# Patient Record
Sex: MALE | Race: WHITE | NOT HISPANIC OR LATINO | Employment: OTHER | URBAN - METROPOLITAN AREA
[De-identification: names, ages, dates, MRNs, and addresses within clinical notes are randomized per-mention and may not be internally consistent; named-entity substitution may affect disease eponyms.]

---

## 2017-01-12 ENCOUNTER — GENERIC CONVERSION - ENCOUNTER (OUTPATIENT)
Dept: OTHER | Facility: OTHER | Age: 82
End: 2017-01-12

## 2017-01-16 ENCOUNTER — GENERIC CONVERSION - ENCOUNTER (OUTPATIENT)
Dept: OTHER | Facility: OTHER | Age: 82
End: 2017-01-16

## 2017-04-25 ENCOUNTER — GENERIC CONVERSION - ENCOUNTER (OUTPATIENT)
Dept: OTHER | Facility: OTHER | Age: 82
End: 2017-04-25

## 2017-05-30 ENCOUNTER — ALLSCRIPTS OFFICE VISIT (OUTPATIENT)
Dept: OTHER | Facility: OTHER | Age: 82
End: 2017-05-30

## 2017-05-30 DIAGNOSIS — R51 HEADACHE(784.0): ICD-10-CM

## 2017-06-01 ENCOUNTER — GENERIC CONVERSION - ENCOUNTER (OUTPATIENT)
Dept: OTHER | Facility: OTHER | Age: 82
End: 2017-06-01

## 2017-06-15 ENCOUNTER — GENERIC CONVERSION - ENCOUNTER (OUTPATIENT)
Dept: OTHER | Facility: OTHER | Age: 82
End: 2017-06-15

## 2017-06-15 LAB
A/G RATIO (HISTORICAL): 1.8 (ref 1.2–2.2)
ALBUMIN SERPL BCP-MCNC: 4.5 G/DL (ref 3.5–4.7)
ALP SERPL-CCNC: 67 IU/L (ref 39–117)
ALT SERPL W P-5'-P-CCNC: 21 IU/L (ref 0–44)
AST SERPL W P-5'-P-CCNC: 26 IU/L (ref 0–40)
BILIRUB SERPL-MCNC: 0.7 MG/DL (ref 0–1.2)
BUN SERPL-MCNC: 17 MG/DL (ref 8–27)
BUN/CREA RATIO (HISTORICAL): 19 (ref 10–24)
CALCIUM SERPL-MCNC: 9.2 MG/DL (ref 8.6–10.2)
CHLORIDE SERPL-SCNC: 95 MMOL/L (ref 96–106)
CO2 SERPL-SCNC: 24 MMOL/L (ref 18–29)
CREAT SERPL-MCNC: 0.89 MG/DL (ref 0.76–1.27)
DEPRECATED RDW RBC AUTO: 13.6 % (ref 12.3–15.4)
EGFR AFRICAN AMERICAN (HISTORICAL): 91 ML/MIN/1.73
EGFR-AMERICAN CALC (HISTORICAL): 79 ML/MIN/1.73
ERYTHROCYTE SEDIMENTATION RATE (HISTORICAL): 6 MM/HR (ref 0–30)
GLUCOSE SERPL-MCNC: 110 MG/DL (ref 65–99)
HCT VFR BLD AUTO: 40.2 % (ref 37.5–51)
HGB BLD-MCNC: 13.5 G/DL (ref 12.6–17.7)
MCH RBC QN AUTO: 31.7 PG (ref 26.6–33)
MCHC RBC AUTO-ENTMCNC: 33.6 G/DL (ref 31.5–35.7)
MCV RBC AUTO: 94 FL (ref 79–97)
POTASSIUM SERPL-SCNC: 4.4 MMOL/L (ref 3.5–5.2)
RBC (HISTORICAL): 4.26 X10E6/UL (ref 4.14–5.8)
SODIUM SERPL-SCNC: 135 MMOL/L (ref 134–144)
TOT. GLOBULIN, SERUM (HISTORICAL): 2.5 G/DL (ref 1.5–4.5)
TOTAL PROTEIN (HISTORICAL): 7 G/DL (ref 6–8.5)
WBC # BLD AUTO: 5.5 X10E3/UL (ref 3.4–10.8)

## 2017-07-17 ENCOUNTER — GENERIC CONVERSION - ENCOUNTER (OUTPATIENT)
Dept: OTHER | Facility: OTHER | Age: 82
End: 2017-07-17

## 2017-12-05 ENCOUNTER — GENERIC CONVERSION - ENCOUNTER (OUTPATIENT)
Dept: FAMILY MEDICINE CLINIC | Facility: HOSPITAL | Age: 82
End: 2017-12-05

## 2017-12-26 ENCOUNTER — HOSPITAL ENCOUNTER (OUTPATIENT)
Dept: RADIOLOGY | Facility: HOSPITAL | Age: 82
Discharge: HOME | End: 2017-12-28
Payer: COMMERCIAL

## 2017-12-28 ENCOUNTER — ALLSCRIPTS OFFICE VISIT (OUTPATIENT)
Dept: OTHER | Facility: OTHER | Age: 82
End: 2017-12-28

## 2017-12-28 DIAGNOSIS — R07.89 OTHER CHEST PAIN: ICD-10-CM

## 2017-12-29 ENCOUNTER — TRANSCRIBE ORDERS (OUTPATIENT)
Dept: ADMINISTRATIVE | Facility: HOSPITAL | Age: 82
End: 2017-12-29

## 2017-12-29 ENCOUNTER — HOSPITAL ENCOUNTER (OUTPATIENT)
Dept: RADIOLOGY | Facility: HOSPITAL | Age: 82
Discharge: HOME | End: 2017-12-29
Attending: INTERNAL MEDICINE
Payer: COMMERCIAL

## 2017-12-29 DIAGNOSIS — R07.89 OTHER CHEST PAIN: ICD-10-CM

## 2017-12-29 PROCEDURE — 71020 HB X-RAY EXAM CHEST 2 VIEWS: CPT

## 2017-12-29 NOTE — PROGRESS NOTES
Assessment   1  Chest pain, atypical (786 59) (R07 89)   2  Artificial pacemaker (V45 01) (Z95 0)    Plan   Chest pain, atypical    · * XR CHEST PA & LATERAL; Status:Active; Requested for:44Ypd9102;     Chief Complaint   PT having chest pain on the right side - goes across chest to pacemaker - started with the placement of pacer  History of Present Illness   HPI: gets pain in the R side of chest-mostly with activity It is in the anterior chest  Pain aching  This could last for < 24 hours  No SOB a/w this   No smoking hx      Review of Systems        Constitutional: no fever or chills, feels well, no tiredness, no recent weight loss or weight gain  Cardiovascular: as noted in HPI  Respiratory: as noted in HPI  Gastrointestinal: no complaints of abdominal pain, no constipation, no nausea or vomiting, no diarrhea or bloody stools  Genitourinary: no complaints of dysuria or incontinence, no hesitancy, no nocturia, no genital lesion, no inadequacy of penile erection  Active Problems   1  Advanced directive placed in chart this admission (V49 89) (Z78 9)   2  Aortic stenosis (424 1) (I35 0)   3  Artificial pacemaker (V45 01) (Z95 0)   4  Chronic nonintractable headache, unspecified headache type (784 0) (R51)   5  Enlarged prostate without lower urinary tract symptoms (luts) (600 00) (N40 0)   6  GERD without esophagitis (530 81) (K21 9)   7  Hypertension (401 9) (I10)   8  Hypothyroidism (244 9) (E03 9)    Social History    · Cultural background   · non-   · Former smoker (V15 82) (R06 862)   · Quit 50-60 years ago   · Lives with wife   · Living Situation: Supportive and safe   · Living will on file (I72 46) (Z28 061)   ·    · Primary language is English   · Racial background   · white    Current Meds    1  Aspirin EC 81 MG Oral Tablet Delayed Release; TAKE 1 TABLET DAILY AS DIRECTED; Therapy: 29Bvd0355 to (Evaluate:06Apr2017) Recorded   2   Fish Oil 1200 MG Oral Capsule; take 1 capsule daily; Therapy: (Recorded:14Oct2014) to Recorded   3  Glucosamine 1500 Complex Oral Capsule; TAKE 1 CAPSULE DAILY; Therapy: (Recorded:14Oct2014) to Recorded   4  Levothyroxine Sodium 50 MCG Oral Tablet; TAKE ONE TABLET BY MOUTH ONCE DAILY; Therapy: 43SHY6664 to (Evaluate:62Vxt7345)  Requested for: 36WGM4300; Last     Rx:36Yme2646 Ordered   5  Metoprolol Tartrate 100 MG Oral Tablet; Take 1 tablet twice daily; Therapy: 36Ouo2668 to (Evaluate:91Avp2107) Recorded   6  Multivitamins CAPS; TAKE 1 CAPSULE DAILY; Therapy: (Recorded:33Kck1091) to Recorded   7  Tamsulosin HCl - 0 4 MG Oral Capsule; take 1 capsule daily; Therapy: 00JUO7997 to Recorded   8  Vitamin B12 TR 1000 MCG Oral Tablet Extended Release; TAKE 1 TABLET DAILY AS     DIRECTED; Therapy: (Recorded:14Oct2014) to Recorded   9  Vitamin C Plus 500 MG Oral Tablet; TAKE 1 TABLET DAILY; Therapy: (Recorded:14Oct2014) to Recorded     The medication list was reviewed and updated today  Allergies   1  Augmentin TABS  2  No Known Environmental Allergies   3  No Known Food Allergies    Vitals    Recorded: 25Ifn9382 09:19AM Recorded: 62Yvg5512 09:03AM   Heart Rate  75   Systolic 723 991, LUE, Sitting   Diastolic 85 90, LUE, Sitting   Height  5 ft 8 in   Weight  178 lb    BMI Calculated  27 07   BSA Calculated  1 95   O2 Saturation  98     Physical Exam        Constitutional      General appearance: No acute distress, well appearing and well nourished  Pulmonary there is not chest wall pain  Auscultation of lungs: Clear to auscultation, equal breath sounds bilaterally, no wheezes, no rales, no rhonci  Cardiovascular      Auscultation of heart: Normal rate and rhythm, normal S1 and S2, without murmurs         Examination of extremities for edema and/or varicosities: Normal           Results/Data   PHQ-2 Adult Depression Screening 08Nob9666 09:06AM User, Ahs      Test Name Result Flag Reference   PHQ-2 Adult Depression Score 0     Over the last two weeks, how often have you been bothered by any of the following problems?      Little interest or pleasure in doing things: Not at all - 0     Feeling down, depressed, or hopeless: Not at all - 0   PHQ-2 Adult Depression Screening Negative          Signatures    Electronically signed by : MARILIA Carpio ; Dec 28 2017  9:27AM EST                       (Author)

## 2018-01-03 ENCOUNTER — GENERIC CONVERSION - ENCOUNTER (OUTPATIENT)
Dept: OTHER | Facility: OTHER | Age: 83
End: 2018-01-03

## 2018-01-03 ENCOUNTER — GENERIC CONVERSION - ENCOUNTER (OUTPATIENT)
Dept: FAMILY MEDICINE CLINIC | Facility: HOSPITAL | Age: 83
End: 2018-01-03

## 2018-01-04 ENCOUNTER — GENERIC CONVERSION - ENCOUNTER (OUTPATIENT)
Dept: OTHER | Facility: OTHER | Age: 83
End: 2018-01-04

## 2018-01-14 VITALS
HEIGHT: 68 IN | HEART RATE: 72 BPM | BODY MASS INDEX: 25.77 KG/M2 | WEIGHT: 170.03 LBS | TEMPERATURE: 98.3 F | DIASTOLIC BLOOD PRESSURE: 88 MMHG | SYSTOLIC BLOOD PRESSURE: 150 MMHG

## 2018-01-15 ENCOUNTER — HOSPITAL ENCOUNTER (OUTPATIENT)
Dept: CT IMAGING | Facility: HOSPITAL | Age: 83
Discharge: HOME | End: 2018-01-15
Attending: INTERNAL MEDICINE
Payer: COMMERCIAL

## 2018-01-15 DIAGNOSIS — R07.89 OTHER CHEST PAIN: ICD-10-CM

## 2018-01-15 PROCEDURE — 71260 CT THORAX DX C+: CPT

## 2018-01-15 RX ADMIN — IOHEXOL 85 ML: 350 INJECTION, SOLUTION INTRAVENOUS at 15:15

## 2018-01-15 NOTE — RESULT NOTES
Message   Call, CT is okay however I still want him seen by surgery     Verified Results  * CT ABDOMEN PELVIS W CONTRAST 21Jan2016 04:23PM Nancy Das     Test Name Result Flag Reference   CT ABDOMEN PELVIS W CONTRAST (Report)     CT ABDOMEN AND PELVIS WITH IV CONTRAST     INDICATION: Mid to lower pelvic and right groin region discomfort for 3 weeks  History of hernia surgery  COMPARISON: 7/2/2010     TECHNIQUE: CT examination of the abdomen and pelvis was performed after the administration of intravenous contrast  Examination was performed utilizing techniques to minimize radiation dose, including the use of dose reduction software  Axial,    sagittal, and coronal reformatted images were submitted for interpretation  100 cc of intravenous Omnipaque 350 was administered for this examination  Enteric contrast was given  FINDINGS:     ABDOMEN     LOWER CHEST: No significant abnormalities identified in the lower chest      LIVER/BILIARY TREE: Unremarkable  GALLBLADDER: No calcified gallstones  No pericholecystic inflammatory change  SPLEEN: Unremarkable  PANCREAS: Unremarkable  ADRENAL GLANDS: Unremarkable  KIDNEYS/URETERS: Unremarkable  No hydronephrosis  STOMACH AND BOWEL: There is colonic diverticulosis without evidence of acute diverticulitis  APPENDIX: No findings to suggest appendicitis  ABDOMINOPELVIC CAVITY: No ascites or free intraperitoneal air  No lymphadenopathy  VESSELS: Unremarkable for patient's age  PELVIS     REPRODUCTIVE ORGANS: Unremarkable for patient's age  URINARY BLADDER: Unremarkable  ABDOMINAL WALL/INGUINAL REGIONS: Unremarkable  OSSEOUS STRUCTURES: No acute fracture or destructive osseous lesion  IMPRESSION:     No acute inflammatory process identified in the abdomen or pelvis  Signed by:    Zak Delvalle MD   1/22/16

## 2018-01-16 ENCOUNTER — GENERIC CONVERSION - ENCOUNTER (OUTPATIENT)
Dept: OTHER | Facility: OTHER | Age: 83
End: 2018-01-16

## 2018-01-23 VITALS
SYSTOLIC BLOOD PRESSURE: 142 MMHG | HEIGHT: 68 IN | OXYGEN SATURATION: 98 % | HEART RATE: 75 BPM | WEIGHT: 178 LBS | BODY MASS INDEX: 26.98 KG/M2 | DIASTOLIC BLOOD PRESSURE: 85 MMHG

## 2018-01-23 NOTE — RESULT NOTES
Verified Results  * XR CHEST PA & LATERAL 57WYK9093 11:29AM Rajiv Babin Order Number: [id number]     Test Name Result Flag Reference   XR CHEST PA & LATERAL (Report)     CHEST - DUAL ENERGY     INDICATION: Anterior chest pain  COMPARISON: 7/27/2015     VIEWS: PA (including soft tissue/bone algorithms) and lateral projections     IMAGES: 4     FINDINGS: Left chest wall pacemaker and pacemaker leads project appropriately  Cardiomediastinal silhouette appears unremarkable  The lungs are clear  No pneumothorax or pleural effusion  Stable calcified granuloma within the left lung base  Visualized osseous structures appear within normal limits for the patient's age  IMPRESSION:     No active pulmonary disease         Workstation performed: [id number]     Signed by:   Kaylen Laguerre DO   1/3/18

## 2018-01-23 NOTE — RESULT NOTES
Verified Results  CT CHEST W CONTRAST [id number] 02:48PM Tima Wilder Order Number: [id number]   Performing Comments: r/o mediastinal process   - Patient Instructions: To schedule this appointment, please contact Central Scheduling at [phone]  Test Name Result Flag Reference   CT CHEST W CONTRAST (Report)     CT CHEST WITH IV CONTRAST     INDICATION: Chest pain     COMPARISON: Chest radiograph 12/29/2017     TECHNIQUE: CT examination of the chest was performed  Reformatted images were created in axial, sagittal, and coronal planes  Radiation dose length product (DLP) for this visit: 348 49 mGy-cm   This examination, like all CT scans performed in the [facility], was performed utilizing techniques to minimize radiation dose exposure, including the use of iterative   reconstruction and automated exposure control  IV Contrast: 85 mL of iohexol (OMNIPAQUE)          FINDINGS:     LUNGS: There are scattered calcified subcentimeter nodules bilaterally  No suspicious nodule or mass  No endobronchial lesion  No focal consolidation  PLEURA: Unremarkable  HEART/GREAT VESSELS: There is enlargement of the ascending aorta measuring 4 1 cm at the level of the right pulmonary artery  The heart is normal in size without pericardial mass or effusion  There are atherosclerotic changes of the aorta and coronary   arteries  MEDIASTINUM AND BERRY: There are calcified left hilar lymph nodes present  CHEST WALL AND LOWER NECK:    There is a left-sided pacer generator device  VISUALIZED STRUCTURES IN THE UPPER ABDOMEN: Unremarkable  OSSEOUS STRUCTURES: No acute fracture  No destructive osseous lesion  IMPRESSION:   1  Calcified pulmonary nodules and left hilar lymph nodes compatible with old granulomatous disease  2  No suspicious pulmonary nodule or mass  3  Mild enlargement of the ascending aorta measuring 4 1 cm at the level of the right pulmonary artery  Workstation performed: [id number]     Signed by:   Windy Ambriz MD   1/16/18

## 2018-07-09 DIAGNOSIS — E03.9 ACQUIRED HYPOTHYROIDISM: Primary | ICD-10-CM

## 2018-07-09 RX ORDER — LEVOTHYROXINE SODIUM 0.05 MG/1
TABLET ORAL
Qty: 90 TABLET | Refills: 3 | Status: SHIPPED | OUTPATIENT
Start: 2018-07-09 | End: 2018-11-09

## 2018-10-05 ENCOUNTER — OFFICE VISIT (OUTPATIENT)
Dept: FAMILY MEDICINE CLINIC | Facility: HOSPITAL | Age: 83
End: 2018-10-05
Payer: COMMERCIAL

## 2018-10-05 VITALS
HEART RATE: 78 BPM | SYSTOLIC BLOOD PRESSURE: 138 MMHG | HEIGHT: 70 IN | BODY MASS INDEX: 24.55 KG/M2 | WEIGHT: 171.5 LBS | DIASTOLIC BLOOD PRESSURE: 80 MMHG

## 2018-10-05 DIAGNOSIS — Z23 NEED FOR VACCINATION WITH 13-POLYVALENT PNEUMOCOCCAL CONJUGATE VACCINE: ICD-10-CM

## 2018-10-05 DIAGNOSIS — I35.0 NONRHEUMATIC AORTIC VALVE STENOSIS: ICD-10-CM

## 2018-10-05 DIAGNOSIS — E03.9 ACQUIRED HYPOTHYROIDISM: ICD-10-CM

## 2018-10-05 DIAGNOSIS — Z23 NEED FOR INFLUENZA VACCINATION: ICD-10-CM

## 2018-10-05 DIAGNOSIS — Z00.00 MEDICARE ANNUAL WELLNESS VISIT, SUBSEQUENT: Primary | ICD-10-CM

## 2018-10-05 DIAGNOSIS — N40.0 BENIGN PROSTATIC HYPERPLASIA, UNSPECIFIED WHETHER LOWER URINARY TRACT SYMPTOMS PRESENT: Primary | ICD-10-CM

## 2018-10-05 DIAGNOSIS — I10 ESSENTIAL HYPERTENSION: ICD-10-CM

## 2018-10-05 DIAGNOSIS — R07.9 RIGHT-SIDED CHEST PAIN: ICD-10-CM

## 2018-10-05 DIAGNOSIS — K21.9 GERD WITHOUT ESOPHAGITIS: ICD-10-CM

## 2018-10-05 PROBLEM — G89.29 CHRONIC NONINTRACTABLE HEADACHE: Status: ACTIVE | Noted: 2017-05-30

## 2018-10-05 PROBLEM — R51.9 CHRONIC NONINTRACTABLE HEADACHE: Status: ACTIVE | Noted: 2017-05-30

## 2018-10-05 PROCEDURE — 90662 IIV NO PRSV INCREASED AG IM: CPT

## 2018-10-05 PROCEDURE — 90670 PCV13 VACCINE IM: CPT

## 2018-10-05 PROCEDURE — G0439 PPPS, SUBSEQ VISIT: HCPCS | Performed by: INTERNAL MEDICINE

## 2018-10-05 PROCEDURE — 1125F AMNT PAIN NOTED PAIN PRSNT: CPT

## 2018-10-05 PROCEDURE — 99213 OFFICE O/P EST LOW 20 MIN: CPT | Performed by: INTERNAL MEDICINE

## 2018-10-05 PROCEDURE — 1170F FXNL STATUS ASSESSED: CPT

## 2018-10-05 PROCEDURE — G0009 ADMIN PNEUMOCOCCAL VACCINE: HCPCS

## 2018-10-05 PROCEDURE — G0008 ADMIN INFLUENZA VIRUS VAC: HCPCS

## 2018-10-05 RX ORDER — METOPROLOL TARTRATE 100 MG/1
1 TABLET ORAL 2 TIMES DAILY
COMMUNITY
Start: 2016-12-27 | End: 2019-07-25 | Stop reason: SDUPTHER

## 2018-10-05 RX ORDER — MULTIVITAMIN
1 CAPSULE ORAL DAILY
COMMUNITY

## 2018-10-05 RX ORDER — TAMSULOSIN HYDROCHLORIDE 0.4 MG/1
0.4 CAPSULE ORAL DAILY
Qty: 90 CAPSULE | Refills: 3 | Status: SHIPPED | OUTPATIENT
Start: 2018-10-05 | End: 2019-07-01 | Stop reason: SDUPTHER

## 2018-10-05 RX ORDER — MULTIVIT-MIN/IRON/FOLIC ACID/K 18-600-40
1 CAPSULE ORAL DAILY
COMMUNITY

## 2018-10-05 RX ORDER — ECHINACEA 400 MG
1 CAPSULE ORAL DAILY
COMMUNITY

## 2018-10-05 RX ORDER — TAMSULOSIN HYDROCHLORIDE 0.4 MG/1
1 CAPSULE ORAL DAILY
COMMUNITY
Start: 2017-05-30 | End: 2018-10-05 | Stop reason: SDUPTHER

## 2018-10-05 RX ORDER — HYDROCORTISONE ACETATE 0.5 %
1 CREAM (GRAM) TOPICAL DAILY
COMMUNITY

## 2018-10-05 RX ORDER — GABAPENTIN 100 MG/1
100 CAPSULE ORAL
Qty: 90 CAPSULE | Refills: 3 | Status: SHIPPED | OUTPATIENT
Start: 2018-10-05 | End: 2018-11-13 | Stop reason: SDUPTHER

## 2018-10-05 RX ORDER — AMOXICILLIN 500 MG
1 CAPSULE ORAL DAILY
COMMUNITY
End: 2022-05-20

## 2018-10-05 NOTE — PROGRESS NOTES
Assessment and Plan:    Problem List Items Addressed This Visit     None      Visit Diagnoses     Medicare annual wellness visit, subsequent    -  Primary        Health Maintenance Due   Topic Date Due    Depression Screening PHQ  10/07/1932    Medicare Annual Wellness Visit (AWV)  10/07/1932    DTaP,Tdap,and Td Vaccines (1 - Tdap) 10/07/1953    Fall Risk  10/07/1997    Pneumococcal PPSV23/PCV13 65+ Years / High and Highest Risk (2 of 2 - PCV13) 10/01/2013    INFLUENZA VACCINE  09/01/2018         HPI:  Soumya Jacob is a 80 y o  male here for his Subsequent Wellness Visit  There is no problem list on file for this patient      Past Medical History:   Diagnosis Date    Arthropathy     multiple sites    Cardiac dysrhythmia     Hepatitis A     Melanoma ([facility] [address] )     malignant of skin     Past Surgical History:   Procedure Laterality Date    INGUINAL HERNIA REPAIR       Family History   Problem Relation Age of Onset    Cancer Family         gastrointestinal tract     History   Smoking Status    Never Smoker   Smokeless Tobacco    Never Used     Comment: quit 50-60 yr ago     History   Alcohol Use No      History   Drug Use No       Current Outpatient Prescriptions   Medication Sig Dispense Refill    Ascorbic Acid (VITAMIN C) 500 MG CAPS Take 1 tablet by mouth daily      aspirin 81 MG tablet Take 1 tablet by mouth daily      Cyanocobalamin (VITAMIN B12) 1000 MCG TBCR Take 1 tablet by mouth daily      Flaxseed, Linseed, (FLAXSEED OIL) 1000 MG CAPS Take 1 capsule by mouth daily      Glucosamine-Chondroit-Vit C-Mn (GLUCOSAMINE 1500 COMPLEX) CAPS Take 1 capsule by mouth daily      levothyroxine 50 mcg tablet TAKE ONE TABLET BY MOUTH ONCE DAILY 90 tablet 3    metoprolol tartrate (LOPRESSOR) 100 mg tablet Take 1 tablet by mouth 2 (two) times a day      Multiple Vitamin (MULTIVITAMIN) capsule Take 1 capsule by mouth daily      Omega-3 Fatty Acids (FISH OIL) 1200 MG CAPS Take 1 capsule by mouth daily      tamsulosin (FLOMAX) 0 4 mg Take 1 capsule by mouth daily       No current facility-administered medications for this visit  Allergies   Allergen Reactions    Amoxicillin Other (See Comments)     Severe weakness     Immunization History   Administered Date(s) Administered    Influenza Split High Dose Preservative Free IM 10/06/2014, 10/19/2015, 10/23/2017    Influenza TIV (IM) 10/01/2012, 10/15/2014    Pneumococcal Polysaccharide PPV23 10/01/2012    Zoster 2008       Patient Care Team:  Nelly Caldwell MD as PCP - General  Nelly Caldwell MD    Medicare Screening Tests and Risk Assessments:  Last Medicare Wellness visit information reviewed, patient interviewed, no change since last AWV  Health Risk Assessment:  Patient rates overall health as very good  Patient feels that their physical health rating is Same  Eyesight was rated as Same  Hearing was rated as Same  Patient feels that their emotional and mental health rating is Same  Pain experienced by patient in the last 7 days has been A lot  Patient's pain rating has been 7/10  Patient states that he has experienced no weight loss or gain in last 6 months  (Additional comments: Since having pacemaker inserted about 2 years ago, he has had chest pain nearly everyday  )    Emotional/Mental Health:  Patient has been feeling nervous/anxious  PHQ-9 Depression Screening:    Frequency of the following problems over the past two weeks:      1  Little interest or pleasure in doing things: 0 - not at all      2  Feeling down, depressed, or hopeless: 1 - several days      3  Trouble falling or staying asleep, or sleeping too much: 3 - nearly every day      4  Feeling tired or having little energy: 3 - nearly every day      5  Poor appetite or overeatin - not at all      6  Feeling bad about yourself - or that you are a failure or have let yourself or your family down: 3 - nearly every day      7   Trouble concentrating on things, such as reading the newspaper or watching television: 0 - not at all      8  Moving or speaking so slowly that other people could have noticed  Or the opposite - being so fidgety or restless that you have been moving around a lot more than usual: 0 - not at all      9  Thoughts that you would be better off dead, or of hurting yourself in some way: 2 - more than half the days  PHQ-2 Score: 1  PHQ-9 Score: 12    Broken Bones/Falls: Fall Risk Assessment:    In the past year, patient has experienced: No history of falling in past year          Bladder/Bowel:  Patient has not leaked urine accidently in the last six months  Patient reports no loss of bowel control  Immunizations:  Patient has had a flu vaccination within the last year  Patient has received a pneumonia shot  Patient has received a shingles shot  Patient has not received tetanus/diphtheria shot  Home Safety:  Patient does not have trouble with stairs inside or outside of their home  Patient currently reports that there are no safety hazards present in home, working smoke alarms, working carbon monoxide detectors  Preventative Screenings:   no prostate cancer screen performed, no colon cancer screen completed, no cholesterol screen completed, glaucoma eye exam completed,     Nutrition:  Current diet: Regular with servings of the following:    Medications:  Patient is currently taking over-the-counter supplements  List of OTC medications includes: Fish Oil, mulitvitamin, [id number], glucosamine, vitamin C  Patient is not able to manage medications  (Additional Comments: His wife manages his medications  )Lifestyle Choices:  Patient reports no tobacco use  Patient has not smoked or used tobacco in the past   Patient reports no alcohol use  Patient drives a vehicle  Patient wears seat belt  Current level of exercise of physical activity described by patient as: Does a lot of physical work around the house          Activities of Daily Living:  Can get out of bed by his or her self, able to dress self, able to make own meals, able to do own shopping, able to bathe self, can do own laundry/housekeeping, can manage own money, pay bills and track expenses    Previous Hospitalizations:  No hospitalization or ED visit in past 12 months        Advanced Directives:  Patient has decided on a power of   Patient has spoken to designated power of   Patient has completed advanced directive  Preventative Screening/Counseling:      Cardiovascular:      General: Screening Not Indicated          Diabetes:      General: Screening Current          Colorectal Cancer:      General: Screening Current          Prostate Cancer:      General: Screening Not Indicated          Osteoporosis:      General: Screening Not Indicated          AAA:      General: Screening Not Indicated          HIV:      General: Screening Not Indicated          Hepatitis C:      General: Screening Not Indicated        Advanced Directives:   Patient has living will for healthcare, has durable POA for healthcare, patient has an advanced directive       Immunizations:  Patient reviewed and up to date      Pneumococcal: Pneumococcal Due Today

## 2018-10-05 NOTE — PROGRESS NOTES
Assessment/Plan:       Diagnoses and all orders for this visit:    Medicare annual wellness visit, subsequent    Need for influenza vaccination  -     influenza vaccine, 9627-6978, high-dose, PF 0 5 mL, for patients 65 yr+ (FLUZONE HIGH-DOSE)    Need for vaccination with 13-polyvalent pneumococcal conjugate vaccine  -     PNEUMOCOCCAL CONJUGATE VACCINE 13-VALENT GREATER THAN 6 MONTHS    Acquired hypothyroidism    Essential hypertension    GERD without esophagitis    Nonrheumatic aortic valve stenosis    Other orders  -     aspirin 81 MG tablet; Take 1 tablet by mouth daily  -     Omega-3 Fatty Acids (FISH OIL) 1200 MG CAPS; Take 1 capsule by mouth daily  -     Glucosamine-Chondroit-Vit C-Mn (GLUCOSAMINE 1500 COMPLEX) CAPS; Take 1 capsule by mouth daily  -     metoprolol tartrate (LOPRESSOR) 100 mg tablet; Take 1 tablet by mouth 2 (two) times a day  -     Multiple Vitamin (MULTIVITAMIN) capsule; Take 1 capsule by mouth daily  -     tamsulosin (FLOMAX) 0 4 mg; Take 1 capsule by mouth daily  -     Cyanocobalamin (VITAMIN B12) 1000 MCG TBCR; Take 1 tablet by mouth daily  -     Ascorbic Acid (VITAMIN C) 500 MG CAPS; Take 1 tablet by mouth daily  -     Flaxseed, Linseed, (FLAXSEED OIL) 1000 MG CAPS; Take 1 capsule by mouth daily          All of the above diagnoses have been assessed  Additional COMMENTS/PLAN: Will see back in 6 weeks with venus ayala  Subjective:      Patient ID: Jm Mckenzie is a 80 y o  male  HPI     CP-Still has R sided CP  Almost every day  When he has pain he can not function  This may be neuropathic  Hypothyroid - Patient denies any symptoms such as heat or cold intolerance, change in hair texture, or change in bowel habits  There has been no significant change in weight  HTN-compliant with meds      The following portions of the patient's history were revised and updated as appropriate: Problem list, allergies, med list, FH, SH, Past medical and surgical histories      Current Outpatient Prescriptions   Medication Sig Dispense Refill    Ascorbic Acid (VITAMIN C) 500 MG CAPS Take 1 tablet by mouth daily      aspirin 81 MG tablet Take 1 tablet by mouth daily      Cyanocobalamin (VITAMIN B12) 1000 MCG TBCR Take 1 tablet by mouth daily      Flaxseed, Linseed, (FLAXSEED OIL) 1000 MG CAPS Take 1 capsule by mouth daily      Glucosamine-Chondroit-Vit C-Mn (GLUCOSAMINE 1500 COMPLEX) CAPS Take 1 capsule by mouth daily      levothyroxine 50 mcg tablet TAKE ONE TABLET BY MOUTH ONCE DAILY 90 tablet 3    metoprolol tartrate (LOPRESSOR) 100 mg tablet Take 1 tablet by mouth 2 (two) times a day      Multiple Vitamin (MULTIVITAMIN) capsule Take 1 capsule by mouth daily      Omega-3 Fatty Acids (FISH OIL) 1200 MG CAPS Take 1 capsule by mouth daily      tamsulosin (FLOMAX) 0 4 mg Take 1 capsule by mouth daily       No current facility-administered medications for this visit  Review of Systems   All other systems reviewed and are negative  Objective:    /90 (BP Location: Left arm, Patient Position: Sitting, Cuff Size: Standard)   Pulse 78   Ht 5' 9 5" (1 765 m)   Wt 77 8 kg (171 lb 8 oz)   BMI 24 96 kg/m²      Physical Exam   Constitutional: He is oriented to person, place, and time  He appears well-developed and well-nourished  HENT:   Bilateral cerumen obstruction flushed out  Neck: No JVD present  Cardiovascular: Normal rate and regular rhythm  No CW pain   Pulmonary/Chest: Effort normal    Abdominal: Soft  Bowel sounds are normal    Musculoskeletal: He exhibits no edema  Neurological: He is alert and oriented to person, place, and time  Skin: Skin is warm and dry  Vitals reviewed  No visits with results within 2 Week(s) from this visit     Latest known visit with results is:   Generic Conversion - Encounter on 06/15/2017   Component Date Value Ref Range Status    ERYTHROCYTE SEDIMENTATION RATE 06/15/2017 6  0 - 30 mm/hr Final    Comment:   Performed at: [address] [address], , [place], [place], [id number], [id number]  MD:  [id number] Mayelin Guevara [phone]      Glucose 06/15/2017 110* 65 - 99 mg/dL Final    BUN 06/15/2017 17  8 - 27 mg/dL Final    Creatinine 06/15/2017 0 89  0 76 - 1 27 mg/dL Final    BUN/CREA Ratio 06/15/2017 19  10 - 24 Final    Sodium 06/15/2017 135  134 - 144 mmol/L Final    Potassium 06/15/2017 4 4  3 5 - 5 2 mmol/L Final    Chloride 06/15/2017 95* 96 - 106 mmol/L Final    CO2 06/15/2017 24  18 - 29 mmol/L Final    Calcium 06/15/2017 9 2  8 6 - 10 2 mg/dL Final    Total Protein 06/15/2017 7 0  6 0 - 8 5 g/dL Final    Albumin 06/15/2017 4 5  3 5 - 4 7 g/dL Final    Tot   Globulin, Serum 06/15/2017 2 5  1 5 - 4 5 g/dL Final    A/G RATIO 06/15/2017 1 8  1 2 - 2 2 Final    Total Bilirubin 06/15/2017 0 7  0 0 - 1 2 mg/dL Final    Alkaline Phosphatase 06/15/2017 67  39 - 117 IU/L Final    AST 06/15/2017 26  0 - 40 IU/L Final    ALT 06/15/2017 21  0 - 44 IU/L Final    EGFR-AMERICAN CALC 06/15/2017 79  >59 mL/min/1 73 Final    eGFR  06/15/2017 91  >59 mL/min/1 73 Final    Comment:   Performed at: [address] [address], , [place], [place], [id number], [id number]  MD:  [id number] Mayelin Guevara [phone]      WBC 06/15/2017 5 5  3 4 - 10 8 x10E3/uL Final    RBC 06/15/2017 4 26  4 14 - 5 80 x10E6/uL Final    Hemoglobin 06/15/2017 13 5  12 6 - 17 7 g/dL Final    Hematocrit 06/15/2017 40 2  37 5 - 51 0 % Final    MCV 06/15/2017 94  79 - 97 fL Final    MCH 06/15/2017 31 7  26 6 - 33 0 pg Final    MCHC 06/15/2017 33 6  31 5 - 35 7 g/dL Final    RDW 06/15/2017 13 6  12 3 - 15 4 % Final    Comment:   Performed at: [address] [place] [address], Magaly Gonsalves, , [place], [place], [id number], [id number]  MD:  Magaly Lundy [phone]           Chris Lizarraga MD

## 2018-10-15 ENCOUNTER — TELEPHONE (OUTPATIENT)
Dept: FAMILY MEDICINE CLINIC | Facility: HOSPITAL | Age: 83
End: 2018-10-15

## 2018-11-07 LAB — TSH SERPL DL<=0.005 MIU/L-ACNC: 7.01 UIU/ML (ref 0.45–4.5)

## 2018-11-09 DIAGNOSIS — E03.9 HYPOTHYROIDISM, UNSPECIFIED TYPE: Primary | ICD-10-CM

## 2018-11-09 DIAGNOSIS — E03.9 ACQUIRED HYPOTHYROIDISM: ICD-10-CM

## 2018-11-09 RX ORDER — LEVOTHYROXINE SODIUM 0.1 MG/1
100 TABLET ORAL DAILY
Qty: 30 TABLET | Refills: 3 | Status: CANCELLED | OUTPATIENT
Start: 2018-11-09

## 2018-11-13 ENCOUNTER — OFFICE VISIT (OUTPATIENT)
Dept: FAMILY MEDICINE CLINIC | Facility: HOSPITAL | Age: 83
End: 2018-11-13
Payer: COMMERCIAL

## 2018-11-13 VITALS
BODY MASS INDEX: 25.05 KG/M2 | DIASTOLIC BLOOD PRESSURE: 78 MMHG | WEIGHT: 175 LBS | HEART RATE: 81 BPM | HEIGHT: 70 IN | SYSTOLIC BLOOD PRESSURE: 130 MMHG

## 2018-11-13 DIAGNOSIS — R07.9 RIGHT-SIDED CHEST PAIN: ICD-10-CM

## 2018-11-13 DIAGNOSIS — E03.9 ACQUIRED HYPOTHYROIDISM: Primary | ICD-10-CM

## 2018-11-13 PROCEDURE — 99213 OFFICE O/P EST LOW 20 MIN: CPT | Performed by: INTERNAL MEDICINE

## 2018-11-13 PROCEDURE — 1036F TOBACCO NON-USER: CPT | Performed by: INTERNAL MEDICINE

## 2018-11-13 PROCEDURE — 1160F RVW MEDS BY RX/DR IN RCRD: CPT | Performed by: INTERNAL MEDICINE

## 2018-11-13 PROCEDURE — 4040F PNEUMOC VAC/ADMIN/RCVD: CPT | Performed by: INTERNAL MEDICINE

## 2018-11-13 RX ORDER — GABAPENTIN 100 MG/1
100 CAPSULE ORAL DAILY
Qty: 90 CAPSULE | Refills: 0 | Status: SHIPPED | OUTPATIENT
Start: 2018-11-13 | End: 2019-03-05 | Stop reason: DRUGHIGH

## 2018-11-13 RX ORDER — LEVOTHYROXINE SODIUM 0.1 MG/1
100 TABLET ORAL DAILY
COMMUNITY
End: 2018-11-13 | Stop reason: SDUPTHER

## 2018-11-13 RX ORDER — LEVOTHYROXINE SODIUM 0.1 MG/1
100 TABLET ORAL DAILY
Qty: 90 TABLET | Refills: 3 | Status: SHIPPED | OUTPATIENT
Start: 2018-11-13 | End: 2019-12-06 | Stop reason: SDUPTHER

## 2018-11-13 NOTE — PROGRESS NOTES
Assessment/Plan:       Diagnoses and all orders for this visit:    Acquired hypothyroidism  -     levothyroxine 100 mcg tablet; Take 1 tablet (100 mcg total) by mouth daily    Right-sided chest pain    Other orders  -     Discontinue: levothyroxine 100 mcg tablet; Take 100 mcg by mouth daily          All of the above diagnoses have been assessed  Additional COMMENTS/PLAN: It appears that the pain on the r side of chest is neuropathic  Also some costochondral       Subjective:      Patient ID: Lauren Rojo is a 80 y o  male  HPI     R sided CP-this is better on the gabapentin  Gets some different discomfort after working outside  Does not get any pain when actually exerting self  The following portions of the patient's history were revised and updated as appropriate: Problem list, allergies, med list, FH, SH, Past medical and surgical histories  Current Outpatient Prescriptions   Medication Sig Dispense Refill    Ascorbic Acid (VITAMIN C) 500 MG CAPS Take 1 tablet by mouth daily      aspirin 81 MG tablet Take 1 tablet by mouth daily      Cyanocobalamin (VITAMIN B12) 1000 MCG TBCR Take 1 tablet by mouth daily      Flaxseed, Linseed, (FLAXSEED OIL) 1000 MG CAPS Take 1 capsule by mouth daily      gabapentin (NEURONTIN) 100 mg capsule Take 1 capsule (100 mg total) by mouth titrated One daily for 1 week 1 twice a day for 1 week then 1 3 times a day   90 capsule 3    Glucosamine-Chondroit-Vit C-Mn (GLUCOSAMINE 1500 COMPLEX) CAPS Take 1 capsule by mouth daily      levothyroxine 100 mcg tablet Take 1 tablet (100 mcg total) by mouth daily 90 tablet 3    metoprolol tartrate (LOPRESSOR) 100 mg tablet Take 1 tablet by mouth 2 (two) times a day      Multiple Vitamin (MULTIVITAMIN) capsule Take 1 capsule by mouth daily      Omega-3 Fatty Acids (FISH OIL) 1200 MG CAPS Take 1 capsule by mouth daily      tamsulosin (FLOMAX) 0 4 mg Take 1 capsule (0 4 mg total) by mouth daily 90 capsule 3     No current facility-administered medications for this visit  Review of Systems   All other systems reviewed and are negative  Objective:    /78 (BP Location: Left arm, Patient Position: Sitting, Cuff Size: Standard)   Pulse 81   Ht 5' 9 5" (1 765 m)   Wt 79 4 kg (175 lb)   BMI 25 47 kg/m²      Physical Exam   Constitutional: He appears well-developed and well-nourished  Cardiovascular: Normal rate and regular rhythm  Has some costochondral pain   Pulmonary/Chest: Effort normal and breath sounds normal    Musculoskeletal: He exhibits no edema  Vitals reviewed          Orders Only on 11/06/2018   Component Date Value Ref Range Status    TSH 11/06/2018 7 010* 0 450 - 4 500 uIU/mL Final         Farrukh Aguila MD

## 2019-03-05 ENCOUNTER — OFFICE VISIT (OUTPATIENT)
Dept: FAMILY MEDICINE CLINIC | Facility: HOSPITAL | Age: 84
End: 2019-03-05
Payer: COMMERCIAL

## 2019-03-05 VITALS
DIASTOLIC BLOOD PRESSURE: 80 MMHG | BODY MASS INDEX: 24.98 KG/M2 | HEIGHT: 70 IN | WEIGHT: 174.5 LBS | SYSTOLIC BLOOD PRESSURE: 140 MMHG | HEART RATE: 81 BPM

## 2019-03-05 DIAGNOSIS — I10 ESSENTIAL HYPERTENSION: Primary | ICD-10-CM

## 2019-03-05 DIAGNOSIS — R07.9 LEFT SIDED CHEST PAIN: ICD-10-CM

## 2019-03-05 DIAGNOSIS — I35.0 NONRHEUMATIC AORTIC VALVE STENOSIS: ICD-10-CM

## 2019-03-05 PROCEDURE — 99214 OFFICE O/P EST MOD 30 MIN: CPT | Performed by: INTERNAL MEDICINE

## 2019-03-05 PROCEDURE — 1036F TOBACCO NON-USER: CPT | Performed by: INTERNAL MEDICINE

## 2019-03-05 RX ORDER — GABAPENTIN 100 MG/1
100 CAPSULE ORAL DAILY PRN
COMMUNITY
End: 2021-06-16 | Stop reason: ALTCHOICE

## 2019-03-05 RX ORDER — LISINOPRIL 5 MG/1
TABLET ORAL
COMMUNITY
Start: 2019-02-07 | End: 2019-03-28 | Stop reason: DRUGHIGH

## 2019-03-05 NOTE — PROGRESS NOTES
Assessment/Plan:       Diagnoses and all orders for this visit:    Essential hypertension    Nonrheumatic aortic valve stenosis  -     Echo complete with contrast if indicated; Future  -     Ambulatory referral to Cardiology; Future    Left sided chest pain  -     Ambulatory referral to Cardiology; Future    Other orders  -     gabapentin (NEURONTIN) 100 mg capsule; Take 100 mg by mouth daily  -     lisinopril (ZESTRIL) 5 mg tablet          All of the above diagnoses have been assessed  Additional COMMENTS/PLAN: will refer to  card  Will get fresh echo  Subjective:      Patient ID: Kelin Smith is a 80 y o  male  HPI     Having pain now on the left side of chest-this started a few months ago  This is in area of pacer  Restarted gabapentin which helped for the R sided pain and it did not help  Gets lighted when standing up  The following portions of the patient's history were revised and updated as appropriate: Problem list, allergies, med list, FH, SH, Past medical and surgical histories      Current Outpatient Medications   Medication Sig Dispense Refill    Ascorbic Acid (VITAMIN C) 500 MG CAPS Take 1 tablet by mouth daily      aspirin 81 MG tablet Take 1 tablet by mouth daily      Cyanocobalamin (VITAMIN B12) 1000 MCG TBCR Take 1 tablet by mouth daily      Flaxseed, Linseed, (FLAXSEED OIL) 1000 MG CAPS Take 1 capsule by mouth daily      gabapentin (NEURONTIN) 100 mg capsule Take 100 mg by mouth daily      Glucosamine-Chondroit-Vit C-Mn (GLUCOSAMINE 1500 COMPLEX) CAPS Take 1 capsule by mouth daily      levothyroxine 100 mcg tablet Take 1 tablet (100 mcg total) by mouth daily 90 tablet 3    lisinopril (ZESTRIL) 5 mg tablet       metoprolol tartrate (LOPRESSOR) 100 mg tablet Take 1 tablet by mouth 2 (two) times a day      Multiple Vitamin (MULTIVITAMIN) capsule Take 1 capsule by mouth daily      Omega-3 Fatty Acids (FISH OIL) 1200 MG CAPS Take 1 capsule by mouth daily      tamsulosin (FLOMAX) 0 4 mg Take 1 capsule (0 4 mg total) by mouth daily 90 capsule 3     No current facility-administered medications for this visit  Review of Systems   All other systems reviewed and are negative  Objective:    /80   Pulse 81   Ht 5' 9 5" (1 765 m)   Wt 79 2 kg (174 lb 8 oz)   BMI 25 40 kg/m²   No tilt   Physical Exam   Constitutional: He appears well-developed and well-nourished  Neck: No thyromegaly present  Cardiovascular: Normal rate and regular rhythm  Murmur (2/6) heard  Some tenderness at pacer site  Pulmonary/Chest: Effort normal and breath sounds normal    Musculoskeletal: He exhibits no edema  Vitals reviewed  No visits with results within 2 Week(s) from this visit     Latest known visit with results is:   Orders Only on 11/06/2018   Component Date Value Ref Range Status    TSH 11/06/2018 7 010* 0 450 - 4 500 uIU/mL Final         Zachary Castaneda MD

## 2019-03-11 ENCOUNTER — OFFICE VISIT (OUTPATIENT)
Dept: FAMILY MEDICINE CLINIC | Facility: HOSPITAL | Age: 84
End: 2019-03-11
Payer: COMMERCIAL

## 2019-03-11 VITALS
SYSTOLIC BLOOD PRESSURE: 130 MMHG | BODY MASS INDEX: 24.91 KG/M2 | HEART RATE: 79 BPM | HEIGHT: 70 IN | WEIGHT: 174 LBS | DIASTOLIC BLOOD PRESSURE: 80 MMHG

## 2019-03-11 DIAGNOSIS — S60.00XA TRAUMATIC HEMATOMA OF FINGER, INITIAL ENCOUNTER: Primary | ICD-10-CM

## 2019-03-11 PROCEDURE — 1160F RVW MEDS BY RX/DR IN RCRD: CPT | Performed by: INTERNAL MEDICINE

## 2019-03-11 PROCEDURE — 99212 OFFICE O/P EST SF 10 MIN: CPT | Performed by: INTERNAL MEDICINE

## 2019-03-11 NOTE — PROGRESS NOTES
Assessment/Plan:       Diagnoses and all orders for this visit:    Traumatic hematoma of finger, initial encounter  Comments:  Either Raynaud's or trauma  All of the above diagnoses have been assessed  Additional COMMENTS/PLAN: will call for update in 1 week      Subjective:      Patient ID: Clarice Crain is a 80 y o  male  HPI     Patient's as stacking wood had heavy duty gloves on  Notice some numbness at the tip of his left 3rd finger  When he took it off was noted to be black and blue in the middle portion of the finger  Was the tip  The tip did seem quite white like it was frozen  The following portions of the patient's history were revised and updated as appropriate: Problem list, allergies, med list, FH, SH, Past medical and surgical histories  Current Outpatient Medications   Medication Sig Dispense Refill    Ascorbic Acid (VITAMIN C) 500 MG CAPS Take 1 tablet by mouth daily      aspirin 81 MG tablet Take 1 tablet by mouth daily      Cyanocobalamin (VITAMIN B12) 1000 MCG TBCR Take 1 tablet by mouth daily      Flaxseed, Linseed, (FLAXSEED OIL) 1000 MG CAPS Take 1 capsule by mouth daily      gabapentin (NEURONTIN) 100 mg capsule Take 100 mg by mouth daily      Glucosamine-Chondroit-Vit C-Mn (GLUCOSAMINE 1500 COMPLEX) CAPS Take 1 capsule by mouth daily      levothyroxine 100 mcg tablet Take 1 tablet (100 mcg total) by mouth daily 90 tablet 3    lisinopril (ZESTRIL) 5 mg tablet       metoprolol tartrate (LOPRESSOR) 100 mg tablet Take 1 tablet by mouth 2 (two) times a day      Multiple Vitamin (MULTIVITAMIN) capsule Take 1 capsule by mouth daily      Omega-3 Fatty Acids (FISH OIL) 1200 MG CAPS Take 1 capsule by mouth daily      tamsulosin (FLOMAX) 0 4 mg Take 1 capsule (0 4 mg total) by mouth daily 90 capsule 3     No current facility-administered medications for this visit  Review of Systems   All other systems reviewed and are negative          Objective:    BP 130/80 (BP Location: Left arm, Patient Position: Sitting, Cuff Size: Standard)   Pulse 79   Ht 5' 9 5" (1 765 m)   Wt 78 9 kg (174 lb)   BMI 25 33 kg/m²      Physical Exam   Musculoskeletal:   Left finger shows bluish discoloration from the PIP to the DI P joint  This is the 3rd finger  There is no change in temperature of any finger and there is distal tip is unchanged  No visits with results within 2 Week(s) from this visit     Latest known visit with results is:   Orders Only on 11/06/2018   Component Date Value Ref Range Status    TSH 11/06/2018 7 010* 0 450 - 4 500 uIU/mL Final         Elis Reynolds MD

## 2019-03-18 ENCOUNTER — HOSPITAL ENCOUNTER (OUTPATIENT)
Dept: NON INVASIVE DIAGNOSTICS | Facility: HOSPITAL | Age: 84
Discharge: HOME | End: 2019-03-18
Attending: INTERNAL MEDICINE
Payer: COMMERCIAL

## 2019-03-18 DIAGNOSIS — I35.0 NONRHEUMATIC AORTIC VALVE STENOSIS: ICD-10-CM

## 2019-03-18 PROCEDURE — 93306 TTE W/DOPPLER COMPLETE: CPT

## 2019-03-18 PROCEDURE — 93306 TTE W/DOPPLER COMPLETE: CPT | Performed by: INTERNAL MEDICINE

## 2019-03-25 ENCOUNTER — APPOINTMENT (EMERGENCY)
Dept: RADIOLOGY | Facility: HOSPITAL | Age: 84
End: 2019-03-25
Payer: COMMERCIAL

## 2019-03-25 ENCOUNTER — HOSPITAL ENCOUNTER (EMERGENCY)
Facility: HOSPITAL | Age: 84
Discharge: HOME | End: 2019-03-25
Attending: EMERGENCY MEDICINE | Admitting: EMERGENCY MEDICINE
Payer: COMMERCIAL

## 2019-03-25 ENCOUNTER — APPOINTMENT (EMERGENCY)
Dept: CT IMAGING | Facility: HOSPITAL | Age: 84
End: 2019-03-25
Payer: COMMERCIAL

## 2019-03-25 VITALS
RESPIRATION RATE: 20 BRPM | OXYGEN SATURATION: 96 % | DIASTOLIC BLOOD PRESSURE: 91 MMHG | HEART RATE: 69 BPM | SYSTOLIC BLOOD PRESSURE: 158 MMHG | TEMPERATURE: 84 F | BODY MASS INDEX: 24.34 KG/M2 | HEIGHT: 70 IN | WEIGHT: 170 LBS

## 2019-03-25 DIAGNOSIS — R55 NEAR SYNCOPE: Primary | ICD-10-CM

## 2019-03-25 LAB
ALBUMIN SERPL BCP-MCNC: 3.9 G/DL (ref 3.5–5)
ALP SERPL-CCNC: 79 U/L (ref 46–116)
ALT SERPL W P-5'-P-CCNC: 49 U/L (ref 12–78)
ANION GAP SERPL CALCULATED.3IONS-SCNC: 7 MMOL/L (ref 4–13)
AST SERPL W P-5'-P-CCNC: 35 U/L (ref 5–45)
ATRIAL RATE: 75 BPM
ATRIAL RATE: 75 BPM
BASOPHILS # BLD AUTO: 0.04 THOUSANDS/ΜL (ref 0–0.1)
BASOPHILS NFR BLD AUTO: 1 % (ref 0–1)
BILIRUB SERPL-MCNC: 0.7 MG/DL (ref 0.2–1)
BILIRUB UR QL STRIP: NEGATIVE
BUN SERPL-MCNC: 14 MG/DL (ref 5–25)
CALCIUM SERPL-MCNC: 9.4 MG/DL (ref 8.3–10.1)
CHLORIDE SERPL-SCNC: 98 MMOL/L (ref 100–108)
CLARITY UR: CLEAR
CO2 SERPL-SCNC: 30 MMOL/L (ref 21–32)
COLOR UR: YELLOW
CREAT SERPL-MCNC: 0.87 MG/DL (ref 0.6–1.3)
EOSINOPHIL # BLD AUTO: 0.24 THOUSAND/ΜL (ref 0–0.61)
EOSINOPHIL NFR BLD AUTO: 4 % (ref 0–6)
ERYTHROCYTE [DISTWIDTH] IN BLOOD BY AUTOMATED COUNT: 12.8 % (ref 11.6–15.1)
GFR SERPL CREATININE-BSD FRML MDRD: 78 ML/MIN/1.73SQ M
GLUCOSE SERPL-MCNC: 103 MG/DL (ref 65–140)
GLUCOSE UR STRIP-MCNC: NEGATIVE MG/DL
HCT VFR BLD AUTO: 40.8 % (ref 36.5–49.3)
HGB BLD-MCNC: 13.9 G/DL (ref 12–17)
HGB UR QL STRIP.AUTO: NEGATIVE
IMM GRANULOCYTES # BLD AUTO: 0.03 THOUSAND/UL (ref 0–0.2)
IMM GRANULOCYTES NFR BLD AUTO: 1 % (ref 0–2)
KETONES UR STRIP-MCNC: NEGATIVE MG/DL
LEUKOCYTE ESTERASE UR QL STRIP: NEGATIVE
LYMPHOCYTES # BLD AUTO: 1.75 THOUSANDS/ΜL (ref 0.6–4.47)
LYMPHOCYTES NFR BLD AUTO: 31 % (ref 14–44)
MAGNESIUM SERPL-MCNC: 2.1 MG/DL (ref 1.6–2.6)
MCH RBC QN AUTO: 31.4 PG (ref 26.8–34.3)
MCHC RBC AUTO-ENTMCNC: 34.1 G/DL (ref 31.4–37.4)
MCV RBC AUTO: 92 FL (ref 82–98)
MONOCYTES # BLD AUTO: 0.7 THOUSAND/ΜL (ref 0.17–1.22)
MONOCYTES NFR BLD AUTO: 12 % (ref 4–12)
NEUTROPHILS # BLD AUTO: 2.92 THOUSANDS/ΜL (ref 1.85–7.62)
NEUTS SEG NFR BLD AUTO: 51 % (ref 43–75)
NITRITE UR QL STRIP: NEGATIVE
NRBC BLD AUTO-RTO: 0 /100 WBCS
P AXIS: 58 DEGREES
PH UR STRIP.AUTO: 7 [PH]
PLATELET # BLD AUTO: 270 THOUSANDS/UL (ref 149–390)
PMV BLD AUTO: 10.3 FL (ref 8.9–12.7)
POTASSIUM SERPL-SCNC: 3.9 MMOL/L (ref 3.5–5.3)
PR INTERVAL: 242 MS
PR INTERVAL: 268 MS
PROT SERPL-MCNC: 8.1 G/DL (ref 6.4–8.2)
PROT UR STRIP-MCNC: NEGATIVE MG/DL
QRS AXIS: -51 DEGREES
QRS AXIS: -60 DEGREES
QRSD INTERVAL: 108 MS
QRSD INTERVAL: 110 MS
QT INTERVAL: 376 MS
QT INTERVAL: 376 MS
QTC INTERVAL: 419 MS
QTC INTERVAL: 419 MS
RBC # BLD AUTO: 4.42 MILLION/UL (ref 3.88–5.62)
SODIUM SERPL-SCNC: 135 MMOL/L (ref 136–145)
SP GR UR STRIP.AUTO: 1.01 (ref 1–1.03)
T WAVE AXIS: -74 DEGREES
T WAVE AXIS: -81 DEGREES
TROPONIN I SERPL-MCNC: <0.02 NG/ML
TROPONIN I SERPL-MCNC: <0.02 NG/ML
UROBILINOGEN UR QL STRIP.AUTO: 0.2 E.U./DL
VENTRICULAR RATE: 75 BPM
VENTRICULAR RATE: 75 BPM
WBC # BLD AUTO: 5.68 THOUSAND/UL (ref 4.31–10.16)

## 2019-03-25 PROCEDURE — 70450 CT HEAD/BRAIN W/O DYE: CPT

## 2019-03-25 PROCEDURE — 71045 X-RAY EXAM CHEST 1 VIEW: CPT

## 2019-03-25 PROCEDURE — 93010 ELECTROCARDIOGRAM REPORT: CPT | Performed by: INTERNAL MEDICINE

## 2019-03-25 PROCEDURE — 85025 COMPLETE CBC W/AUTO DIFF WBC: CPT | Performed by: PHYSICIAN ASSISTANT

## 2019-03-25 PROCEDURE — 80053 COMPREHEN METABOLIC PANEL: CPT | Performed by: PHYSICIAN ASSISTANT

## 2019-03-25 PROCEDURE — 83735 ASSAY OF MAGNESIUM: CPT | Performed by: PHYSICIAN ASSISTANT

## 2019-03-25 PROCEDURE — 93005 ELECTROCARDIOGRAM TRACING: CPT

## 2019-03-25 PROCEDURE — 84484 ASSAY OF TROPONIN QUANT: CPT | Performed by: PHYSICIAN ASSISTANT

## 2019-03-25 PROCEDURE — 99285 EMERGENCY DEPT VISIT HI MDM: CPT

## 2019-03-25 PROCEDURE — 81003 URINALYSIS AUTO W/O SCOPE: CPT | Performed by: PHYSICIAN ASSISTANT

## 2019-03-25 PROCEDURE — 36415 COLL VENOUS BLD VENIPUNCTURE: CPT | Performed by: PHYSICIAN ASSISTANT

## 2019-03-25 NOTE — ED PROVIDER NOTES
History  Chief Complaint   Patient presents with    Dizziness     patient here after having a dizzy and lightheaded episode while at a friends house  states this has happened before  80-year-old male with history of cardiac arrhythmias status post pacemaker placement 3 years ago, and hypertension presents emergency department for evaluation of intermittent near syncopal episodes x2 weeks  Patient states that approximately 1 hour prior to arrival, he began to feel faint, was reportedly told that he was pale in the face, and he was assisted to the ground by a bystander  He states that prior to the event he was standing for at least 30 minutes  He notes that he lost control of his urination when symptoms began  He denies associated fever, chills, sweats, chest pain, shortness of breath, abdominal pain, nausea, vomiting, or leg swelling  He previously followed with [facility], will change to [facility] cardiology next week  Prior to Admission Medications   Prescriptions Last Dose Informant Patient Reported? Taking?    Ascorbic Acid (VITAMIN C) 500 MG CAPS  Spouse/Significant Other Yes No   Sig: Take 1 tablet by mouth daily   Cyanocobalamin (VITAMIN B12) 1000 MCG TBCR  Spouse/Significant Other Yes No   Sig: Take 1 tablet by mouth daily   Flaxseed, Linseed, (FLAXSEED OIL) 1000 MG CAPS  Spouse/Significant Other Yes No   Sig: Take 1 capsule by mouth daily   Glucosamine-Chondroit-Vit C-Mn (GLUCOSAMINE 1500 COMPLEX) CAPS  Spouse/Significant Other Yes No   Sig: Take 1 capsule by mouth daily   Multiple Vitamin (MULTIVITAMIN) capsule  Spouse/Significant Other Yes No   Sig: Take 1 capsule by mouth daily   Omega-3 Fatty Acids (FISH OIL) 1200 MG CAPS  Spouse/Significant Other Yes No   Sig: Take 1 capsule by mouth daily   aspirin 81 MG tablet  Spouse/Significant Other Yes Yes   Sig: Take 1 tablet by mouth daily   gabapentin (NEURONTIN) 100 mg capsule  Spouse/Significant Other Yes Yes   Sig: Take 100 mg by mouth daily   levothyroxine 100 mcg tablet  Spouse/Significant Other No Yes   Sig: Take 1 tablet (100 mcg total) by mouth daily   lisinopril (ZESTRIL) 5 mg tablet  Spouse/Significant Other Yes Yes   metoprolol tartrate (LOPRESSOR) 100 mg tablet  Spouse/Significant Other Yes Yes   Sig: Take 1 tablet by mouth 2 (two) times a day   tamsulosin (FLOMAX) 0 4 mg  Spouse/Significant Other No Yes   Sig: Take 1 capsule (0 4 mg total) by mouth daily      Facility-Administered Medications: None       Past Medical History:   Diagnosis Date    Arthropathy     multiple sites    Cardiac dysrhythmia     Hepatitis A     Melanoma ([facility] [address] )     malignant of skin       Past Surgical History:   Procedure Laterality Date    INGUINAL HERNIA REPAIR         Family History   Problem Relation Age of Onset    Cancer Family         gastrointestinal tract    Substance Abuse Neg Hx     Mental illness Neg Hx      I have reviewed and agree with the history as documented  Social History     Tobacco Use    Smoking status: Never Smoker    Smokeless tobacco: Never Used    Tobacco comment: quit 50-60 yr ago   Substance Use Topics    Alcohol use: No    Drug use: No        Review of Systems   Constitutional: Negative for chills, diaphoresis and fever  Eyes: Negative for visual disturbance  Respiratory: Negative for cough and shortness of breath  Cardiovascular: Negative for chest pain and palpitations  Gastrointestinal: Negative for abdominal pain, diarrhea, nausea and vomiting  Genitourinary: Negative for dysuria, flank pain and frequency  Musculoskeletal: Negative for arthralgias and myalgias  Skin: Negative for color change, rash and wound  Allergic/Immunologic: Negative for immunocompromised state  Neurological: Negative for dizziness, syncope (near syncope) and light-headedness  Hematological: Does not bruise/bleed easily  Psychiatric/Behavioral: Negative for confusion   The patient is not nervous/anxious  Physical Exam  Physical Exam   Constitutional: He is oriented to person, place, and time  He appears well-developed and well-nourished  No distress  HENT:   Head: Normocephalic and atraumatic  Mouth/Throat: Oropharynx is clear and moist    Eyes: Pupils are equal, round, and reactive to light  No scleral icterus  Neck: No JVD present  Cardiovascular: Normal rate and regular rhythm  Exam reveals no gallop and no friction rub  No murmur heard  Pulmonary/Chest: No respiratory distress  He has no wheezes  He has no rales  Abdominal: Soft  Bowel sounds are normal  He exhibits no distension and no mass  There is no tenderness  There is no guarding  Neurological: He is alert and oriented to person, place, and time  No cranial nerve deficit  Skin: Skin is warm and dry  Capillary refill takes less than 2 seconds  He is not diaphoretic  Psychiatric: He has a normal mood and affect  His behavior is normal    Vitals reviewed        Vital Signs  ED Triage Vitals   Temperature Pulse Respirations Blood Pressure SpO2   03/25/19 1213 03/25/19 1213 03/25/19 1213 03/25/19 1213 03/25/19 1213   97 6 °F (36 4 °C) 74 20 (!) 199/121 98 %      Temp src Heart Rate Source Patient Position - Orthostatic VS BP Location FiO2 (%)   -- 03/25/19 1430 03/25/19 1430 03/25/19 1430 --    Monitor Lying - Orthostatic VS Left arm       Pain Score       --                  Vitals:    03/25/19 1432 03/25/19 1445 03/25/19 1500 03/25/19 1545   BP: 103/70 109/74 103/70 160/91   Pulse: 83 78 69 86   Patient Position - Orthostatic VS: Standing - Orthostatic VS  Sitting Sitting         Visual Acuity      ED Medications  Medications - No data to display    Diagnostic Studies  Results Reviewed     Procedure Component Value Units Date/Time    Troponin I [[id number]]  (Normal) Collected:  03/25/19 1606    Lab Status:  Final result Specimen:  Blood from Arm, Right Updated:  03/25/19 1634     Troponin I <0 02 ng/mL     UA w Reflex to Microscopic [[id number]] Collected:  03/25/19 1542    Lab Status:  Final result Specimen:  Urine, Clean Catch Updated:  03/25/19 1548     Color, UA Yellow     Clarity, UA Clear     Specific Gravity, UA 1 015     pH, UA 7 0     Leukocytes, UA Negative     Nitrite, UA Negative     Protein, UA Negative mg/dl      Glucose, UA Negative mg/dl      Ketones, UA Negative mg/dl      Urobilinogen, UA 0 2 E U /dl      Bilirubin, UA Negative     Blood, UA Negative    Troponin I [[id number]]  (Normal) Collected:  03/25/19 1301    Lab Status:  Final result Specimen:  Blood from Arm, Right Updated:  03/25/19 1335     Troponin I <0 02 ng/mL     Comprehensive metabolic panel [[id number]]  (Abnormal) Collected:  03/25/19 1301    Lab Status:  Final result Specimen:  Blood from Arm, Right Updated:  03/25/19 1333     Sodium 135 mmol/L      Potassium 3 9 mmol/L      Chloride 98 mmol/L      CO2 30 mmol/L      ANION GAP 7 mmol/L      BUN 14 mg/dL      Creatinine 0 87 mg/dL      Glucose 103 mg/dL      Calcium 9 4 mg/dL      AST 35 U/L      ALT 49 U/L      Alkaline Phosphatase 79 U/L      Total Protein 8 1 g/dL      Albumin 3 9 g/dL      Total Bilirubin 0 70 mg/dL      eGFR 78 ml/min/1 73sq m     Narrative:       National Kidney Disease Education Program recommendations are as follows:  GFR calculation is accurate only with a steady state creatinine  Chronic Kidney disease less than 60 ml/min/1 73 sq  meters  Kidney failure less than 15 ml/min/1 73 sq  meters      Magnesium [[id number]]  (Normal) Collected:  03/25/19 1301    Lab Status:  Final result Specimen:  Blood from Arm, Right Updated:  03/25/19 1333     Magnesium 2 1 mg/dL     CBC and differential [[id number]] Collected:  03/25/19 1301    Lab Status:  Final result Specimen:  Blood from Arm, Right Updated:  03/25/19 1312     WBC 5 68 Thousand/uL      RBC 4 42 Million/uL      Hemoglobin 13 9 g/dL      Hematocrit 40 8 %      MCV 92 fL      MCH 31 4 pg      MCHC 34 1 g/dL      RDW 12 8 %      MPV 10 3 fL      Platelets 095 Thousands/uL      nRBC 0 /100 WBCs      Neutrophils Relative 51 %      Immat GRANS % 1 %      Lymphocytes Relative 31 %      Monocytes Relative 12 %      Eosinophils Relative 4 %      Basophils Relative 1 %      Neutrophils Absolute 2 92 Thousands/µL      Immature Grans Absolute 0 03 Thousand/uL      Lymphocytes Absolute 1 75 Thousands/µL      Monocytes Absolute 0 70 Thousand/µL      Eosinophils Absolute 0 24 Thousand/µL      Basophils Absolute 0 04 Thousands/µL                  XR chest 1 view portable   ED Interpretation by Eliana Galdamez PA-C (03/25 4039)   No acute cardiopulmonary disease      Final Result by Roula Caro MD (03/25 4130)      No active disease            Workstation performed: [id number]         CT head without contrast   Final Result by Amarjit Holley MD (03/25 5305)      No acute intracranial abnormality                    Workstation performed: [id number]                    Procedures  ECG 12 Lead Documentation  Date/Time: 3/25/2019 12:15 PM  Performed by: Eliana Galdamez PA-C  Authorized by: Eliana Galdamez PA-C     Indications / Diagnosis:  Near syncope  ECG reviewed by me, the ED Provider: yes    Patient location:  ED  Previous ECG:     Previous ECG:  Compared to current    Comparison ECG info:  1/3/2018 [facility]    Similarity:  No change  Interpretation:     Interpretation: non-specific    Rate:     ECG rate:  75    ECG rate assessment: normal    Rhythm:     Rhythm: sinus rhythm    Ectopy:     Ectopy: none    QRS:     QRS axis:  Left    QRS intervals:  Normal  Conduction:     Conduction: abnormal      Abnormal conduction: incomplete LBBB    ST segments:     ST segments:  Abnormal    Depression:  II, III and aVF (similar to prior EKG)  T waves:     T waves: inverted    Q waves:     Q waves:  II, III, aVF, V3, V4, V5 and V6 (similar to prior ekg)           Phone Contacts  ED Phone Contact    ED Course  ED Course as of Mar 25 1648   Mon Mar 25, 2019   1300 Left message with pt's cardiology group regarding pacemaker interrogation      1410 [employer] rep will be in ED in approx 2 hours      1508 Pt remains stable without complaints  No significant change w/ orthostatics      1640 Pacer interrogated by [employer] rep, no abnormalities  Repeat troponin negative  MDM  Number of Diagnoses or Management Options  Near syncope: new and requires workup  Diagnosis management comments: 77-year-old male presents emergency department after near syncopal episode  He did experience a similar episode approximately 2 weeks ago  He does report losing control of urination during episode today, this is atypical for near syncope, however bystanders did not report seizure activity  Workup in the emergency department is unremarkable, patient's pacemaker was interrogated by device rep and found to be normal   Delta troponin obtained during the emergency department stay which was also unremarkable  Patient be discharged to outpatient follow-up with primary care as well as Cardiology  He is strongly encouraged to return emergency department should he develop complete syncope or for recurrent episodes         Amount and/or Complexity of Data Reviewed  Clinical lab tests: ordered and reviewed  Tests in the radiology section of CPT®: ordered and reviewed  Tests in the medicine section of CPT®: ordered and reviewed  Review and summarize past medical records: yes  Discuss the patient with other providers: yes  Independent visualization of images, tracings, or specimens: yes        Disposition  Final diagnoses:   Near syncope     Time reflects when diagnosis was documented in both MDM as applicable and the Disposition within this note     Time User Action Codes Description Comment    3/25/2019  4:41 PM Riki Milner Add [R55] Near syncope       ED Disposition     ED Disposition Condition Date/Time Comment    Discharge Stable Mon Mar 25, 2019  4:41 PM Kristina Logan discharge to home/self care  Follow-up Information     Follow up With Specialties Details Why Contact Info    Marcio Bermudez MD Internal Medicine In 2 days  [facility]  [address] [address] [phone]            Patient's Medications   Discharge Prescriptions    No medications on file     No discharge procedures on file      ED Provider  Electronically Signed by           Braulio Moses PA-C  03/25/19 6010

## 2019-03-25 NOTE — ED NOTES
Patient resting in bed, eating hoagie  Denies current complaints  States that he will provide urine after he eats  Awaiting interrogation of pacer and repeat troponin  Will continue to monitor        Jacoby Stallworth RN  03/25/19 6268

## 2019-03-25 NOTE — ED NOTES
Patient resting in bed without complaints  Wife at bedside  Will continue to monitor        Zarina Lizama RN  03/25/19 0065

## 2019-03-28 ENCOUNTER — OFFICE VISIT (OUTPATIENT)
Dept: FAMILY MEDICINE CLINIC | Facility: HOSPITAL | Age: 84
End: 2019-03-28
Payer: COMMERCIAL

## 2019-03-28 VITALS
WEIGHT: 164.5 LBS | DIASTOLIC BLOOD PRESSURE: 78 MMHG | HEIGHT: 70 IN | BODY MASS INDEX: 23.55 KG/M2 | SYSTOLIC BLOOD PRESSURE: 120 MMHG | HEART RATE: 76 BPM | OXYGEN SATURATION: 97 %

## 2019-03-28 DIAGNOSIS — K21.9 GERD WITHOUT ESOPHAGITIS: ICD-10-CM

## 2019-03-28 DIAGNOSIS — I10 ESSENTIAL HYPERTENSION: ICD-10-CM

## 2019-03-28 DIAGNOSIS — R55 SYNCOPE, UNSPECIFIED SYNCOPE TYPE: Primary | ICD-10-CM

## 2019-03-28 PROCEDURE — 1160F RVW MEDS BY RX/DR IN RCRD: CPT | Performed by: INTERNAL MEDICINE

## 2019-03-28 PROCEDURE — 99214 OFFICE O/P EST MOD 30 MIN: CPT | Performed by: INTERNAL MEDICINE

## 2019-03-28 NOTE — PROGRESS NOTES
Assessment/Plan:       Diagnoses and all orders for this visit:    Syncope, unspecified syncope type    Essential hypertension    GERD without esophagitis          All of the above diagnoses have been assessed  Additional COMMENTS/PLAN: I think the patient was orthostatic as well caused the syncope  Patient will only be on the beta-blocker  Will see back for regular apt in June  Subjective:      Patient ID: Bartolome Begum is a 80 y o  male  HPI     Was standing for a long period of time and had syncope  Went to ED  W/U neg  This was a few hours after taking metoprolol and lisinopril  He did have some loss of continence  This patient was interrogated in the emergency room which showed no evidence of dysrhythmia  There for no seizure like activity  Seeing Dr Laney Flor next week  The following portions of the patient's history were revised and updated as appropriate: Problem list, allergies, med list, FH, SH, Past medical and surgical histories  Current Outpatient Medications   Medication Sig Dispense Refill    Ascorbic Acid (VITAMIN C) 500 MG CAPS Take 1 tablet by mouth daily      Cyanocobalamin (VITAMIN B12) 1000 MCG TBCR Take 1 tablet by mouth daily      Flaxseed, Linseed, (FLAXSEED OIL) 1000 MG CAPS Take 1 capsule by mouth daily      gabapentin (NEURONTIN) 100 mg capsule Take 100 mg by mouth daily      Glucosamine-Chondroit-Vit C-Mn (GLUCOSAMINE 1500 COMPLEX) CAPS Take 1 capsule by mouth daily      levothyroxine 100 mcg tablet Take 1 tablet (100 mcg total) by mouth daily 90 tablet 3    metoprolol tartrate (LOPRESSOR) 100 mg tablet Take 1 tablet by mouth 2 (two) times a day      Multiple Vitamin (MULTIVITAMIN) capsule Take 1 capsule by mouth daily      Omega-3 Fatty Acids (FISH OIL) 1200 MG CAPS Take 1 capsule by mouth daily      tamsulosin (FLOMAX) 0 4 mg Take 1 capsule (0 4 mg total) by mouth daily 90 capsule 3     No current facility-administered medications for this visit  Review of Systems   All other systems reviewed and are negative  Objective:    /78   Pulse 76   Ht 5' 10" (1 778 m)   Wt 74 6 kg (164 lb 8 oz)   SpO2 97%   BMI 23 60 kg/m²      Tilt systolic blood pressure 108     Physical Exam   Constitutional: He appears well-developed and well-nourished  Neck: No JVD present  Cardiovascular: Normal rate and regular rhythm  Pulmonary/Chest: Effort normal and breath sounds normal    Musculoskeletal: He exhibits no edema  Vitals reviewed          Admission on 03/25/2019, Discharged on 03/25/2019   Component Date Value Ref Range Status    WBC 03/25/2019 5 68  4 31 - 10 16 Thousand/uL Final    RBC 03/25/2019 4 42  3 88 - 5 62 Million/uL Final    Hemoglobin 03/25/2019 13 9  12 0 - 17 0 g/dL Final    Hematocrit 03/25/2019 40 8  36 5 - 49 3 % Final    MCV 03/25/2019 92  82 - 98 fL Final    MCH 03/25/2019 31 4  26 8 - 34 3 pg Final    MCHC 03/25/2019 34 1  31 4 - 37 4 g/dL Final    RDW 03/25/2019 12 8  11 6 - 15 1 % Final    MPV 03/25/2019 10 3  8 9 - 12 7 fL Final    Platelets 91/31/1605 270  149 - 390 Thousands/uL Final    nRBC 03/25/2019 0  /100 WBCs Final    Neutrophils Relative 03/25/2019 51  43 - 75 % Final    Immat GRANS % 03/25/2019 1  0 - 2 % Final    Lymphocytes Relative 03/25/2019 31  14 - 44 % Final    Monocytes Relative 03/25/2019 12  4 - 12 % Final    Eosinophils Relative 03/25/2019 4  0 - 6 % Final    Basophils Relative 03/25/2019 1  0 - 1 % Final    Neutrophils Absolute 03/25/2019 2 92  1 85 - 7 62 Thousands/µL Final    Immature Grans Absolute 03/25/2019 0 03  0 00 - 0 20 Thousand/uL Final    Lymphocytes Absolute 03/25/2019 1 75  0 60 - 4 47 Thousands/µL Final    Monocytes Absolute 03/25/2019 0 70  0 17 - 1 22 Thousand/µL Final    Eosinophils Absolute 03/25/2019 0 24  0 00 - 0 61 Thousand/µL Final    Basophils Absolute 03/25/2019 0 04  0 00 - 0 10 Thousands/µL Final    Sodium 03/25/2019 135* 136 - 145 mmol/L Final    Potassium 03/25/2019 3 9  3 5 - 5 3 mmol/L Final    Chloride 03/25/2019 98* 100 - 108 mmol/L Final    CO2 03/25/2019 30  21 - 32 mmol/L Final    ANION GAP 03/25/2019 7  4 - 13 mmol/L Final    BUN 03/25/2019 14  5 - 25 mg/dL Final    Creatinine 03/25/2019 0 87  0 60 - 1 30 mg/dL Final    Standardized to IDMS reference method    Glucose 03/25/2019 103  65 - 140 mg/dL Final      If the patient is fasting, the ADA then defines impaired fasting glucose as > 100 mg/dL and diabetes as > or equal to 123 mg/dL  Specimen collection should occur prior to Sulfasalazine administration due to the potential for falsely depressed results  Specimen collection should occur prior to Sulfapyridine administration due to the potential for falsely elevated results   Calcium 03/25/2019 9 4  8 3 - 10 1 mg/dL Final    AST 03/25/2019 35  5 - 45 U/L Final      Specimen collection should occur prior to Sulfasalazine administration due to the potential for falsely depressed results   ALT 03/25/2019 49  12 - 78 U/L Final      Specimen collection should occur prior to Sulfasalazine administration due to the potential for falsely depressed results   Alkaline Phosphatase 03/25/2019 79  46 - 116 U/L Final    Total Protein 03/25/2019 8 1  6 4 - 8 2 g/dL Final    Albumin 03/25/2019 3 9  3 5 - 5 0 g/dL Final    Total Bilirubin 03/25/2019 0 70  0 20 - 1 00 mg/dL Final    eGFR 03/25/2019 78  ml/min/1 73sq m Final    Troponin I 03/25/2019 <0 02  <=0 04 ng/mL Final    3Autovalidation override  Siemens Chemistry analyzer 99% cutoff is > 0 04 ng/mL in network labs     o cTnI 99% cutoff is useful only when applied to patients in the clinical setting of myocardial ischemia   o cTnI 99% cutoff should be interpreted in the context of clinical history, ECG findings and possibly cardiac imaging to establish correct diagnosis     o cTnI 99% cutoff may be suggestive but clearly not indicative of a coronary event without the clinical setting of myocardial ischemia   Magnesium 03/25/2019 2 1  1 6 - 2 6 mg/dL Final    Color, UA 03/25/2019 Yellow   Final    Clarity, UA 03/25/2019 Clear   Final    Specific [place], UA 03/25/2019 1 015  1 003 - 1 030 Final    pH, UA 03/25/2019 7 0  4 5, 5 0, 5 5, 6 0, 6 5, 7 0, 7 5, 8 0 Final    Leukocytes, UA 03/25/2019 Negative  Negative Final    Nitrite, UA 03/25/2019 Negative  Negative Final    Protein, UA 03/25/2019 Negative  Negative mg/dl Final    Glucose, UA 03/25/2019 Negative  Negative mg/dl Final    Ketones, UA 03/25/2019 Negative  Negative mg/dl Final    Urobilinogen, UA 03/25/2019 0 2  0 2, 1 0 E U /dl E U /dl Final    Bilirubin, UA 03/25/2019 Negative  Negative Final    Blood, UA 03/25/2019 Negative  Negative Final    Troponin I 03/25/2019 <0 02  <=0 04 ng/mL Final    3Autovalidation override  Siemens Chemistry analyzer 99% cutoff is > 0 04 ng/mL in network labs     o cTnI 99% cutoff is useful only when applied to patients in the clinical setting of myocardial ischemia   o cTnI 99% cutoff should be interpreted in the context of clinical history, ECG findings and possibly cardiac imaging to establish correct diagnosis  o cTnI 99% cutoff may be suggestive but clearly not indicative of a coronary event without the clinical setting of myocardial ischemia        Ventricular Rate 03/25/2019 75  BPM Final    Atrial Rate 03/25/2019 75  BPM Final    LA Interval 03/25/2019 268  ms Final    QRSD Interval 03/25/2019 110  ms Final    QT Interval 03/25/2019 376  ms Final    QTC Interval 03/25/2019 419  ms Final    QRS Axis 03/25/2019 -60  degrees Final    T Wave Axis 03/25/2019 -81  degrees Final    Ventricular Rate 03/25/2019 75  BPM Final    Atrial Rate 03/25/2019 75  BPM Final    LA Interval 03/25/2019 242  ms Final    QRSD Interval 03/25/2019 108  ms Final    QT Interval 03/25/2019 376  ms Final    QTC Interval 03/25/2019 419  ms Final    P Axis 03/25/2019 58  degrees Final    QRS [place] 03/25/2019 -51  degrees Final    T Wave Axis 03/25/2019 -74  degrees Final         Joanne Mena MD

## 2019-03-29 ENCOUNTER — TELEPHONE (OUTPATIENT)
Dept: CARDIOLOGY CLINIC | Facility: CLINIC | Age: 84
End: 2019-03-29

## 2019-04-05 ENCOUNTER — CONSULT (OUTPATIENT)
Dept: CARDIOLOGY CLINIC | Facility: CLINIC | Age: 84
End: 2019-04-05
Payer: COMMERCIAL

## 2019-04-05 VITALS
BODY MASS INDEX: 24.62 KG/M2 | HEIGHT: 70 IN | HEART RATE: 72 BPM | SYSTOLIC BLOOD PRESSURE: 146 MMHG | DIASTOLIC BLOOD PRESSURE: 90 MMHG | WEIGHT: 172 LBS

## 2019-04-05 DIAGNOSIS — I48.0 PAROXYSMAL ATRIAL FIBRILLATION (HCC): ICD-10-CM

## 2019-04-05 DIAGNOSIS — I10 ESSENTIAL HYPERTENSION: Primary | ICD-10-CM

## 2019-04-05 DIAGNOSIS — I35.0 NONRHEUMATIC AORTIC VALVE STENOSIS: ICD-10-CM

## 2019-04-05 DIAGNOSIS — I49.5 SICK SINUS SYNDROME (HCC): ICD-10-CM

## 2019-04-05 DIAGNOSIS — Z95.0 PRESENCE OF PERMANENT CARDIAC PACEMAKER: ICD-10-CM

## 2019-04-05 PROCEDURE — 99204 OFFICE O/P NEW MOD 45 MIN: CPT | Performed by: INTERNAL MEDICINE

## 2019-05-15 ENCOUNTER — IN-CLINIC DEVICE VISIT (OUTPATIENT)
Dept: CARDIOLOGY CLINIC | Facility: CLINIC | Age: 84
End: 2019-05-15
Payer: COMMERCIAL

## 2019-05-15 DIAGNOSIS — Z95.0 CARDIAC PACEMAKER: ICD-10-CM

## 2019-05-15 DIAGNOSIS — I49.5 SICK SINUS SYNDROME (HCC): Primary | ICD-10-CM

## 2019-05-15 DIAGNOSIS — I48.0 PAROXYSMAL ATRIAL FIBRILLATION (HCC): ICD-10-CM

## 2019-05-15 PROCEDURE — 93280 PM DEVICE PROGR EVAL DUAL: CPT | Performed by: INTERNAL MEDICINE

## 2019-06-11 ENCOUNTER — OFFICE VISIT (OUTPATIENT)
Dept: FAMILY MEDICINE CLINIC | Facility: HOSPITAL | Age: 84
End: 2019-06-11
Payer: COMMERCIAL

## 2019-06-11 VITALS
WEIGHT: 169 LBS | BODY MASS INDEX: 24.2 KG/M2 | HEART RATE: 78 BPM | HEIGHT: 70 IN | DIASTOLIC BLOOD PRESSURE: 70 MMHG | SYSTOLIC BLOOD PRESSURE: 120 MMHG

## 2019-06-11 DIAGNOSIS — I10 ESSENTIAL HYPERTENSION: ICD-10-CM

## 2019-06-11 DIAGNOSIS — R26.89 BALANCE DISORDER: ICD-10-CM

## 2019-06-11 DIAGNOSIS — E03.9 ACQUIRED HYPOTHYROIDISM: ICD-10-CM

## 2019-06-11 DIAGNOSIS — I48.0 PAROXYSMAL ATRIAL FIBRILLATION (HCC): Primary | ICD-10-CM

## 2019-06-11 PROCEDURE — 1160F RVW MEDS BY RX/DR IN RCRD: CPT | Performed by: INTERNAL MEDICINE

## 2019-06-11 PROCEDURE — 1036F TOBACCO NON-USER: CPT | Performed by: INTERNAL MEDICINE

## 2019-06-11 PROCEDURE — 99214 OFFICE O/P EST MOD 30 MIN: CPT | Performed by: INTERNAL MEDICINE

## 2019-06-20 ENCOUNTER — EVALUATION (OUTPATIENT)
Dept: PHYSICAL THERAPY | Facility: CLINIC | Age: 84
End: 2019-06-20
Payer: COMMERCIAL

## 2019-06-20 DIAGNOSIS — R26.89 BALANCE DISORDER: ICD-10-CM

## 2019-06-20 PROCEDURE — 97162 PT EVAL MOD COMPLEX 30 MIN: CPT | Performed by: PHYSICAL THERAPIST

## 2019-06-20 PROCEDURE — 97112 NEUROMUSCULAR REEDUCATION: CPT | Performed by: PHYSICAL THERAPIST

## 2019-06-25 ENCOUNTER — OFFICE VISIT (OUTPATIENT)
Dept: PHYSICAL THERAPY | Facility: CLINIC | Age: 84
End: 2019-06-25
Payer: COMMERCIAL

## 2019-06-25 DIAGNOSIS — R26.89 BALANCE DISORDER: Primary | ICD-10-CM

## 2019-06-25 PROCEDURE — 97112 NEUROMUSCULAR REEDUCATION: CPT | Performed by: PHYSICAL THERAPIST

## 2019-06-25 PROCEDURE — 97140 MANUAL THERAPY 1/> REGIONS: CPT | Performed by: PHYSICAL THERAPIST

## 2019-07-01 DIAGNOSIS — N40.0 BENIGN PROSTATIC HYPERPLASIA, UNSPECIFIED WHETHER LOWER URINARY TRACT SYMPTOMS PRESENT: ICD-10-CM

## 2019-07-01 RX ORDER — TAMSULOSIN HYDROCHLORIDE 0.4 MG/1
0.4 CAPSULE ORAL DAILY
Qty: 90 CAPSULE | Refills: 3 | Status: SHIPPED | OUTPATIENT
Start: 2019-07-01 | End: 2020-03-03 | Stop reason: SDUPTHER

## 2019-07-02 ENCOUNTER — OFFICE VISIT (OUTPATIENT)
Dept: PHYSICAL THERAPY | Facility: CLINIC | Age: 84
End: 2019-07-02
Payer: COMMERCIAL

## 2019-07-02 DIAGNOSIS — R26.89 BALANCE DISORDER: Primary | ICD-10-CM

## 2019-07-02 PROCEDURE — 97112 NEUROMUSCULAR REEDUCATION: CPT | Performed by: PHYSICAL THERAPIST

## 2019-07-02 PROCEDURE — 97140 MANUAL THERAPY 1/> REGIONS: CPT | Performed by: PHYSICAL THERAPIST

## 2019-07-02 NOTE — PROGRESS NOTES
Daily Note / Discharge    Today's date: 2019  Patient name: Karishma Espana  : 10/7/1932  MRN: [id number]  Referring provider: Marito Dwyer MD  Dx:   Encounter Diagnosis     ICD-10-CM    1  Balance disorder R26 89      Addendum 19:  Pt reports feeling great  Ready for D/C  Subjective:  Pt reports feeling much better recently  Did a lot of house work yesterday and felt a little dizziness for 5 min  May have been dehydrated  Objective: See treatment diary below      Assessment:  Pt presented to outpatient physical therapy at [facility] with complaints of dizziness  He presented with decreased cervical range of motion, decreased postural strength, limited flexibility, poor postural awareness, poor balance, decreased tolerance to activity and decreased functional mobility due to balance disorder  He will continue to benefit from skilled PT services in order to address these deficits and reach maximum level of function  Pt has had very little dizziness during the past week since adding manual tx  Did very well with new additional ex today  Plan:  Continue postural strengthening  VOR EC with head motions and Iyer-Daroff with EC  Continue PT 1x/wk x 2-3 weeks         DX:  Balance disorder  EPOC:  8/15/19  Precautions: Fall risk  CO-MORBIDITES:  Pacemaker, HTN  PERSONAL FACTORS:  None    Manual   72        STM cervical paraspinals in supine  12' 12'                                                    Exercise Diary  2       Seated fast VOR vert/horiz targets and no targets EO/EC 15 ea On rocker board 10 ea mod speed On rocker board 10 ea mod speed       T-band rows B  Blue 30 Blue 30       Retro UBE  L5 4' L5 5'       Supine chin tucks  NV 5" 15       T-band LPD B  NV Blue 30       Iyer-Daroff  NV 5 ea fast onto forearm - head not to table Modalities

## 2019-07-09 ENCOUNTER — APPOINTMENT (OUTPATIENT)
Dept: PHYSICAL THERAPY | Facility: CLINIC | Age: 84
End: 2019-07-09
Payer: COMMERCIAL

## 2019-07-16 ENCOUNTER — APPOINTMENT (OUTPATIENT)
Dept: PHYSICAL THERAPY | Facility: CLINIC | Age: 84
End: 2019-07-16
Payer: COMMERCIAL

## 2019-07-25 DIAGNOSIS — I10 ESSENTIAL HYPERTENSION: Primary | ICD-10-CM

## 2019-07-25 RX ORDER — METOPROLOL TARTRATE 100 MG/1
100 TABLET ORAL 2 TIMES DAILY
Qty: 180 TABLET | Refills: 3 | Status: SHIPPED | OUTPATIENT
Start: 2019-07-25 | End: 2020-03-03 | Stop reason: SDUPTHER

## 2019-09-09 ENCOUNTER — REMOTE DEVICE CLINIC VISIT (OUTPATIENT)
Dept: CARDIOLOGY CLINIC | Facility: CLINIC | Age: 84
End: 2019-09-09
Payer: COMMERCIAL

## 2019-09-09 DIAGNOSIS — Z95.0 CARDIAC PACEMAKER IN SITU: Primary | ICD-10-CM

## 2019-09-09 PROCEDURE — 93294 REM INTERROG EVL PM/LDLS PM: CPT | Performed by: INTERNAL MEDICINE

## 2019-09-09 PROCEDURE — 93296 REM INTERROG EVL PM/IDS: CPT | Performed by: INTERNAL MEDICINE

## 2019-09-10 NOTE — PROGRESS NOTES
Results for orders placed or performed in visit on 09/09/19   Cardiac EP device report    Narrative    DUAL CHAMBER PPM  BIOTRONIK TRANSMISSION: BATTERY STATUS "OK"; 75%  %  37%  ALL AVAILABLE LEAD PARAMETERS WITHIN NORMAL LIMITS  NO SIGNIFICANT HIGH RATE EPISODES  NORMAL DEVICE FUNCTION   NC

## 2019-10-15 ENCOUNTER — OFFICE VISIT (OUTPATIENT)
Dept: FAMILY MEDICINE CLINIC | Facility: HOSPITAL | Age: 84
End: 2019-10-15
Payer: COMMERCIAL

## 2019-10-15 VITALS
WEIGHT: 173 LBS | HEIGHT: 70 IN | BODY MASS INDEX: 24.77 KG/M2 | HEART RATE: 78 BPM | DIASTOLIC BLOOD PRESSURE: 78 MMHG | SYSTOLIC BLOOD PRESSURE: 130 MMHG

## 2019-10-15 DIAGNOSIS — K21.9 GERD WITHOUT ESOPHAGITIS: ICD-10-CM

## 2019-10-15 DIAGNOSIS — I10 ESSENTIAL HYPERTENSION: Primary | ICD-10-CM

## 2019-10-15 DIAGNOSIS — E03.9 ACQUIRED HYPOTHYROIDISM: ICD-10-CM

## 2019-10-15 DIAGNOSIS — I48.0 PAROXYSMAL ATRIAL FIBRILLATION (HCC): ICD-10-CM

## 2019-10-15 PROCEDURE — 1101F PT FALLS ASSESS-DOCD LE1/YR: CPT | Performed by: INTERNAL MEDICINE

## 2019-10-15 PROCEDURE — 99214 OFFICE O/P EST MOD 30 MIN: CPT | Performed by: INTERNAL MEDICINE

## 2019-10-15 NOTE — PROGRESS NOTES
Assessment/Plan:       Diagnoses and all orders for this visit:    Essential hypertension  -     CBC; Future  -     Comprehensive metabolic panel; Future    GERD without esophagitis    Paroxysmal atrial fibrillation (HCC)    Acquired hypothyroidism  -     TSH, 3rd generation; Future    Other orders  -     Cancel: influenza vaccine, [id number], high-dose, PF 0 5 mL (FLUZONE HIGH-DOSE)          All of the above diagnoses have been assessed  Additional COMMENTS/PLAN:  wellness      Subjective:      Patient ID: Peterson Ovalle is a 80 y o  male  HPI     PAF/pacer-has apt with card coming up  Also needs eval of Aov  CP - this is better  No using gabapentin  Hypothyroid - Patient denies any symptoms such as heat or cold intolerance, change in hair texture, or change in bowel habits  There has been no significant change in weight  Hypertension  Patient is here for follow-up of elevated blood pressure  He is  adherent to a low-salt diet  Patient denies headache, dizziness or lightheadedness Cardiovascular risk factors: none  History of target organ damage: none  The following portions of the patient's history were revised and updated as appropriate: Problem list, allergies, med list, FH, SH, Past medical and surgical histories      Current Outpatient Medications   Medication Sig Dispense Refill    Ascorbic Acid (VITAMIN C) 500 MG CAPS Take 1 tablet by mouth daily      Cyanocobalamin (VITAMIN B12) 1000 MCG TBCR Take 1 tablet by mouth daily      Flaxseed, Linseed, (FLAXSEED OIL) 1000 MG CAPS Take 1 capsule by mouth daily      gabapentin (NEURONTIN) 100 mg capsule Take 100 mg by mouth daily as needed       Glucosamine-Chondroit-Vit C-Mn (GLUCOSAMINE 1500 COMPLEX) CAPS Take 1 capsule by mouth daily      levothyroxine 100 mcg tablet Take 1 tablet (100 mcg total) by mouth daily 90 tablet 3    metoprolol tartrate (LOPRESSOR) 100 mg tablet Take 1 tablet (100 mg total) by mouth 2 (two) times a day 180 tablet 3    Multiple Vitamin (MULTIVITAMIN) capsule Take 1 capsule by mouth daily      Omega-3 Fatty Acids (FISH OIL) 1200 MG CAPS Take 1 capsule by mouth daily      tamsulosin (FLOMAX) 0 4 mg Take 1 capsule (0 4 mg total) by mouth daily 90 capsule 3     No current facility-administered medications for this visit  Review of Systems   All other systems reviewed and are negative  Objective:    /78 (BP Location: Left arm, Patient Position: Sitting, Cuff Size: Standard)   Pulse 78   Ht 5' 10" (1 778 m)   Wt 78 5 kg (173 lb)   BMI 24 82 kg/m²     BP Readings from Last 3 Encounters:   10/15/19 130/78   06/11/19 120/70   04/05/19 146/90                  Wt Readings from Last 3 Encounters:   10/15/19 78 5 kg (173 lb)   06/11/19 76 7 kg (169 lb)   04/05/19 78 kg (172 lb)         Physical Exam   Constitutional: He appears well-developed and well-nourished  Neck: No thyromegaly present  Cardiovascular: Normal rate and regular rhythm  Murmur: 3/6 systolic  Pulmonary/Chest: Effort normal and breath sounds normal    Abdominal: Soft  Bowel sounds are normal    Musculoskeletal: He exhibits no edema  Vitals reviewed  No visits with results within 2 Week(s) from this visit     Latest known visit with results is:   Admission on 03/25/2019, Discharged on 03/25/2019   Component Date Value Ref Range Status    WBC 03/25/2019 5 68  4 31 - 10 16 Thousand/uL Final    RBC 03/25/2019 4 42  3 88 - 5 62 Million/uL Final    Hemoglobin 03/25/2019 13 9  12 0 - 17 0 g/dL Final    Hematocrit 03/25/2019 40 8  36 5 - 49 3 % Final    MCV 03/25/2019 92  82 - 98 fL Final    MCH 03/25/2019 31 4  26 8 - 34 3 pg Final    MCHC 03/25/2019 34 1  31 4 - 37 4 g/dL Final    RDW 03/25/2019 12 8  11 6 - 15 1 % Final    MPV 03/25/2019 10 3  8 9 - 12 7 fL Final    Platelets 35/12/7489 270  149 - 390 Thousands/uL Final    nRBC 03/25/2019 0  /100 WBCs Final    Neutrophils Relative 03/25/2019 51  43 - 75 % Final    Immat GRANS % 03/25/2019 1  0 - 2 % Final    Lymphocytes Relative 03/25/2019 31  14 - 44 % Final    Monocytes Relative 03/25/2019 12  4 - 12 % Final    Eosinophils Relative 03/25/2019 4  0 - 6 % Final    Basophils Relative 03/25/2019 1  0 - 1 % Final    Neutrophils Absolute 03/25/2019 2 92  1 85 - 7 62 Thousands/µL Final    Immature Grans Absolute 03/25/2019 0 03  0 00 - 0 20 Thousand/uL Final    Lymphocytes Absolute 03/25/2019 1 75  0 60 - 4 47 Thousands/µL Final    Monocytes Absolute 03/25/2019 0 70  0 17 - 1 22 Thousand/µL Final    Eosinophils Absolute 03/25/2019 0 24  0 00 - 0 61 Thousand/µL Final    Basophils Absolute 03/25/2019 0 04  0 00 - 0 10 Thousands/µL Final    Sodium 03/25/2019 135* 136 - 145 mmol/L Final    Potassium 03/25/2019 3 9  3 5 - 5 3 mmol/L Final    Chloride 03/25/2019 98* 100 - 108 mmol/L Final    CO2 03/25/2019 30  21 - 32 mmol/L Final    ANION GAP 03/25/2019 7  4 - 13 mmol/L Final    BUN 03/25/2019 14  5 - 25 mg/dL Final    Creatinine 03/25/2019 0 87  0 60 - 1 30 mg/dL Final    Standardized to IDMS reference method    Glucose 03/25/2019 103  65 - 140 mg/dL Final      If the patient is fasting, the ADA then defines impaired fasting glucose as > 100 mg/dL and diabetes as > or equal to 123 mg/dL  Specimen collection should occur prior to Sulfasalazine administration due to the potential for falsely depressed results  Specimen collection should occur prior to Sulfapyridine administration due to the potential for falsely elevated results   Calcium 03/25/2019 9 4  8 3 - 10 1 mg/dL Final    AST 03/25/2019 35  5 - 45 U/L Final      Specimen collection should occur prior to Sulfasalazine administration due to the potential for falsely depressed results   ALT 03/25/2019 49  12 - 78 U/L Final      Specimen collection should occur prior to Sulfasalazine administration due to the potential for falsely depressed results       Alkaline Phosphatase 03/25/2019 79  46 - 116 U/L Final    Total Protein 03/25/2019 8 1  6 4 - 8 2 g/dL Final    Albumin 03/25/2019 3 9  3 5 - 5 0 g/dL Final    Total Bilirubin 03/25/2019 0 70  0 20 - 1 00 mg/dL Final    eGFR 03/25/2019 78  ml/min/1 73sq m Final    Troponin I 03/25/2019 <0 02  <=0 04 ng/mL Final    3Autovalidation override  [employer] Chemistry analyzer 99% cutoff is > 0 04 ng/mL in network labs     o cTnI 99% cutoff is useful only when applied to patients in the clinical setting of myocardial ischemia   o cTnI 99% cutoff should be interpreted in the context of clinical history, ECG findings and possibly cardiac imaging to establish correct diagnosis  o cTnI 99% cutoff may be suggestive but clearly not indicative of a coronary event without the clinical setting of myocardial ischemia   Magnesium 03/25/2019 2 1  1 6 - 2 6 mg/dL Final    Color, UA 03/25/2019 Yellow   Final    Clarity, UA 03/25/2019 Clear   Final    Specific [place], UA 03/25/2019 1 015  1 003 - 1 030 Final    pH, UA 03/25/2019 7 0  4 5, 5 0, 5 5, 6 0, 6 5, 7 0, 7 5, 8 0 Final    Leukocytes, UA 03/25/2019 Negative  Negative Final    Nitrite, UA 03/25/2019 Negative  Negative Final    Protein, UA 03/25/2019 Negative  Negative mg/dl Final    Glucose, UA 03/25/2019 Negative  Negative mg/dl Final    Ketones, UA 03/25/2019 Negative  Negative mg/dl Final    Urobilinogen, UA 03/25/2019 0 2  0 2, 1 0 E U /dl E U /dl Final    Bilirubin, UA 03/25/2019 Negative  Negative Final    Blood, UA 03/25/2019 Negative  Negative Final    Troponin I 03/25/2019 <0 02  <=0 04 ng/mL Final    3Autovalidation override  [employer] Chemistry analyzer 99% cutoff is > 0 04 ng/mL in network labs     o cTnI 99% cutoff is useful only when applied to patients in the clinical setting of myocardial ischemia   o cTnI 99% cutoff should be interpreted in the context of clinical history, ECG findings and possibly cardiac imaging to establish correct diagnosis     o cTnI 99% cutoff may be suggestive but clearly not indicative of a coronary event without the clinical setting of myocardial ischemia        Ventricular Rate 03/25/2019 75  BPM Final    Atrial Rate 03/25/2019 75  BPM Final    [place] Interval 03/25/2019 268  ms Final    QRSD Interval 03/25/2019 110  ms Final    QT Interval 03/25/2019 376  ms Final    QTC Interval 03/25/2019 419  ms Final    QRS Axis 03/25/2019 -60  degrees Final    T Wave Axis 03/25/2019 -81  degrees Final    Ventricular Rate 03/25/2019 75  BPM Final    Atrial Rate 03/25/2019 75  BPM Final    [place] Interval 03/25/2019 242  ms Final    QRSD Interval 03/25/2019 108  ms Final    QT Interval 03/25/2019 376  ms Final    QTC Interval 03/25/2019 419  ms Final    P Axis 03/25/2019 58  degrees Final    QRS Axis 03/25/2019 -51  degrees Final    T Wave Axis 03/25/2019 -74  degrees Final         Jesse Roman MD

## 2019-10-17 ENCOUNTER — OFFICE VISIT (OUTPATIENT)
Dept: CARDIOLOGY CLINIC | Facility: CLINIC | Age: 84
End: 2019-10-17
Payer: COMMERCIAL

## 2019-10-17 VITALS
HEIGHT: 70 IN | WEIGHT: 173 LBS | BODY MASS INDEX: 24.77 KG/M2 | HEART RATE: 86 BPM | SYSTOLIC BLOOD PRESSURE: 120 MMHG | DIASTOLIC BLOOD PRESSURE: 82 MMHG

## 2019-10-17 DIAGNOSIS — I35.0 NONRHEUMATIC AORTIC VALVE STENOSIS: ICD-10-CM

## 2019-10-17 DIAGNOSIS — Z95.0 PRESENCE OF PERMANENT CARDIAC PACEMAKER: ICD-10-CM

## 2019-10-17 DIAGNOSIS — I10 ESSENTIAL HYPERTENSION: ICD-10-CM

## 2019-10-17 DIAGNOSIS — I48.0 PAROXYSMAL ATRIAL FIBRILLATION (HCC): Primary | ICD-10-CM

## 2019-10-17 DIAGNOSIS — I49.5 SICK SINUS SYNDROME (HCC): ICD-10-CM

## 2019-10-17 PROCEDURE — 99214 OFFICE O/P EST MOD 30 MIN: CPT | Performed by: INTERNAL MEDICINE

## 2019-10-17 PROCEDURE — 93000 ELECTROCARDIOGRAM COMPLETE: CPT | Performed by: INTERNAL MEDICINE

## 2019-10-17 RX ORDER — ASPIRIN 81 MG/1
81 TABLET ORAL DAILY
COMMUNITY

## 2019-10-17 NOTE — PROGRESS NOTES
Cardiology Consultation     Omid Sequeira  [id number]  10/7/1932  HEART & VASCULAR  [facility]  [address]  [address] [address] [address]    1  Paroxysmal atrial fibrillation (HCC)  POCT ECG   2  Sick sinus syndrome ([address] )     3  Presence of permanent cardiac pacemaker     4  Nonrheumatic aortic valve stenosis     5  Essential hypertension       HPI:    Mr Kyra Liu comes in for follow-up given his cardiac history  He formally followed with Cardiology out of [address]  He has a history of sick sinus syndrome and status post permanent pacemaker in March of 2016  He also has a history of moderate aortic stenosis, paroxysmal atrial fibrillation and hypertension  According to the records, he is predominantly atrially paced  He was offered anticoagulation for stroke prevention, but declined  Moe Lutz gives me a history of having significant mechanical pain after his permanent pacemaker  He recalls coming out of anesthesia and felt as if somebody was pressing on his chest   It is unclear at this time what exactly had happened  I asked if there was a complication like an arrhythmia that required defibrillation, but he does not ever recall being told that  For close to 3 years he had on and off mechanical pains from his pacemaker  However, fortunately these have dissipated and are no longer present  His pacemaker interrogations show that he is 100% a paced, and V paced about 40% of the time  He has had a few episodes of atrial fibrillation detected by pacer interrogation in the past   He has declined anticoagulation  Moe Lutz otherwise denies any cardiac complaints  He is not giving me any symptoms of angina  In fact when he is active he feels better, and does not have his pain around his pacemaker site    He does have some shortness of breath and fatigue with exertion, but he goes well above and beyond his activities of daily living without any limitations  He denies any symptoms at rest   No signs or symptoms of CHF  No recent palpitations, lightheadedness or syncope        Patient Active Problem List   Diagnosis    Nonrheumatic aortic valve stenosis    Chronic nonintractable headache    Enlarged prostate without lower urinary tract symptoms (luts)    GERD without esophagitis    Essential hypertension    Acquired hypothyroidism    Right-sided chest pain    Left sided chest pain    Sick sinus syndrome (HCC)    Presence of permanent cardiac pacemaker    Paroxysmal atrial fibrillation (HCC)    Balance disorder     Past Medical History:   Diagnosis Date    Arthropathy     multiple sites    Cardiac dysrhythmia     Hepatitis A     Melanoma ([facility] [address] )     malignant of skin     Social History     Socioeconomic History    Marital status: /Civil Union     Spouse name: Lyndon Hemphill Number of children: Not on file    Years of education: Not on file    Highest education level: Not on file   Occupational History    Not on file   Social Needs    Financial resource strain: Not on file    Food insecurity:     Worry: Not on file     Inability: Not on file    Transportation needs:     Medical: Not on file     Non-medical: Not on file   Tobacco Use    Smoking status: Never Smoker    Smokeless tobacco: Never Used   Substance and Sexual Activity    Alcohol use: No    Drug use: No    Sexual activity: Not on file   Lifestyle    Physical activity:     Days per week: Not on file     Minutes per session: Not on file    Stress: Not on file   Relationships    Social connections:     Talks on phone: Not on file     Gets together: Not on file     Attends [employer] service: Not on file     Active member of club or organization: Not on file     Attends meetings of clubs or organizations: Not on file     Relationship status: Not on file    Intimate partner violence:     Fear of current or ex partner: Not on file     Emotionally abused: Not on file Physically abused: Not on file     Forced sexual activity: Not on file   Other Topics Concern    Not on file   Social History Narrative    Living will on file    Supportive and safe    Lives with wife      Family History   Problem Relation Age of Onset    Cancer Family         gastrointestinal tract    Substance Abuse Neg Hx     Mental illness Neg Hx      Past Surgical History:   Procedure Laterality Date    INGUINAL HERNIA REPAIR         Current Outpatient Medications:     Ascorbic Acid (VITAMIN C) 500 MG CAPS, Take 1 tablet by mouth daily, Disp: , Rfl:     aspirin (ECOTRIN LOW STRENGTH) 81 mg EC tablet, Take 81 mg by mouth daily, Disp: , Rfl:     Cyanocobalamin (VITAMIN B12) 1000 MCG TBCR, Take 1 tablet by mouth daily, Disp: , Rfl:     Flaxseed, Linseed, (FLAXSEED OIL) 1000 MG CAPS, Take 1 capsule by mouth daily, Disp: , Rfl:     gabapentin (NEURONTIN) 100 mg capsule, Take 100 mg by mouth daily as needed , Disp: , Rfl:     Glucosamine-Chondroit-Vit C-Mn (GLUCOSAMINE 1500 COMPLEX) CAPS, Take 1 capsule by mouth daily, Disp: , Rfl:     levothyroxine 100 mcg tablet, Take 1 tablet (100 mcg total) by mouth daily, Disp: 90 tablet, Rfl: 3    metoprolol tartrate (LOPRESSOR) 100 mg tablet, Take 1 tablet (100 mg total) by mouth 2 (two) times a day, Disp: 180 tablet, Rfl: 3    Multiple Vitamin (MULTIVITAMIN) capsule, Take 1 capsule by mouth daily, Disp: , Rfl:     Omega-3 Fatty Acids (FISH OIL) 1200 MG CAPS, Take 1 capsule by mouth daily, Disp: , Rfl:     tamsulosin (FLOMAX) 0 4 mg, Take 1 capsule (0 4 mg total) by mouth daily, Disp: 90 capsule, Rfl: 3  Allergies   Allergen Reactions    Amoxicillin Other (See Comments)     Severe weakness     Vitals:    10/17/19 0955   BP: 120/82   BP Location: Left arm   Patient Position: Sitting   Cuff Size: Standard   Pulse: 86   Weight: 78 5 kg (173 lb)   Height: 5' 10" (1 778 m)       Labs:  Lab Results   Component Value Date     06/15/2017    K 3 9 03/25/2019    K 4 4 06/15/2017    CL 98 (L) 03/25/2019    CL 95 (L) 06/15/2017    CO2 30 03/25/2019    CO2 24 06/15/2017    BUN 14 03/25/2019    BUN 17 06/15/2017    CREATININE 0 87 03/25/2019    CREATININE 0 89 06/15/2017    GLUCOSE 110 (H) 06/15/2017    CALCIUM 9 4 03/25/2019    CALCIUM 9 2 06/15/2017     Lab Results   Component Value Date    WBC 5 68 03/25/2019    WBC 5 5 06/15/2017    HGB 13 9 03/25/2019    HGB 13 5 06/15/2017    HCT 40 8 03/25/2019    HCT 40 2 06/15/2017    MCV 92 03/25/2019    MCV 94 06/15/2017     03/25/2019       Imaging:  ECG today shows AV sequential paced rhythm  ECHO(3/18/19):  LEFT VENTRICLE:  Systolic function was normal  Ejection fraction was estimated to be 55 %  There were no regional wall motion abnormalities  Wall thickness was mildly increased      MITRAL VALVE:  There was moderate annular calcification  There was mild regurgitation      AORTIC VALVE:  The valve was trileaflet  Leaflets exhibited moderately to markedly increased thickness, moderate calcification, mild to moderately reduced cuspal separation, and reduced mobility  There was mild to moderate stenosis  There was trace regurgitation  Valve mean gradient was 17 6 mmHg      TRICUSPID VALVE:  There was mild regurgitation      AORTA:  The root exhibited mild dilatation  4 0 cm      Review of Systems:  Review of Systems   Constitutional: Negative  HENT: Negative  Eyes: Negative  Respiratory: Negative  Cardiovascular: Negative  Gastrointestinal: Negative  Musculoskeletal: Positive for arthralgias and myalgias  Skin: Negative  Allergic/Immunologic: Negative  Neurological: Positive for dizziness and syncope  Hematological: Negative  Psychiatric/Behavioral: Negative  All other systems reviewed and are negative  Vitals:    10/17/19 0955   BP: 120/82   Pulse: 86     Physical Exam:  Physical Exam   Constitutional: He is oriented to person, place, and time   He appears well-developed and well-nourished  HENT:   Head: Normocephalic and atraumatic  Eyes: Pupils are equal, round, and reactive to light  EOM are normal  Right eye exhibits no discharge  Left eye exhibits no discharge  No scleral icterus  Neck: Normal range of motion  Neck supple  No JVD present  No thyromegaly present  Cardiovascular: Normal rate, regular rhythm, S1 normal, S2 normal, intact distal pulses and normal pulses  No extrasystoles are present  Exam reveals distant heart sounds  Exam reveals no gallop, no friction rub and no decreased pulses  Murmur heard  Systolic murmur is present with a grade of 2/6  Pulmonary/Chest: Effort normal and breath sounds normal  No respiratory distress  He has no wheezes  He has no rales  Abdominal: Soft  Bowel sounds are normal  He exhibits no distension  There is no tenderness  Musculoskeletal: Normal range of motion  He exhibits no edema, tenderness or deformity  Neurological: He is alert and oriented to person, place, and time  No cranial nerve deficit  Skin: Skin is warm and dry  No rash noted  Psychiatric: He has a normal mood and affect  Judgment and thought content normal    Nursing note and vitals reviewed  Discussion/Summary:    1  History of permanent pacemaker - This was secondary to sick sinus syndrome  He had some sort of mechanical chest pain after his procedure that fortunately is no longer present  He will continue to follow in our pacemaker clinic  He is 100% a paced, and about 40% V-paced  2  Paroxysmal atrial fibrillation - Found on pacemaker interrogation  He is predominantly atrially paced  Anticoagulation is recommended however he has declined in the past   I suggested at least aspirin therapy  3  Moderate aortic stenosis - He had an echocardiogram prior to our last visit that showed no significant change    After our next visit in 6 months we will repeat an echocardiogram     4  Hypertension - His blood pressure is under good control today   He will remain on the same medical regimen  He has this followed closely by his internist       Counseling / Coordination of Care  Total office time spent today 25 minutes  Greater than 50% of total time was spent with the patient and / or family counseling and / or coordination of care

## 2019-11-20 ENCOUNTER — REMOTE DEVICE CLINIC VISIT (OUTPATIENT)
Dept: CARDIOLOGY CLINIC | Facility: CLINIC | Age: 84
End: 2019-11-20
Payer: COMMERCIAL

## 2019-11-20 DIAGNOSIS — Z95.0 CARDIAC PACEMAKER IN SITU: Primary | ICD-10-CM

## 2019-11-20 PROCEDURE — 93296 REM INTERROG EVL PM/IDS: CPT | Performed by: INTERNAL MEDICINE

## 2019-11-20 PROCEDURE — 93294 REM INTERROG EVL PM/LDLS PM: CPT | Performed by: INTERNAL MEDICINE

## 2019-11-20 NOTE — PROGRESS NOTES
Results for orders placed or performed in visit on 11/20/19   Cardiac EP device report    Narrative    DUAL CHAMBER PPM  BIOTRONIK TRANSMISSION: BATTERY STATUS "OK"  AP-100%, -35%  ALL AVAILABLE LEAD PARAMETERS WITHIN NORMAL LIMITS  NO SIGNIFICANT HIGH RATE EPISODES  NORMAL DEVICE FUNCTION   GV

## 2019-12-06 DIAGNOSIS — E03.9 ACQUIRED HYPOTHYROIDISM: ICD-10-CM

## 2019-12-06 RX ORDER — LEVOTHYROXINE SODIUM 0.1 MG/1
TABLET ORAL
Qty: 90 TABLET | Refills: 3 | Status: SHIPPED | OUTPATIENT
Start: 2019-12-06 | End: 2020-03-03 | Stop reason: SDUPTHER

## 2020-02-19 ENCOUNTER — REMOTE DEVICE CLINIC VISIT (OUTPATIENT)
Dept: CARDIOLOGY CLINIC | Facility: CLINIC | Age: 85
End: 2020-02-19
Payer: COMMERCIAL

## 2020-02-19 DIAGNOSIS — Z95.0 CARDIAC PACEMAKER IN SITU: Primary | ICD-10-CM

## 2020-02-19 PROCEDURE — 93294 REM INTERROG EVL PM/LDLS PM: CPT | Performed by: INTERNAL MEDICINE

## 2020-02-19 PROCEDURE — 93296 REM INTERROG EVL PM/IDS: CPT | Performed by: INTERNAL MEDICINE

## 2020-02-19 NOTE — PROGRESS NOTES
Results for orders placed or performed in visit on 02/19/20   Cardiac EP device report    Narrative    BIOT DUAL CHAMBER PPM  BIOTRONIK TRANSMISSION: BATTERY VOLTAGE ADEQUATE (70%)  %  34%  ALL AVAILABLE LEAD PARAMETERS WITHIN NORMAL LIMITS  NO SIGNIFICANT HIGH RATE EPISODES  NORMAL DEVICE FUNCTION     EB

## 2020-03-03 DIAGNOSIS — N40.0 BENIGN PROSTATIC HYPERPLASIA, UNSPECIFIED WHETHER LOWER URINARY TRACT SYMPTOMS PRESENT: ICD-10-CM

## 2020-03-03 DIAGNOSIS — I10 ESSENTIAL HYPERTENSION: ICD-10-CM

## 2020-03-03 DIAGNOSIS — E03.9 ACQUIRED HYPOTHYROIDISM: ICD-10-CM

## 2020-03-03 RX ORDER — LEVOTHYROXINE SODIUM 0.1 MG/1
100 TABLET ORAL DAILY
Qty: 90 TABLET | Refills: 3 | Status: SHIPPED | OUTPATIENT
Start: 2020-03-03 | End: 2021-03-02 | Stop reason: SDUPTHER

## 2020-03-03 RX ORDER — METOPROLOL TARTRATE 100 MG/1
100 TABLET ORAL 2 TIMES DAILY
Qty: 180 TABLET | Refills: 3 | Status: SHIPPED | OUTPATIENT
Start: 2020-03-03 | End: 2021-04-15 | Stop reason: SDUPTHER

## 2020-03-03 RX ORDER — TAMSULOSIN HYDROCHLORIDE 0.4 MG/1
0.4 CAPSULE ORAL DAILY
Qty: 90 CAPSULE | Refills: 3 | Status: SHIPPED | OUTPATIENT
Start: 2020-03-03 | End: 2021-03-10 | Stop reason: SDUPTHER

## 2020-05-22 ENCOUNTER — REMOTE DEVICE CLINIC VISIT (OUTPATIENT)
Dept: CARDIOLOGY CLINIC | Facility: CLINIC | Age: 85
End: 2020-05-22
Payer: COMMERCIAL

## 2020-05-22 DIAGNOSIS — Z95.0 CARDIAC PACEMAKER IN SITU: Primary | ICD-10-CM

## 2020-05-22 PROCEDURE — 93296 REM INTERROG EVL PM/IDS: CPT | Performed by: INTERNAL MEDICINE

## 2020-05-22 PROCEDURE — 93294 REM INTERROG EVL PM/LDLS PM: CPT | Performed by: INTERNAL MEDICINE

## 2020-06-29 ENCOUNTER — OFFICE VISIT (OUTPATIENT)
Dept: FAMILY MEDICINE CLINIC | Facility: HOSPITAL | Age: 85
End: 2020-06-29
Payer: COMMERCIAL

## 2020-06-29 VITALS
TEMPERATURE: 97.3 F | HEART RATE: 79 BPM | WEIGHT: 170.2 LBS | BODY MASS INDEX: 24.37 KG/M2 | SYSTOLIC BLOOD PRESSURE: 140 MMHG | DIASTOLIC BLOOD PRESSURE: 82 MMHG | HEIGHT: 70 IN

## 2020-06-29 DIAGNOSIS — Z95.0 PRESENCE OF PERMANENT CARDIAC PACEMAKER: ICD-10-CM

## 2020-06-29 DIAGNOSIS — H61.23 CERUMEN DEBRIS ON TYMPANIC MEMBRANE OF BOTH EARS: ICD-10-CM

## 2020-06-29 DIAGNOSIS — I10 ESSENTIAL HYPERTENSION: ICD-10-CM

## 2020-06-29 DIAGNOSIS — K21.9 GERD WITHOUT ESOPHAGITIS: ICD-10-CM

## 2020-06-29 DIAGNOSIS — I48.0 PAROXYSMAL ATRIAL FIBRILLATION (HCC): ICD-10-CM

## 2020-06-29 DIAGNOSIS — E03.9 ACQUIRED HYPOTHYROIDISM: Primary | ICD-10-CM

## 2020-06-29 PROCEDURE — 99214 OFFICE O/P EST MOD 30 MIN: CPT | Performed by: INTERNAL MEDICINE

## 2020-06-29 PROCEDURE — 3077F SYST BP >= 140 MM HG: CPT | Performed by: INTERNAL MEDICINE

## 2020-06-29 PROCEDURE — 1160F RVW MEDS BY RX/DR IN RCRD: CPT | Performed by: INTERNAL MEDICINE

## 2020-06-29 PROCEDURE — 1125F AMNT PAIN NOTED PAIN PRSNT: CPT | Performed by: INTERNAL MEDICINE

## 2020-06-29 PROCEDURE — 69210 REMOVE IMPACTED EAR WAX UNI: CPT | Performed by: INTERNAL MEDICINE

## 2020-06-29 PROCEDURE — G0439 PPPS, SUBSEQ VISIT: HCPCS | Performed by: INTERNAL MEDICINE

## 2020-06-29 PROCEDURE — 3079F DIAST BP 80-89 MM HG: CPT | Performed by: INTERNAL MEDICINE

## 2020-06-29 PROCEDURE — 1036F TOBACCO NON-USER: CPT | Performed by: INTERNAL MEDICINE

## 2020-06-29 PROCEDURE — 3008F BODY MASS INDEX DOCD: CPT | Performed by: INTERNAL MEDICINE

## 2020-06-29 PROCEDURE — 4040F PNEUMOC VAC/ADMIN/RCVD: CPT | Performed by: INTERNAL MEDICINE

## 2020-06-29 PROCEDURE — 1170F FXNL STATUS ASSESSED: CPT | Performed by: INTERNAL MEDICINE

## 2020-08-26 ENCOUNTER — REMOTE DEVICE CLINIC VISIT (OUTPATIENT)
Dept: CARDIOLOGY CLINIC | Facility: CLINIC | Age: 85
End: 2020-08-26
Payer: COMMERCIAL

## 2020-08-26 DIAGNOSIS — Z95.0 CARDIAC PACEMAKER IN SITU: Primary | ICD-10-CM

## 2020-08-26 PROCEDURE — 93296 REM INTERROG EVL PM/IDS: CPT | Performed by: INTERNAL MEDICINE

## 2020-08-26 PROCEDURE — 93294 REM INTERROG EVL PM/LDLS PM: CPT | Performed by: INTERNAL MEDICINE

## 2020-08-26 NOTE — PROGRESS NOTES
Results for orders placed or performed in visit on 08/26/20   Cardiac EP device report    Narrative    BIOT DUAL CHAMBER PPM - ACTIVE SYSTEM IS MRI CONDITIONAL  BIOTRONIK TRANSMISSION: BATTERY STATUS "OK"  %  39%  ALL AVAILABLE LEAD PARAMETERS WITHIN NORMAL LIMITS  NO SIGNIFICANT HIGH RATE EPISODES  NORMAL DEVICE FUNCTION   NC       Current Outpatient Medications:     Ascorbic Acid (VITAMIN C) 500 MG CAPS, Take 1 tablet by mouth daily, Disp: , Rfl:     aspirin (ECOTRIN LOW STRENGTH) 81 mg EC tablet, Take 81 mg by mouth daily, Disp: , Rfl:     Cyanocobalamin (VITAMIN B12) 1000 MCG TBCR, Take 1 tablet by mouth daily, Disp: , Rfl:     Flaxseed, Linseed, (FLAXSEED OIL) 1000 MG CAPS, Take 1 capsule by mouth daily, Disp: , Rfl:     gabapentin (NEURONTIN) 100 mg capsule, Take 100 mg by mouth daily as needed , Disp: , Rfl:     Glucosamine-Chondroit-Vit C-Mn (GLUCOSAMINE 1500 COMPLEX) CAPS, Take 1 capsule by mouth daily, Disp: , Rfl:     levothyroxine 100 mcg tablet, Take 1 tablet (100 mcg total) by mouth daily, Disp: 90 tablet, Rfl: 3    metoprolol tartrate (LOPRESSOR) 100 mg tablet, Take 1 tablet (100 mg total) by mouth 2 (two) times a day, Disp: 180 tablet, Rfl: 3    Multiple Vitamin (MULTIVITAMIN) capsule, Take 1 capsule by mouth daily, Disp: , Rfl:     Omega-3 Fatty Acids (FISH OIL) 1200 MG CAPS, Take 1 capsule by mouth daily, Disp: , Rfl:     tamsulosin (FLOMAX) 0 4 mg, Take 1 capsule (0 4 mg total) by mouth daily, Disp: 90 capsule, Rfl: 3

## 2020-09-02 LAB
ALBUMIN SERPL-MCNC: 4.5 G/DL (ref 3.6–4.6)
ALBUMIN/GLOB SERPL: 1.7 {RATIO} (ref 1.2–2.2)
ALP SERPL-CCNC: 69 IU/L (ref 39–117)
ALT SERPL-CCNC: 38 IU/L (ref 0–44)
AST SERPL-CCNC: 43 IU/L (ref 0–40)
BASOPHILS # BLD AUTO: 0 X10E3/UL (ref 0–0.2)
BASOPHILS NFR BLD AUTO: 1 %
BILIRUB SERPL-MCNC: 0.8 MG/DL (ref 0–1.2)
BUN SERPL-MCNC: 11 MG/DL (ref 8–27)
BUN/CREAT SERPL: 13 (ref 10–24)
CALCIUM SERPL-MCNC: 9.3 MG/DL (ref 8.6–10.2)
CHLORIDE SERPL-SCNC: 97 MMOL/L (ref 96–106)
CO2 SERPL-SCNC: 25 MMOL/L (ref 20–29)
CREAT SERPL-MCNC: 0.84 MG/DL (ref 0.76–1.27)
EOSINOPHIL # BLD AUTO: 0.3 X10E3/UL (ref 0–0.4)
EOSINOPHIL NFR BLD AUTO: 5 %
ERYTHROCYTE [DISTWIDTH] IN BLOOD BY AUTOMATED COUNT: 13.1 % (ref 11.6–15.4)
GLOBULIN SER-MCNC: 2.6 G/DL (ref 1.5–4.5)
GLUCOSE SERPL-MCNC: 105 MG/DL (ref 65–99)
HCT VFR BLD AUTO: 39.2 % (ref 37.5–51)
HGB BLD-MCNC: 13.8 G/DL (ref 13–17.7)
IMM GRANULOCYTES # BLD: 0 X10E3/UL (ref 0–0.1)
IMM GRANULOCYTES NFR BLD: 0 %
LYMPHOCYTES # BLD AUTO: 1.5 X10E3/UL (ref 0.7–3.1)
LYMPHOCYTES NFR BLD AUTO: 27 %
MCH RBC QN AUTO: 32.6 PG (ref 26.6–33)
MCHC RBC AUTO-ENTMCNC: 35.2 G/DL (ref 31.5–35.7)
MCV RBC AUTO: 93 FL (ref 79–97)
MONOCYTES # BLD AUTO: 0.5 X10E3/UL (ref 0.1–0.9)
MONOCYTES NFR BLD AUTO: 9 %
NEUTROPHILS # BLD AUTO: 3.3 X10E3/UL (ref 1.4–7)
NEUTROPHILS NFR BLD AUTO: 58 %
PLATELET # BLD AUTO: 251 X10E3/UL (ref 150–450)
POTASSIUM SERPL-SCNC: 5.1 MMOL/L (ref 3.5–5.2)
PROT SERPL-MCNC: 7.1 G/DL (ref 6–8.5)
RBC # BLD AUTO: 4.23 X10E6/UL (ref 4.14–5.8)
SL AMB EGFR AFRICAN AMERICAN: 91 ML/MIN/1.73
SL AMB EGFR NON AFRICAN AMERICAN: 79 ML/MIN/1.73
SODIUM SERPL-SCNC: 134 MMOL/L (ref 134–144)
TSH SERPL DL<=0.005 MIU/L-ACNC: 2.43 UIU/ML (ref 0.45–4.5)
WBC # BLD AUTO: 5.6 X10E3/UL (ref 3.4–10.8)

## 2020-09-16 ENCOUNTER — IN-CLINIC DEVICE VISIT (OUTPATIENT)
Dept: CARDIOLOGY CLINIC | Facility: CLINIC | Age: 85
End: 2020-09-16
Payer: COMMERCIAL

## 2020-09-16 DIAGNOSIS — Z95.0 PRESENCE OF CARDIAC PACEMAKER: Primary | ICD-10-CM

## 2020-09-16 PROCEDURE — 93280 PM DEVICE PROGR EVAL DUAL: CPT | Performed by: INTERNAL MEDICINE

## 2020-09-16 NOTE — PROGRESS NOTES
Results for orders placed or performed in visit on 09/16/20   Cardiac EP device report    Narrative    BIOT DUAL CHAMBER PPM - ACTIVE SYSTEM IS MRI CONDITIONAL  DEVICE INTERROGATED IN THE [place] OFFICE: TEMP: 97 6  BATTERY VOLTAGE ADEQUATE (7 1 YRS~70%)  AP: 81%  : 33%  ALL LEAD PARAMETERS WITHIN NORMAL LIMITS  3 AMS EPISODES W/ EGRMS SHOWING PAT, MAX DURATION 4 SECS  (HX OF SAME)  PT TAKES METOPROLOL TART, ASA 81MG  EF: 55% (ECHO 3/18/19)  NO PROGRAMMING CHANGES MADE TO DEVICE PARAMETERS  PACEMAKER FUNCTIONING APPROPRIATELY    [address] [place] [address]

## 2020-10-07 ENCOUNTER — IMMUNIZATIONS (OUTPATIENT)
Dept: FAMILY MEDICINE CLINIC | Facility: HOSPITAL | Age: 85
End: 2020-10-07
Payer: COMMERCIAL

## 2020-10-07 DIAGNOSIS — Z23 ENCOUNTER FOR IMMUNIZATION: ICD-10-CM

## 2020-10-07 PROCEDURE — G0008 ADMIN INFLUENZA VIRUS VAC: HCPCS

## 2020-10-07 PROCEDURE — 90662 IIV NO PRSV INCREASED AG IM: CPT

## 2020-12-22 ENCOUNTER — REMOTE DEVICE CLINIC VISIT (OUTPATIENT)
Dept: CARDIOLOGY CLINIC | Facility: CLINIC | Age: 85
End: 2020-12-22
Payer: COMMERCIAL

## 2020-12-22 DIAGNOSIS — Z95.0 PRESENCE OF PERMANENT CARDIAC PACEMAKER: Primary | ICD-10-CM

## 2020-12-22 PROCEDURE — 93294 REM INTERROG EVL PM/LDLS PM: CPT | Performed by: INTERNAL MEDICINE

## 2020-12-22 PROCEDURE — 93296 REM INTERROG EVL PM/IDS: CPT | Performed by: INTERNAL MEDICINE

## 2020-12-29 ENCOUNTER — OFFICE VISIT (OUTPATIENT)
Dept: FAMILY MEDICINE CLINIC | Facility: HOSPITAL | Age: 85
End: 2020-12-29
Payer: COMMERCIAL

## 2020-12-29 VITALS
HEART RATE: 80 BPM | DIASTOLIC BLOOD PRESSURE: 78 MMHG | HEIGHT: 70 IN | SYSTOLIC BLOOD PRESSURE: 130 MMHG | WEIGHT: 172 LBS | BODY MASS INDEX: 24.62 KG/M2

## 2020-12-29 DIAGNOSIS — R26.89 BALANCE DISORDER: ICD-10-CM

## 2020-12-29 DIAGNOSIS — E03.9 ACQUIRED HYPOTHYROIDISM: ICD-10-CM

## 2020-12-29 DIAGNOSIS — K21.9 GERD WITHOUT ESOPHAGITIS: ICD-10-CM

## 2020-12-29 DIAGNOSIS — I10 ESSENTIAL HYPERTENSION: ICD-10-CM

## 2020-12-29 DIAGNOSIS — I35.0 NONRHEUMATIC AORTIC VALVE STENOSIS: Primary | ICD-10-CM

## 2020-12-29 PROCEDURE — 99214 OFFICE O/P EST MOD 30 MIN: CPT | Performed by: INTERNAL MEDICINE

## 2020-12-29 PROCEDURE — 1036F TOBACCO NON-USER: CPT | Performed by: INTERNAL MEDICINE

## 2020-12-29 PROCEDURE — 1160F RVW MEDS BY RX/DR IN RCRD: CPT | Performed by: INTERNAL MEDICINE

## 2020-12-29 RX ORDER — AMLODIPINE BESYLATE 5 MG/1
5 TABLET ORAL DAILY
Qty: 30 TABLET | Refills: 5 | Status: SHIPPED | OUTPATIENT
Start: 2020-12-29 | End: 2021-02-18 | Stop reason: SDUPTHER

## 2020-12-31 ENCOUNTER — TELEPHONE (OUTPATIENT)
Dept: FAMILY MEDICINE CLINIC | Facility: HOSPITAL | Age: 85
End: 2020-12-31

## 2021-01-18 ENCOUNTER — IMMUNIZATIONS (OUTPATIENT)
Dept: FAMILY MEDICINE CLINIC | Facility: HOSPITAL | Age: 86
End: 2021-01-18

## 2021-01-18 ENCOUNTER — TELEPHONE (OUTPATIENT)
Dept: FAMILY MEDICINE CLINIC | Facility: HOSPITAL | Age: 86
End: 2021-01-18

## 2021-01-18 DIAGNOSIS — Z23 ENCOUNTER FOR IMMUNIZATION: Primary | ICD-10-CM

## 2021-01-18 PROCEDURE — 91301 SARS-COV-2 / COVID-19 MRNA VACCINE (MODERNA) 100 MCG: CPT

## 2021-01-18 PROCEDURE — 0011A SARS-COV-2 / COVID-19 MRNA VACCINE (MODERNA) 100 MCG: CPT

## 2021-02-15 ENCOUNTER — IMMUNIZATIONS (OUTPATIENT)
Dept: FAMILY MEDICINE CLINIC | Facility: HOSPITAL | Age: 86
End: 2021-02-15

## 2021-02-15 DIAGNOSIS — Z23 ENCOUNTER FOR IMMUNIZATION: Primary | ICD-10-CM

## 2021-02-15 PROCEDURE — 0012A SARS-COV-2 / COVID-19 MRNA VACCINE (MODERNA) 100 MCG: CPT

## 2021-02-15 PROCEDURE — 91301 SARS-COV-2 / COVID-19 MRNA VACCINE (MODERNA) 100 MCG: CPT

## 2021-02-16 ENCOUNTER — OFFICE VISIT (OUTPATIENT)
Dept: FAMILY MEDICINE CLINIC | Facility: HOSPITAL | Age: 86
End: 2021-02-16
Payer: COMMERCIAL

## 2021-02-16 VITALS
DIASTOLIC BLOOD PRESSURE: 82 MMHG | SYSTOLIC BLOOD PRESSURE: 130 MMHG | BODY MASS INDEX: 25.2 KG/M2 | HEIGHT: 70 IN | HEART RATE: 79 BPM | WEIGHT: 176 LBS

## 2021-02-16 DIAGNOSIS — I10 ESSENTIAL HYPERTENSION: Primary | ICD-10-CM

## 2021-02-16 DIAGNOSIS — E03.9 ACQUIRED HYPOTHYROIDISM: ICD-10-CM

## 2021-02-16 PROCEDURE — 1160F RVW MEDS BY RX/DR IN RCRD: CPT | Performed by: INTERNAL MEDICINE

## 2021-02-16 PROCEDURE — 1101F PT FALLS ASSESS-DOCD LE1/YR: CPT | Performed by: INTERNAL MEDICINE

## 2021-02-16 PROCEDURE — 3725F SCREEN DEPRESSION PERFORMED: CPT | Performed by: INTERNAL MEDICINE

## 2021-02-16 PROCEDURE — 1036F TOBACCO NON-USER: CPT | Performed by: INTERNAL MEDICINE

## 2021-02-16 PROCEDURE — 99213 OFFICE O/P EST LOW 20 MIN: CPT | Performed by: INTERNAL MEDICINE

## 2021-02-16 PROCEDURE — 3288F FALL RISK ASSESSMENT DOCD: CPT | Performed by: INTERNAL MEDICINE

## 2021-02-16 NOTE — PROGRESS NOTES
BMI Counseling: Body mass index is 25 25 kg/m²  The BMI is above normal  Nutrition recommendations include reducing portion sizes  Assessment/Plan:       Diagnoses and all orders for this visit:    Essential hypertension          All of the above diagnoses have been assessed  Additional COMMENTS/PLAN: BP is OK      Subjective:      Patient ID: Jolly Meza is a 80 y o  male  HPI     Here to f/u on BP with the addition of amlodipine/    The following portions of the patient's history were revised and updated as appropriate: Problem list, allergies, med list, FH, SH, Past medical and surgical histories  Current Outpatient Medications   Medication Sig Dispense Refill    amLODIPine (NORVASC) 5 mg tablet Take 1 tablet (5 mg total) by mouth daily 30 tablet 5    Ascorbic Acid (VITAMIN C) 500 MG CAPS Take 1 tablet by mouth daily      aspirin (ECOTRIN LOW STRENGTH) 81 mg EC tablet Take 81 mg by mouth daily      Cyanocobalamin (VITAMIN B12) 1000 MCG TBCR Take 1 tablet by mouth daily      Flaxseed, Linseed, (FLAXSEED OIL) 1000 MG CAPS Take 1 capsule by mouth daily      gabapentin (NEURONTIN) 100 mg capsule Take 100 mg by mouth daily as needed       Glucosamine-Chondroit-Vit C-Mn (GLUCOSAMINE 1500 COMPLEX) CAPS Take 1 capsule by mouth daily      levothyroxine 100 mcg tablet Take 1 tablet (100 mcg total) by mouth daily 90 tablet 3    metoprolol tartrate (LOPRESSOR) 100 mg tablet Take 1 tablet (100 mg total) by mouth 2 (two) times a day 180 tablet 3    Multiple Vitamin (MULTIVITAMIN) capsule Take 1 capsule by mouth daily      Omega-3 Fatty Acids (FISH OIL) 1200 MG CAPS Take 1 capsule by mouth daily      tamsulosin (FLOMAX) 0 4 mg Take 1 capsule (0 4 mg total) by mouth daily 90 capsule 3     No current facility-administered medications for this visit  Review of Systems   All other systems reviewed and are negative          Objective:    /82 (BP Location: Left arm, Patient Position: Sitting) Pulse 79   Ht 5' 10" (1 778 m)   Wt 79 8 kg (176 lb)   BMI 25 25 kg/m²     BP Readings from Last 3 Encounters:   02/16/21 130/82   12/29/20 130/78   06/29/20 140/82                  Wt Readings from Last 3 Encounters:   02/16/21 79 8 kg (176 lb)   12/29/20 78 kg (172 lb)   06/29/20 77 2 kg (170 lb 3 2 oz)         Physical Exam  Vitals signs reviewed  Musculoskeletal:      Right lower leg: No edema  Left lower leg: No edema  No visits with results within 2 Week(s) from this visit  Latest known visit with results is:   Orders Only on 09/01/2020   Component Date Value Ref Range Status    White Blood Cell Count 09/01/2020 5 6  3 4 - 10 8 x10E3/uL Final    Red Blood Cell Count 09/01/2020 4 23  4  14 - 5 80 x10E6/uL Final    Hemoglobin 09/01/2020 13 8  13 0 - 17 7 g/dL Final    HCT 09/01/2020 39 2  37 5 - 51 0 % Final    MCV 09/01/2020 93  79 - 97 fL Final    MCH 09/01/2020 32 6  26 6 - 33 0 pg Final    MCHC 09/01/2020 35 2  31 5 - 35 7 g/dL Final    RDW 09/01/2020 13 1  11 6 - 15 4 % Final    Platelet Count 68/01/5396 251  150 - 450 x10E3/uL Final    Neutrophils 09/01/2020 58  Not Estab  % Final    Lymphocytes 09/01/2020 27  Not Estab  % Final    Monocytes 09/01/2020 9  Not Estab  % Final    Eosinophils 09/01/2020 5  Not Estab  % Final    Basophils PCT 09/01/2020 1  Not Estab  % Final    Neutrophils (Absolute) 09/01/2020 3 3  1 4 - 7 0 x10E3/uL Final    Lymphocytes (Absolute) 09/01/2020 1 5  0 7 - 3 1 x10E3/uL Final    Monocytes (Absolute) 09/01/2020 0 5  0 1 - 0 9 x10E3/uL Final    Eosinophils (Absolute) 09/01/2020 0 3  0 0 - 0 4 x10E3/uL Final    Basophils ABS 09/01/2020 0 0  0 0 - 0 2 x10E3/uL Final    Immature Granulocytes 09/01/2020 0  Not Estab  % Final    Immature Granulocytes (Absolute) 09/01/2020 0 0  0 0 - 0 1 x10E3/uL Final    Comment: A hand-written panel/profile was received from your office   In  accordance with the [employer] Code Policy dated July 2003, we have assigned CBC with Differential/Platelet, Test Code  #[id number] to this request  If this is not the testing you wished to  receive on this specimen, please contact the [facility] Department to clarify the test order  We  appreciate your business   Glucose, Random 09/01/2020 105* 65 - 99 mg/dL Final    BUN 09/01/2020 11  8 - 27 mg/dL Final    Creatinine 09/01/2020 0 84  0 76 - 1 27 mg/dL Final    eGFR Non African American 09/01/2020 79  >59 mL/min/1 73 Final    eGFR African American 09/01/2020 91  >59 mL/min/1 73 Final    SL AMB BUN/CREATININE RATIO 09/01/2020 13  10 - 24 Final    Sodium 09/01/2020 134  134 - 144 mmol/L Final    Potassium 09/01/2020 5 1  3 5 - 5 2 mmol/L Final    Chloride 09/01/2020 97  96 - 106 mmol/L Final    CO2 09/01/2020 25  20 - 29 mmol/L Final    CALCIUM 09/01/2020 9 3  8 6 - 10 2 mg/dL Final    Protein, Total 09/01/2020 7 1  6 0 - 8 5 g/dL Final    Albumin 09/01/2020 4 5  3 6 - 4 6 g/dL Final    Globulin, Total 09/01/2020 2 6  1 5 - 4 5 g/dL Final    Albumin/Globulin Ratio 09/01/2020 1 7  1 2 - 2 2 Final    TOTAL BILIRUBIN 09/01/2020 0 8  0 0 - 1 2 mg/dL Final    Alk Phos Isoenzymes 09/01/2020 69  39 - 117 IU/L Final    AST 09/01/2020 43* 0 - 40 IU/L Final    ALT 09/01/2020 38  0 - 44 IU/L Final    TSH 09/01/2020 2 430  0 450 - 4 500 uIU/mL Final         Rory Mcfarlane MD    Some or all of this note was generated with a voice recognition dictation system and therefore my contain grammatical or spelling errors

## 2021-02-18 DIAGNOSIS — I10 ESSENTIAL HYPERTENSION: ICD-10-CM

## 2021-02-18 RX ORDER — AMLODIPINE BESYLATE 5 MG/1
5 TABLET ORAL DAILY
Qty: 90 TABLET | Refills: 3 | Status: SHIPPED | OUTPATIENT
Start: 2021-02-18 | End: 2022-02-15

## 2021-03-02 DIAGNOSIS — E03.9 ACQUIRED HYPOTHYROIDISM: ICD-10-CM

## 2021-03-02 RX ORDER — LEVOTHYROXINE SODIUM 0.1 MG/1
100 TABLET ORAL DAILY
Qty: 90 TABLET | Refills: 3 | Status: SHIPPED | OUTPATIENT
Start: 2021-03-02 | End: 2022-02-28

## 2021-03-10 DIAGNOSIS — N40.0 BENIGN PROSTATIC HYPERPLASIA, UNSPECIFIED WHETHER LOWER URINARY TRACT SYMPTOMS PRESENT: ICD-10-CM

## 2021-03-11 RX ORDER — TAMSULOSIN HYDROCHLORIDE 0.4 MG/1
0.4 CAPSULE ORAL DAILY
Qty: 90 CAPSULE | Refills: 3 | Status: SHIPPED | OUTPATIENT
Start: 2021-03-11 | End: 2022-03-12

## 2021-04-15 DIAGNOSIS — I10 ESSENTIAL HYPERTENSION: ICD-10-CM

## 2021-04-15 RX ORDER — METOPROLOL TARTRATE 100 MG/1
100 TABLET ORAL 2 TIMES DAILY
Qty: 180 TABLET | Refills: 3 | Status: SHIPPED | OUTPATIENT
Start: 2021-04-15 | End: 2022-04-13

## 2021-05-20 ENCOUNTER — OFFICE VISIT (OUTPATIENT)
Dept: FAMILY MEDICINE CLINIC | Facility: HOSPITAL | Age: 86
End: 2021-05-20
Payer: COMMERCIAL

## 2021-05-20 VITALS
HEART RATE: 74 BPM | WEIGHT: 168 LBS | BODY MASS INDEX: 24.05 KG/M2 | DIASTOLIC BLOOD PRESSURE: 78 MMHG | TEMPERATURE: 98.4 F | HEIGHT: 70 IN | SYSTOLIC BLOOD PRESSURE: 140 MMHG

## 2021-05-20 DIAGNOSIS — R42 VERTIGO: Primary | ICD-10-CM

## 2021-05-20 PROCEDURE — 99213 OFFICE O/P EST LOW 20 MIN: CPT | Performed by: INTERNAL MEDICINE

## 2021-05-20 RX ORDER — MECLIZINE HCL 12.5 MG/1
12.5 TABLET ORAL EVERY 8 HOURS PRN
Qty: 20 TABLET | Refills: 1 | Status: SHIPPED | OUTPATIENT
Start: 2021-05-20 | End: 2021-08-16

## 2021-05-20 NOTE — PROGRESS NOTES
Assessment/Plan:       Diagnoses and all orders for this visit:    Vertigo  -     meclizine (ANTIVERT) 12 5 MG tablet; Take 1 tablet (12 5 mg total) by mouth every 8 (eight) hours as needed for dizziness  -     Ambulatory referral to Physical Therapy; Future          All of the above diagnoses have been assessed  Additional COMMENTS/PLAN:  wellness next time      Subjective:      Patient ID: Amee Stanton is a 80 y o  male  HPI     Dizzy-this started about a month ago  Patient does recognize that there is a sense of motion with this  Change position does seem to the exacerbated  It is better if he moves slower  The following portions of the patient's history were revised and updated as appropriate: Problem list, allergies, med list, FH, SH, Past medical and surgical histories      Current Outpatient Medications   Medication Sig Dispense Refill    amLODIPine (NORVASC) 5 mg tablet Take 1 tablet (5 mg total) by mouth daily 90 tablet 3    Ascorbic Acid (VITAMIN C) 500 MG CAPS Take 1 tablet by mouth daily      aspirin (ECOTRIN LOW STRENGTH) 81 mg EC tablet Take 81 mg by mouth daily      Cyanocobalamin (VITAMIN B12) 1000 MCG TBCR Take 1 tablet by mouth daily      Flaxseed, Linseed, (FLAXSEED OIL) 1000 MG CAPS Take 1 capsule by mouth daily      gabapentin (NEURONTIN) 100 mg capsule Take 100 mg by mouth daily as needed       Glucosamine-Chondroit-Vit C-Mn (GLUCOSAMINE 1500 COMPLEX) CAPS Take 1 capsule by mouth daily      levothyroxine 100 mcg tablet Take 1 tablet (100 mcg total) by mouth daily 90 tablet 3    metoprolol tartrate (LOPRESSOR) 100 mg tablet Take 1 tablet (100 mg total) by mouth 2 (two) times a day 180 tablet 3    Multiple Vitamin (MULTIVITAMIN) capsule Take 1 capsule by mouth daily      Omega-3 Fatty Acids (FISH OIL) 1200 MG CAPS Take 1 capsule by mouth daily      tamsulosin (FLOMAX) 0 4 mg Take 1 capsule (0 4 mg total) by mouth daily 90 capsule 3    meclizine (ANTIVERT) 12 5 MG tablet Take 1 tablet (12 5 mg total) by mouth every 8 (eight) hours as needed for dizziness 20 tablet 1     No current facility-administered medications for this visit  Review of Systems   All other systems reviewed and are negative  Objective:    /78 (BP Location: Left arm, Patient Position: Sitting, Cuff Size: Standard)   Pulse 74   Temp 98 4 °F (36 9 °C) (Tympanic)   Ht 5' 10" (1 778 m)   Wt 76 2 kg (168 lb)   BMI 24 11 kg/m²     BP Readings from Last 3 Encounters:   05/20/21 140/78   02/16/21 130/82   12/29/20 130/78                  Wt Readings from Last 3 Encounters:   05/20/21 76 2 kg (168 lb)   02/16/21 79 8 kg (176 lb)   12/29/20 78 kg (172 lb)         Physical Exam  Vitals signs reviewed  Cardiovascular:      Rate and Rhythm: Normal rate and regular rhythm  Pulmonary:      Effort: Pulmonary effort is normal       Breath sounds: Normal breath sounds  Musculoskeletal:      Right lower leg: No edema  Left lower leg: No edema  Neurological:      Mental Status: He is alert  Comments: Bradley- Hallpike maneuver is negative  No visits with results within 2 Week(s) from this visit  Latest known visit with results is:   Orders Only on 09/01/2020   Component Date Value Ref Range Status    White Blood Cell Count 09/01/2020 5 6  3 4 - 10 8 x10E3/uL Final    Red Blood Cell Count 09/01/2020 4 23  4  14 - 5 80 x10E6/uL Final    Hemoglobin 09/01/2020 13 8  13 0 - 17 7 g/dL Final    HCT 09/01/2020 39 2  37 5 - 51 0 % Final    MCV 09/01/2020 93  79 - 97 fL Final    MCH 09/01/2020 32 6  26 6 - 33 0 pg Final    MCHC 09/01/2020 35 2  31 5 - 35 7 g/dL Final    RDW 09/01/2020 13 1  11 6 - 15 4 % Final    Platelet Count 34/06/4601 251  150 - 450 x10E3/uL Final    Neutrophils 09/01/2020 58  Not Estab  % Final    Lymphocytes 09/01/2020 27  Not Estab  % Final    Monocytes 09/01/2020 9  Not Estab  % Final    Eosinophils 09/01/2020 5  Not Estab  % Final    Basophils PCT 09/01/2020 1  Not Estab  % Final    Neutrophils (Absolute) 09/01/2020 3 3  1 4 - 7 0 x10E3/uL Final    Lymphocytes (Absolute) 09/01/2020 1 5  0 7 - 3 1 x10E3/uL Final    Monocytes (Absolute) 09/01/2020 0 5  0 1 - 0 9 x10E3/uL Final    Eosinophils (Absolute) 09/01/2020 0 3  0 0 - 0 4 x10E3/uL Final    Basophils ABS 09/01/2020 0 0  0 0 - 0 2 x10E3/uL Final    Immature Granulocytes 09/01/2020 0  Not Estab  % Final    Immature Granulocytes (Absolute) 09/01/2020 0 0  0 0 - 0 1 x10E3/uL Final    Comment: A hand-written panel/profile was received from your office  In  accordance with the [facility] Ambiguous Test Code Policy dated July 6429, we have assigned CBC with Differential/Platelet, Test Code  #[id number] to this request  If this is not the testing you wished to  receive on this specimen, please contact the [facility] Department to clarify the test order  We  appreciate your business        Glucose, Random 09/01/2020 105* 65 - 99 mg/dL Final    BUN 09/01/2020 11  8 - 27 mg/dL Final    Creatinine 09/01/2020 0 84  0 76 - 1 27 mg/dL Final    eGFR Non African American 09/01/2020 79  >59 mL/min/1 73 Final    eGFR African American 09/01/2020 91  >59 mL/min/1 73 Final    SL AMB BUN/CREATININE RATIO 09/01/2020 13  10 - 24 Final    Sodium 09/01/2020 134  134 - 144 mmol/L Final    Potassium 09/01/2020 5 1  3 5 - 5 2 mmol/L Final    Chloride 09/01/2020 97  96 - 106 mmol/L Final    CO2 09/01/2020 25  20 - 29 mmol/L Final    CALCIUM 09/01/2020 9 3  8 6 - 10 2 mg/dL Final    Protein, Total 09/01/2020 7 1  6 0 - 8 5 g/dL Final    Albumin 09/01/2020 4 5  3 6 - 4 6 g/dL Final    Globulin, Total 09/01/2020 2 6  1 5 - 4 5 g/dL Final    Albumin/Globulin Ratio 09/01/2020 1 7  1 2 - 2 2 Final    TOTAL BILIRUBIN 09/01/2020 0 8  0 0 - 1 2 mg/dL Final    Alk Phos Isoenzymes 09/01/2020 69  39 - 117 IU/L Final    AST 09/01/2020 43* 0 - 40 IU/L Final    ALT 09/01/2020 38  0 - 44 IU/L Final  TSH 09/01/2020 2 430  0 450 - 4 500 uIU/mL Final         Javed Barriga MD    Some or all of this note was generated with a voice recognition dictation system and therefore my contain grammatical or spelling errors

## 2021-06-01 ENCOUNTER — EVALUATION (OUTPATIENT)
Dept: PHYSICAL THERAPY | Facility: CLINIC | Age: 86
End: 2021-06-01
Payer: COMMERCIAL

## 2021-06-01 DIAGNOSIS — R42 VERTIGO: ICD-10-CM

## 2021-06-01 PROCEDURE — 97161 PT EVAL LOW COMPLEX 20 MIN: CPT | Performed by: PHYSICAL THERAPIST

## 2021-06-01 NOTE — PROGRESS NOTES
PT Evaluation     Today's date: 2021  Patient name: Shannon Collins  : 10/7/1932  MRN: [id number]  Referring provider: Tono Arechiga MD  Dx:   Encounter Diagnosis     ICD-10-CM    1  Vertigo  R42 Ambulatory referral to Physical Therapy                  Assessment  Assessment details: Shannon Collins is a 80 y o  male presenting to outpatient physical therapy at [place] [address] with complaints of dizziness  He presents with decreased cervical range of motion, decreased postural strength, limited flexibility, poor postural awareness, poor balance, decreased tolerance to activity and decreased functional mobility due to balance disorder  He had a similar issue 2 years ago which resolved with PT after 3 visits and he is expected to make good progression again  He would benefit from skilled PT services in order to address these deficits and reach maximum level of function  Thank you for the referral!  Impairments: abnormal or restricted ROM, activity intolerance, impaired balance, impaired physical strength, lacks appropriate home exercise program, pain with function and poor posture   Barriers to therapy: None  Understanding of Dx/Px/POC: good   Prognosis: good    Goals  ST  Independent with HEP in 2 weeks  2  Good postural awareness in 2 weeks     LT  Achieve FOTO score of 90/100 in 8 weeks   2  Able to get up and turn quickly without dizziness in 8 weeks  3  Strength traps = 5/5 B in 8 weeks  4    Excellent B LE balance in 8 weeks  5   - Fakuda Stepping test in 8 weeks      Plan  Patient would benefit from: skilled PT and PT eval  Planned therapy interventions: ADL retraining, balance/weight bearing training, flexibility, functional ROM exercises, home exercise program, joint mobilization, manual therapy, neuromuscular re-education, postural training, strengthening, stretching, therapeutic activities and therapeutic exercise  Frequency: 2x week  Duration in weeks: 8  Treatment plan discussed with: patient        Subjective Evaluation    History of Present Illness  Mechanism of injury: Pt reports having dizziness mostly with changing positions quickly  States that symptoms started about 3 year ago and resolved with PT after only 3 sessions  Symptoms started again recently about 2 months ago  Also has pacemaker x 4 years with fairly consistent quick bursts chest pain, but not related to dizziness  Retired [employer]    Keeps very active with house and yardwork including cutting wood  Has hx of melanoma, Hep A  Most symptoms happen when leaning forward or when getting up fast   Symptoms resolve in a few minutes  Recurrent probem    Quality of life: good    Pain  Current pain ratin  At best pain ratin  At worst pain ratin  Quality: dizziness  Relieving factors: rest  Progression: worsening    Social Support  Steps to enter house: yes  Stairs in house: yes   Lives in: multiple-level home  Lives with: spouse    Employment status: not working    Diagnostic Tests  CT scan: normal  Treatments  Previous treatment: physical therapy  Patient Goals  Patient goals for therapy: improved balance and increased strength  Patient's goals regarding treatment: no dizziness  Objective     Static Posture     Comments  VESTIBULAR EVALUATION    Posture:  Poor with forward head and rounded shoulders = moderate  Cervical ROM:  Limited by 25% throughout  Palpation: Mod tenderness B cervical paraspinals  Balance:  Min limited B LE's  Worse with EC  VOR:  Normal    Nystagmus:  None  Fakuda Stepping Test:  No turn, but 5 foot forward displacement  Strength B UE's = 5/5, except for B traps = 4/5         Flowsheet Rows      Most Recent Value   PT/OT G-Codes   Current Score  97   Projected Score  90        Daily Treatment Diary       DX:  Vertigo  EPOC:  21  Precautions: Fall risk  CO-MORBIDITES:  Pacemaker, HTN, Hep A  PERSONAL FACTORS:  None    Manuals HEP 6/           Supine STM B cervical paraspinals  7'                                                  Neuro Re-Ed     Seated chin tucks with B scap retraction 6/1                                                                                          Ther Ex    Ankle pumps prior to change of position 6/1                                                                                                       Ther Activity                              Gait Training                              Modalities

## 2021-06-02 ENCOUNTER — REMOTE DEVICE CLINIC VISIT (OUTPATIENT)
Dept: CARDIOLOGY CLINIC | Facility: CLINIC | Age: 86
End: 2021-06-02
Payer: COMMERCIAL

## 2021-06-02 DIAGNOSIS — Z95.0 PRESENCE OF PERMANENT CARDIAC PACEMAKER: Primary | ICD-10-CM

## 2021-06-02 PROCEDURE — 93296 REM INTERROG EVL PM/IDS: CPT | Performed by: INTERNAL MEDICINE

## 2021-06-02 PROCEDURE — 93294 REM INTERROG EVL PM/LDLS PM: CPT | Performed by: INTERNAL MEDICINE

## 2021-06-02 NOTE — PROGRESS NOTES
Results for orders placed or performed in visit on 06/02/21   Cardiac EP device report    Narrative    BIOT DUAL CHAMBER PPM - ACTIVE SYSTEM IS MRI CONDITIONAL  BIOTRONIK TRANSMISSION: BATTERY VOLTAGE ADEQUATE (~ 65% REMAINING LONGEVITY)  %  51% (>40%/DDD-CLS 50//280 MS)  ALL AVAILABLE LEAD PARAMETERS WITHIN NORMAL LIMITS  1 VT-NS EPISODE FOR NSVT W/DURATION ~ 10 BEATS @ AVG  MS  EF 55% (3/2019 ECHO)  PT TAKES ASA 81, METOPROLOL TART  TASK TO DR KELLER FOR NSVT  TASK TO SCHEDULING FOR O/D CARDIAC FUP  NORMAL DEVICE FUNCTION     ES

## 2021-06-08 ENCOUNTER — OFFICE VISIT (OUTPATIENT)
Dept: PHYSICAL THERAPY | Facility: CLINIC | Age: 86
End: 2021-06-08
Payer: COMMERCIAL

## 2021-06-08 DIAGNOSIS — R42 VERTIGO: Primary | ICD-10-CM

## 2021-06-08 PROCEDURE — 97112 NEUROMUSCULAR REEDUCATION: CPT | Performed by: PHYSICAL THERAPIST

## 2021-06-08 PROCEDURE — 97140 MANUAL THERAPY 1/> REGIONS: CPT | Performed by: PHYSICAL THERAPIST

## 2021-06-08 NOTE — PROGRESS NOTES
Daily Note     Today's date: 2021  Patient name: Mily Dumont  : 10/7/1932  MRN: [id number]  Referring provider: Jo Ann Hong MD  Dx:   Encounter Diagnosis     ICD-10-CM    1  Vertigo  R42                   Subjective:  Pt reports feeling a little less dizzy  Still some neck pain  Able to cut down several trees this week  Objective:  See treatment diary below      Assessment:  Pt presented to outpatient physical therapy at [place] [address] with complaints of dizziness  He presented with decreased cervical range of motion, decreased postural strength, limited flexibility, poor postural awareness, poor balance, decreased tolerance to activity and decreased functional mobility due to balance disorder  He had a similar issue 2 years ago which resolved with PT after 3 visits and he is expected to make good progression again  He will continue to benefit from skilled PT services in order to address these deficits and reach maximum level of function  Mod less cervical tightness post manual tx today  No LOB on RB with EO or EC today  Plan:  Continue vestibular tx  Try RB EC with head movements           DX:  Vertigo  EPOC:  21  Precautions: Fall risk  CO-MORBIDITES:  Pacemaker, HTN, Hep A  PERSONAL FACTORS:  None    Manuals HEP           Supine STM B cervical paraspinals  7' 10'                                                 Neuro Re-Ed     Seated chin tucks with B scap retraction             VOR on RB with targets and stationary EC F/B, S/S   15 ea          TB rows B   Blue 25          TB LPD B   Blue 25          Retro UBE standing   L4 4'                                    Ther Ex    Ankle pumps prior to change of position                                                                                                        Ther Activity                              Gait Training                              Modalities

## 2021-06-16 ENCOUNTER — OFFICE VISIT (OUTPATIENT)
Dept: PHYSICAL THERAPY | Facility: CLINIC | Age: 86
End: 2021-06-16
Payer: COMMERCIAL

## 2021-06-16 ENCOUNTER — OFFICE VISIT (OUTPATIENT)
Dept: CARDIOLOGY CLINIC | Facility: CLINIC | Age: 86
End: 2021-06-16
Payer: COMMERCIAL

## 2021-06-16 VITALS
HEART RATE: 70 BPM | SYSTOLIC BLOOD PRESSURE: 132 MMHG | HEIGHT: 70 IN | WEIGHT: 169 LBS | BODY MASS INDEX: 24.2 KG/M2 | DIASTOLIC BLOOD PRESSURE: 80 MMHG

## 2021-06-16 DIAGNOSIS — I35.0 NONRHEUMATIC AORTIC VALVE STENOSIS: ICD-10-CM

## 2021-06-16 DIAGNOSIS — Z95.0 PRESENCE OF PERMANENT CARDIAC PACEMAKER: ICD-10-CM

## 2021-06-16 DIAGNOSIS — I10 ESSENTIAL HYPERTENSION: ICD-10-CM

## 2021-06-16 DIAGNOSIS — R42 VERTIGO: Primary | ICD-10-CM

## 2021-06-16 DIAGNOSIS — I48.0 PAROXYSMAL ATRIAL FIBRILLATION (HCC): Primary | ICD-10-CM

## 2021-06-16 DIAGNOSIS — I49.5 SICK SINUS SYNDROME (HCC): ICD-10-CM

## 2021-06-16 PROCEDURE — 97140 MANUAL THERAPY 1/> REGIONS: CPT | Performed by: PHYSICAL THERAPIST

## 2021-06-16 PROCEDURE — 97112 NEUROMUSCULAR REEDUCATION: CPT | Performed by: PHYSICAL THERAPIST

## 2021-06-16 PROCEDURE — 93000 ELECTROCARDIOGRAM COMPLETE: CPT | Performed by: INTERNAL MEDICINE

## 2021-06-16 PROCEDURE — 1036F TOBACCO NON-USER: CPT | Performed by: INTERNAL MEDICINE

## 2021-06-16 PROCEDURE — 99214 OFFICE O/P EST MOD 30 MIN: CPT | Performed by: INTERNAL MEDICINE

## 2021-06-16 PROCEDURE — 1160F RVW MEDS BY RX/DR IN RCRD: CPT | Performed by: INTERNAL MEDICINE

## 2021-06-16 NOTE — PROGRESS NOTES
Daily Note     Today's date: 2021  Patient name: Raoul Amador  : 10/7/1932  MRN: [id number]  Referring provider: Yola Carbone MD  Dx:   Encounter Diagnosis     ICD-10-CM    1  Vertigo  R42                   Subjective:  Pt reports feeling no real dizziness since last week   Objective:  See treatment diary below      Assessment:  Pt presented to outpatient physical therapy at [place] [address] with complaints of dizziness  He presented with decreased cervical range of motion, decreased postural strength, limited flexibility, poor postural awareness, poor balance, decreased tolerance to activity and decreased functional mobility due to balance disorder  He had a similar issue 2 years ago which resolved with PT after 3 visits and he is showing the same progression again  He may be ready for D/C by next week if this trend continues  No LOB on RB with EO or EC today or with addition of walking VOR  Plan:  Likely D/C on 21            DX:  Vertigo  EPOC:  21  Precautions: Fall risk  CO-MORBIDITES:  Pacemaker, HTN, Hep A  PERSONAL FACTORS:  None    Manuals HEP          Supine STM B cervical paraspinals  7' 10' 10'                                                Neuro Re-Ed     Seated chin tucks with B scap retraction             VOR on RB with targets and stationary EC F/B, S/S   15 ea 15 ea EO + EC targets and no targets         TB rows B   Blue 25 [place] 30         TB LPD B   Blue 25 Blue 30         Retro UBE standing   L4 4' L5 4'         Walking VOR targets horiz + vert 12' in ll bars    3 laps ea                      Ther Ex    Ankle pumps prior to change of position                                                                                                        Ther Activity                              Gait Training                              Modalities

## 2021-06-16 NOTE — PROGRESS NOTES
Cardiology Consultation     Amee Stanton  [id number]  10/7/1932  HEART & VASCULAR  [facility] Jose Goldberg  [address]  [address] [address] [address]    1  Paroxysmal atrial fibrillation (HCC)  POCT ECG   2  Nonrheumatic aortic valve stenosis     3  Essential hypertension     4  Sick sinus syndrome ([address] )     5  Presence of permanent cardiac pacemaker           Discussion/Summary:    1  History of permanent pacemaker - This was secondary to sick sinus syndrome  He had some sort of mechanical chest pain after his procedure that fortunately have greatly improved  He will continue to follow in our pacemaker clinic  He is 100% a paced, and about 40-50% V-paced  2  Paroxysmal atrial fibrillation - Found on pacemaker interrogation  He is predominantly atrially paced and has not had any recent episodes of atrial fibrillation with the last episode detected about 2 years ago  Anticoagulation is recommended however he has declined in the past   I suggested at least aspirin therapy  3  Moderate aortic stenosis - It has been over 2 years since his last echocardiogram we were ordered an updated 1 today  If there is no significant change we will see him back in 1 year  4  Hypertension - His blood pressure is under good control today  He will remain on the same medical regimen  He has this followed closely by his internist       HPI:    Mr Luis Cesar comes in for follow-up given his cardiac history  He has a history of sick sinus syndrome and status post permanent pacemaker in March of 2016  He also has a history of moderate aortic stenosis, paroxysmal atrial fibrillation and hypertension  He is predominantly atrially paced  He was offered anticoagulation for stroke prevention, but declined  His prior cardiology care was through [facility], where he received as permanent pacemaker    Courtney Price gives me a history of having significant mechanical pain after his permanent pacemaker  He recalls coming out of anesthesia and felt as if somebody was pressing on his chest   It is unclear at this time what exactly had happened  I asked if there was a complication like an arrhythmia that required defibrillation, but he does not ever recall being told that  For close to 3 years he had on and off mechanical pains from his pacemaker  They have improved, and now he seems to describe the fleeting sharp chest pain that only lasts a few seconds  His pacemaker interrogations show that he is 100% a paced, and V paced about 40-50% of the time  He has had a few episodes of atrial fibrillation detected by pacer interrogation in the past   He has had none recently  His last echocardiogram was over 2 years ago, showing moderate aortic stenosis and normal LV systolic function  Farideh Taylor otherwise denies any cardiac complaints  He is not giving me any symptoms of angina  In fact when he is active he feels better, and does not have his pain around his pacemaker site  He does have some shortness of breath and fatigue with exertion, but he goes well above and beyond his activities of daily living without any limitations  He denies any symptoms at rest   No signs or symptoms of CHF  No recent palpitations, lightheadedness or syncope        Patient Active Problem List   Diagnosis    Nonrheumatic aortic valve stenosis    Chronic nonintractable headache    Enlarged prostate without lower urinary tract symptoms (luts)    GERD without esophagitis    Essential hypertension    Acquired hypothyroidism    Right-sided chest pain    Left sided chest pain    Sick sinus syndrome (HCC)    Presence of permanent cardiac pacemaker    [facility]ox[facility]mal [facility] fibrillation ([facility] )    Balance disorder     Past Medical History:   Diagnosis Date    Arthropathy     multiple sites    Cardiac dysrhythmia     Hepatitis A     Melanoma ([facility] [address] )     malignant of skin     Social History     Socioeconomic History  Marital status: /Civil Union     Spouse name: Kate Epps Number of children: Not on file    Years of education: Not on file    Highest education level: Not on file   Occupational History    Not on file   Tobacco Use    Smoking status: Never Smoker    Smokeless tobacco: Never Used   Vaping Use    Vaping Use: Never used   Substance and Sexual Activity    Alcohol use: No    Drug use: No    Sexual activity: Not on file   Other Topics Concern    Not on file   Social History Narrative    Living will on file    Supportive and safe    Lives with wife     Social Determinants of Health     Financial Resource Strain:     Difficulty of Paying Living Expenses:    Food Insecurity:     Worried About Running Out of Food in the Last Year:     [address] in the Last Year:    Transportation Needs:     Lack of Transportation (Medical):      Lack of Transportation (Non-Medical):    Physical Activity:     Days of Exercise per Week:     Minutes of Exercise per Session:    Stress:     Feeling of Stress :    Social Connections:     Frequency of Communication with Friends and Family:     Frequency of Social Gatherings with Friends and Family:     Attends Nondenominational Services:     Active Member of Clubs or Organizations:     Attends Club or Organization Meetings:     Marital Status:    Intimate Partner Violence:     Fear of Current or Ex-Partner:     Emotionally Abused:     Physically Abused:     Sexually Abused:       Family History   Problem Relation Age of Onset    Cancer Family         gastrointestinal tract    Substance Abuse Neg Hx     Mental illness Neg Hx      Past Surgical History:   Procedure Laterality Date    INGUINAL HERNIA REPAIR         Current Outpatient Medications:     amLODIPine (NORVASC) 5 mg tablet, Take 1 tablet (5 mg total) by mouth daily, Disp: 90 tablet, Rfl: 3    Ascorbic Acid (VITAMIN C) 500 MG CAPS, Take 1 tablet by mouth daily, Disp: , Rfl:     aspirin (ECOTRIN LOW STRENGTH) 81 mg EC tablet, Take 81 mg by mouth daily, Disp: , Rfl:     Cyanocobalamin (VITAMIN B12) 1000 MCG TBCR, Take 1 tablet by mouth daily, Disp: , Rfl:     Flaxseed, Linseed, (FLAXSEED OIL) 1000 MG CAPS, Take 1 capsule by mouth daily, Disp: , Rfl:     Glucosamine-Chondroit-Vit C-Mn (GLUCOSAMINE 1500 COMPLEX) CAPS, Take 1 capsule by mouth daily, Disp: , Rfl:     levothyroxine 100 mcg tablet, Take 1 tablet (100 mcg total) by mouth daily, Disp: 90 tablet, Rfl: 3    metoprolol tartrate (LOPRESSOR) 100 mg tablet, Take 1 tablet (100 mg total) by mouth 2 (two) times a day, Disp: 180 tablet, Rfl: 3    Multiple Vitamin (MULTIVITAMIN) capsule, Take 1 capsule by mouth daily, Disp: , Rfl:     Omega-3 Fatty Acids (FISH OIL) 1200 MG CAPS, Take 1 capsule by mouth daily, Disp: , Rfl:     tamsulosin (FLOMAX) 0 4 mg, Take 1 capsule (0 4 mg total) by mouth daily, Disp: 90 capsule, Rfl: 3    meclizine (ANTIVERT) 12 5 MG tablet, Take 1 tablet (12 5 mg total) by mouth every 8 (eight) hours as needed for dizziness (Patient not taking: Reported on 6/16/2021), Disp: 20 tablet, Rfl: 1  Allergies   Allergen Reactions    Amoxicillin Other (See Comments)     Severe weakness     Vitals:    06/16/21 1014   BP: 132/80   BP Location: Left arm   Patient Position: Sitting   Cuff Size: Standard   Pulse: 70   Weight: 76 7 kg (169 lb)   Height: 5' 10" (1 778 m)       Labs:  Lab Results   Component Value Date     06/15/2017    K 5 1 09/01/2020    CL 97 09/01/2020    CO2 25 09/01/2020    BUN 11 09/01/2020    CREATININE 0 84 09/01/2020    CREATININE 0 87 03/25/2019    CREATININE 0 89 06/15/2017    GLUCOSE 110 (H) 06/15/2017    CALCIUM 9 4 03/25/2019    CALCIUM 9 2 06/15/2017     Lab Results   Component Value Date    WBC 5 6 09/01/2020    WBC 5 68 03/25/2019    WBC 5 5 06/15/2017    HGB 13 8 09/01/2020    HGB 13 9 03/25/2019    HGB 13 5 06/15/2017    HCT 39 2 09/01/2020    HCT 40 8 03/25/2019    HCT 40 2 06/15/2017    MCV 93 09/01/2020 MCV 92 03/25/2019    MCV 94 06/15/2017     09/01/2020     03/25/2019       Imaging:  ECG today shows AV sequential paced rhythm  ECHO(3/18/19):  LEFT VENTRICLE:  Systolic function was normal  Ejection fraction was estimated to be 55 %  There were no regional wall motion abnormalities  Wall thickness was mildly increased      MITRAL VALVE:  There was moderate annular calcification  There was mild regurgitation      AORTIC VALVE:  The valve was trileaflet  Leaflets exhibited moderately to markedly increased thickness, moderate calcification, mild to moderately reduced cuspal separation, and reduced mobility  There was mild to moderate stenosis  There was trace regurgitation  Valve mean gradient was 17 6 mmHg      TRICUSPID VALVE:  There was mild regurgitation      AORTA:  The root exhibited mild dilatation  4 0 cm      Review of Systems:  Review of Systems   Constitutional: Negative  HENT: Negative  Eyes: Negative  Respiratory: Negative  Cardiovascular: Negative  Gastrointestinal: Negative  Musculoskeletal: Positive for arthralgias  Skin: Negative  Allergic/Immunologic: Negative  Neurological: Negative  Hematological: Negative  Psychiatric/Behavioral: Negative  All other systems reviewed and are negative  Vitals:    06/16/21 1014   BP: 132/80   BP Location: Left arm   Patient Position: Sitting   Cuff Size: Standard   Pulse: 70   Weight: 76 7 kg (169 lb)   Height: 5' 10" (1 778 m)       Physical Exam:  Physical Exam  Vitals and nursing note reviewed  Constitutional:       Appearance: He is well-developed  HENT:      Head: Normocephalic and atraumatic  Eyes:      General: No scleral icterus  Right eye: No discharge  Left eye: No discharge  Pupils: Pupils are equal, round, and reactive to light  Neck:      Thyroid: No thyromegaly  Vascular: No JVD  Cardiovascular:      Rate and Rhythm: Normal rate and regular rhythm    No extrasystoles are present  Pulses: Normal pulses  No decreased pulses  Heart sounds: S1 normal and S2 normal  Murmur heard  Systolic murmur is present with a grade of 3/6  No friction rub  No gallop  Pulmonary:      Effort: Pulmonary effort is normal  No respiratory distress  Breath sounds: Normal breath sounds  No wheezing or rales  Abdominal:      General: Bowel sounds are normal  There is no distension  Palpations: Abdomen is soft  Tenderness: There is no abdominal tenderness  Musculoskeletal:         General: No tenderness or deformity  Normal range of motion  Cervical back: Normal range of motion and neck supple  Skin:     General: Skin is warm and dry  Findings: No rash  Neurological:      Mental Status: He is alert and oriented to person, place, and time  Cranial Nerves: No cranial nerve deficit  Psychiatric:         Thought Content: Thought content normal          Judgment: Judgment normal        Counseling / Coordination of Care  Total office time spent today 25 minutes  Greater than 50% of total time was spent with the patient and / or family counseling and / or coordination of care

## 2021-06-22 ENCOUNTER — OFFICE VISIT (OUTPATIENT)
Dept: PHYSICAL THERAPY | Facility: CLINIC | Age: 86
End: 2021-06-22
Payer: COMMERCIAL

## 2021-06-22 DIAGNOSIS — R42 VERTIGO: Primary | ICD-10-CM

## 2021-06-22 PROCEDURE — 97112 NEUROMUSCULAR REEDUCATION: CPT | Performed by: PHYSICAL THERAPIST

## 2021-06-22 PROCEDURE — 97140 MANUAL THERAPY 1/> REGIONS: CPT | Performed by: PHYSICAL THERAPIST

## 2021-06-22 NOTE — PROGRESS NOTES
PT Discharge    Today's date: 2021  Patient name: John Mondragon  : 10/7/1932  MRN: [id number]  Referring provider: Marcus Shine MD  Dx:   Encounter Diagnosis     ICD-10-CM    1  Vertigo  R42                   Assessment  Assessment details: John Mondragon is a 80 y o  male who presented to outpatient physical therapy at [place] [address] with complaints of dizziness  He presented with decreased cervical range of motion, decreased postural strength, limited flexibility, poor postural awareness, poor balance, decreased tolerance to activity and decreased functional mobility due to balance disorder  He had a similar issue 2 years ago which resolved with PT after 3 visits and he was able to resolve symptoms again in a few sessions  He has met all goals and is ready for D/C to HEP  Barriers to therapy: None  Understanding of Dx/Px/POC: excellent  Goals  ST  Independent with HEP in 2 weeks - Met  2  Good postural awareness in 2 weeks - Met    LT  Achieve FOTO score of 90/100 in 8 weeks - Met   2  Able to get up and turn quickly without dizziness in 8 weeks - Met  3  Strength traps = 5/5 B in 8 weeks - Met  4  Excellent B LE balance in 8 weeks - Met  5   - Fakuda Stepping test in 8 weeks - Met      Plan  Treatment plan discussed with: patient        Subjective Evaluation    History of Present Illness  Mechanism of injury: Pt reports having dizziness mostly with changing positions quickly  States that symptoms started about 3 year ago and resolved with PT after only 3 sessions  Symptoms started again recently about 3 months ago  Also has pacemaker x 4 years with fairly consistent quick bursts chest pain, but not related to dizziness  Retired alicia    Keeps very active with house and yardwork including cutting wood  Has hx of melanoma, Hep A  Most symptoms happened when leaning forward or when getting up fast   Symptoms resolved in a few minutes    Since 2 weeks ago he has had no symptoms  Recurrent probem    Quality of life: excellent    Pain  Current pain ratin  At best pain ratin  At worst pain ratin  Quality: dizziness  Relieving factors: rest  Progression: resolved    Social Support  Steps to enter house: yes  Stairs in house: yes   Lives in: multiple-level home  Lives with: spouse    Employment status: not working    Diagnostic Tests  CT scan: normal  Treatments  Previous treatment: physical therapy  Current treatment: physical therapy  Patient Goals  Patient's goals regarding treatment: no dizziness  Objective     Static Posture     Comments  VESTIBULAR EVALUATION    Posture:  Good  Cervical ROM:  Limited by 105% throughout  Palpation:  No tenderness B cervical paraspinals  Balance:  Min limited B LE's with EO or EC  VOR:  Normal    Nystagmus:  None  Fakuda Stepping Test:  Normal    Strength B UE's and traps = 5/5           Daily Treatment Diary         DX:  Vertigo  EPOC:  21  Precautions: Fall risk  CO-MORBIDITES:  Pacemaker, HTN, Hep A  PERSONAL FACTORS:  None    Manuals HEP         Supine STM B cervical paraspinals  7' 10' 10' 10'                                               Neuro Re-Ed     Seated chin tucks with B scap retraction             VOR on RB with targets and stationary EC F/B, S/S   15 ea 15 ea EO + EC targets and no targets 15 ea EO + EC targets and no targets        TB rows B   Blue 25 Plum 30 Plum 30        TB LPD B   Blue 25 Blue 30 Blue 30        Retro UBE standing   L4 4' L5 4' L5 5'        Walking VOR targets horiz + vert 12' in ll bars    3 laps ea 3 laps ea                     Ther Ex    Ankle pumps prior to change of position                                                                                                        Ther Activity                              Gait Training                              Modalities

## 2021-08-06 ENCOUNTER — HOSPITAL ENCOUNTER (OUTPATIENT)
Dept: NON INVASIVE DIAGNOSTICS | Age: 86
Discharge: HOME | End: 2021-08-06
Payer: COMMERCIAL

## 2021-08-06 DIAGNOSIS — I35.0 NONRHEUMATIC AORTIC VALVE STENOSIS: ICD-10-CM

## 2021-08-06 PROCEDURE — 93306 TTE W/DOPPLER COMPLETE: CPT | Performed by: INTERNAL MEDICINE

## 2021-08-06 PROCEDURE — 93306 TTE W/DOPPLER COMPLETE: CPT

## 2021-08-11 LAB
ALBUMIN SERPL-MCNC: 4.5 G/DL (ref 3.6–4.6)
ALBUMIN/GLOB SERPL: 1.6 {RATIO} (ref 1.2–2.2)
ALP SERPL-CCNC: 75 IU/L (ref 48–121)
ALT SERPL-CCNC: 34 IU/L (ref 0–44)
AST SERPL-CCNC: 39 IU/L (ref 0–40)
BASOPHILS # BLD AUTO: 0.1 X10E3/UL (ref 0–0.2)
BASOPHILS NFR BLD AUTO: 1 %
BILIRUB SERPL-MCNC: 0.7 MG/DL (ref 0–1.2)
BUN SERPL-MCNC: 18 MG/DL (ref 8–27)
BUN/CREAT SERPL: 21 (ref 10–24)
CALCIUM SERPL-MCNC: 9.5 MG/DL (ref 8.6–10.2)
CHLORIDE SERPL-SCNC: 97 MMOL/L (ref 96–106)
CO2 SERPL-SCNC: 24 MMOL/L (ref 20–29)
CREAT SERPL-MCNC: 0.87 MG/DL (ref 0.76–1.27)
EOSINOPHIL # BLD AUTO: 0.3 X10E3/UL (ref 0–0.4)
EOSINOPHIL NFR BLD AUTO: 6 %
ERYTHROCYTE [DISTWIDTH] IN BLOOD BY AUTOMATED COUNT: 13.2 % (ref 11.6–15.4)
GLOBULIN SER-MCNC: 2.8 G/DL (ref 1.5–4.5)
GLUCOSE SERPL-MCNC: 109 MG/DL (ref 65–99)
HCT VFR BLD AUTO: 38.8 % (ref 37.5–51)
HGB BLD-MCNC: 13.1 G/DL (ref 13–17.7)
IMM GRANULOCYTES # BLD: 0 X10E3/UL (ref 0–0.1)
IMM GRANULOCYTES NFR BLD: 0 %
LYMPHOCYTES # BLD AUTO: 1.6 X10E3/UL (ref 0.7–3.1)
LYMPHOCYTES NFR BLD AUTO: 30 %
MCH RBC QN AUTO: 31.8 PG (ref 26.6–33)
MCHC RBC AUTO-ENTMCNC: 33.8 G/DL (ref 31.5–35.7)
MCV RBC AUTO: 94 FL (ref 79–97)
MONOCYTES # BLD AUTO: 0.7 X10E3/UL (ref 0.1–0.9)
MONOCYTES NFR BLD AUTO: 14 %
NEUTROPHILS # BLD AUTO: 2.6 X10E3/UL (ref 1.4–7)
NEUTROPHILS NFR BLD AUTO: 49 %
PLATELET # BLD AUTO: 240 X10E3/UL (ref 150–450)
POTASSIUM SERPL-SCNC: 5.1 MMOL/L (ref 3.5–5.2)
PROT SERPL-MCNC: 7.3 G/DL (ref 6–8.5)
RBC # BLD AUTO: 4.12 X10E6/UL (ref 4.14–5.8)
SL AMB EGFR AFRICAN AMERICAN: 89 ML/MIN/1.73
SL AMB EGFR NON AFRICAN AMERICAN: 77 ML/MIN/1.73
SODIUM SERPL-SCNC: 133 MMOL/L (ref 134–144)
TSH SERPL DL<=0.005 MIU/L-ACNC: 2.6 UIU/ML (ref 0.45–4.5)
WBC # BLD AUTO: 5.3 X10E3/UL (ref 3.4–10.8)

## 2021-08-16 ENCOUNTER — OFFICE VISIT (OUTPATIENT)
Dept: FAMILY MEDICINE CLINIC | Facility: HOSPITAL | Age: 86
End: 2021-08-16
Payer: COMMERCIAL

## 2021-08-16 VITALS
WEIGHT: 169 LBS | BODY MASS INDEX: 24.2 KG/M2 | DIASTOLIC BLOOD PRESSURE: 78 MMHG | HEART RATE: 78 BPM | SYSTOLIC BLOOD PRESSURE: 130 MMHG | HEIGHT: 70 IN

## 2021-08-16 DIAGNOSIS — E03.9 ACQUIRED HYPOTHYROIDISM: ICD-10-CM

## 2021-08-16 DIAGNOSIS — I48.0 PAROXYSMAL ATRIAL FIBRILLATION (HCC): ICD-10-CM

## 2021-08-16 DIAGNOSIS — I10 ESSENTIAL HYPERTENSION: Primary | ICD-10-CM

## 2021-08-16 DIAGNOSIS — I35.0 NONRHEUMATIC AORTIC VALVE STENOSIS: ICD-10-CM

## 2021-08-16 DIAGNOSIS — H61.23 CERUMINOSIS, BILATERAL: ICD-10-CM

## 2021-08-16 PROBLEM — R07.9 LEFT-SIDED CHEST PAIN: Status: RESOLVED | Noted: 2019-03-05 | Resolved: 2021-08-16

## 2021-08-16 PROBLEM — R07.9 RIGHT-SIDED CHEST PAIN: Status: RESOLVED | Noted: 2018-10-05 | Resolved: 2021-08-16

## 2021-08-16 PROCEDURE — 3725F SCREEN DEPRESSION PERFORMED: CPT | Performed by: INTERNAL MEDICINE

## 2021-08-16 PROCEDURE — 1170F FXNL STATUS ASSESSED: CPT | Performed by: INTERNAL MEDICINE

## 2021-08-16 PROCEDURE — 69210 REMOVE IMPACTED EAR WAX UNI: CPT | Performed by: INTERNAL MEDICINE

## 2021-08-16 PROCEDURE — 1036F TOBACCO NON-USER: CPT | Performed by: INTERNAL MEDICINE

## 2021-08-16 PROCEDURE — 1160F RVW MEDS BY RX/DR IN RCRD: CPT | Performed by: INTERNAL MEDICINE

## 2021-08-16 PROCEDURE — G0439 PPPS, SUBSEQ VISIT: HCPCS | Performed by: INTERNAL MEDICINE

## 2021-08-16 PROCEDURE — 3288F FALL RISK ASSESSMENT DOCD: CPT | Performed by: INTERNAL MEDICINE

## 2021-08-16 PROCEDURE — 1125F AMNT PAIN NOTED PAIN PRSNT: CPT | Performed by: INTERNAL MEDICINE

## 2021-08-16 PROCEDURE — 99214 OFFICE O/P EST MOD 30 MIN: CPT | Performed by: INTERNAL MEDICINE

## 2021-08-16 NOTE — PROGRESS NOTES
Assessment/Plan:       Diagnoses and all orders for this visit:    Essential hypertension    Acquired hypothyroidism    Paroxysmal atrial fibrillation (HCC)    Nonrheumatic aortic valve stenosis    Ceruminosis, bilateral    Other orders  -     Ear cerumen removal          All of the above diagnoses have been assessed  Additional COMMENTS/PLAN:  Patient is significant aortic stenosis this time I have cautioned as to the warning signs of needing intervention    Will re-evaluate in 4 months  Subjective:      Patient ID: Jacklyn Rizo is a 80 y o  male  HPI     HTN-compliant with meds and salt restriction  Hypothyroid - Patient denies any symptoms such as heat or cold intolerance, change in hair texture, or change in bowel habits  There has been no significant change in weight  Pacer-up to date on check  AS-did have echo with sign AS  The following portions of the patient's history were revised and updated as appropriate: Problem list, allergies, med list, FH, SH, Past medical and surgical histories      Current Outpatient Medications   Medication Sig Dispense Refill    amLODIPine (NORVASC) 5 mg tablet Take 1 tablet (5 mg total) by mouth daily 90 tablet 3    Ascorbic Acid (VITAMIN C) 500 MG CAPS Take 1 tablet by mouth daily      aspirin (ECOTRIN LOW STRENGTH) 81 mg EC tablet Take 81 mg by mouth daily      Cyanocobalamin (VITAMIN B12) 1000 MCG TBCR Take 1 tablet by mouth daily      Flaxseed, Linseed, (FLAXSEED OIL) 1000 MG CAPS Take 1 capsule by mouth daily      Glucosamine-Chondroit-Vit C-Mn (GLUCOSAMINE 1500 COMPLEX) CAPS Take 1 capsule by mouth daily      levothyroxine 100 mcg tablet Take 1 tablet (100 mcg total) by mouth daily 90 tablet 3    metoprolol tartrate (LOPRESSOR) 100 mg tablet Take 1 tablet (100 mg total) by mouth 2 (two) times a day 180 tablet 3    Multiple Vitamin (MULTIVITAMIN) capsule Take 1 capsule by mouth daily      Omega-3 Fatty Acids (FISH OIL) 1200 MG CAPS Take 1 capsule by mouth daily      tamsulosin (FLOMAX) 0 4 mg Take 1 capsule (0 4 mg total) by mouth daily 90 capsule 3     No current facility-administered medications for this visit  Review of Systems   HENT:        Hearing impairment   All other systems reviewed and are negative  Objective:    /78   Pulse 78   Ht 5' 10" (1 778 m)   Wt 76 7 kg (169 lb)   BMI 24 25 kg/m²     BP Readings from Last 3 Encounters:   08/16/21 130/78   06/16/21 132/80   05/20/21 140/78                  Wt Readings from Last 3 Encounters:   08/16/21 76 7 kg (169 lb)   06/16/21 76 7 kg (169 lb)   05/20/21 76 2 kg (168 lb)         Physical Exam  Vitals reviewed  Constitutional:       Appearance: Normal appearance  HENT:      Left Ear: There is impacted cerumen  Neck:      Comments: Transmitted murmur  Cardiovascular:      Rate and Rhythm: Normal rate and regular rhythm  Heart sounds: Murmur heard  Pulmonary:      Effort: Pulmonary effort is normal       Breath sounds: Normal breath sounds  Abdominal:      General: Abdomen is flat  Bowel sounds are normal       Palpations: Abdomen is soft  Musculoskeletal:      Right lower leg: No edema  Left lower leg: No edema  Neurological:      Mental Status: He is alert         Ear cerumen removal    Date/Time: 8/16/2021 1:43 PM  Performed by: Crista Rod MD  Authorized by: Crista Rod MD     Procedure details:     Location:  L ear and R ear    Procedure type: irrigation with instrumentation      Instrumentation: curette    Post-procedure details:     Complication:  None    Hearing quality:  Normal        Orders Only on 08/10/2021   Component Date Value Ref Range Status    White Blood Cell Count 08/10/2021 5 3  3 4 - 10 8 x10E3/uL Final    Red Blood Cell Count 08/10/2021 4 12* 4 14 - 5 80 x10E6/uL Final    Hemoglobin 08/10/2021 13 1  13 0 - 17 7 g/dL Final    HCT 08/10/2021 38 8  37 5 - 51 0 % Final    MCV 08/10/2021 94  79 - 97 fL Final   [facility] 08/10/2021 31 8  26 6 - 33 0 pg Final    MCHC 08/10/2021 33 8  31 5 - 35 7 g/dL Final    RDW 08/10/2021 13 2  11 6 - 15 4 % Final    Platelet Count 10/71/2901 240  150 - 450 x10E3/uL Final    Neutrophils 08/10/2021 49  Not Estab  % Final    Lymphocytes 08/10/2021 30  Not Estab  % Final    Monocytes 08/10/2021 14  Not Estab  % Final    Eosinophils 08/10/2021 6  Not Estab  % Final    Basophils PCT 08/10/2021 1  Not Estab  % Final    Neutrophils (Absolute) 08/10/2021 2 6  1 4 - 7 0 x10E3/uL Final    Lymphocytes (Absolute) 08/10/2021 1 6  0 7 - 3 1 x10E3/uL Final    Monocytes (Absolute) 08/10/2021 0 7  0 1 - 0 9 x10E3/uL Final    Eosinophils (Absolute) 08/10/2021 0 3  0 0 - 0 4 x10E3/uL Final    Basophils ABS 08/10/2021 0 1  0 0 - 0 2 x10E3/uL Final    Immature Granulocytes 08/10/2021 0  Not Estab  % Final    Immature Granulocytes (Absolute) 08/10/2021 0 0  0 0 - 0 1 x10E3/uL Final    Comment: A hand-written panel/profile was received from your office  In  accordance with the [facility] Ambiguous Test Code Policy dated July 8840, we have assigned CBC with Differential/Platelet, Test Code  #[id number] to this request  If this is not the testing you wished to  receive on this specimen, please contact the [facility] Department to clarify the test order  We  appreciate your business   Glucose, Random 08/10/2021 109* 65 - 99 mg/dL Final    BUN 08/10/2021 18  8 - 27 mg/dL Final    Creatinine 08/10/2021 0 87  0 76 - 1 27 mg/dL Final    eGFR Non  08/10/2021 77  >59 mL/min/1 73 Final    eGFR  08/10/2021 89  >59 mL/min/1 73 Final    Comment: **[facility] currently reports eGFR in compliance with the current**    recommendations of the [employer]  [facility] will    update reporting as new guidelines are published from the NKF-ASN    Task force        SL AMB BUN/CREATININE RATIO 08/10/2021 21  10 - 24 Final    Sodium 08/10/2021 133* 134 - 144 mmol/L Final    Potassium 08/10/2021 5 1  3 5 - 5 2 mmol/L Final    Chloride 08/10/2021 97  96 - 106 mmol/L Final    CO2 08/10/2021 24  20 - 29 mmol/L Final    CALCIUM 08/10/2021 9 5  8 6 - 10 2 mg/dL Final    Protein, Total 08/10/2021 7 3  6 0 - 8 5 g/dL Final    Albumin 08/10/2021 4 5  3 6 - 4 6 g/dL Final    Globulin, Total 08/10/2021 2 8  1 5 - 4 5 g/dL Final    Albumin/Globulin Ratio 08/10/2021 1 6  1 2 - 2 2 Final    TOTAL BILIRUBIN 08/10/2021 0 7  0 0 - 1 2 mg/dL Final    Alk Phos Isoenzymes 08/10/2021 75  48 - 121 IU/L Final    AST 08/10/2021 39  0 - 40 IU/L Final    ALT 08/10/2021 34  0 - 44 IU/L Final    TSH 08/10/2021 2 600  0 450 - 4 500 uIU/mL Final         Liborio Heller MD    Some or all of this note was generated with a voice recognition dictation system and therefore my contain grammatical or spelling errors

## 2021-08-16 NOTE — PROGRESS NOTES
Assessment and Plan:     Problem List Items Addressed This Visit     None      Visit Diagnoses     Medicare annual wellness visit, subsequent    -  Primary           Preventive health issues were discussed with patient, and age appropriate screening tests were ordered as noted in patient's After Visit Summary  Personalized health advice and appropriate referrals for health education or preventive services given if needed, as noted in patient's After Visit Summary       History of Present Illness:     Patient presents for Medicare Annual Wellness visit    Patient Care Team:  Jonnathan De Leon MD as PCP - General  Jonnathan De Leon MD     Problem List:     Patient Active Problem List   Diagnosis    Nonrheumatic aortic valve stenosis    Chronic nonintractable headache    Enlarged prostate without lower urinary tract symptoms (luts)    GERD without esophagitis    Essential hypertension    Acquired hypothyroidism    [facility] sinus syndrome ([facility]    Presence of permanent cardiac pacemaker    Paroxysmal atrial fibrillation ([facility] )    Balance disorder      Past Medical and Surgical History:     Past Medical History:   Diagnosis Date    Arthropathy     multiple sites    Cardiac dysrhythmia     Hepatitis A     Melanoma ([facility] )     malignant of skin     Past Surgical History:   Procedure Laterality Date    INGUINAL HERNIA REPAIR        Family History:     Family History   Problem Relation Age of Onset    Cancer Family         gastrointestinal tract    Substance Abuse Neg Hx     Mental illness Neg Hx       Social History:     Social History     Socioeconomic History    Marital status: /Civil Union     Spouse name: Yomi Mar Number of children: None    Years of education: None    Highest education level: None   Occupational History    None   Tobacco Use    Smoking status: Never Smoker    Smokeless tobacco: Never Used   Vaping Use    Vaping Use: Never used   Substance and Sexual Activity    Alcohol use: No    Drug use: No    Sexual activity: None   Other Topics Concern    None   Social History Narrative    Living will on file    Supportive and safe    Lives with wife     Social Determinants of Health     Financial Resource Strain:     Difficulty of Paying Living Expenses:    Food Insecurity:     Worried About Running Out of Food in the Last Year:     [address] in the Last Year:    Transportation Needs:     Lack of Transportation (Medical):      Lack of Transportation (Non-Medical):    Physical Activity:     Days of Exercise per Week:     Minutes of Exercise per Session:    Stress:     Feeling of Stress :    Social Connections:     Frequency of Communication with Friends and Family:     Frequency of Social Gatherings with Friends and Family:     Attends Nondenominational Services:     Active Member of Clubs or Organizations:     Attends Club or Organization Meetings:     Marital Status:    Intimate Partner Violence:     Fear of Current or Ex-Partner:     Emotionally Abused:     Physically Abused:     Sexually Abused:       Medications and Allergies:     Current Outpatient Medications   Medication Sig Dispense Refill    amLODIPine (NORVASC) 5 mg tablet Take 1 tablet (5 mg total) by mouth daily 90 tablet 3    Ascorbic Acid (VITAMIN C) 500 MG CAPS Take 1 tablet by mouth daily      aspirin (ECOTRIN LOW STRENGTH) 81 mg EC tablet Take 81 mg by mouth daily      Cyanocobalamin (VITAMIN B12) 1000 MCG TBCR Take 1 tablet by mouth daily      Flaxseed, Linseed, (FLAXSEED OIL) 1000 MG CAPS Take 1 capsule by mouth daily      Glucosamine-Chondroit-Vit C-Mn (GLUCOSAMINE 1500 COMPLEX) CAPS Take 1 capsule by mouth daily      levothyroxine 100 mcg tablet Take 1 tablet (100 mcg total) by mouth daily 90 tablet 3    metoprolol tartrate (LOPRESSOR) 100 mg tablet Take 1 tablet (100 mg total) by mouth 2 (two) times a day 180 tablet 3    Multiple Vitamin (MULTIVITAMIN) capsule Take 1 capsule by mouth daily      Omega-3 Fatty Acids (FISH OIL) 1200 MG CAPS Take 1 capsule by mouth daily      tamsulosin (FLOMAX) 0 4 mg Take 1 capsule (0 4 mg total) by mouth daily 90 capsule 3     No current facility-administered medications for this visit  Allergies   Allergen Reactions    Amoxicillin Other (See Comments)     Severe weakness      Immunizations:     Immunization History   Administered Date(s) Administered    Influenza Split High Dose Preservative Free IM 10/06/2014, 10/19/2015, 10/23/2017, 10/01/2019    Influenza, high dose seasonal 0 7 mL 10/05/2018, 10/07/2020    Influenza, seasonal, injectable 10/01/2012, 10/15/2014    Pneumococcal Conjugate 13-Valent 10/05/2018    Pneumococcal Polysaccharide PPV23 10/01/2012    SARS-CoV-2 / COVID-19 mRNA IM (Moderna) 01/18/2021, 02/15/2021    Zoster 06/19/2008      Health Maintenance: There are no preventive care reminders to display for this patient  Topic Date Due    DTaP,Tdap,and Td Vaccines (1 - Tdap) Never done    Influenza Vaccine (1) 09/01/2021      Medicare Health Risk Assessment:     /88   Pulse 78   Ht 5' 10" (1 778 m)   Wt 76 7 kg (169 lb)   BMI 24 25 kg/m²      Lorrie Ho is here for his Subsequent Wellness visit  Health Risk Assessment:   Patient rates overall health as good  Patient feels that their physical health rating is same  Patient is satisfied with their life  Eyesight was rated as same  Hearing was rated as same  Patient feels that their emotional and mental health rating is same  Patients states they are sometimes angry  Patient states they are sometimes unusually tired/fatigued  Pain experienced in the last 7 days has been some  Patient's pain rating has been 5/10  Patient states that he has experienced no weight loss or gain in last 6 months  Pain-low back    Depression Screening:   PHQ-2 Score: 0      Fall Risk Screening:    In the past year, patient has experienced: no history of falling in past year      Home Safety:  Patient does not have trouble with stairs inside or outside of their home  Patient has working smoke alarms and has working carbon monoxide detector  Home safety hazards include: none  Nutrition:   Current diet is Regular and Low Saturated Fat  Medications:   Patient is currently taking over-the-counter supplements  OTC medications include: see medication list  Patient is able to manage medications  Activities of Daily Living (ADLs)/Instrumental Activities of Daily Living (IADLs):   Walk and transfer into and out of bed and chair?: Yes  Dress and groom yourself?: Yes    Bathe or shower yourself?: Yes    Feed yourself? Yes  Do your laundry/housekeeping?: Yes  Manage your money, pay your bills and track your expenses?: Yes  Make your own meals?: Yes    Do your own shopping?: Yes    Previous Hospitalizations:   Any hospitalizations or ED visits within the last 12 months?: No      Advance Care Planning:   Living will: Yes    Durable POA for healthcare:  Yes    Advanced directive: Yes      Cognitive Screening:   Provider or family/friend/caregiver concerned regarding cognition?: No    PREVENTIVE SCREENINGS      Cardiovascular Screening:    General: Screening Current      Diabetes Screening:     General: Screening Current      Colorectal Cancer Screening:     General: Screening Not Indicated    Due for: FOBT/FIT      Prostate Cancer Screening:    General: Screening Not Indicated      Osteoporosis Screening:    General: Screening Not Indicated      Abdominal Aortic Aneurysm (AAA) Screening:        General: Screening Not Indicated      Lung Cancer Screening:     General: Screening Not Indicated      Hepatitis C Screening:    General: Screening Not Indicated      Preventive Screening Comments: Exercise-yard work    Vaccines up to date    Screening, Brief Intervention, and Referral to Treatment (SBIRT)    Screening  Typical number of drinks in a day: 0  Typical number of drinks in a week: 2  Interpretation: Low risk drinking behavior      Single Item Drug Screening:  How often have you used an illegal drug (including marijuana) or a prescription medication for non-medical reasons in the past year? never    Single Item Drug Screen Score: 0  Interpretation: Negative screen for possible drug use disorder      Taras Parson MD

## 2021-08-16 NOTE — PATIENT INSTRUCTIONS
Medicare Preventive Visit Patient Instructions  Thank you for completing your Welcome to Medicare Visit or Medicare Annual Wellness Visit today  Your next wellness visit will be due in one year (8/17/2022)  The screening/preventive services that you may require over the next 5-10 years are detailed below  Some tests may not apply to you based off risk factors and/or age  Screening tests ordered at today's visit but not completed yet may show as past due  Also, please note that scanned in results may not display below  Preventive Screenings:  Service Recommendations Previous Testing/Comments   Colorectal Cancer Screening  · Colonoscopy    · Fecal Occult Blood Test (FOBT)/Fecal Immunochemical Test (FIT)  · Fecal DNA/Cologuard Test  · Flexible Sigmoidoscopy Age: 54-65 years old   Colonoscopy: every 10 years (May be performed more frequently if at higher risk)  OR  FOBT/FIT: every 1 year  OR  Cologuard: every 3 years  OR  Sigmoidoscopy: every 5 years  Screening may be recommended earlier than age 48 if at higher risk for colorectal cancer  Also, an individualized decision between you and your healthcare provider will decide whether screening between the ages of 74-80 would be appropriate   Colonoscopy: Not on file  FOBT/FIT: Not on file  Cologuard: Not on file  Sigmoidoscopy: Not on file    Screening Not Indicated     Prostate Cancer Screening Individualized decision between patient and health care provider in men between ages of 53-78   Medicare will cover every 12 months beginning on the day after your 50th birthday PSA: No results in last 5 years     Screening Not Indicated     Hepatitis C Screening Once for adults born between [facility]  More frequently in patients at high risk for Hepatitis C Hep C Antibody: Not on file        Diabetes Screening 1-2 times per year if you're at risk for diabetes or have pre-diabetes Fasting glucose: No results in last 5 years   A1C: No results in last 5 years    Screening Current   Cholesterol Screening Once every 5 years if you don't have a lipid disorder  May order more often based on risk factors  Lipid panel: Not on file           Other Preventive Screenings Covered by Medicare:  1  Abdominal Aortic Aneurysm (AAA) Screening: covered once if your at risk  You're considered to be at risk if you have a family history of AAA or a male between the age of 73-68 who smoking at least 100 cigarettes in your lifetime  2  Lung Cancer Screening: covers low dose CT scan once per year if you meet all of the following conditions: (1) Age 50-69; (2) No signs or symptoms of lung cancer; (3) Current smoker or have quit smoking within the last 15 years; (4) You have a tobacco smoking history of at least 30 pack years (packs per day x number of years you smoked); (5) You get a written order from a healthcare provider  3  Glaucoma Screening: covered annually if you're considered high risk: (1) You have diabetes OR (2) Family history of glaucoma OR (3)  aged 48 and older OR (3)  American aged 72 and older  3  Osteoporosis Screening: covered every 2 years if you meet one of the following conditions: (1) Have a vertebral abnormality; (2) On glucocorticoid therapy for more than 3 months; (3) Have primary hyperparathyroidism; (4) On osteoporosis medications and need to assess response to drug therapy  5  HIV Screening: covered annually if you're between the age of 12-76  Also covered annually if you are younger than 13 and older than 72 with risk factors for HIV infection  For pregnant patients, it is covered up to 3 times per pregnancy      Immunizations:  Immunization Recommendations   Influenza Vaccine Annual influenza vaccination during flu season is recommended for all persons aged >= 6 months who do not have contraindications   Pneumococcal Vaccine (Prevnar and Pneumovax)  * Prevnar = PCV13  * Pneumovax = PPSV23 Adults 25-60 years old: 1-3 doses may be recommended based on certain risk factors  Adults 72 years old: Prevnar (PCV13) vaccine recommended followed by Pneumovax (PPSV23) vaccine  If already received PPSV23 since turning 65, then PCV13 recommended at least one year after PPSV23 dose  Hepatitis B Vaccine 3 dose series if at intermediate or high risk (ex: diabetes, end stage renal disease, liver disease)   Tetanus (Td) Vaccine - COST NOT COVERED BY MEDICARE PART B Following completion of primary series, a booster dose should be given every 10 years to maintain immunity against tetanus  Td may also be given as tetanus wound prophylaxis  Tdap Vaccine - COST NOT COVERED BY MEDICARE PART B Recommended at least once for all adults  For pregnant patients, recommended with each pregnancy  Shingles Vaccine (Shingrix) - COST NOT COVERED BY MEDICARE PART B  2 shot series recommended in those aged 48 and above     Health Maintenance Due:  There are no preventive care reminders to display for this patient  Immunizations Due:      Topic Date Due    DTaP,Tdap,and Td Vaccines (1 - Tdap) Never done    Influenza Vaccine (1) 09/01/2021     Advance Directives   What are advance directives? Advance directives are legal documents that state your wishes and plans for medical care  These plans are made ahead of time in case you lose your ability to make decisions for yourself  Advance directives can apply to any medical decision, such as the treatments you want, and if you want to donate organs  What are the types of advance directives? There are many types of advance directives, and each state has rules about how to use them  You may choose a combination of any of the following:  · Living will: This is a written record of the treatment you want  You can also choose which treatments you do not want, which to limit, and which to stop at a certain time  This includes surgery, medicine, IV fluid, and tube feedings  · Durable power of  for healthcare [facility]):   This is a written record that states who you want to make healthcare choices for you when you are unable to make them for yourself  This person, called a proxy, is usually a family member or a friend  You may choose more than 1 proxy  · Do not resuscitate (DNR) order:  A DNR order is used in case your heart stops beating or you stop breathing  It is a request not to have certain forms of treatment, such as CPR  A DNR order may be included in other types of advance directives  · Medical directive: This covers the care that you want if you are in a coma, near death, or unable to make decisions for yourself  You can list the treatments you want for each condition  Treatment may include pain medicine, surgery, blood transfusions, dialysis, IV or tube feedings, and a ventilator (breathing machine)  · Values history: This document has questions about your views, beliefs, and how you feel and think about life  This information can help others choose the care that you would choose  Why are advance directives important? An advance directive helps you control your care  Although spoken wishes may be used, it is better to have your wishes written down  Spoken wishes can be misunderstood, or not followed  Treatments may be given even if you do not want them  An advance directive may make it easier for your family to make difficult choices about your care  © Copyright [employer] 2018 Information is for End User's use only and may not be sold, redistributed or otherwise used for commercial purposes   All illustrations and images included in CareNotes® are the copyrighted property of [employer]  or [address] [place] [address] [employer]

## 2021-11-08 ENCOUNTER — REMOTE DEVICE CLINIC VISIT (OUTPATIENT)
Dept: CARDIOLOGY CLINIC | Facility: CLINIC | Age: 86
End: 2021-11-08
Payer: COMMERCIAL

## 2021-11-08 DIAGNOSIS — Z95.0 PRESENCE OF CARDIAC PACEMAKER: Primary | ICD-10-CM

## 2021-11-08 PROCEDURE — 93296 REM INTERROG EVL PM/IDS: CPT | Performed by: INTERNAL MEDICINE

## 2021-11-08 PROCEDURE — 93294 REM INTERROG EVL PM/LDLS PM: CPT | Performed by: INTERNAL MEDICINE

## 2021-12-01 ENCOUNTER — TELEPHONE (OUTPATIENT)
Dept: FAMILY MEDICINE CLINIC | Facility: HOSPITAL | Age: 86
End: 2021-12-01

## 2022-02-14 DIAGNOSIS — I10 ESSENTIAL HYPERTENSION: ICD-10-CM

## 2022-02-15 RX ORDER — AMLODIPINE BESYLATE 5 MG/1
TABLET ORAL
Qty: 90 TABLET | Refills: 3 | Status: SHIPPED | OUTPATIENT
Start: 2022-02-15 | End: 2022-05-20

## 2022-02-28 DIAGNOSIS — E03.9 ACQUIRED HYPOTHYROIDISM: ICD-10-CM

## 2022-02-28 RX ORDER — LEVOTHYROXINE SODIUM 0.1 MG/1
TABLET ORAL
Qty: 90 TABLET | Refills: 3 | Status: SHIPPED | OUTPATIENT
Start: 2022-02-28

## 2022-03-01 ENCOUNTER — OFFICE VISIT (OUTPATIENT)
Dept: CARDIOLOGY CLINIC | Facility: CLINIC | Age: 87
End: 2022-03-01
Payer: COMMERCIAL

## 2022-03-01 VITALS
SYSTOLIC BLOOD PRESSURE: 128 MMHG | DIASTOLIC BLOOD PRESSURE: 80 MMHG | HEIGHT: 70 IN | WEIGHT: 168 LBS | HEART RATE: 77 BPM | BODY MASS INDEX: 24.05 KG/M2

## 2022-03-01 DIAGNOSIS — I35.0 SEVERE AORTIC STENOSIS: Primary | ICD-10-CM

## 2022-03-01 DIAGNOSIS — I49.5 SICK SINUS SYNDROME (HCC): ICD-10-CM

## 2022-03-01 DIAGNOSIS — I48.0 PAROXYSMAL ATRIAL FIBRILLATION (HCC): ICD-10-CM

## 2022-03-01 DIAGNOSIS — I10 ESSENTIAL HYPERTENSION: ICD-10-CM

## 2022-03-01 DIAGNOSIS — Z95.0 PRESENCE OF PERMANENT CARDIAC PACEMAKER: ICD-10-CM

## 2022-03-01 PROCEDURE — 99214 OFFICE O/P EST MOD 30 MIN: CPT | Performed by: INTERNAL MEDICINE

## 2022-03-01 NOTE — PROGRESS NOTES
Cardiology Consultation     Lizz Rothman  [id number]  10/7/1932  HEART & VASCULAR  [facility] Jonathon Sumner  [address] [place], [place] [id number]    1  Severe aortic stenosis  Echo complete w/ contrast if indicated   2  Paroxysmal atrial fibrillation (HCC)     3  Sick sinus syndrome ([address] )     4  Presence of permanent cardiac pacemaker     5  Essential hypertension         Discussion/Summary:    1  Severe aortic stenosis - When I last saw Jerry Tirado it had been a few years since his prior echo, and we obtained an updated 1 that showed severe aortic stenosis  At that time he was for the most part asymptomatic, however since I last saw him he has developed worsening shortness of breath with exertion  I recommended consultation with CT surgery as I feel he would be a TAVR candidate  He wanted to think about this, as he had complications from prior procedures in the past   I recommended at least a consultation  In the meantime we will get an updated echocardiogram     2   History of permanent pacemaker - This was secondary to sick sinus syndrome  He had some sort of mechanical chest pain after his procedure that fortunately have greatly improved  He will continue to follow in our pacemaker clinic  He is 100% a paced, and about 50% V-paced  3   Paroxysmal atrial fibrillation - Found on pacemaker interrogation  He is predominantly atrially paced and has not had any recent episodes of atrial fibrillation with the last episode detected about 2 years ago  Anticoagulation is recommended however he has declined in the past   I suggested at least aspirin therapy  5   Hypertension - His blood pressure is under good control today  He will remain on the same medical regimen  He has this followed closely by his internist       HPI:    Mr Bushra Russell comes in for follow-up given his cardiac history    He has a history of sick sinus syndrome and status post permanent pacemaker in March of 2016  He also has a history of moderate aortic stenosis, paroxysmal atrial fibrillation and hypertension  He is predominantly atrially paced  He was offered anticoagulation for stroke prevention, but declined  His prior cardiology care was through [facility], where he received as permanent pacemaker  Holger Mishra gives me a history of having significant mechanical pain after his permanent pacemaker  He recalls coming out of anesthesia and felt as if somebody was pressing on his chest   It is unclear at this time what exactly had happened  I asked if there was a complication like an arrhythmia that required defibrillation, but he does not ever recall being told that  For close to 3 years he had on and off mechanical pains from his pacemaker  They have improved, and now he seems to describe the fleeting sharp chest pain that only lasts a few seconds  His pacemaker interrogations show that he is 100% A-paced, and V paced about 50% of the time  He has had a few episodes of atrial fibrillation detected by pacer interrogation in the past   He has had none recently  At our last visit had been about 2 years since his prior echocardiogram   At that time he had moderate aortic stenosis  We repeated an echocardiogram in August that showed progression to severe aortic stenosis, with normal LV systolic function  At our last visit he had minimal if any symptoms, as he conveyed shortness of breath with upper levels of activity but was very active  Holger Mishra has noticed his shortness of breath progress over the last several months  He is noticing it occur with less exertion than in the past and he sometimes test a stopping take breaks  He is very active for his age and continues to go above beyond his activities of daily living, however he is just noticing this shortness of breath more frequently  He denies chest pains or any symptoms of angina    He denies lightheadedness, palpitations or any history of syncope  No other signs or symptoms of CHF        Patient Active Problem List   Diagnosis    Severe aortic stenosis    Chronic nonintractable headache    Enlarged prostate without lower urinary tract symptoms (luts)    GERD without esophagitis    Essential hypertension    Acquired hypothyroidism    Sick sinus syndrome (HCC)    Presence of permanent cardiac pacemaker    Paroxysmal atrial fibrillation (HCC)    Balance disorder     Past Medical History:   Diagnosis Date    Arthropathy     multiple sites    Cardiac dysrhythmia     Hepatitis A     Melanoma ([address] )     malignant of skin     Social History     Socioeconomic History    Marital status: /Civil Union     Spouse name: Kate Epps Number of children: Not on file    Years of education: Not on file    Highest education level: Not on file   Occupational History    Not on file   Tobacco Use    Smoking status: Never Smoker    Smokeless tobacco: Never Used   Vaping Use    Vaping Use: Never used   Substance and Sexual Activity    Alcohol use: No    Drug use: No    Sexual activity: Not on file   Other Topics Concern    Not on file   Social History Narrative    Living will on file    Supportive and safe    Lives with wife     Social Determinants of Health     Financial Resource Strain: Not on file   Food Insecurity: Not on file   Transportation Needs: Not on file   Physical Activity: Not on file   Stress: Not on file   Social Connections: Not on file   Intimate Partner Violence: Not on file   Housing Stability: Not on file      Family History   Problem Relation Age of Onset    Cancer Family         gastrointestinal tract    Substance Abuse Neg Hx     Mental illness Neg Hx      Past Surgical History:   Procedure Laterality Date    INGUINAL HERNIA REPAIR         Current Outpatient Medications:     amLODIPine (NORVASC) 5 mg tablet, TAKE ONE TABLET BY MOUTH EVERY DAY, Disp: 90 tablet, Rfl: 3    Ascorbic Acid (VITAMIN C) 500 MG CAPS, Take 1 tablet by mouth daily, Disp: , Rfl:     aspirin (ECOTRIN LOW STRENGTH) 81 mg EC tablet, Take 81 mg by mouth daily, Disp: , Rfl:     Cyanocobalamin (VITAMIN B12) 1000 MCG TBCR, Take 1 tablet by mouth daily, Disp: , Rfl:     Flaxseed, Linseed, (FLAXSEED OIL) 1000 MG CAPS, Take 1 capsule by mouth daily, Disp: , Rfl:     Glucosamine-Chondroit-Vit C-Mn (GLUCOSAMINE 1500 COMPLEX) CAPS, Take 1 capsule by mouth daily, Disp: , Rfl:     levothyroxine 100 mcg tablet, TAKE ONE TABLET BY MOUTH EVERY DAY, Disp: 90 tablet, Rfl: 3    metoprolol tartrate (LOPRESSOR) 100 mg tablet, Take 1 tablet (100 mg total) by mouth 2 (two) times a day, Disp: 180 tablet, Rfl: 3    Multiple Vitamin (MULTIVITAMIN) capsule, Take 1 capsule by mouth daily, Disp: , Rfl:     Omega-3 Fatty Acids (FISH OIL) 1200 MG CAPS, Take 1 capsule by mouth daily, Disp: , Rfl:     tamsulosin (FLOMAX) 0 4 mg, Take 1 capsule (0 4 mg total) by mouth daily, Disp: 90 capsule, Rfl: 3  Allergies   Allergen Reactions    Amoxicillin Other (See Comments)     Severe weakness     Vitals:    03/01/22 0942   BP: 128/80   BP Location: Left arm   Patient Position: Sitting   Cuff Size: Standard   Pulse: 77   Weight: 76 2 kg (168 lb)   Height: 5' 10" (1 778 m)       Labs:  Lab Results   Component Value Date     06/15/2017    K 5 1 08/10/2021    CL 97 08/10/2021    CO2 24 08/10/2021    BUN 18 08/10/2021    CREATININE 0 87 08/10/2021    CREATININE 0 87 03/25/2019    CREATININE 0 89 06/15/2017    GLUCOSE 110 (H) 06/15/2017    CALCIUM 9 4 03/25/2019    CALCIUM 9 2 06/15/2017     Lab Results   Component Value Date    WBC 5 3 08/10/2021    WBC 5 68 03/25/2019    WBC 5 5 06/15/2017    HGB 13 1 08/10/2021    HGB 13 9 03/25/2019    HGB 13 5 06/15/2017    HCT 38 8 08/10/2021    HCT 40 8 03/25/2019    HCT 40 2 06/15/2017    MCV 94 08/10/2021    MCV 92 03/25/2019    MCV 94 06/15/2017     08/10/2021     03/25/2019       Imaging: ECHO(3/18/19):  LEFT VENTRICLE:  Systolic function was normal  Ejection fraction was estimated to be 55 %  There were no regional wall motion abnormalities  Wall thickness was mildly increased      MITRAL VALVE:  There was moderate annular calcification  There was mild regurgitation      AORTIC VALVE:  The valve was trileaflet  Leaflets exhibited moderately to markedly increased thickness, moderate calcification, mild to moderately reduced cuspal separation, and reduced mobility  There was mild to moderate stenosis  There was trace regurgitation  Valve mean gradient was 17 6 mmHg      TRICUSPID VALVE:  There was mild regurgitation      AORTA:  The root exhibited mild dilatation  4 0 cm      Review of Systems:  Review of Systems   Constitutional: Negative  HENT: Negative  Eyes: Negative  Respiratory: Negative  Cardiovascular: Negative  Gastrointestinal: Negative  Musculoskeletal: Positive for arthralgias  Skin: Negative  Allergic/Immunologic: Negative  Neurological: Negative  Hematological: Negative  Psychiatric/Behavioral: Negative  All other systems reviewed and are negative  Vitals:    03/01/22 0942   BP: 128/80   BP Location: Left arm   Patient Position: Sitting   Cuff Size: Standard   Pulse: 77   Weight: 76 2 kg (168 lb)   Height: 5' 10" (1 778 m)       Physical Exam:  Physical Exam  Vitals and nursing note reviewed  Constitutional:       Appearance: He is well-developed  HENT:      Head: Normocephalic and atraumatic  Eyes:      General: No scleral icterus  Right eye: No discharge  Left eye: No discharge  Pupils: Pupils are equal, round, and reactive to light  Neck:      Thyroid: No thyromegaly  Vascular: No JVD  Cardiovascular:      Rate and Rhythm: Normal rate and regular rhythm  No extrasystoles are present  Pulses: Normal pulses  No decreased pulses  Heart sounds: S1 normal and S2 normal  Murmur heard      Systolic murmur is present with a grade of 3/6  No friction rub  No gallop  Pulmonary:      Effort: Pulmonary effort is normal  No respiratory distress  Breath sounds: Normal breath sounds  No wheezing or rales  Abdominal:      General: Bowel sounds are normal  There is no distension  Palpations: Abdomen is soft  Tenderness: There is no abdominal tenderness  Musculoskeletal:         General: No tenderness or deformity  Normal range of motion  Cervical back: Normal range of motion and neck supple  Skin:     General: Skin is warm and dry  Findings: No rash  Neurological:      Mental Status: He is alert and oriented to person, place, and time  Cranial Nerves: No cranial nerve deficit  Psychiatric:         Thought Content: Thought content normal          Judgment: Judgment normal        Counseling / Coordination of Care  Total office time spent today 25 minutes  Greater than 50% of total time was spent with the patient and / or family counseling and / or coordination of care

## 2022-03-08 ENCOUNTER — HOSPITAL ENCOUNTER (OUTPATIENT)
Dept: NON INVASIVE DIAGNOSTICS | Age: 87
Discharge: HOME | End: 2022-03-08
Payer: COMMERCIAL

## 2022-03-08 VITALS
SYSTOLIC BLOOD PRESSURE: 128 MMHG | BODY MASS INDEX: 24.05 KG/M2 | DIASTOLIC BLOOD PRESSURE: 80 MMHG | HEIGHT: 70 IN | WEIGHT: 168 LBS

## 2022-03-08 DIAGNOSIS — I35.0 SEVERE AORTIC STENOSIS: ICD-10-CM

## 2022-03-08 LAB
AORTIC ROOT: 3.1 CM
AORTIC VALVE MEAN VELOCITY: 30.6 M/S
APICAL FOUR CHAMBER EJECTION FRACTION: 54 %
ASCENDING AORTA: 4 CM (ref 2–3)
AV AREA BY CONTINUOUS VTI: 0.8 CM2
AV AREA PEAK VELOCITY: 0.7 CM2
AV LVOT MEAN GRADIENT: 1 MMHG
AV LVOT PEAK GRADIENT: 2 MMHG
AV MEAN GRADIENT: 41 MMHG
AV PEAK GRADIENT: 58 MMHG
AV VALVE AREA: 0.76 CM2
AV VELOCITY RATIO: 0.16
AVA (PLAN): 0.8 CM2
DOP CALC AO PEAK VEL: 3.82 M/S
DOP CALC AO VTI: 92.83 CM
DOP CALC LVOT AREA: 4.52 CM2
DOP CALC LVOT DIAMETER: 2.4 CM
DOP CALC LVOT PEAK VEL VTI: 15.56 CM
DOP CALC LVOT PEAK VEL: 0.63 M/S
DOP CALC LVOT STROKE INDEX: 37.1 ML/M2
DOP CALC LVOT STROKE VOLUME: 70.36 CM3
FRACTIONAL SHORTENING: 31 % (ref 28–44)
INTERVENTRICULAR SEPTUM IN DIASTOLE (PARASTERNAL SHORT AXIS VIEW): 1.1 CM
INTERVENTRICULAR SEPTUM: 1.1 CM (ref 0.53–0.99)
LAAS-AP2: 19.9 CM2
LAAS-AP4: 15.8 CM2
LEFT ATRIUM AREA SYSTOLE SINGLE PLANE A4C: 15.1 CM2
LEFT ATRIUM SIZE: 3.5 CM
LEFT INTERNAL DIMENSION IN SYSTOLE: 3.5 CM (ref 2.78–4.21)
LEFT VENTRICLE DIASTOLIC VOLUME (MOD BIPLANE): 163 ML (ref 90.88–204.66)
LEFT VENTRICLE SYSTOLIC VOLUME (MOD BIPLANE): 79 ML
LEFT VENTRICULAR INTERNAL DIMENSION IN DIASTOLE: 5.1 CM (ref 4.56–6.79)
LEFT VENTRICULAR POSTERIOR WALL IN END DIASTOLE: 1.1 CM (ref 0.51–0.97)
LEFT VENTRICULAR STROKE VOLUME: 71 ML
LV EF: 52 %
LVSV (TEICH): 71 ML
RIGHT ATRIUM AREA SYSTOLE A4C: 15.4 CM2
RIGHT VENTRICLE ID DIMENSION: 3.5 CM
SL CV LEFT ATRIUM LENGTH A2C: 5.2 CM
SL CV LV DIAS VOL ENDO Z SCORE: 0.54
SL CV LV EF: 50
SL CV PED ECHO LEFT VENTRICLE DIASTOLIC VOLUME (MOD BIPLANE) 2D: 122 ML
SL CV PED ECHO LEFT VENTRICLE SYSTOLIC VOLUME (MOD BIPLANE) 2D: 50 ML
TR MAX PG: 26 MMHG
TR PEAK VELOCITY: 2.6 M/S
TRICUSPID VALVE PEAK REGURGITATION VELOCITY: 2.56 M/S
Z-SCORE OF ASCENDING AORTA: 5.97 CM
Z-SCORE OF INTERVENTRICULAR SEPTUM IN END DIASTOLE: 2.91
Z-SCORE OF LEFT VENTRICULAR DIMENSION IN END DIASTOLE: -0.86
Z-SCORE OF LEFT VENTRICULAR DIMENSION IN END SYSTOLE: 0.18
Z-SCORE OF LEFT VENTRICULAR POSTERIOR WALL IN END DIASTOLE: 3.02

## 2022-03-08 PROCEDURE — 93306 TTE W/DOPPLER COMPLETE: CPT

## 2022-03-08 PROCEDURE — 93306 TTE W/DOPPLER COMPLETE: CPT | Performed by: INTERNAL MEDICINE

## 2022-03-12 DIAGNOSIS — N40.0 BENIGN PROSTATIC HYPERPLASIA, UNSPECIFIED WHETHER LOWER URINARY TRACT SYMPTOMS PRESENT: ICD-10-CM

## 2022-03-12 RX ORDER — TAMSULOSIN HYDROCHLORIDE 0.4 MG/1
CAPSULE ORAL
Qty: 90 CAPSULE | Refills: 3 | Status: SHIPPED | OUTPATIENT
Start: 2022-03-12 | End: 2022-06-28

## 2022-03-15 ENCOUNTER — RA CDI HCC (OUTPATIENT)
Dept: OTHER | Facility: HOSPITAL | Age: 87
End: 2022-03-15

## 2022-03-15 NOTE — PROGRESS NOTES
[facility]  coding opportunities          Chart Reviewed number of suggestions sent to Provider: 2  [phone]  [phone]       Patients Insurance     Medicare Insurance: [employer]

## 2022-03-22 ENCOUNTER — OFFICE VISIT (OUTPATIENT)
Dept: FAMILY MEDICINE CLINIC | Facility: HOSPITAL | Age: 87
End: 2022-03-22
Payer: COMMERCIAL

## 2022-03-22 ENCOUNTER — REMOTE DEVICE CLINIC VISIT (OUTPATIENT)
Dept: CARDIOLOGY CLINIC | Facility: CLINIC | Age: 87
End: 2022-03-22
Payer: COMMERCIAL

## 2022-03-22 VITALS
WEIGHT: 169 LBS | BODY MASS INDEX: 24.2 KG/M2 | HEIGHT: 70 IN | DIASTOLIC BLOOD PRESSURE: 80 MMHG | SYSTOLIC BLOOD PRESSURE: 145 MMHG | OXYGEN SATURATION: 98 % | HEART RATE: 70 BPM

## 2022-03-22 DIAGNOSIS — I35.0 SEVERE AORTIC STENOSIS: Primary | ICD-10-CM

## 2022-03-22 DIAGNOSIS — I10 ESSENTIAL HYPERTENSION: ICD-10-CM

## 2022-03-22 DIAGNOSIS — Z95.0 CARDIAC PACEMAKER IN SITU: Primary | ICD-10-CM

## 2022-03-22 DIAGNOSIS — I48.0 PAROXYSMAL ATRIAL FIBRILLATION (HCC): ICD-10-CM

## 2022-03-22 PROCEDURE — 93296 REM INTERROG EVL PM/IDS: CPT | Performed by: INTERNAL MEDICINE

## 2022-03-22 PROCEDURE — 99214 OFFICE O/P EST MOD 30 MIN: CPT | Performed by: INTERNAL MEDICINE

## 2022-03-22 PROCEDURE — 93294 REM INTERROG EVL PM/LDLS PM: CPT | Performed by: INTERNAL MEDICINE

## 2022-03-22 PROCEDURE — 3725F SCREEN DEPRESSION PERFORMED: CPT | Performed by: INTERNAL MEDICINE

## 2022-03-22 NOTE — PROGRESS NOTES
Results for orders placed or performed in visit on 03/22/22   Cardiac EP device report    Narrative    BIOT DUAL CHAMBER PPM - ACTIVE SYSTEM IS MRI CONDITIONAL  BIOTRONIK TRANSMISSION: BATTERY STATUS "OK" (60% REMAINING BATTERY LIFE)  AP-100%, -52% (>40% [email or web address])  ALL AVAILABLE LEAD PARAMETERS WITHIN NORMAL LIMITS  NO SIGNIFICANT HIGH RATE EPISODES  NORMAL DEVICE FUNCTION   GV

## 2022-03-22 NOTE — PROGRESS NOTES
Assessment/Plan:       Diagnoses and all orders for this visit:    Severe aortic stenosis  -     Ambulatory Referral to Thoracic Surgery; Future    Essential hypertension    Paroxysmal atrial fibrillation (HCC)          All of the above diagnoses have been assessed  Additional COMMENTS/PLAN: needs to have TAVR    Will see in August-for [facility] wellness      Subjective:      Patient ID: Leonard Early is a 80 y o  male  HPI     CP-has had sharp CP, and has noted decrease exercise tolerance  Has SOB  Has been recommended to have TAVR  Has critical AS  Was told to have this  The following portions of the patient's history were revised and updated as appropriate: Problem list, allergies, med list, FH, SH, Past medical and surgical histories  Current Outpatient Medications   Medication Sig Dispense Refill    amLODIPine (NORVASC) 5 mg tablet TAKE ONE TABLET BY MOUTH EVERY DAY 90 tablet 3    Ascorbic Acid (VITAMIN C) 500 MG CAPS Take 1 tablet by mouth daily      aspirin (ECOTRIN LOW STRENGTH) 81 mg EC tablet Take 81 mg by mouth daily      Cyanocobalamin (VITAMIN B12) 1000 MCG TBCR Take 1 tablet by mouth daily      Flaxseed, Linseed, (FLAXSEED OIL) 1000 MG CAPS Take 1 capsule by mouth daily      Glucosamine-Chondroit-Vit C-Mn (GLUCOSAMINE 1500 COMPLEX) CAPS Take 1 capsule by mouth daily      levothyroxine 100 mcg tablet TAKE ONE TABLET BY MOUTH EVERY DAY 90 tablet 3    metoprolol tartrate (LOPRESSOR) 100 mg tablet Take 1 tablet (100 mg total) by mouth 2 (two) times a day 180 tablet 3    Multiple Vitamin (MULTIVITAMIN) capsule Take 1 capsule by mouth daily      Omega-3 Fatty Acids (FISH OIL) 1200 MG CAPS Take 1 capsule by mouth daily      tamsulosin (FLOMAX) 0 4 mg TAKE ONE CAPSULE BY MOUTH EVERY DAY 90 capsule 3     No current facility-administered medications for this visit  Review of Systems   All other systems reviewed and are negative          Objective:    /80   Pulse 70   Ht 5' 10" (1 778 m)   Wt 76 7 kg (169 lb)   SpO2 98%   BMI 24 25 kg/m²     BP Readings from Last 3 Encounters:   03/22/22 145/80   03/08/22 128/80   03/01/22 128/80                  Wt Readings from Last 3 Encounters:   03/22/22 76 7 kg (169 lb)   03/08/22 76 2 kg (168 lb)   03/01/22 76 2 kg (168 lb)         Physical Exam  Vitals reviewed  Constitutional:       Appearance: Normal appearance  Cardiovascular:      Rate and Rhythm: Normal rate and regular rhythm  Heart sounds: Murmur: 4/6 systolic murmer  Pulmonary:      Effort: Pulmonary effort is normal       Breath sounds: Normal breath sounds  Musculoskeletal:      Right lower leg: No edema  Left lower leg: No edema  Neurological:      Mental Status: He is alert  No visits with results within 2 Week(s) from this visit     Latest known visit with results is:   Hospital Outpatient Visit on 03/08/2022   Component Date Value Ref Range Status    A4C EF 03/08/2022 54  % Final    LVOT stroke volume 03/08/2022 70 36  cm3 Final    LVOT SI 03/08/2022 37 10  ml/m2 Final    LV Diastolic Volume (BP) 71/57/0096 163  90 88 - 204 66 mL Final    LV Systolic Volume (BP) 35/63/3161 79  mL Final    EF 03/08/2022 52  % Final    LVIDd 03/08/2022 5 10  4 56 - 6 79 cm Final    LVIDS 03/08/2022 3 50  2 78 - 4 21 cm Final    IVSd 03/08/2022 1 10  cm Final    LVPWd 03/08/2022 1 10  0 51 - 0 97 cm Final    FS 03/08/2022 31  28 - 44 % Final    AV LVOT peak gradient 03/08/2022 2  mmHg Final    LVOT peak VTI 03/08/2022 15 56  cm Final    LVOT peak clarisa 03/08/2022 0 63  m/s Final    RVID d 03/08/2022 3 5  cm Final    LA size 03/08/2022 3 5  cm Final    LA length (A2C) 03/08/2022 5 20  cm Final    KESHAWN A4C 03/08/2022 15 1  cm2 Final    RAA A4C 03/08/2022 15 4  cm2 Final    LVOT diameter 03/08/2022 2 4  cm Final    Ao peak clarisa retrograde 03/08/2022 3 82  m/s Final    Ao VTI 03/08/2022 92 83  cm Final    AV mean gradient 03/08/2022 41  mmHg Final    LVOT mn grad 03/08/2022 1 0  mmHg Final    AV peak gradient 03/08/2022 58  mmHg Final    AV area by cont VTI 03/08/2022 0 8  cm2 Final    AV area peak andrzej 03/08/2022 0 7  cm2 Final    Aortic Valve Area by Planimetry 03/08/2022 0 8  cm2 Final    TR Peak Andrzej 03/08/2022 2 6  m/s Final    Triscuspid Valve Regurgitation Pea* 03/08/2022 26 0  mmHg Final    Ao root 03/08/2022 3 10  cm Final    Aortic valve mean velocity 03/08/2022 30 60  m/s Final    Tricuspid valve peak regurgitation* 03/08/2022 2 56  m/s Final    Left ventricular stroke volume (2D) 03/08/2022 71 00  mL Final    IVS 03/08/2022 1 1  0 53 - 0 99 cm Final    LEFT VENTRICLE SYSTOLIC VOLUME (MO* 72/82/0465 50  mL Final    LV DIASTOLIC VOLUME (MOD BIPLANE) * 03/08/2022 122  mL Final    Left Atrium Area-systolic Four Susan* 27/82/2916 15 8  cm2 Final    Left Atrium Area-systolic Apical T* 12/61/5556 19 9  cm2 Final    LVSV, 2D 03/08/2022 71  mL Final    LVOT area 03/08/2022 4 52  cm2 Final    AV Velocity Ratio 03/08/2022 0 16   Final    AV valve area 03/08/2022 0 76  cm2 Final    ZLVPWD 03/08/2022 3 02   Final    ZLVIDS 03/08/2022 0 18   Final    ZLVIDD 03/08/2022 -0 86   Final    ZIVSD 03/08/2022 2 91   Final    LV THOMPSON VOL ENDO Z SCORE 03/08/2022 0 54   Final    LV EF 03/08/2022 50   Final    Asc Ao 03/08/2022 4 0  2 00 - 3 00 cm Final    Ao asc z-score 03/08/2022 5 97  cm Final         Donita Ojeda MD    Some or all of this note was generated with a voice recognition dictation system and therefore my contain grammatical or spelling errors

## 2022-03-31 ENCOUNTER — OFFICE VISIT (OUTPATIENT)
Dept: CARDIAC SURGERY | Facility: CLINIC | Age: 87
End: 2022-03-31
Payer: COMMERCIAL

## 2022-03-31 VITALS
BODY MASS INDEX: 24.14 KG/M2 | HEART RATE: 74 BPM | RESPIRATION RATE: 18 BRPM | HEIGHT: 70 IN | OXYGEN SATURATION: 97 % | WEIGHT: 168.6 LBS | SYSTOLIC BLOOD PRESSURE: 130 MMHG | DIASTOLIC BLOOD PRESSURE: 69 MMHG

## 2022-03-31 DIAGNOSIS — Z00.00 HEALTHCARE MAINTENANCE: ICD-10-CM

## 2022-03-31 DIAGNOSIS — Z13.6 ENCOUNTER FOR SCREENING FOR STENOSIS OF CAROTID ARTERY: ICD-10-CM

## 2022-03-31 DIAGNOSIS — Z87.891 FORMER TOBACCO USE: Primary | ICD-10-CM

## 2022-03-31 DIAGNOSIS — I35.0 SEVERE AORTIC STENOSIS: ICD-10-CM

## 2022-03-31 PROCEDURE — 1160F RVW MEDS BY RX/DR IN RCRD: CPT | Performed by: PHYSICIAN ASSISTANT

## 2022-03-31 PROCEDURE — 1036F TOBACCO NON-USER: CPT | Performed by: PHYSICIAN ASSISTANT

## 2022-03-31 PROCEDURE — 99204 OFFICE O/P NEW MOD 45 MIN: CPT | Performed by: PHYSICIAN ASSISTANT

## 2022-03-31 NOTE — LETTER
March 31, 2022     MD Sridevi Lindo  [address] [address]    Patient: Alisson Tinsley   YOB: 1932   Date of Visit: 3/31/2022       Dear Dr Woody Nuno:    Thank you for referring Kristina Logan to me for evaluation  Below are my notes for this consultation  If you have questions, please do not hesitate to call me  I look forward to following your patient along with you  Sincerely,        Troy Marques MD        CC: MD Roya Ferrer PA-C  3/31/2022 10:19 AM  Attested  Consultation - Cardiothoracic Surgery   Alisson Tinsley 80 y o  male MRN: [id number]    Physician Requesting Consult: Dr Isiah Zambrano    Reason for Consult / Principal Problem: Aortic stenosis, Non-Rheumatic    History of Present Illness: Alisson Tinsley is a 80y o  year old male who presents for evaluation of severe AS  Has been followed by cardiology for a fib for many years now, has had PPM for SSS, no AC since at least 2021 due to no a fib on his PPM interrogation & patient refusal  Has had AS on echocardiography since 2019 & has been followed w/ serial echocardiograms  In 2021 had severe AS w/ preserved EF on echocardiography but was asymptomatic therefore continued under surveillance  Had f/u w/ cardiologist in March w/ admission to worsening fatigue over the past year w/ occasional dyspnea, states feels better when he pushes himself through his wood work in his yard but still experiences it  He has been referred to our office for surgical consultation  Upon examination today, Mr Annabel Goddard reports he is feeling well  Admits to complaints as above  Is nervous for a surgical consultation due to bad experience w/ PPM in past & feeling well when he pushes himself working outside  Also c/o a banding sensation to dorsum of b/l feet, brought up to his PCP who did not indicate anything wrong   Denies CP, palpitations, SOB, TILLEY, LE edema b/l, orthopnea, PND, numbness/tinlging/paresthesias in UE or LE bilaterally, HA, lightheadeness, dizziness, presyncopal symptoms, hx syncopal events, N/V/D, hemoptysis, hematemesis, hematochezia, melena  Denies hx stroke, hx cancer w/ chest wall radiation, hx blood loss anemia, hx varicose veins or vein stripping  PMH includes severe AS, HTN, a fib/SSS (no AC per patient, no a fib since 2019 per cardiology note from [facility] [place] interrogation), hypothyroidism, hep A (tx medically, no other hepatis or residual liver issues), h/o melanoma (s/p excisions), arthritis, HA, BPH, GERD, vitamin B12 deficiency  PSH includes PPM (left, [facility] 2016), IHR (b/l, w/ mesh), melanoma excisions  (+) maternal h/o HD (unsure kind, no procedures needed)  Denies FH of CAD/MI, HTN, HL, valvular disease, DM, aortic aneurysms, or SCD  Does wood working in his yard w/ chopping daily  Patient is retired  Lives in a [place] home w/ wife  Does not use an assist device for ambulation  Drives  Denies current or prior use of tobacco, ETOH, or drug use  Allergies include Amoxicillin with rxn weakness  Cardiac pertinent medications include: Norvasc 5mg, Aspirin 81mg, Levothyroxine 100mcg, Lopresor 100mg BID  Other medications can be reviewed in the patient chart  Patient sees Dr Lorean Edgar as his primary care physician, their prior visit documentation was reviewed at this visit  Patient sees Dr Conrad Molina as his cardiologist, their proior visit documentation was reviewed at this visit  Last dental visit last week, (+) full upper/partial lower dentures, Dr Hope Bronson  COVID vaccines: 1/18/2021, 2/15/2021, 11/2/2021      Past Medical History:  Past Medical History:   Diagnosis Date    Arthritis     Ascending aortic aneurysm (HCC)     trileaflet AV, 41mm    BPH (benign prostatic hyperplasia)     Former tobacco use     pipe & cigar use socially & infrequently    GERD (gastroesophageal reflux disease)     Headache     h/o    Hepatitis A     History of melanoma     Hypertension     Hypothyroidism     Severe aortic stenosis     Vitamin B12 deficiency      Past Surgical History:   Past Surgical History:   Procedure Laterality Date    CARDIAC PACEMAKER PLACEMENT      INGUINAL HERNIA REPAIR Bilateral     SKIN CANCER EXCISION      melanoma, multiple     Family History:  Family History   Problem Relation Age of Onset    Cancer Family         gastrointestinal tract    Heart disease Mother     Substance Abuse Neg Hx     Mental illness Neg Hx      Social History:  Social History     Substance and Sexual Activity   Alcohol Use Yes    Comment: a beer once in a while  Social History     Substance and Sexual Activity   Drug Use No     Social History     Tobacco Use   Smoking Status Never Smoker   Smokeless Tobacco Never Used       Home Medications:   Prior to Admission medications    Medication Sig Start Date End Date Taking?  Authorizing Provider   amLODIPine (NORVASC) 5 mg tablet TAKE ONE TABLET BY MOUTH EVERY DAY 2/15/22   Zachary Castaneda MD   Ascorbic Acid (VITAMIN C) 500 MG CAPS Take 1 tablet by mouth daily    Historical Provider, MD   aspirin (ECOTRIN LOW STRENGTH) 81 mg EC tablet Take 81 mg by mouth daily    Historical Provider, MD   Cyanocobalamin (VITAMIN B12) 1000 MCG TBCR Take 1 tablet by mouth daily    Historical Provider, MD   Flaxseed, Linseed, (FLAXSEED OIL) 1000 MG CAPS Take 1 capsule by mouth daily    Historical Provider, MD   Glucosamine-Chondroit-Vit C-Mn (GLUCOSAMINE 1500 COMPLEX) CAPS Take 1 capsule by mouth daily    Historical Provider, MD   levothyroxine 100 mcg tablet TAKE ONE TABLET BY MOUTH EVERY DAY 2/28/22   Zachary Castaneda MD   metoprolol tartrate (LOPRESSOR) 100 mg tablet Take 1 tablet (100 mg total) by mouth 2 (two) times a day 4/15/21   Zachary Castaneda MD   Multiple Vitamin (MULTIVITAMIN) capsule Take 1 capsule by mouth daily    Historical Provider, MD   Omega-3 Fatty Acids (FISH OIL) 1200 MG CAPS Take 1 capsule by mouth daily    Historical Provider, MD tamsulosin (FLOMAX) 0 4 mg TAKE ONE CAPSULE BY MOUTH EVERY DAY 3/12/22   Joyce Fox MD       Allergies: Allergies   Allergen Reactions    Amoxicillin Other (See Comments)     Severe weakness       Review of Systems:     Review of Systems   Constitutional: Positive for activity change and fatigue  Negative for diaphoresis and unexpected weight change  HENT: Negative  Negative for dental problem  Respiratory: Positive for shortness of breath  Negative for chest tightness and wheezing  Cardiovascular: Negative  Negative for chest pain, palpitations and leg swelling  Gastrointestinal: Negative  Negative for blood in stool, constipation, diarrhea, nausea and vomiting  Genitourinary: Negative  Musculoskeletal: Negative  Negative for arthralgias, back pain, gait problem and myalgias  Skin: Negative  Neurological: Negative  Negative for dizziness, syncope, weakness, light-headedness, numbness and headaches  Hematological: Negative  Does not bruise/bleed easily  All other systems reviewed and are negative  Vital Signs:     Vitals:    03/31/22 0929 03/31/22 0933   BP: 131/68 130/69   BP Location: Left arm Right arm   Patient Position: Sitting Sitting   Cuff Size: Standard    Pulse: 74    Resp: 18    SpO2: 97%    Weight: 76 5 kg (168 lb 9 6 oz)    Height: 5' 10" (1 778 m)        Physical Exam:     Physical Exam  Constitutional:       General: He is not in acute distress  Appearance: Normal appearance  He is well-developed  He is not ill-appearing or toxic-appearing  Comments: Sitting on exam table in NAD   HENT:      Head: Normocephalic and atraumatic  Mouth/Throat:      Dentition: Normal dentition  Does not have dentures  Pharynx: Uvula midline  Neck:      Vascular: No carotid bruit or JVD  Trachea: Trachea normal    Cardiovascular:      Rate and Rhythm: Normal rate and regular rhythm  Chest Wall: PMI is not displaced        Heart sounds: S1 normal and S2 normal  Murmur heard  Systolic murmur is present with a grade of 5/6  No friction rub  No S3 or S4 sounds  Comments: No heaves/lifts on palpation  Pulmonary:      Effort: Pulmonary effort is normal  No accessory muscle usage or respiratory distress  Breath sounds: Normal breath sounds  No wheezing, rhonchi or rales  Abdominal:      General: Bowel sounds are normal  There is no distension  Palpations: Abdomen is not rigid  There is no mass  Tenderness: There is no abdominal tenderness  There is no guarding or rebound  Musculoskeletal:      Cervical back: Normal range of motion and neck supple  Skin:     General: Skin is warm and dry  Findings: No bruising, petechiae or rash  Nails: There is no clubbing  Comments: Trace edema b/l LE, no varicosities appreciated to b/l LE, (+) healed left PPM incision   Neurological:      Mental Status: He is alert and oriented to person, place, and time  Cranial Nerves: No cranial nerve deficit  Sensory: No sensory deficit  Comments: No focal deficit appreciated         Lab Results:               Invalid input(s): LABGLOM      No results found for: HGBA1C  Lab Results   Component Value Date    TROPONINI <0 02 03/25/2019       Imaging Studies:     Transthoracic Echocardiogram 3/8/2022: EF 50%, severe AS (mean 41, MIRA 0 75cm2), mild MR, mild TR    I have personally reviewed pertinent reports     and I have personally reviewed pertinent films in PACS    TAVR evaluation Assessment:     Jovanny Parry 122: II    STS Risk Score: 2 5 %, mortality risk    5 Meter Walk Test:      Attempt 1: 4   Attempt 2: 6   Attempt 3: 6    KCCQ-12 completed    Assessment:  Patient Active Problem List    Diagnosis Date Noted    Balance disorder 06/11/2019    Sick sinus syndrome ([facility] [address] ) 04/05/2019    Presence of permanent cardiac pacemaker 04/05/2019    Paroxysmal atrial fibrillation ([facility] [address] ) 04/05/2019    Chronic nonintractable headache 05/30/2017    Severe aortic stenosis 10/19/2015    Acquired hypothyroidism 07/29/2015    GERD without esophagitis 02/26/2015    Enlarged prostate without lower urinary tract symptoms (luts) 10/14/2014    Essential hypertension 10/14/2014     Severe aortic stenosis; Ongoing TAVR workup    Plan:    Tip Toledo has severe symptomatic aortic stenosis  Based on their STS risk assessment, they will undergo the following testing for transcatheter aortic valve replacement: gated CTA of the chest, abdomen, and pelvis, cardiac catheterization, dental clearance and carotid ultrasound  Once these studies have been completed, Tip Toledo will follow up in our office to review the results and confirm the suitability of proceeding with transcatheter aortic valve replacment  Shared decision-making encounter occurred during this visit  Additionally, heart team evaluation of suitability for surgical replacement has been completed  Tip Toledo was comfortable with our recommendations, and their questions were answered to their satisfaction  We will continue to evaluate the patient, with a final surgical recommendation pending the above work up  Thank you for allowing us to participate in the care of this patient  Routine referral to gastroenterology for colonoscopy screening was not indicated, as the patient is over 76years old    SIGNATURE: Bello Ramires PA-C  DATE: March 31, 2022  TIME: 10:11 AM    * This note was completed in part utilizing [employer] direct voice recognition software  Grammatical errors, random word insertion, spelling mistakes, and incomplete sentences may be an occasional consequence of the system secondary to software limitations, ambient noise and hardware issues  At the time of dictation, efforts were made to edit, clarify and /or correct errors  Please read the chart carefully and recognize, using context, where substitutions have occurred    If you have any questions or concerns about the context, text or information contained within the body of this dictation, please contact myself, the provider, for further clarification  Attestation signed by Amarjit Holley MD at 3/31/2022 10:33 AM:  Patient seen and evaluated with Shell Anderson / [facility]ETO  I agree with the above assessment and plan with the following additions  The patient is a very pleasant 80-year-old man with symptomatic severe aortic stenosis  I explained the diagnosis to the patient and his wife and treatment options  I recommend TAVR  The patient understands and agrees  He will go for preoperative testing and will return to schedule an elective operation  This note was completed in part utilizing [email or web address] direct voice recognition software  Grammatical errors, random word insertion, spelling mistakes, and incomplete sentences may be an occasional consequence of the system secondary to software limitations, ambient noise and hardware issues  At the time of dictation, efforts were made to edit, clarify and /or correct errors  Please read the chart carefully and recognize, using context, where substitutions have occurred  If you have any questions or concerns about the context, text or information contained within the body of this dictation, please contact myself, the provider, for further clarification

## 2022-03-31 NOTE — H&P (VIEW-ONLY)
Consultation - Cardiothoracic Surgery   Roc Chan 80 y o  male MRN: [id number]    Physician Requesting Consult: Dr Ayo Valverde    Reason for Consult / Principal Problem: Aortic stenosis, Non-Rheumatic    History of Present Illness: Roc Chan is a 80y o  year old male who presents for evaluation of severe AS  Has been followed by cardiology for a fib for many years now, has had PPM for SSS, no AC since at least 2021 due to no a fib on his PPM interrogation & patient refusal  Has had AS on echocardiography since 2019 & has been followed w/ serial echocardiograms  In 2021 had severe AS w/ preserved EF on echocardiography but was asymptomatic therefore continued under surveillance  Had f/u w/ cardiologist in March w/ admission to worsening fatigue over the past year w/ occasional dyspnea, states feels better when he pushes himself through his wood work in his yard but still experiences it  He has been referred to our office for surgical consultation  Upon examination today, Mr Colonel Reid reports he is feeling well  Admits to complaints as above  Is nervous for a surgical consultation due to bad experience w/ PPM in past & feeling well when he pushes himself working outside  Also c/o a banding sensation to dorsum of b/l feet, brought up to his PCP who did not indicate anything wrong  Denies CP, palpitations, SOB, TILLEY, LE edema b/l, orthopnea, PND, numbness/tinlging/paresthesias in UE or LE bilaterally, HA, lightheadeness, dizziness, presyncopal symptoms, hx syncopal events, N/V/D, hemoptysis, hematemesis, hematochezia, melena  Denies hx stroke, hx cancer w/ chest wall radiation, hx blood loss anemia, hx varicose veins or vein stripping      PMH includes severe AS, HTN, a fib/SSS (no AC per patient, no a fib since 2019 per cardiology note from [facility] interrogation), hypothyroidism, hep A (tx medically, no other hepatis or residual liver issues), h/o melanoma (s/p excisions), arthritis, HA, BPH, GERD, vitamin B12 deficiency  PSH includes PPM (left, [place] 2016), IHR (b/l, w/ mesh), melanoma excisions  (+) maternal h/o HD (unsure kind, no procedures needed)  Denies FH of CAD/MI, HTN, HL, valvular disease, DM, aortic aneurysms, or SCD  Does wood working in his yard w/ chopping daily  Patient is retired  Lives in a rancher home w/ wife  Does not use an assist device for ambulation  Drives  Denies current or prior use of tobacco, ETOH, or drug use  Allergies include Amoxicillin with rxn weakness  Cardiac pertinent medications include: Norvasc 5mg, Aspirin 81mg, Levothyroxine 100mcg, Lopresor 100mg BID  Other medications can be reviewed in the patient chart  Patient sees Dr Edilson Hamlin as his primary care physician, their prior visit documentation was reviewed at this visit  Patient sees Dr Palak Powers as his cardiologist, their proior visit documentation was reviewed at this visit  Last dental visit last week, (+) full upper/partial lower dentures, Dr Jovany Velazco  COVID vaccines: 1/18/2021, 2/15/2021, 11/2/2021      Past Medical History:  Past Medical History:   Diagnosis Date    Arthritis     Ascending aortic aneurysm (HCC)     trileaflet AV, 41mm    BPH (benign prostatic hyperplasia)     Former tobacco use     pipe & cigar use socially & infrequently    GERD (gastroesophageal reflux disease)     Headache     h/o    Hepatitis A     History of melanoma     Hypertension     Hypothyroidism     Severe aortic stenosis     Vitamin B12 deficiency      Past Surgical History:   Past Surgical History:   Procedure Laterality Date    CARDIAC PACEMAKER PLACEMENT      INGUINAL HERNIA REPAIR Bilateral     SKIN CANCER EXCISION      melanoma, multiple     Family History:  Family History   Problem Relation Age of Onset    Cancer Family         gastrointestinal tract    Heart disease Mother     Substance Abuse Neg Hx     Mental illness Neg Hx      Social History:  Social History     Substance and Sexual Activity   Alcohol Use Yes    Comment: a beer once in a while  Social History     Substance and Sexual Activity   Drug Use No     Social History     Tobacco Use   Smoking Status Never Smoker   Smokeless Tobacco Never Used       Home Medications:   Prior to Admission medications    Medication Sig Start Date End Date Taking? Authorizing Provider   amLODIPine (NORVASC) 5 mg tablet TAKE ONE TABLET BY MOUTH EVERY DAY 2/15/22   Ru Stephens MD   Ascorbic Acid (VITAMIN C) 500 MG CAPS Take 1 tablet by mouth daily    Historical Provider, MD   aspirin (ECOTRIN LOW STRENGTH) 81 mg EC tablet Take 81 mg by mouth daily    Historical Provider, MD   Cyanocobalamin (VITAMIN B12) 1000 MCG TBCR Take 1 tablet by mouth daily    Historical Provider, MD   Flaxseed, Linseed, (FLAXSEED OIL) 1000 MG CAPS Take 1 capsule by mouth daily    Historical Provider, MD   Glucosamine-Chondroit-Vit C-Mn (GLUCOSAMINE 1500 COMPLEX) CAPS Take 1 capsule by mouth daily    Historical Provider, MD   levothyroxine 100 mcg tablet TAKE ONE TABLET BY MOUTH EVERY DAY 2/28/22   Ru Stephens MD   metoprolol tartrate (LOPRESSOR) 100 mg tablet Take 1 tablet (100 mg total) by mouth 2 (two) times a day 4/15/21   Ru Stephens MD   Multiple Vitamin (MULTIVITAMIN) capsule Take 1 capsule by mouth daily    Historical Provider, MD   Omega-3 Fatty Acids (FISH OIL) 1200 MG CAPS Take 1 capsule by mouth daily    Historical Provider, MD   tamsulosin (FLOMAX) 0 4 mg TAKE ONE CAPSULE BY MOUTH EVERY DAY 3/12/22   Ru Stephens MD       Allergies: Allergies   Allergen Reactions    Amoxicillin Other (See Comments)     Severe weakness       Review of Systems:     Review of Systems   Constitutional: Positive for activity change and fatigue  Negative for diaphoresis and unexpected weight change  HENT: Negative  Negative for dental problem  Respiratory: Positive for shortness of breath  Negative for chest tightness and wheezing  Cardiovascular: Negative    Negative for chest pain, palpitations and leg swelling  Gastrointestinal: Negative  Negative for blood in stool, constipation, diarrhea, nausea and vomiting  Genitourinary: Negative  Musculoskeletal: Negative  Negative for arthralgias, back pain, gait problem and myalgias  Skin: Negative  Neurological: Negative  Negative for dizziness, syncope, weakness, light-headedness, numbness and headaches  Hematological: Negative  Does not bruise/bleed easily  All other systems reviewed and are negative  Vital Signs:     Vitals:    03/31/22 0929 03/31/22 0933   BP: 131/68 130/69   BP Location: Left arm Right arm   Patient Position: Sitting Sitting   Cuff Size: Standard    Pulse: 74    Resp: 18    SpO2: 97%    Weight: 76 5 kg (168 lb 9 6 oz)    Height: 5' 10" (1 778 m)        Physical Exam:     Physical Exam  Constitutional:       General: He is not in acute distress  Appearance: Normal appearance  He is well-developed  He is not ill-appearing or toxic-appearing  Comments: Sitting on exam table in NAD   HENT:      Head: Normocephalic and atraumatic  Mouth/Throat:      Dentition: Normal dentition  Does not have dentures  Pharynx: Uvula midline  Neck:      Vascular: No carotid bruit or JVD  Trachea: Trachea normal    Cardiovascular:      Rate and Rhythm: Normal rate and regular rhythm  Chest Wall: PMI is not displaced  Heart sounds: S1 normal and S2 normal  Murmur heard  Systolic murmur is present with a grade of 5/6  No friction rub  No S3 or S4 sounds  Comments: No heaves/lifts on palpation  Pulmonary:      Effort: Pulmonary effort is normal  No accessory muscle usage or respiratory distress  Breath sounds: Normal breath sounds  No wheezing, rhonchi or rales  Abdominal:      General: Bowel sounds are normal  There is no distension  Palpations: Abdomen is not rigid  There is no mass  Tenderness: There is no abdominal tenderness  There is no guarding or rebound     Musculoskeletal: Cervical back: Normal range of motion and neck supple  Skin:     General: Skin is warm and dry  Findings: No bruising, petechiae or rash  Nails: There is no clubbing  Comments: Trace edema b/l LE, no varicosities appreciated to b/l LE, (+) healed left PPM incision   Neurological:      Mental Status: He is alert and oriented to person, place, and time  Cranial Nerves: No cranial nerve deficit  Sensory: No sensory deficit  Comments: No focal deficit appreciated         Lab Results:               Invalid input(s): LABGLOM      No results found for: HGBA1C  Lab Results   Component Value Date    TROPONINI <0 02 03/25/2019       Imaging Studies:     Transthoracic Echocardiogram 3/8/2022: EF 50%, severe AS (mean 41, MIRA 0 75cm2), mild MR, mild TR    I have personally reviewed pertinent reports  and I have personally reviewed pertinent films in PACS    TAVR evaluation Assessment:     Don Davis: II    STS Risk Score: 2 5 %, mortality risk    5 Meter Walk Test:      Attempt 1: 4   Attempt 2: 6   Attempt 3: 6    KCCQ-12 completed    Assessment:  Patient Active Problem List    Diagnosis Date Noted    Balance disorder 06/11/2019    Sick sinus syndrome ([facility] [address] ) 04/05/2019    Presence of permanent cardiac pacemaker 04/05/2019    Paroxysmal atrial fibrillation ([facility] [address] ) 04/05/2019    Chronic nonintractable headache 05/30/2017    Severe aortic stenosis 10/19/2015    Acquired hypothyroidism 07/29/2015    GERD without esophagitis 02/26/2015    Enlarged prostate without lower urinary tract symptoms (luts) 10/14/2014    Essential hypertension 10/14/2014     Severe aortic stenosis; Ongoing TAVR workup    Plan:    Lona Dickinson has severe symptomatic aortic stenosis  Based on their STS risk assessment, they will undergo the following testing for transcatheter aortic valve replacement: gated CTA of the chest, abdomen, and pelvis, cardiac catheterization, dental clearance and carotid ultrasound      Once these studies have been completed, Ruby Mendez will follow up in our office to review the results and confirm the suitability of proceeding with transcatheter aortic valve replacment  Shared decision-making encounter occurred during this visit  Additionally, heart team evaluation of suitability for surgical replacement has been completed  Ruby Mendez was comfortable with our recommendations, and their questions were answered to their satisfaction  We will continue to evaluate the patient, with a final surgical recommendation pending the above work up  Thank you for allowing us to participate in the care of this patient  Routine referral to gastroenterology for colonoscopy screening was not indicated, as the patient is over 76years old    SIGNATURE: Qing Meneses PA-C  DATE: March 31, 2022  TIME: 10:11 AM    * This note was completed in part utilizing [employer] direct voice recognition software  Grammatical errors, random word insertion, spelling mistakes, and incomplete sentences may be an occasional consequence of the system secondary to software limitations, ambient noise and hardware issues  At the time of dictation, efforts were made to edit, clarify and /or correct errors  Please read the chart carefully and recognize, using context, where substitutions have occurred  If you have any questions or concerns about the context, text or information contained within the body of this dictation, please contact myself, the provider, for further clarification

## 2022-03-31 NOTE — PROGRESS NOTES
Consultation - Cardiothoracic Surgery   Donna Martin 80 y o  male MRN: [id number]    Physician Requesting Consult: Dr Ron Nunez    Reason for Consult / Principal Problem: Aortic stenosis, Non-Rheumatic    History of Present Illness: Donna Martin is a 80y o  year old male who presents for evaluation of severe AS  Has been followed by cardiology for a fib for many years now, has had PPM for SSS, no AC since at least 2021 due to no a fib on his PPM interrogation & patient refusal  Has had AS on echocardiography since 2019 & has been followed w/ serial echocardiograms  In 2021 had severe AS w/ preserved EF on echocardiography but was asymptomatic therefore continued under surveillance  Had f/u w/ cardiologist in March w/ admission to worsening fatigue over the past year w/ occasional dyspnea, states feels better when he pushes himself through his wood work in his yard but still experiences it  He has been referred to our office for surgical consultation  Upon examination today, Mr Pervis Primrose reports he is feeling well  Admits to complaints as above  Is nervous for a surgical consultation due to bad experience w/ PPM in past & feeling well when he pushes himself working outside  Also c/o a banding sensation to dorsum of b/l feet, brought up to his PCP who did not indicate anything wrong  Denies CP, palpitations, SOB, TILLEY, LE edema b/l, orthopnea, PND, numbness/tinlging/paresthesias in UE or LE bilaterally, HA, lightheadeness, dizziness, presyncopal symptoms, hx syncopal events, N/V/D, hemoptysis, hematemesis, hematochezia, melena  Denies hx stroke, hx cancer w/ chest wall radiation, hx blood loss anemia, hx varicose veins or vein stripping      PMH includes severe AS, HTN, a fib/SSS (no AC per patient, no a fib since 2019 per cardiology note from [facility] interrogation), hypothyroidism, hep A (tx medically, no other hepatis or residual liver issues), h/o melanoma (s/p excisions), arthritis, HA, BPH, GERD, vitamin B12 deficiency  PSH includes PPM (left, [place] 2016), IHR (b/l, w/ mesh), melanoma excisions  (+) maternal h/o HD (unsure kind, no procedures needed)  Denies FH of CAD/MI, HTN, HL, valvular disease, DM, aortic aneurysms, or SCD  Does wood working in his yard w/ chopping daily  Patient is retired  Lives in a rancher home w/ wife  Does not use an assist device for ambulation  Drives  Denies current or prior use of tobacco, ETOH, or drug use  Allergies include Amoxicillin with rxn weakness  Cardiac pertinent medications include: Norvasc 5mg, Aspirin 81mg, Levothyroxine 100mcg, Lopresor 100mg BID  Other medications can be reviewed in the patient chart  Patient sees Dr Rada Romberg as his primary care physician, their prior visit documentation was reviewed at this visit  Patient sees Dr Adin Mckeon as his cardiologist, their proior visit documentation was reviewed at this visit  Last dental visit last week, (+) full upper/partial lower dentures, Dr Tesfaye Marion  COVID vaccines: 1/18/2021, 2/15/2021, 11/2/2021      Past Medical History:  Past Medical History:   Diagnosis Date    Arthritis     Ascending aortic aneurysm (HCC)     trileaflet AV, 41mm    BPH (benign prostatic hyperplasia)     Former tobacco use     pipe & cigar use socially & infrequently    GERD (gastroesophageal reflux disease)     Headache     h/o    Hepatitis A     History of melanoma     Hypertension     Hypothyroidism     Severe aortic stenosis     Vitamin B12 deficiency      Past Surgical History:   Past Surgical History:   Procedure Laterality Date    CARDIAC PACEMAKER PLACEMENT      INGUINAL HERNIA REPAIR Bilateral     SKIN CANCER EXCISION      melanoma, multiple     Family History:  Family History   Problem Relation Age of Onset    Cancer Family         gastrointestinal tract    Heart disease Mother     Substance Abuse Neg Hx     Mental illness Neg Hx      Social History:  Social History     Substance and Sexual Activity   Alcohol Use Yes    Comment: a beer once in a while  Social History     Substance and Sexual Activity   Drug Use No     Social History     Tobacco Use   Smoking Status Never Smoker   Smokeless Tobacco Never Used       Home Medications:   Prior to Admission medications    Medication Sig Start Date End Date Taking? Authorizing Provider   amLODIPine (NORVASC) 5 mg tablet TAKE ONE TABLET BY MOUTH EVERY DAY 2/15/22   Tricia Prakash MD   Ascorbic Acid (VITAMIN C) 500 MG CAPS Take 1 tablet by mouth daily    Historical Provider, MD   aspirin (ECOTRIN LOW STRENGTH) 81 mg EC tablet Take 81 mg by mouth daily    Historical Provider, MD   Cyanocobalamin (VITAMIN B12) 1000 MCG TBCR Take 1 tablet by mouth daily    Historical Provider, MD   Flaxseed, Linseed, (FLAXSEED OIL) 1000 MG CAPS Take 1 capsule by mouth daily    Historical Provider, MD   Glucosamine-Chondroit-Vit C-Mn (GLUCOSAMINE 1500 COMPLEX) CAPS Take 1 capsule by mouth daily    Historical Provider, MD   levothyroxine 100 mcg tablet TAKE ONE TABLET BY MOUTH EVERY DAY 2/28/22   Tricia Prakash MD   metoprolol tartrate (LOPRESSOR) 100 mg tablet Take 1 tablet (100 mg total) by mouth 2 (two) times a day 4/15/21   Tricia Prakash MD   Multiple Vitamin (MULTIVITAMIN) capsule Take 1 capsule by mouth daily    Historical Provider, MD   Omega-3 Fatty Acids (FISH OIL) 1200 MG CAPS Take 1 capsule by mouth daily    Historical Provider, MD   tamsulosin (FLOMAX) 0 4 mg TAKE ONE CAPSULE BY MOUTH EVERY DAY 3/12/22   Tricia Prakash MD       Allergies: Allergies   Allergen Reactions    Amoxicillin Other (See Comments)     Severe weakness       Review of Systems:     Review of Systems   Constitutional: Positive for activity change and fatigue  Negative for diaphoresis and unexpected weight change  HENT: Negative  Negative for dental problem  Respiratory: Positive for shortness of breath  Negative for chest tightness and wheezing  Cardiovascular: Negative    Negative for chest pain, palpitations and leg swelling  Gastrointestinal: Negative  Negative for blood in stool, constipation, diarrhea, nausea and vomiting  Genitourinary: Negative  Musculoskeletal: Negative  Negative for arthralgias, back pain, gait problem and myalgias  Skin: Negative  Neurological: Negative  Negative for dizziness, syncope, weakness, light-headedness, numbness and headaches  Hematological: Negative  Does not bruise/bleed easily  All other systems reviewed and are negative  Vital Signs:     Vitals:    03/31/22 0929 03/31/22 0933   BP: 131/68 130/69   BP Location: Left arm Right arm   Patient Position: Sitting Sitting   Cuff Size: Standard    Pulse: 74    Resp: 18    SpO2: 97%    Weight: 76 5 kg (168 lb 9 6 oz)    Height: 5' 10" (1 778 m)        Physical Exam:     Physical Exam  Constitutional:       General: He is not in acute distress  Appearance: Normal appearance  He is well-developed  He is not ill-appearing or toxic-appearing  Comments: Sitting on exam table in NAD   HENT:      Head: Normocephalic and atraumatic  Mouth/Throat:      Dentition: Normal dentition  Does not have dentures  Pharynx: Uvula midline  Neck:      Vascular: No carotid bruit or JVD  Trachea: Trachea normal    Cardiovascular:      Rate and Rhythm: Normal rate and regular rhythm  Chest Wall: PMI is not displaced  Heart sounds: S1 normal and S2 normal  Murmur heard  Systolic murmur is present with a grade of 5/6  No friction rub  No S3 or S4 sounds  Comments: No heaves/lifts on palpation  Pulmonary:      Effort: Pulmonary effort is normal  No accessory muscle usage or respiratory distress  Breath sounds: Normal breath sounds  No wheezing, rhonchi or rales  Abdominal:      General: Bowel sounds are normal  There is no distension  Palpations: Abdomen is not rigid  There is no mass  Tenderness: There is no abdominal tenderness  There is no guarding or rebound     Musculoskeletal: Cervical back: Normal range of motion and neck supple  Skin:     General: Skin is warm and dry  Findings: No bruising, petechiae or rash  Nails: There is no clubbing  Comments: Trace edema b/l LE, no varicosities appreciated to b/l LE, (+) healed left PPM incision   Neurological:      Mental Status: He is alert and oriented to person, place, and time  Cranial Nerves: No cranial nerve deficit  Sensory: No sensory deficit  Comments: No focal deficit appreciated         Lab Results:               Invalid input(s): LABGLOM      No results found for: HGBA1C  Lab Results   Component Value Date    TROPONINI <0 02 03/25/2019       Imaging Studies:     Transthoracic Echocardiogram 3/8/2022: EF 50%, severe AS (mean 41, MIRA 0 75cm2), mild MR, mild TR    I have personally reviewed pertinent reports  and I have personally reviewed pertinent films in PACS    TAVR evaluation Assessment:     Jairo Velasquez: II    STS Risk Score: 2 5 %, mortality risk    5 Meter Walk Test:      Attempt 1: 4   Attempt 2: 6   Attempt 3: 6    KCCQ-12 completed    Assessment:  Patient Active Problem List    Diagnosis Date Noted    Balance disorder 06/11/2019    Sick sinus syndrome ([facility] [address] ) 04/05/2019    Presence of permanent cardiac pacemaker 04/05/2019    Paroxysmal atrial fibrillation ([facility] [address] ) 04/05/2019    Chronic nonintractable headache 05/30/2017    Severe aortic stenosis 10/19/2015    Acquired hypothyroidism 07/29/2015    GERD without esophagitis 02/26/2015    Enlarged prostate without lower urinary tract symptoms (luts) 10/14/2014    Essential hypertension 10/14/2014     Severe aortic stenosis; Ongoing TAVR workup    Plan:    Ginna Delgadillo has severe symptomatic aortic stenosis  Based on their STS risk assessment, they will undergo the following testing for transcatheter aortic valve replacement: gated CTA of the chest, abdomen, and pelvis, cardiac catheterization, dental clearance and carotid ultrasound      Once these studies have been completed, Ke Cronin will follow up in our office to review the results and confirm the suitability of proceeding with transcatheter aortic valve replacment  Shared decision-making encounter occurred during this visit  Additionally, heart team evaluation of suitability for surgical replacement has been completed  Ke Cronin was comfortable with our recommendations, and their questions were answered to their satisfaction  We will continue to evaluate the patient, with a final surgical recommendation pending the above work up  Thank you for allowing us to participate in the care of this patient  Routine referral to gastroenterology for colonoscopy screening was not indicated, as the patient is over 76years old    SIGNATURE: Leobardo Palma PA-C  DATE: March 31, 2022  TIME: 10:11 AM    * This note was completed in part utilizing [employer] direct voice recognition software  Grammatical errors, random word insertion, spelling mistakes, and incomplete sentences may be an occasional consequence of the system secondary to software limitations, ambient noise and hardware issues  At the time of dictation, efforts were made to edit, clarify and /or correct errors  Please read the chart carefully and recognize, using context, where substitutions have occurred  If you have any questions or concerns about the context, text or information contained within the body of this dictation, please contact myself, the provider, for further clarification

## 2022-04-04 ENCOUNTER — TELEPHONE (OUTPATIENT)
Dept: CARDIOLOGY CLINIC | Facility: CLINIC | Age: 87
End: 2022-04-04

## 2022-04-04 ENCOUNTER — PREP FOR PROCEDURE (OUTPATIENT)
Dept: CARDIOLOGY CLINIC | Facility: CLINIC | Age: 87
End: 2022-04-04

## 2022-04-04 DIAGNOSIS — I35.0 AORTIC VALVE STENOSIS, SEVERE: Primary | ICD-10-CM

## 2022-04-04 NOTE — TELEPHONE ENCOUNTER
Patient scheduled for R+LHc at [facility] on 04/15/22 with Dr Josephine Jones  Patient aware of general instructions and labs reacquired  Can we please have insurance check for this service

## 2022-04-04 NOTE — TELEPHONE ENCOUNTER
----- Message from Leeroy Christensen sent at 3/31/2022 10:50 AM EDT -----  Regarding: Cath  Please schedule patient for:     Pre TAVR Cardiac Cath: to be scheduled in the next few weeks, patient has [employer] as primary insurance and is getting bloodwork done this week  Pre TAVR PCI:     To be done at: [facility]     To be done by:     Please address any questions regarding this request to Najma Oswald or Oenl Ozuna,     Thank you

## 2022-04-06 LAB
ALBUMIN SERPL-MCNC: 4.3 G/DL (ref 3.6–4.6)
ALBUMIN/GLOB SERPL: 1.6 {RATIO} (ref 1.2–2.2)
ALP SERPL-CCNC: 85 IU/L (ref 44–121)
ALT SERPL-CCNC: 19 IU/L (ref 0–44)
AST SERPL-CCNC: 26 IU/L (ref 0–40)
BASOPHILS # BLD AUTO: 0.1 X10E3/UL (ref 0–0.2)
BASOPHILS NFR BLD AUTO: 1 %
BILIRUB SERPL-MCNC: 0.8 MG/DL (ref 0–1.2)
BUN SERPL-MCNC: 17 MG/DL (ref 8–27)
BUN/CREAT SERPL: 20 (ref 10–24)
CALCIUM SERPL-MCNC: 9.3 MG/DL (ref 8.6–10.2)
CHLORIDE SERPL-SCNC: 97 MMOL/L (ref 96–106)
CO2 SERPL-SCNC: 23 MMOL/L (ref 20–29)
CREAT SERPL-MCNC: 0.85 MG/DL (ref 0.76–1.27)
EGFR: 83 ML/MIN/1.73
EOSINOPHIL # BLD AUTO: 0.3 X10E3/UL (ref 0–0.4)
EOSINOPHIL NFR BLD AUTO: 5 %
ERYTHROCYTE [DISTWIDTH] IN BLOOD BY AUTOMATED COUNT: 12.6 % (ref 11.6–15.4)
GLOBULIN SER-MCNC: 2.7 G/DL (ref 1.5–4.5)
GLUCOSE SERPL-MCNC: 97 MG/DL (ref 65–99)
HCT VFR BLD AUTO: 36.9 % (ref 37.5–51)
HGB BLD-MCNC: 12.7 G/DL (ref 13–17.7)
IMM GRANULOCYTES # BLD: 0 X10E3/UL (ref 0–0.1)
IMM GRANULOCYTES NFR BLD: 0 %
LYMPHOCYTES # BLD AUTO: 1.6 X10E3/UL (ref 0.7–3.1)
LYMPHOCYTES NFR BLD AUTO: 32 %
MCH RBC QN AUTO: 31.2 PG (ref 26.6–33)
MCHC RBC AUTO-ENTMCNC: 34.4 G/DL (ref 31.5–35.7)
MCV RBC AUTO: 91 FL (ref 79–97)
MONOCYTES # BLD AUTO: 0.5 X10E3/UL (ref 0.1–0.9)
MONOCYTES NFR BLD AUTO: 10 %
NEUTROPHILS # BLD AUTO: 2.6 X10E3/UL (ref 1.4–7)
NEUTROPHILS NFR BLD AUTO: 52 %
PLATELET # BLD AUTO: 227 X10E3/UL (ref 150–450)
POTASSIUM SERPL-SCNC: 4.5 MMOL/L (ref 3.5–5.2)
PROT SERPL-MCNC: 7 G/DL (ref 6–8.5)
RBC # BLD AUTO: 4.07 X10E6/UL (ref 4.14–5.8)
SODIUM SERPL-SCNC: 135 MMOL/L (ref 134–144)
WBC # BLD AUTO: 5 X10E3/UL (ref 3.4–10.8)

## 2022-04-07 ENCOUNTER — HOSPITAL ENCOUNTER (OUTPATIENT)
Dept: RADIOLOGY | Facility: HOSPITAL | Age: 87
Discharge: HOME | End: 2022-04-07
Payer: COMMERCIAL

## 2022-04-07 ENCOUNTER — HOSPITAL ENCOUNTER (OUTPATIENT)
Dept: NON INVASIVE DIAGNOSTICS | Facility: HOSPITAL | Age: 87
Discharge: HOME | End: 2022-04-07
Payer: COMMERCIAL

## 2022-04-07 DIAGNOSIS — I35.0 SEVERE AORTIC STENOSIS: ICD-10-CM

## 2022-04-07 DIAGNOSIS — Z13.6 ENCOUNTER FOR SCREENING FOR STENOSIS OF CAROTID ARTERY: ICD-10-CM

## 2022-04-07 DIAGNOSIS — Z87.891 FORMER TOBACCO USE: ICD-10-CM

## 2022-04-07 PROCEDURE — 75572 CT HRT W/3D IMAGE: CPT

## 2022-04-07 PROCEDURE — G1004 CDSM NDSC: HCPCS

## 2022-04-07 PROCEDURE — 93880 EXTRACRANIAL BILAT STUDY: CPT

## 2022-04-07 PROCEDURE — 93880 EXTRACRANIAL BILAT STUDY: CPT | Performed by: SURGERY

## 2022-04-07 PROCEDURE — 74174 CTA ABD&PLVS W/CONTRAST: CPT

## 2022-04-07 RX ADMIN — IODIXANOL 120 ML: 320 INJECTION, SOLUTION INTRAVASCULAR at 10:33

## 2022-04-08 DIAGNOSIS — I35.0 SEVERE AORTIC STENOSIS: Primary | ICD-10-CM

## 2022-04-13 DIAGNOSIS — I10 ESSENTIAL HYPERTENSION: ICD-10-CM

## 2022-04-13 RX ORDER — METOPROLOL TARTRATE 100 MG/1
TABLET ORAL
Qty: 180 TABLET | Refills: 3 | Status: SHIPPED | OUTPATIENT
Start: 2022-04-13 | End: 2022-05-20

## 2022-04-15 ENCOUNTER — HOSPITAL ENCOUNTER (OUTPATIENT)
Facility: HOSPITAL | Age: 87
Setting detail: OUTPATIENT SURGERY
Discharge: HOME | End: 2022-04-15
Attending: INTERNAL MEDICINE | Admitting: INTERNAL MEDICINE
Payer: COMMERCIAL

## 2022-04-15 VITALS
DIASTOLIC BLOOD PRESSURE: 87 MMHG | SYSTOLIC BLOOD PRESSURE: 149 MMHG | RESPIRATION RATE: 17 BRPM | TEMPERATURE: 97.5 F | HEIGHT: 70 IN | BODY MASS INDEX: 23.62 KG/M2 | WEIGHT: 165 LBS | HEART RATE: 74 BPM | OXYGEN SATURATION: 95 %

## 2022-04-15 DIAGNOSIS — I25.10 CORONARY ARTERY DISEASE INVOLVING NATIVE CORONARY ARTERY OF NATIVE HEART WITHOUT ANGINA PECTORIS: Primary | ICD-10-CM

## 2022-04-15 DIAGNOSIS — I35.0 AORTIC VALVE STENOSIS, SEVERE: ICD-10-CM

## 2022-04-15 LAB
KCT BLD-ACNC: 327 SEC (ref 89–137)
SPECIMEN SOURCE: ABNORMAL

## 2022-04-15 PROCEDURE — 93454 CORONARY ARTERY ANGIO S&I: CPT | Performed by: INTERNAL MEDICINE

## 2022-04-15 PROCEDURE — C1769 GUIDE WIRE: HCPCS | Performed by: INTERNAL MEDICINE

## 2022-04-15 PROCEDURE — C1894 INTRO/SHEATH, NON-LASER: HCPCS | Performed by: INTERNAL MEDICINE

## 2022-04-15 PROCEDURE — C1874 STENT, COATED/COV W/DEL SYS: HCPCS | Performed by: INTERNAL MEDICINE

## 2022-04-15 PROCEDURE — C1887 CATHETER, GUIDING: HCPCS | Performed by: INTERNAL MEDICINE

## 2022-04-15 PROCEDURE — 92928 PRQ TCAT PLMT NTRAC ST 1 LES: CPT | Performed by: INTERNAL MEDICINE

## 2022-04-15 PROCEDURE — 85347 COAGULATION TIME ACTIVATED: CPT

## 2022-04-15 PROCEDURE — 99152 MOD SED SAME PHYS/QHP 5/>YRS: CPT | Performed by: INTERNAL MEDICINE

## 2022-04-15 PROCEDURE — C1725 CATH, TRANSLUMIN NON-LASER: HCPCS | Performed by: INTERNAL MEDICINE

## 2022-04-15 PROCEDURE — 99153 MOD SED SAME PHYS/QHP EA: CPT | Performed by: INTERNAL MEDICINE

## 2022-04-15 PROCEDURE — 93005 ELECTROCARDIOGRAM TRACING: CPT

## 2022-04-15 PROCEDURE — C9600 PERC DRUG-EL COR STENT SING: HCPCS | Performed by: INTERNAL MEDICINE

## 2022-04-15 DEVICE — XIENCE SKYPOINT™ EVEROLIMUS ELUTING CORONARY STENT SYSTEM 2.50 MM X 18 MM / RAPID-EXCHANGE
Type: IMPLANTABLE DEVICE | Status: FUNCTIONAL
Brand: XIENCE SKYPOINT™

## 2022-04-15 RX ORDER — PHENYLEPHRINE HYDROCHLORIDE 10 MG/ML
INJECTION INTRAVENOUS AS NEEDED
Status: DISCONTINUED | OUTPATIENT
Start: 2022-04-15 | End: 2022-04-15 | Stop reason: HOSPADM

## 2022-04-15 RX ORDER — SODIUM CHLORIDE 9 MG/ML
100 INJECTION, SOLUTION INTRAVENOUS CONTINUOUS
Status: DISPENSED | OUTPATIENT
Start: 2022-04-15 | End: 2022-04-15

## 2022-04-15 RX ORDER — CLOPIDOGREL BISULFATE 75 MG/1
75 TABLET ORAL DAILY
Qty: 90 TABLET | Refills: 1 | Status: SHIPPED | OUTPATIENT
Start: 2022-04-16 | End: 2022-06-07 | Stop reason: SDUPTHER

## 2022-04-15 RX ORDER — AMLODIPINE BESYLATE 5 MG/1
5 TABLET ORAL ONCE
Status: COMPLETED | OUTPATIENT
Start: 2022-04-15 | End: 2022-04-15

## 2022-04-15 RX ORDER — ONDANSETRON 2 MG/ML
4 INJECTION INTRAMUSCULAR; INTRAVENOUS EVERY 6 HOURS PRN
Status: DISCONTINUED | OUTPATIENT
Start: 2022-04-15 | End: 2022-04-15 | Stop reason: HOSPADM

## 2022-04-15 RX ORDER — HEPARIN SODIUM 1000 [USP'U]/ML
INJECTION, SOLUTION INTRAVENOUS; SUBCUTANEOUS AS NEEDED
Status: DISCONTINUED | OUTPATIENT
Start: 2022-04-15 | End: 2022-04-15 | Stop reason: HOSPADM

## 2022-04-15 RX ORDER — CLOPIDOGREL BISULFATE 75 MG/1
300 TABLET ORAL ONCE
Status: COMPLETED | OUTPATIENT
Start: 2022-04-15 | End: 2022-04-15

## 2022-04-15 RX ORDER — NITROGLYCERIN 0.4 MG/1
0.4 TABLET SUBLINGUAL
Status: DISCONTINUED | OUTPATIENT
Start: 2022-04-15 | End: 2022-04-15 | Stop reason: HOSPADM

## 2022-04-15 RX ORDER — NITROGLYCERIN 20 MG/100ML
INJECTION INTRAVENOUS AS NEEDED
Status: DISCONTINUED | OUTPATIENT
Start: 2022-04-15 | End: 2022-04-15 | Stop reason: HOSPADM

## 2022-04-15 RX ORDER — SODIUM CHLORIDE 9 MG/ML
125 INJECTION, SOLUTION INTRAVENOUS CONTINUOUS
Status: DISCONTINUED | OUTPATIENT
Start: 2022-04-15 | End: 2022-04-15 | Stop reason: HOSPADM

## 2022-04-15 RX ORDER — MIDAZOLAM HYDROCHLORIDE 2 MG/2ML
INJECTION, SOLUTION INTRAMUSCULAR; INTRAVENOUS AS NEEDED
Status: DISCONTINUED | OUTPATIENT
Start: 2022-04-15 | End: 2022-04-15 | Stop reason: HOSPADM

## 2022-04-15 RX ORDER — LIDOCAINE HYDROCHLORIDE 10 MG/ML
INJECTION, SOLUTION EPIDURAL; INFILTRATION; INTRACAUDAL; PERINEURAL AS NEEDED
Status: DISCONTINUED | OUTPATIENT
Start: 2022-04-15 | End: 2022-04-15 | Stop reason: HOSPADM

## 2022-04-15 RX ORDER — ASPIRIN 81 MG/1
324 TABLET, CHEWABLE ORAL ONCE
Status: COMPLETED | OUTPATIENT
Start: 2022-04-15 | End: 2022-04-15

## 2022-04-15 RX ORDER — LABETALOL 20 MG/4 ML (5 MG/ML) INTRAVENOUS SYRINGE
10
Status: DISCONTINUED | OUTPATIENT
Start: 2022-04-15 | End: 2022-04-15 | Stop reason: HOSPADM

## 2022-04-15 RX ORDER — VERAPAMIL HCL 2.5 MG/ML
AMPUL (ML) INTRAVENOUS AS NEEDED
Status: DISCONTINUED | OUTPATIENT
Start: 2022-04-15 | End: 2022-04-15 | Stop reason: HOSPADM

## 2022-04-15 RX ORDER — FENTANYL CITRATE 50 UG/ML
INJECTION, SOLUTION INTRAMUSCULAR; INTRAVENOUS AS NEEDED
Status: DISCONTINUED | OUTPATIENT
Start: 2022-04-15 | End: 2022-04-15 | Stop reason: HOSPADM

## 2022-04-15 RX ORDER — ACETAMINOPHEN 325 MG/1
650 TABLET ORAL EVERY 4 HOURS PRN
Status: DISCONTINUED | OUTPATIENT
Start: 2022-04-15 | End: 2022-04-15 | Stop reason: HOSPADM

## 2022-04-15 RX ADMIN — CLOPIDOGREL BISULFATE 300 MG: 75 TABLET ORAL at 17:12

## 2022-04-15 RX ADMIN — ASPIRIN 81 MG CHEWABLE TABLET 324 MG: 81 TABLET CHEWABLE at 07:23

## 2022-04-15 RX ADMIN — AMLODIPINE BESYLATE 5 MG: 5 TABLET ORAL at 14:25

## 2022-04-15 RX ADMIN — SODIUM CHLORIDE 125 ML/HR: 0.9 INJECTION, SOLUTION INTRAVENOUS at 07:24

## 2022-04-15 RX ADMIN — Medication 10 MG: at 15:18

## 2022-04-15 NOTE — PROGRESS NOTES
Gave labatolol 10mg to bring pressure down, in 170s, unable to get tr band off, will check b/p 1/2hr   otherwise pt stable

## 2022-04-15 NOTE — Clinical Note
Post procedure Joel Saas is fully awake alert responsive and appropriate  FSC, WATTS without complaints  Respirations eupenic on r/a    Readied for  transfer

## 2022-04-15 NOTE — PROGRESS NOTES
Post procedure, family spoken to by Filemon Pham MD, Fellow and findings with plan of care discussed  All questions answered  Transferred to [facility] post procedure is stable condition  Moved to bed with smooth  and 4 assist and tolerated  Received by Chante Hernandez and care assumed  OP site assessed and remains stable  Safety addressed before leaving bedside

## 2022-04-15 NOTE — INTERVAL H&P NOTE
H&P reviewed  After examining the patient I find no changes in the patients condition since the H&P had been written   Accept for H and P      Vitals:    04/15/22 0712   BP: 148/95   Pulse: 86   Resp: 18   Temp: 97 5 °F (36 4 °C)   SpO2: 94%

## 2022-04-15 NOTE — DISCHARGE INSTRUCTIONS
1  Please see the post cardiac catheterization dishcarge instructions  No heavy lifting, greater than 10 lbs  or strenuous  activity for 48 hrs  2 Remove band aid tomorrow  Shower and wash area- wrist gently with soap and water- beginning tomorrow  Rinse and pat dry  Apply new water seal band aid  Repeat this process for 5 days  No powders, creams lotions or antibiotic ointments  for 5 days  No tub baths, hot tubs or swimming for 5 days  3  Please call our office ([phone]) if you have any fever, redness, swelling, discharge from your wrist access site  4 No driving for 1 day    5  none      Left Heart Catheterization   WHAT YOU NEED TO KNOW:   A left heart catheterization is a procedure to look at your heart and its arteries  You may need this procedure if you have chest pain, heart disease, or your heart is not working as it should  WHILE YOU ARE HERE:   Before your procedure:   · Informed consent  is a legal document that explains the tests, treatments, or procedures that you may need  Informed consent means you understand what will be done and can make decisions about what you want  You give your permission when you sign the consent form  You can have someone sign this form for you if you are not able to sign it  You have the right to understand your medical care in words you know  Before you sign the consent form, understand the risks and benefits of what will be done  Make sure all your questions are answered  · An IV  is a small tube placed in your vein that is used to give you medicine or liquids  · Medicines:      ? Antihistamines  help prevent a reaction to the dye used during your heart catheterization  ? A sedative  is given to help you stay calm and relaxed  ? Steroids  decrease inflammation and help prevent a reaction to the contrast dye  · Local anesthesia  is a shot of medicine put into your arm or leg  It is used to numb the area and dull the pain   You may still feel pressure or pushing during the procedure  During your procedure:   · Your healthcare provider will insert a catheter into an artery in your arm, wrist, or leg  An x-ray will be used to carefully guide the catheter to your heart  He will inject a dye so he can see the blood vessels, muscle, or valves of your heart more clearly  You may get a warm feeling or slight nausea right after the dye is injected  This is normal, and will pass quickly  Your healthcare provider may remove a small sample of heart tissue and send it to a lab for testing  He may also open a narrow or blocked heart valve or artery  A stent (small tube) may be left inside your artery to hold it open  · The catheter may be left in place to monitor pressure in your heart  When the catheter is removed, a healthcare provider will apply pressure to the site for at least 30 minutes to help decrease the risk of bleeding  A collagen plug or other closure devices may be used to close the site  If the site is in your wrist, your healthcare provider will place a compression device around your wrist  Healthcare providers will cover the site with a pressure bandage to decrease further bleeding  After your procedure: You will be taken to a room to rest until you are fully awake  If insertion was in your wrist, the pressure device will be around your wrist  Healthcare providers will slowly decrease pressure in the device  If insertion was in your groin, a pressure bandage will be in place  Keep your arm or leg straight  Do not  get out of bed until your healthcare provider says it is okay  Healthcare providers will frequently monitor your vital signs and pulses  They will also frequently check your wound for bleeding  Healthcare providers may ask you to drink more liquids to help flush the dye out of your body  If the catheter was in your groin and you need to cough, apply pressure over the area with your hands as directed    RISKS:   During the procedure, the catheter may tear an artery and cause bleeding  An air bubble may enter your lung, or your lung may collapse  You may have a heart attack  After the procedure, you may have bleeding or an infection  You may have damage to a heart valve, or a fistula (abnormal opening) may form between an artery and vein  You may have irregular heartbeats, which may cause dizziness or fainting  You may get a blood clot in your leg or arm  Without this procedure, your condition may get worse  These problems may become life-threatening  CARE AGREEMENT:   You have the right to help plan your care  Learn about your health condition and how it may be treated  Discuss treatment options with your healthcare providers to decide what care you want to receive  You always have the right to refuse treatment  © Copyright [employer] 2018 Information is for End User's use only and may not be sold, redistributed or otherwise used for commercial purposes  All illustrations and images included in CareNotes® are the copyrighted property of A D A M , Inc  or [facility]  The above information is an  only  It is not intended as medical advice for individual conditions or treatments  Talk to your doctor, nurse or pharmacist before following any medical regimen to see if it is safe and effective for you    Patient Discharge    Gallo Anupama / [id number] : 10/7/1932    Admitted 4/15/2022 Discharged: 4/15/2022       Scheduled Meds:  Current Facility-Administered Medications   Medication Dose Route Frequency Provider Last Rate    acetaminophen  650 mg Oral Q4H PRN Natalie Salazar, DO      clopidogrel  300 mg Oral Once Natalie Salazar, DO      Labetalol HCl  10 mg Intravenous Q10 Min PRN Royal Alfaro, DO      nitroglycerin  0 4 mg Sublingual Q5 Min PRN Natalie Salazar, DO      ondansetron  4 mg Intravenous Q6H PRN Natalie Salazar, DO      sodium chloride  125 mL/hr Intravenous Continuous MELINA Arnett 125 mL/hr (04/15/22 0724)     Continuous Infusions:sodium chloride, 125 mL/hr, Last Rate: 125 mL/hr (04/15/22 0724)      PRN Meds:  acetaminophen    Labetalol HCl    nitroglycerin    ondansetron  Personal Items    Please collect all clothing which belongs to you from your nurse  Please collect any valuables you stored during your stay from the , and please remember all of your personal items, such as dentures, canes, and eyeglasses  Activity Instructions    You must avoid lifting more than *** pounds until your physician instructs you differently  You should avoid {d/c avoid/resume:288972}  You may resume {d/c avoid/resume:800913}  I understand that if any problems occur once I am at home I am to contact my physician  I understand and acknowledge receipt of the instructions indicated above        _____________________________________________                                                        Physician's or R N 's Signature                Date/Time                          _____________________________________________                                                        Patient or Representative Signature         Date/Time          I understand that if any problems occur once I am at home I am to contact my physician      I understand and acknowledge receipt of the instructions indicated above        _____________________________________________                                                        Physician's or R N 's Signature                Date/Time                          _____________________________________________                                                        Patient or Representative Signature         Date/Time

## 2022-04-18 ENCOUNTER — OFFICE VISIT (OUTPATIENT)
Dept: CARDIAC SURGERY | Facility: CLINIC | Age: 87
End: 2022-04-18
Payer: COMMERCIAL

## 2022-04-18 VITALS
OXYGEN SATURATION: 97 % | HEART RATE: 76 BPM | DIASTOLIC BLOOD PRESSURE: 83 MMHG | SYSTOLIC BLOOD PRESSURE: 148 MMHG | WEIGHT: 168.7 LBS | HEIGHT: 70 IN | BODY MASS INDEX: 24.15 KG/M2 | RESPIRATION RATE: 18 BRPM

## 2022-04-18 DIAGNOSIS — Z95.5 S/P DRUG ELUTING CORONARY STENT PLACEMENT: ICD-10-CM

## 2022-04-18 DIAGNOSIS — I25.10 CORONARY ARTERY DISEASE INVOLVING NATIVE CORONARY ARTERY OF NATIVE HEART WITHOUT ANGINA PECTORIS: ICD-10-CM

## 2022-04-18 DIAGNOSIS — I35.0 SEVERE AORTIC STENOSIS: Primary | ICD-10-CM

## 2022-04-18 DIAGNOSIS — Z01.812 ENCOUNTER FOR PRE-OPERATIVE LABORATORY TESTING: ICD-10-CM

## 2022-04-18 PROCEDURE — 99215 OFFICE O/P EST HI 40 MIN: CPT | Performed by: NURSE PRACTITIONER

## 2022-04-18 RX ORDER — CHLORHEXIDINE GLUCONATE 0.12 MG/ML
15 RINSE ORAL ONCE
Status: CANCELLED | OUTPATIENT
Start: 2022-04-18 | End: 2022-04-18

## 2022-04-18 RX ORDER — CEFAZOLIN SODIUM 2 G/50ML
2000 SOLUTION INTRAVENOUS ONCE
Status: CANCELLED | OUTPATIENT
Start: 2022-04-18 | End: 2022-04-18

## 2022-04-18 NOTE — H&P
Pre-Op History & Physical - Cardiothoracic Surgery   Yanelis Dickinson 80 y o  male MRN: [id number]    Physician Requesting Consult: Harlo Hodgkin, MD    Reason for Consult / Principal Problem: Aortic stenosis, Non-Rheumatic    History of Present Illness: Yanelis Dickinson is a 80y o  year old male who was previously evaluated in our office by MARILIA Eid  for transcatheter aortic valve replacement  During this initial consultation, arrangements were made for the following studies to be completed: Gated CTA of the chest/abdomen/pelvis, cardiac catheterization, dental clearance and carotid artery ultrasound  Yanelis Diciknson now presents to review the results of these tests and obtain a second surgeon to confirm the suitability of proceeding with transcatheter aortic valve replacment  In review, Danie Abdi is an 80 yr old male with PMHx notable for Afib (No AC), SSS s/p PPM (3/2016), Asc Ao aneurysm (41 mm), h/o Hep A, HTN, Hypothyroidism, GERD, BPH, h/o melanoma and arthritis  Patient has been followed by Cardiology for a number of years for AFib  Echo in 2019 demonstrated AS and since then he has been under surveillance with serial echocardiograms  Most recent Echo 3/8/22 demonstrates progression of his AS to severe range with an MIRA 0 75 cm2, MG 41 and PG 58 mm Hg  Patient with symptoms of fatigue, TILLEY and change in activity tolerance  He denies chest pian, lightheadedness, palpitations, syncope, weight gain or edema  Recent cath 4/15/22 with 90% mRCA lesion treated with PCI/IAIN  Now on ASA and Plavix  Carotid duplex with asymptomatic BAILEY stenosis 70-99%  Upon interview today patient denies any change in his cardiac symptoms  He reports a tick bite this past weekend on his right upper arm  The tick was engorged with blood and fell off yesterday  He denies any fevers, chills or malaise  He reports a raised red lump at the bite site      Covid vaccinations x 3    Up to date with dental care (full upper denture, partial lower denture)      Past Medical History:  Past Medical History:   Diagnosis Date    Arthritis     Ascending aortic aneurysm (HCC)     trileaflet AV, 41mm    BPH (benign prostatic hyperplasia)     Former tobacco use     pipe & cigar use socially & infrequently    GERD (gastroesophageal reflux disease)     Headache     h/o    Hepatitis A     History of melanoma     Hypertension     Hypothyroidism     Severe aortic stenosis     Vitamin B12 deficiency          Past Surgical History:   Past Surgical History:   Procedure Laterality Date    CARDIAC CATHETERIZATION  4/15/2022    Procedure: Cardiac catheterization;  Surgeon: Jairo Jacob DO;  [facility]: [facility]; Service: Cardiology    CARDIAC CATHETERIZATION N/A 4/15/2022    Procedure: Cardiac Coronary Angiogram;  Surgeon: Jairo Jacob DO;  [facility]; Service: Cardiology    CARDIAC CATHETERIZATION N/A 4/15/2022    Procedure: Cardiac pci;  Surgeon: Jairo Jacob DO;  [facility]; Service: Cardiology    CARDIAC PACEMAKER PLACEMENT      INGUINAL HERNIA REPAIR Bilateral     SKIN CANCER EXCISION      melanoma, multiple         Family History:  Family History   Problem Relation Age of Onset    Cancer Family         gastrointestinal tract    Heart disease Mother     Substance Abuse Neg Hx     Mental illness Neg Hx          Social History:    Social History     Substance and Sexual Activity   Alcohol Use Yes    Comment: a beer once in a while  Social History     Substance and Sexual Activity   Drug Use No     Social History     Tobacco Use   Smoking Status Never Smoker   Smokeless Tobacco Never Used       Home Medications:   Prior to Admission medications    Medication Sig Start Date End Date Taking?  Authorizing Provider   amLODIPine (NORVASC) 5 mg tablet TAKE ONE TABLET BY MOUTH EVERY DAY 2/15/22  Yes Juan David Esqueda MD   Ascorbic Acid (VITAMIN C) 500 MG CAPS Take 1 tablet by mouth daily   Yes Historical Provider, MD   aspirin (ECOTRIN LOW STRENGTH) 81 mg EC tablet Take 81 mg by mouth daily   Yes Historical Provider, MD   clopidogrel (Plavix) 75 mg tablet Take 1 tablet (75 mg total) by mouth daily 4/16/22  Yes Alyssia Kwon, DO   Cyanocobalamin (VITAMIN B12) 1000 MCG TBCR Take 1 tablet by mouth daily   Yes Historical Provider, MD   Flaxseed, Linseed, (FLAXSEED OIL) 1000 MG CAPS Take 1 capsule by mouth daily   Yes Historical Provider, MD   Glucosamine-Chondroit-Vit C-Mn (GLUCOSAMINE 1500 COMPLEX) CAPS Take 1 capsule by mouth daily   Yes Historical Provider, MD   levothyroxine 100 mcg tablet TAKE ONE TABLET BY MOUTH EVERY DAY 2/28/22  Yes Maritza Serrano MD   metoprolol tartrate (LOPRESSOR) 100 mg tablet TAKE ONE TABLET BY MOUTH TWICE A DAY 4/13/22  Yes Maritza Serrano MD   Multiple Vitamin (MULTIVITAMIN) capsule Take 1 capsule by mouth daily   Yes Historical Provider, MD   Omega-3 Fatty Acids (FISH OIL) 1200 MG CAPS Take 1 capsule by mouth daily   Yes Historical Provider, MD   tamsulosin (FLOMAX) 0 4 mg TAKE ONE CAPSULE BY MOUTH EVERY DAY 3/12/22  Yes Maritza Serrano MD       Allergies:   Allergies   Allergen Reactions    Amoxicillin Other (See Comments)     Severe weakness       Review of Systems:  Review of Systems - History obtained from chart review and the patient  General ROS: positive for  - fatigue and activity change  negative for - chills, fever, malaise, night sweats, sleep disturbance or weight gain  Psychological ROS: negative  Ophthalmic ROS: negative  ENT ROS: negative  Allergy and Immunology ROS: negative  Hematological and Lymphatic ROS: negative for - bleeding problems, blood clots, bruising or jaundice  Endocrine ROS: negative  Breast ROS: negative  Respiratory ROS: positive for - shortness of breath  negative for - cough, hemoptysis, orthopnea or pleuritic pain  Cardiovascular ROS: positive for - dyspnea on exertion and murmur  negative for - chest pain, edema, irregular heartbeat, loss of consciousness, orthopnea, palpitations or paroxysmal nocturnal dyspnea  Gastrointestinal ROS: no abdominal pain, change in bowel habits, or black or bloody stools  Genito-Urinary ROS: no dysuria, trouble voiding, or hematuria  Musculoskeletal ROS: negative  Neurological ROS: no TIA or stroke symptoms  Dermatological ROS: positive for raised red lump right upper arm (tick bite site)    Vital Signs:     Vitals:    04/18/22 1016   BP: 148/83   BP Location: Left arm   Patient Position: Sitting   Cuff Size: Standard   Pulse: 76   Resp: 18   SpO2: 97%   Weight: 76 5 kg (168 lb 11 2 oz)   Height: 5' 10" (1 778 m)       Physical Exam:    General: Alert, oriented, well developed, no acute distress  HEENT/NECK:  PERRLA  No jugular venous distention  Cardiac:Regular rate and rhythm, III/VI harsh systolic murmur RUSB  Carotids: 1+ pulses, bruits vs radiation of cardiac murmur   Pulmonary:  Breath sounds clear bilaterally  Abdomen:  Non-tender, Non-distended  Positive bowel sounds  Upper extremities: 2+ radial pulses; brisk capillary refill  Lower extremities: Extremities warm/dry  PT/DP pulses 1+ bilaterally  No edema B/L  Neuro: Alert and oriented X 3  Sensation is grossly intact  No focal deficits  Musculoskeletal: MAEE, stable gait  Skin: Warm/Dry, without rashes or lesions      Lab Results:   Lab Results   Component Value Date    WBC 5 0 04/05/2022    HGB 12 7 (L) 04/05/2022    HCT 36 9 (L) 04/05/2022    MCV 91 04/05/2022     04/05/2022     Lab Results   Component Value Date     06/15/2017    SODIUM 135 04/05/2022    K 4 5 04/05/2022    CL 97 04/05/2022    CO2 23 04/05/2022    AGAP 7 03/25/2019    BUN 17 04/05/2022    CREATININE 0 85 04/05/2022    GLUC 97 04/05/2022    CALCIUM 9 4 03/25/2019    AST 26 04/05/2022    ALT 19 04/05/2022    ALKPHOS 79 03/25/2019    PROT 7 0 06/15/2017    TP 7 0 04/05/2022    BILITOT 0 7 06/15/2017    TBILI 0 8 04/05/2022    EGFR 83 04/05/2022     Lab Results   Component Value Date    TROPONINI <0 02 03/25/2019       Imaging Studies:     Echocardiogram: 3/8/22      Left Ventricle: Left ventricular cavity size is upper normal  Wall thickness is mildly increased  The left ventricular ejection fraction is 50%  Systolic function is low normal  Wall motion is normal     Aortic Valve: The aortic valve is trileaflet  The leaflets are severely calcified  There is severely reduced mobility  There is mild regurgitation  There is severe stenosis  Mean gradient of 41 mmHg, MIRA of 0 75 cm2    Mitral Valve: There is mild annular calcification  There is mild regurgitation    Tricuspid Valve: There is mild regurgitation    Aorta: The aortic root is normal in size  The ascending aorta is mildly dilated  Gated CTA of the chest/abdomen/pelvis: 4/7/22  VASCULAR STRUCTURES:       Annulus: diameter 31 4 x 26 6 mm      area: 663 4 sq mm    Annulus to LCA: 8 5 mm    Annulus to RCA: 19 8 mm    Minimal diameter right iliofemoral segment: 7 7 mm    Minimal diameter left iliofemoral segment: 6 7 mm     The ascending aorta is nonaneurysmal measuring 39 mm with minimal atherosclerosis  There is a type III aortic arch with classic branching anatomy and no stenosis in the visualized great vessels  The aortic arch is nonaneurysmal with mild   atherosclerosis  The descending thoracic aorta is nonaneurysmal with mild atherosclerosis      The abdominal aorta is nonaneurysmal with mild atherosclerosis  Bilateral iliofemoral arteries are nonaneurysmal with mild atherosclerosis  Celiac, superior mesenteric, and inferior mesenteric arteries are patent  Bilateral renal arteries are patent      Cardiac findings: There is moderate calcification of the aortic valve  The left ventricle is normal   The ventricular septum is normal   No prior valvular surgery  No prior bypass surgery  No pericardial effusion  No cardiac mass or thrombus    Coronary artery and mitral annulus calcifications  Pacemaker leads terminate in the right atrium and right ventricle  Cardiac catheterization: 4/15/22  Left Main   The vessel exhibits minimal luminal irregularities  Left Anterior Descending   Prox LAD lesion is 25% stenosed  FLAKITO flow is 3  Mid LAD lesion is 50% stenosed  FLAKITO flow is 3  Left Circumflex   First Obtuse Marginal Branch   1st Mrg lesion is 80% stenosed  Small OM1 branch (medical therapy), supplies small territory   Right Coronary Artery   Mid RCA lesion is 90% stenosed  Mid RCA lesion   Angioplasty   Angioplasty using a compliant balloon was performed  The balloon used was a BALLOON EUPHORA RX 2 X 15MM  Maximum pressure: 6 rivas  Inflation time: 5 sec  Time obtained: 09:22 EDT  First reinflation; Max pressure: 12 rivas  Inflation time 10 sec  Time obtained: 09:23 EDT  Supplies used: CATH GUIDE LAUNCHER 5FR JR 4 0; BALLOON EUPHORA RX 2 X 15MM; GUIDEWIRE ASAHI MINAMO 190CM J   Stent   Drug-eluting stent was successfully placed  The stent used was a STENT XIENCE SKYPOINT 2 5 X 18MM  Stent was deployed by way of balloon expansion  Maximum pressure: 14 rivas  Inflation time: 10 sec  Supplies used: STENT XIENCE SKYPOINT 2 5 X 18MM; CATH GUIDE LAUNCHER 5FR JR 4 0; GUIDEWIRE ASAHI MINAMO 190CM J   Post-Intervention Lesion Assessment   The intervention was successful  Post-intervention FLAKITO flow is 3  There is a 0% residual stenosis post intervention  Carotid artery ultrasound: 4/7/22     RIGHT:  There is 70-99% stenosis noted in the internal carotid artery  Plaque is  heterogenous and irregular  Vertebral artery flow is antegrade  There is no significant subclavian artery  disease  LEFT:  There is <50% stenosis noted in the internal carotid artery  Plaque is  homogenous and smooth  Vertebral artery flow is antegrade  There is no significant subclavian artery  Disease        I have personally reviewed pertinent films in PACS     PCP and Cardiology notes reviewed    TAVR evaluation Assessment:     5 Meter Walk Test:      Attempt 1: 4 sec   Attempt 2: 4 sec   Attempt 3: 6 sec    STS Risk Score: 2 5%    [address]: III    KCCQ-12 completed    Assessment:  Patient Active Problem List    Diagnosis Date Noted    Balance disorder 06/11/2019    Sick sinus syndrome ([facility] [address] ) 04/05/2019    Presence of permanent cardiac pacemaker 04/05/2019    [facility] 04/05/2019    Chronic nonintractable headache 05/30/2017    Severe aortic stenosis 10/19/2015    Acquired hypothyroidism 07/29/2015    GERD without esophagitis 02/26/2015    Enlarged prostate without lower urinary tract symptoms (luts) 10/14/2014    Essential hypertension 10/14/2014     Severe aortic stenosis;  TAVR workup completed    Plan:    Natanael Ch has symptomatic severe aortic stenosis  They have undergone testing for transcatheter aortic valve replacement  The results of these studies have been interpreted by the multidisciplinary TAVR team who have determined the patient to be Intermediate risk for open aortic valve replacement based on other pre-existing comobidities not reflected in the STS risk score  The risks, benefits, and alternatives to TAVR were discussed in detail with the patient today  They understand and wish to proceed with transfemoral transcatheter aortic valve replacement  Informed consent was obtained and a date for the intervention has been set  Patient's PCP contacted regarding recent tick bite  Their office will reach out to patient to evaluate and treat as deemed necessary  Natanael Ch was comfortable with our recommendations, and their questions were answered to their satisfaction  The following preoperative instructions were provided at the conclusion of their consultation:     1  You will receive a phone call from the hospital between 2:00 PM and 8:00 PM the day prior to surgery to confirm arrival time and location   For surgery on Mondays, you will receive a call on Friday  2  Do not drink or eat anything after midnight the night before surgery  That includes no water, candy, gum, lozenges, Lifesavers, etc  We recommend you not eat any salty or fatty snack food, consume alcohol or smoke the night before surgery  3  Continue taking aspirin but only 81 mg daily  4  If you take Coumadin and/or Plavix, discontinue it 5 days before surgery  5  If you are diabetic, do not take any of your diabetic pills the morning of surgery  If you take Lantus insulin, you may take it at your regularly scheduled time the day before surgery  Do not take any other insulins the morning of surgery  6  The 2 nights before surgery, take a shower each night using the special antiseptic soap or soap sponges you received from the office or [facility] your hair with regular shampoo and rinse completely before using the antiseptic sponges  Use the sponge to wash from your neck down, with special attention to the armpits and groin area  Do not use any other soap or cleanser on your skin  Do not use lotions, powder, deodorant or perfume of any kind on your skin after you shower  Use clean bed linens and wear clean pajamas after your shower  7  You will be prescribed Mupirocin nasal ointment  Apply to both nostrils twice a day for 5 days prior to surgery  8  Do not take a shower the morning of her surgery; you'll be given a special" bath" at the hospital   9  Notify the CT surgery office if you develop a cold, sore throat, cough, fever or other health issues before your surgery  10  Other medication changes included the following:   STOP OMEGA 3, MULTIVITAMIN, FLAXSEED AND GLUCOSAMINE-CHONDROITIN NOW  CONTINUE PLAVIX AND ASPIRIN FOR HEART ARTERY STENT           SIGNATURE: MELINA Whaley  DATE: April 18, 2022  TIME: 10:43 AM

## 2022-04-18 NOTE — PROGRESS NOTES
Pre-Op History & Physical - Cardiothoracic Surgery   Lizz Tariq 80 y o  male MRN: [id number]    Physician Requesting Consult: Christian Fajardo MD    Reason for Consult / Principal Problem: Aortic stenosis, Non-Rheumatic    History of Present Illness: Lizz Tariq is a 80y o  year old male who was previously evaluated in our office by MARILIA Tellez  for transcatheter aortic valve replacement  During this initial consultation, arrangements were made for the following studies to be completed: Gated CTA of the chest/abdomen/pelvis, cardiac catheterization, dental clearance and carotid artery ultrasound  Lizz Tariq now presents to review the results of these tests and obtain a second surgeon to confirm the suitability of proceeding with transcatheter aortic valve replacment  In review, Colletta Burows is an 80 yr old male with PMHx notable for Afib (No AC), SSS s/p PPM (3/2016), Asc Ao aneurysm (41 mm), h/o Hep A, HTN, Hypothyroidism, GERD, BPH, h/o melanoma and arthritis  Patient has been followed by Cardiology for a number of years for AFib  Echo in 2019 demonstrated AS and since then he has been under surveillance with serial echocardiograms  Most recent Echo 3/8/22 demonstrates progression of his AS to severe range with an MIRA 0 75 cm2, MG 41 and PG 58 mm Hg  Patient with symptoms of fatigue, TILLEY and change in activity tolerance  He denies chest pian, lightheadedness, palpitations, syncope, weight gain or edema  Recent cath 4/15/22 with 90% mRCA lesion treated with PCI/IAIN  Now on ASA and Plavix  Carotid duplex with asymptomatic BAILEY stenosis 70-99%  Upon interview today patient denies any change in his cardiac symptoms  He reports a tick bite this past weekend on his right upper arm  The tick was engorged with blood and fell off yesterday  He denies any fevers, chills or malaise  He reports a raised red lump at the bite site      Covid vaccinations x 3    Up to date with dental care (full upper denture, partial lower denture)      Past Medical History:  Past Medical History:   Diagnosis Date    Arthritis     Ascending aortic aneurysm (HCC)     trileaflet AV, 41mm    BPH (benign prostatic hyperplasia)     Former tobacco use     pipe & cigar use socially & infrequently    GERD (gastroesophageal reflux disease)     Headache     h/o    Hepatitis A     History of melanoma     Hypertension     Hypothyroidism     Severe aortic stenosis     Vitamin B12 deficiency          Past Surgical History:   Past Surgical History:   Procedure Laterality Date    CARDIAC CATHETERIZATION  4/15/2022    Procedure: Cardiac catheterization;  Surgeon: Ke Ford DO;  Location: [facility]; Service: Cardiology    CARDIAC CATHETERIZATION N/A 4/15/2022    Procedure: Cardiac Coronary Angiogram;  Surgeon: Ke Ford DO;  [facility]; Service: Cardiology    CARDIAC CATHETERIZATION N/A 4/15/2022    Procedure: Cardiac pci;  Surgeon: Ke Ford DO;  [facility]: [facility]; Service: Cardiology    CARDIAC PACEMAKER PLACEMENT      INGUINAL HERNIA REPAIR Bilateral     SKIN CANCER EXCISION      melanoma, multiple         Family History:  Family History   Problem Relation Age of Onset    Cancer Family         gastrointestinal tract    Heart disease Mother     Substance Abuse Neg Hx     Mental illness Neg Hx          Social History:    Social History     Substance and Sexual Activity   Alcohol Use Yes    Comment: a beer once in a while  Social History     Substance and Sexual Activity   Drug Use No     Social History     Tobacco Use   Smoking Status Never Smoker   Smokeless Tobacco Never Used       Home Medications:   Prior to Admission medications    Medication Sig Start Date End Date Taking?  Authorizing Provider   amLODIPine (NORVASC) 5 mg tablet TAKE ONE TABLET BY MOUTH EVERY DAY 2/15/22  Yes Cara Ozuna MD   Ascorbic Acid (VITAMIN C) 500 MG CAPS Take 1 tablet by mouth daily   Yes Historical Provider, MD   aspirin (ECOTRIN LOW STRENGTH) 81 mg EC tablet Take 81 mg by mouth daily   Yes Historical Provider, MD   clopidogrel (Plavix) 75 mg tablet Take 1 tablet (75 mg total) by mouth daily 4/16/22  Yes Pily Sims DO   Cyanocobalamin (VITAMIN B12) 1000 MCG TBCR Take 1 tablet by mouth daily   Yes Historical Provider, MD   Flaxseed, Linseed, (FLAXSEED OIL) 1000 MG CAPS Take 1 capsule by mouth daily   Yes Historical Provider, MD   Glucosamine-Chondroit-Vit C-Mn (GLUCOSAMINE 1500 COMPLEX) CAPS Take 1 capsule by mouth daily   Yes Historical Provider, MD   levothyroxine 100 mcg tablet TAKE ONE TABLET BY MOUTH EVERY DAY 2/28/22  Yes Jad Colbert MD   metoprolol tartrate (LOPRESSOR) 100 mg tablet TAKE ONE TABLET BY MOUTH TWICE A DAY 4/13/22  Yes Jad Colbert MD   Multiple Vitamin (MULTIVITAMIN) capsule Take 1 capsule by mouth daily   Yes Historical Provider, MD   Omega-3 Fatty Acids (FISH OIL) 1200 MG CAPS Take 1 capsule by mouth daily   Yes Historical Provider, MD   tamsulosin (FLOMAX) 0 4 mg TAKE ONE CAPSULE BY MOUTH EVERY DAY 3/12/22  Yes Jad Colbert MD       Allergies:   Allergies   Allergen Reactions    Amoxicillin Other (See Comments)     Severe weakness       Review of Systems:  Review of Systems - History obtained from chart review and the patient  General ROS: positive for  - fatigue and activity change  negative for - chills, fever, malaise, night sweats, sleep disturbance or weight gain  Psychological ROS: negative  Ophthalmic ROS: negative  ENT ROS: negative  Allergy and Immunology ROS: negative  Hematological and Lymphatic ROS: negative for - bleeding problems, blood clots, bruising or jaundice  Endocrine ROS: negative  Breast ROS: negative  Respiratory ROS: positive for - shortness of breath  negative for - cough, hemoptysis, orthopnea or pleuritic pain  Cardiovascular ROS: positive for - dyspnea on exertion and murmur  negative for - chest pain, edema, irregular heartbeat, loss of consciousness, orthopnea, palpitations or paroxysmal nocturnal dyspnea  Gastrointestinal ROS: no abdominal pain, change in bowel habits, or black or bloody stools  Genito-Urinary ROS: no dysuria, trouble voiding, or hematuria  Musculoskeletal ROS: negative  Neurological ROS: no TIA or stroke symptoms  Dermatological ROS: positive for raised red lump right upper arm (tick bite site)    Vital Signs:     Vitals:    04/18/22 1016   BP: 148/83   BP Location: Left arm   Patient Position: Sitting   Cuff Size: Standard   Pulse: 76   Resp: 18   SpO2: 97%   Weight: 76 5 kg (168 lb 11 2 oz)   Height: 5' 10" (1 778 m)       Physical Exam:    General: Alert, oriented, well developed, no acute distress  HEENT/NECK:  PERRLA  No jugular venous distention  Cardiac:Regular rate and rhythm, III/VI harsh systolic murmur RUSB  Carotids: 1+ pulses, bruits vs radiation of cardiac murmur   Pulmonary:  Breath sounds clear bilaterally  Abdomen:  Non-tender, Non-distended  Positive bowel sounds  Upper extremities: 2+ radial pulses; brisk capillary refill  Lower extremities: Extremities warm/dry  PT/DP pulses 1+ bilaterally  No edema B/L  Neuro: Alert and oriented X 3  Sensation is grossly intact  No focal deficits  Musculoskeletal: MAEE, stable gait  Skin: Warm/Dry, without rashes or lesions      Lab Results:   Lab Results   Component Value Date    WBC 5 0 04/05/2022    HGB 12 7 (L) 04/05/2022    HCT 36 9 (L) 04/05/2022    MCV 91 04/05/2022     04/05/2022     Lab Results   Component Value Date     06/15/2017    SODIUM 135 04/05/2022    K 4 5 04/05/2022    CL 97 04/05/2022    CO2 23 04/05/2022    AGAP 7 03/25/2019    BUN 17 04/05/2022    CREATININE 0 85 04/05/2022    GLUC 97 04/05/2022    CALCIUM 9 4 03/25/2019    AST 26 04/05/2022    ALT 19 04/05/2022    ALKPHOS 79 03/25/2019    PROT 7 0 06/15/2017    TP 7 0 04/05/2022    BILITOT 0 7 06/15/2017    TBILI 0 8 04/05/2022    EGFR 83 04/05/2022     Lab Results   Component Value Date    TROPONINI <0 02 03/25/2019       Imaging Studies:     Echocardiogram: 3/8/22      Left Ventricle: Left ventricular cavity size is upper normal  Wall thickness is mildly increased  The left ventricular ejection fraction is 50%  Systolic function is low normal  Wall motion is normal     Aortic Valve: The aortic valve is trileaflet  The leaflets are severely calcified  There is severely reduced mobility  There is mild regurgitation  There is severe stenosis  Mean gradient of 41 mmHg, MIRA of 0 75 cm2    Mitral Valve: There is mild annular calcification  There is mild regurgitation    Tricuspid Valve: There is mild regurgitation    Aorta: The aortic root is normal in size  The ascending aorta is mildly dilated  Gated CTA of the chest/abdomen/pelvis: 4/7/22  VASCULAR STRUCTURES:       Annulus: diameter 31 4 x 26 6 mm      area: 663 4 sq mm    Annulus to LCA: 8 5 mm    Annulus to RCA: 19 8 mm    Minimal diameter right iliofemoral segment: 7 7 mm    Minimal diameter left iliofemoral segment: 6 7 mm     The ascending aorta is nonaneurysmal measuring 39 mm with minimal atherosclerosis  There is a type III aortic arch with classic branching anatomy and no stenosis in the visualized great vessels  The aortic arch is nonaneurysmal with mild   atherosclerosis  The descending thoracic aorta is nonaneurysmal with mild atherosclerosis      The abdominal aorta is nonaneurysmal with mild atherosclerosis  Bilateral iliofemoral arteries are nonaneurysmal with mild atherosclerosis  Celiac, superior mesenteric, and inferior mesenteric arteries are patent  Bilateral renal arteries are patent      Cardiac findings: There is moderate calcification of the aortic valve  The left ventricle is normal   The ventricular septum is normal   No prior valvular surgery  No prior bypass surgery  No pericardial effusion  No cardiac mass or thrombus    Coronary artery and mitral annulus calcifications  Pacemaker leads terminate in the right atrium and right ventricle  Cardiac catheterization: 4/15/22  Left Main   The vessel exhibits minimal luminal irregularities  Left Anterior Descending   Prox LAD lesion is 25% stenosed  FLAKITO flow is 3  Mid LAD lesion is 50% stenosed  FLAKITO flow is 3  Left Circumflex   First Obtuse Marginal Branch   1st Mrg lesion is 80% stenosed  Small OM1 branch (medical therapy), supplies small territory   Right Coronary Artery   Mid RCA lesion is 90% stenosed  Mid RCA lesion   Angioplasty   Angioplasty using a compliant balloon was performed  The balloon used was a BALLOON EUPHORA RX 2 X 15MM  Maximum pressure: 6 rivas  Inflation time: 5 sec  Time obtained: 09:22 EDT  First reinflation; Max pressure: 12 rivas  Inflation time 10 sec  Time obtained: 09:23 EDT  Supplies used: CATH GUIDE LAUNCHER 5FR JR 4 0; BALLOON EUPHORA RX 2 X 15MM; GUIDEWIRE ASAHI MINAMO 190CM J   Stent   Drug-eluting stent was successfully placed  The stent used was a STENT XIENCE SKYPOINT 2 5 X 18MM  Stent was deployed by way of balloon expansion  Maximum pressure: 14 rivas  Inflation time: 10 sec  Supplies used: STENT XIENCE SKYPOINT 2 5 X 18MM; CATH GUIDE LAUNCHER 5FR JR 4 0; GUIDEWIRE ASAHI MINAMO 190CM J   Post-Intervention Lesion Assessment   The intervention was successful  Post-intervention FLAKITO flow is 3  There is a 0% residual stenosis post intervention  Carotid artery ultrasound: 4/7/22     RIGHT:  There is 70-99% stenosis noted in the internal carotid artery  Plaque is  heterogenous and irregular  Vertebral artery flow is antegrade  There is no significant subclavian artery  disease  LEFT:  There is <50% stenosis noted in the internal carotid artery  Plaque is  homogenous and smooth  Vertebral artery flow is antegrade  There is no significant subclavian artery  Disease        I have personally reviewed pertinent films in PACS     PCP and Cardiology notes reviewed    TAVR evaluation Assessment:     5 Meter Walk Test:      Attempt 1: 4 sec   Attempt 2: 4 sec   Attempt 3: 6 sec    STS Risk Score: 2 5%    [address]: III    KCCQ-12 completed    Assessment:  Patient Active Problem List    Diagnosis Date Noted    Balance disorder 06/11/2019    Sick sinus syndrome ([facility] ) 04/05/2019    Presence of permanent cardiac pacemaker 04/05/2019    [facility] 04/05/2019    Chronic nonintractable headache 05/30/2017    Severe aortic stenosis 10/19/2015    Acquired hypothyroidism 07/29/2015    GERD without esophagitis 02/26/2015    Enlarged prostate without lower urinary tract symptoms (luts) 10/14/2014    Essential hypertension 10/14/2014     Severe aortic stenosis;  TAVR workup completed    Plan:    Kalyn Osorio has symptomatic severe aortic stenosis  They have undergone testing for transcatheter aortic valve replacement  The results of these studies have been interpreted by the multidisciplinary TAVR team who have determined the patient to be Intermediate risk for open aortic valve replacement based on other pre-existing comobidities not reflected in the STS risk score  The risks, benefits, and alternatives to TAVR were discussed in detail with the patient today  They understand and wish to proceed with transfemoral transcatheter aortic valve replacement  Informed consent was obtained and a date for the intervention has been set  Patient's PCP contacted regarding recent tick bite  Their office will reach out to patient to evaluate and treat as deemed necessary  Kalyn Osorio was comfortable with our recommendations, and their questions were answered to their satisfaction  The following preoperative instructions were provided at the conclusion of their consultation:     1  You will receive a phone call from the hospital between 2:00 PM and 8:00 PM the day prior to surgery to confirm arrival time and location   For surgery on Mondays, you will receive a call on Friday  2  Do not drink or eat anything after midnight the night before surgery  That includes no water, candy, gum, lozenges, Lifesavers, etc  We recommend you not eat any salty or fatty snack food, consume alcohol or smoke the night before surgery  3  Continue taking aspirin but only 81 mg daily  4  If you take Coumadin and/or Plavix, discontinue it 5 days before surgery  5  If you are diabetic, do not take any of your diabetic pills the morning of surgery  If you take Lantus insulin, you may take it at your regularly scheduled time the day before surgery  Do not take any other insulins the morning of surgery  6  The 2 nights before surgery, take a shower each night using the special antiseptic soap or soap sponges you received from the office or [facility] your hair with regular shampoo and rinse completely before using the antiseptic sponges  Use the sponge to wash from your neck down, with special attention to the armpits and groin area  Do not use any other soap or cleanser on your skin  Do not use lotions, powder, deodorant or perfume of any kind on your skin after you shower  Use clean bed linens and wear clean pajamas after your shower  7  You will be prescribed Mupirocin nasal ointment  Apply to both nostrils twice a day for 5 days prior to surgery  8  Do not take a shower the morning of her surgery; you'll be given a special" bath" at the hospital   9  Notify the CT surgery office if you develop a cold, sore throat, cough, fever or other health issues before your surgery  10  Other medication changes included the following:   STOP OMEGA 3, MULTIVITAMIN, FLAXSEED AND GLUCOSAMINE-CHONDROITIN NOW  CONTINUE PLAVIX AND ASPIRIN FOR HEART ARTERY STENT           SIGNATURE: MELINA Hardwick  DATE: April 18, 2022  TIME: 10:43 AM

## 2022-04-18 NOTE — LETTER
April 18, 2022     MD Sridevi Moreno  [address] [address]    Patient: Ag Mccormack   YOB: 1932   Date of Visit: 4/18/2022       Dear Dr Hamida Shen:    Thank you for referring Carli Francis to me for evaluation  Below are my notes for this consultation  If you have questions, please do not hesitate to call me  I look forward to following your patient along with you  Sincerely,        Sabrina Proctor DO        CC: No Recipients  MELINA Coughlin  4/18/2022 11:20 AM  Attested  Pre-Op History & Physical - Cardiothoracic Surgery   Ag Mccormack 80 y o  male MRN: [id number]    Physician Requesting Consult: Kali Mahoney MD    Reason for Consult / Principal Problem: Aortic stenosis, Non-Rheumatic    History of Present Illness: Ag Mccormack is a 80y o  year old male who was previously evaluated in our office by MARILIA Castillo  for transcatheter aortic valve replacement  During this initial consultation, arrangements were made for the following studies to be completed: Gated CTA of the chest/abdomen/pelvis, cardiac catheterization, dental clearance and carotid artery ultrasound  Ag Mccormack now presents to review the results of these tests and obtain a second surgeon to confirm the suitability of proceeding with transcatheter aortic valve replacment  In review, Carli Francis is an 80 yr old male with PMHx notable for Afib (No AC), SSS s/p PPM (3/2016), Asc Ao aneurysm (41 mm), h/o Hep A, HTN, Hypothyroidism, GERD, BPH, h/o melanoma and arthritis  Patient has been followed by Cardiology for a number of years for AFib  Echo in 2019 demonstrated AS and since then he has been under surveillance with serial echocardiograms  Most recent Echo 3/8/22 demonstrates progression of his AS to severe range with an MIRA 0 75 cm2, MG 41 and PG 58 mm Hg  Patient with symptoms of fatigue, TILLEY and change in activity tolerance   He denies chest pian, lightheadedness, palpitations, syncope, weight gain or edema  Recent cath 4/15/22 with 90% mRCA lesion treated with PCI/IAIN  Now on ASA and Plavix  Carotid duplex with asymptomatic BAILEY stenosis 70-99%  Upon interview today patient denies any change in his cardiac symptoms  He reports a tick bite this past weekend on his right upper arm  The tick was engorged with blood and fell off yesterday  He denies any fevers, chills or malaise  He reports a raised red lump at the bite site  Covid vaccinations x 3    Up to date with dental care (full upper denture, partial lower denture)      Past Medical History:  Past Medical History:   Diagnosis Date    Arthritis     Ascending aortic aneurysm (HCC)     trileaflet AV, 41mm    BPH (benign prostatic hyperplasia)     Former tobacco use     pipe & cigar use socially & infrequently    GERD (gastroesophageal reflux disease)     Headache     h/o    Hepatitis A     History of melanoma     Hypertension     Hypothyroidism     Severe aortic stenosis     Vitamin B12 deficiency          Past Surgical History:   Past Surgical History:   Procedure Laterality Date    CARDIAC CATHETERIZATION  4/15/2022    Procedure: Cardiac catheterization;  Surgeon: Chau Cardona DO;  Location: [facility]; Service: Cardiology    CARDIAC CATHETERIZATION N/A 4/15/2022    Procedure: Cardiac Coronary Angiogram;  Surgeon: Chau Cardona DO;  [facility]; Service: Cardiology    CARDIAC CATHETERIZATION N/A 4/15/2022    Procedure: Cardiac pci;  Surgeon: Chau Cardona DO;  Location: [facility];   Service: Cardiology    CARDIAC PACEMAKER PLACEMENT      INGUINAL HERNIA REPAIR Bilateral     SKIN CANCER EXCISION      melanoma, multiple         Family History:  Family History   Problem Relation Age of Onset    Cancer Family         gastrointestinal tract    Heart disease Mother     Substance Abuse Neg Hx     Mental illness Neg Hx Social History:    Social History     Substance and Sexual Activity   Alcohol Use Yes    Comment: a beer once in a while  Social History     Substance and Sexual Activity   Drug Use No     Social History     Tobacco Use   Smoking Status Never Smoker   Smokeless Tobacco Never Used       Home Medications:   Prior to Admission medications    Medication Sig Start Date End Date Taking? Authorizing Provider   amLODIPine (NORVASC) 5 mg tablet TAKE ONE TABLET BY MOUTH EVERY DAY 2/15/22  Yes Donnie Christian MD   Ascorbic Acid (VITAMIN C) 500 MG CAPS Take 1 tablet by mouth daily   Yes Historical Provider, MD   aspirin (ECOTRIN LOW STRENGTH) 81 mg EC tablet Take 81 mg by mouth daily   Yes Historical Provider, MD   clopidogrel (Plavix) 75 mg tablet Take 1 tablet (75 mg total) by mouth daily 4/16/22  Yes Maximilian Ebbs,    Cyanocobalamin (VITAMIN B12) 1000 MCG TBCR Take 1 tablet by mouth daily   Yes Historical Provider, MD   Flaxseed, Linseed, (FLAXSEED OIL) 1000 MG CAPS Take 1 capsule by mouth daily   Yes Historical Provider, MD   Glucosamine-Chondroit-Vit C-Mn (GLUCOSAMINE 1500 COMPLEX) CAPS Take 1 capsule by mouth daily   Yes Historical Provider, MD   levothyroxine 100 mcg tablet TAKE ONE TABLET BY MOUTH EVERY DAY 2/28/22  Yes Donnie Christian MD   metoprolol tartrate (LOPRESSOR) 100 mg tablet TAKE ONE TABLET BY MOUTH TWICE A DAY 4/13/22  Yes Donnie Christian MD   Multiple Vitamin (MULTIVITAMIN) capsule Take 1 capsule by mouth daily   Yes Historical Provider, MD   Omega-3 Fatty Acids (FISH OIL) 1200 MG CAPS Take 1 capsule by mouth daily   Yes Historical Provider, MD   tamsulosin (FLOMAX) 0 4 mg TAKE ONE CAPSULE BY MOUTH EVERY DAY 3/12/22  Yes Donnie Christian MD       Allergies:   Allergies   Allergen Reactions    Amoxicillin Other (See Comments)     Severe weakness       Review of Systems:  Review of Systems - History obtained from chart review and the patient  General ROS: positive for  - fatigue and activity change  negative for - chills, fever, malaise, night sweats, sleep disturbance or weight gain  Psychological ROS: negative  Ophthalmic ROS: negative  ENT ROS: negative  Allergy and Immunology ROS: negative  Hematological and Lymphatic ROS: negative for - bleeding problems, blood clots, bruising or jaundice  Endocrine ROS: negative  Breast ROS: negative  Respiratory ROS: positive for - shortness of breath  negative for - cough, hemoptysis, orthopnea or pleuritic pain  Cardiovascular ROS: positive for - dyspnea on exertion and murmur  negative for - chest pain, edema, irregular heartbeat, loss of consciousness, orthopnea, palpitations or paroxysmal nocturnal dyspnea  Gastrointestinal ROS: no abdominal pain, change in bowel habits, or black or bloody stools  Genito-Urinary ROS: no dysuria, trouble voiding, or hematuria  Musculoskeletal ROS: negative  Neurological ROS: no TIA or stroke symptoms  Dermatological ROS: positive for raised red lump right upper arm (tick bite site)    Vital Signs:     Vitals:    04/18/22 1016   BP: 148/83   BP Location: Left arm   Patient Position: Sitting   Cuff Size: Standard   Pulse: 76   Resp: 18   SpO2: 97%   Weight: 76 5 kg (168 lb 11 2 oz)   Height: 5' 10" (1 778 m)       Physical Exam:    General: Alert, oriented, well developed, no acute distress  HEENT/NECK:  PERRLA  No jugular venous distention  Cardiac:Regular rate and rhythm, III/VI harsh systolic murmur RUSB  Carotids: 1+ pulses, bruits vs radiation of cardiac murmur   Pulmonary:  Breath sounds clear bilaterally  Abdomen:  Non-tender, Non-distended  Positive bowel sounds  Upper extremities: 2+ radial pulses; brisk capillary refill  Lower extremities: Extremities warm/dry  PT/DP pulses 1+ bilaterally  No edema B/L  Neuro: Alert and oriented X 3  Sensation is grossly intact  No focal deficits  Musculoskeletal: MAEE, stable gait  Skin: Warm/Dry, without rashes or lesions      Lab Results:   Lab Results   Component Value Date    WBC 5 0 04/05/2022    HGB 12 7 (L) 04/05/2022    HCT 36 9 (L) 04/05/2022    MCV 91 04/05/2022     04/05/2022     Lab Results   Component Value Date     06/15/2017    SODIUM 135 04/05/2022    K 4 5 04/05/2022    CL 97 04/05/2022    CO2 23 04/05/2022    AGAP 7 03/25/2019    BUN 17 04/05/2022    CREATININE 0 85 04/05/2022    GLUC 97 04/05/2022    CALCIUM 9 4 03/25/2019    AST 26 04/05/2022    ALT 19 04/05/2022    ALKPHOS 79 03/25/2019    PROT 7 0 06/15/2017    TP 7 0 04/05/2022    BILITOT 0 7 06/15/2017    TBILI 0 8 04/05/2022    EGFR 83 04/05/2022     Lab Results   Component Value Date    TROPONINI <0 02 03/25/2019       Imaging Studies:     Echocardiogram: 3/8/22      Left Ventricle: Left ventricular cavity size is upper normal  Wall thickness is mildly increased  The left ventricular ejection fraction is 50%  Systolic function is low normal  Wall motion is normal     Aortic Valve: The aortic valve is trileaflet  The leaflets are severely calcified  There is severely reduced mobility  There is mild regurgitation  There is severe stenosis  Mean gradient of 41 mmHg, MIRA of 0 75 cm2    Mitral Valve: There is mild annular calcification  There is mild regurgitation    Tricuspid Valve: There is mild regurgitation    Aorta: The aortic root is normal in size  The ascending aorta is mildly dilated  Gated CTA of the chest/abdomen/pelvis: 4/7/22  VASCULAR STRUCTURES:       Annulus: diameter 31 4 x 26 6 mm      area: 663 4 sq mm    Annulus to LCA: 8 5 mm    Annulus to RCA: 19 8 mm    Minimal diameter right iliofemoral segment: 7 7 mm    Minimal diameter left iliofemoral segment: 6 7 mm     The ascending aorta is nonaneurysmal measuring 39 mm with minimal atherosclerosis  There is a type III aortic arch with classic branching anatomy and no stenosis in the visualized great vessels  The aortic arch is nonaneurysmal with mild   atherosclerosis   The descending thoracic aorta is nonaneurysmal with mild atherosclerosis      The abdominal aorta is nonaneurysmal with mild atherosclerosis  Bilateral iliofemoral arteries are nonaneurysmal with mild atherosclerosis  Celiac, superior mesenteric, and inferior mesenteric arteries are patent  Bilateral renal arteries are patent      Cardiac findings: There is moderate calcification of the aortic valve  The left ventricle is normal   The ventricular septum is normal   No prior valvular surgery  No prior bypass surgery  No pericardial effusion  No cardiac mass or thrombus  Coronary artery and   mitral annulus calcifications  Pacemaker leads terminate in the right atrium and right ventricle  Cardiac catheterization: 4/15/22  Left Main   The vessel exhibits minimal luminal irregularities  Left Anterior Descending   Prox LAD lesion is 25% stenosed  FLAKITO flow is 3  Mid LAD lesion is 50% stenosed  FLAKITO flow is 3  Left Circumflex   First Obtuse Marginal Branch   1st Mrg lesion is 80% stenosed  Small OM1 branch (medical therapy), supplies small territory   Right Coronary Artery   Mid RCA lesion is 90% stenosed  Mid RCA lesion   Angioplasty   Angioplasty using a compliant balloon was performed  The balloon used was a BALLOON EUPHORA RX 2 X 15MM  Maximum pressure: 6 rivas  Inflation time: 5 sec  Time obtained: 09:22 EDT  First reinflation; Max pressure: 12 rivas  Inflation time 10 sec  Time obtained: 09:23 EDT  Supplies used: CATH GUIDE LAUNCHER 5FR JR 4 0; BALLOON EUPHORA RX 2 X 15MM; GUIDEWIRE ASAHI MINAMO 190CM J   Stent   Drug-eluting stent was successfully placed  The stent used was a STENT XIENCE SKYPOINT 2 5 X 18MM  Stent was deployed by way of balloon expansion  Maximum pressure: 14 rivas  Inflation time: 10 sec  Supplies used: STENT XIENCE SKYPOINT 2 5 X 18MM; CATH GUIDE LAUNCHER 5FR JR 4 0; GUIDEWIRE ASAHI MINAMO 190CM J   Post-Intervention Lesion Assessment   The intervention was successful  Post-intervention FLAKITO flow is 3     There is a 0% residual stenosis post intervention  Carotid artery ultrasound: 4/7/22     RIGHT:  There is 70-99% stenosis noted in the internal carotid artery  Plaque is  heterogenous and irregular  Vertebral artery flow is antegrade  There is no significant subclavian artery  disease  LEFT:  There is <50% stenosis noted in the internal carotid artery  Plaque is  homogenous and smooth  Vertebral artery flow is antegrade  There is no significant subclavian artery  Disease  I have personally reviewed pertinent films in PACS     PCP and Cardiology notes reviewed    TAVR evaluation Assessment:     5 Meter Walk Test:      Attempt 1: 4 sec   Attempt 2: 4 sec   Attempt 3: 6 sec    STS Risk Score: 2 5%    [address]: III    KCCQ-12 completed    Assessment:  Patient Active Problem List    Diagnosis Date Noted    Balance disorder 06/11/2019    Sick sinus syndrome ([facility] [address] ) 04/05/2019    Presence of permanent cardiac pacemaker 04/05/2019    Paroxysmal atrial fibrillation ([facility] [address] ) 04/05/2019    Chronic nonintractable headache 05/30/2017    Severe aortic stenosis 10/19/2015    Acquired hypothyroidism 07/29/2015    GERD without esophagitis 02/26/2015    Enlarged prostate without lower urinary tract symptoms (luts) 10/14/2014    Essential hypertension 10/14/2014     Severe aortic stenosis;  TAVR workup completed    Plan:    Priscilla Little has symptomatic severe aortic stenosis  They have undergone testing for transcatheter aortic valve replacement  The results of these studies have been interpreted by the multidisciplinary TAVR team who have determined the patient to be Intermediate risk for open aortic valve replacement based on other pre-existing comobidities not reflected in the STS risk score  The risks, benefits, and alternatives to TAVR were discussed in detail with the patient today  They understand and wish to proceed with transfemoral transcatheter aortic valve replacement    Informed consent was obtained and a date for the intervention has been set  Patient's PCP contacted regarding recent tick bite  Their office will reach out to patient to evaluate and treat as deemed necessary  Trista Lizama was comfortable with our recommendations, and their questions were answered to their satisfaction  The following preoperative instructions were provided at the conclusion of their consultation:     1  You will receive a phone call from the hospital between 2:00 PM and 8:00 PM the day prior to surgery to confirm arrival time and location  For surgery on Mondays, you will receive a call on Friday  2  Do not drink or eat anything after midnight the night before surgery  That includes no water, candy, gum, lozenges, Lifesavers, etc  We recommend you not eat any salty or fatty snack food, consume alcohol or smoke the night before surgery  3  Continue taking aspirin but only 81 mg daily  4  If you take Coumadin and/or Plavix, discontinue it 5 days before surgery  5  If you are diabetic, do not take any of your diabetic pills the morning of surgery  If you take Lantus insulin, you may take it at your regularly scheduled time the day before surgery  Do not take any other insulins the morning of surgery  6  The 2 nights before surgery, take a shower each night using the special antiseptic soap or soap sponges you received from the office or hospital  Arthur Jenningsan your hair with regular shampoo and rinse completely before using the antiseptic sponges  Use the sponge to wash from your neck down, with special attention to the armpits and groin area  Do not use any other soap or cleanser on your skin  Do not use lotions, powder, deodorant or perfume of any kind on your skin after you shower  Use clean bed linens and wear clean pajamas after your shower  7  You will be prescribed Mupirocin nasal ointment  Apply to both nostrils twice a day for 5 days prior to surgery    8  Do not take a shower the morning of her surgery; you'll be given a special" bath" at the hospital   9  Notify the CT surgery office if you develop a cold, sore throat, cough, fever or other health issues before your surgery  10  Other medication changes included the following:   STOP OMEGA 3, MULTIVITAMIN, FLAXSEED AND GLUCOSAMINE-CHONDROITIN NOW  CONTINUE PLAVIX AND ASPIRIN FOR HEART ARTERY STENT  MELINA Guevara  DATE: April 18, 2022  TIME: 10:43 AM  Attestation signed by Jamir Lynch DO at 4/18/2022 11:22 AM:  Mr Magnus Handley is here for follow-up of his TAVR testing and planning his procedure  All pre-op testing was reviewed in detail with the patient  Of note, the LCA orifice was measured at 8 9mm from the annulus on imaging, however, on certain views it does appear quite low  Additionally, there is heavy leaflet calcium  He had PCI of the RCA and reports some improvement in his symptoms since then but still reports TILLEY  Carotid stenosis will be followed by vascular surgery post TAVR  The risks, benefits and alternatives to TAVR were discussed in detail with the patient and he is willing to proceed  He did recently discover an engorged tick on his right bicep  The site is inflamed, but there doesn't appear to be a target lesion  He will be referred to his PCP for testing prior to his TAVR  Planned for a left TFTAVR later this month

## 2022-04-19 ENCOUNTER — OFFICE VISIT (OUTPATIENT)
Dept: FAMILY MEDICINE CLINIC | Facility: HOSPITAL | Age: 87
End: 2022-04-19
Payer: COMMERCIAL

## 2022-04-19 VITALS
OXYGEN SATURATION: 97 % | BODY MASS INDEX: 24.2 KG/M2 | DIASTOLIC BLOOD PRESSURE: 88 MMHG | HEART RATE: 81 BPM | WEIGHT: 169 LBS | HEIGHT: 70 IN | SYSTOLIC BLOOD PRESSURE: 142 MMHG

## 2022-04-19 DIAGNOSIS — M79.5 FOREIGN BODY (FB) IN SOFT TISSUE: ICD-10-CM

## 2022-04-19 DIAGNOSIS — S40.861A TICK BITE OF RIGHT UPPER ARM, INITIAL ENCOUNTER: Primary | ICD-10-CM
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
## 2022-04-19 PROCEDURE — 24200 RMVL FB UPPER ARM/ELBW SUBQ: CPT | Performed by: INTERNAL MEDICINE

## 2022-04-19 PROCEDURE — 1160F RVW MEDS BY RX/DR IN RCRD: CPT | Performed by: INTERNAL MEDICINE

## 2022-04-19 PROCEDURE — 99213 OFFICE O/P EST LOW 20 MIN: CPT | Performed by: INTERNAL MEDICINE

## 2022-04-19 PROCEDURE — 1036F TOBACCO NON-USER: CPT | Performed by: INTERNAL MEDICINE

## 2022-04-19 RX ORDER — DOXYCYCLINE HYCLATE 100 MG
100 TABLET ORAL 2 TIMES DAILY
Qty: 14 TABLET | Refills: 0 | Status: SHIPPED | OUTPATIENT
Start: 2022-04-19 | End: 2022-04-26

## 2022-04-19 NOTE — PROGRESS NOTES
Assessment/Plan:       Diagnoses and all orders for this visit:    Tick bite of right upper arm, initial encounter  -     doxycycline hyclate (VIBRA-TABS) 100 mg tablet; Take 1 tablet (100 mg total) by mouth 2 (two) times a day for 7 days    Foreign body (FB) in soft tissue  -     Foreign body removal          All of the above diagnoses have been assessed  Additional COMMENTS/PLAN:  Will treat for 1 week with doxycycline for the local cellulitis as well as prevention of Lyme disease  Subjective:      Patient ID: Sadi Jon is a 80 y o  male  HPI     Discovered tick in his right upper arm 4-5 days ago  Patient had tick fall off  There is however soft tissue swelling in the area of the right upper arm  No fever chills  The following portions of the patient's history were revised and updated as appropriate: Problem list, allergies, med list, FH, SH, Past medical and surgical histories      Current Outpatient Medications   Medication Sig Dispense Refill    amLODIPine (NORVASC) 5 mg tablet TAKE ONE TABLET BY MOUTH EVERY DAY 90 tablet 3    Ascorbic Acid (VITAMIN C) 500 MG CAPS Take 1 tablet by mouth daily      aspirin (ECOTRIN LOW STRENGTH) 81 mg EC tablet Take 81 mg by mouth daily      clopidogrel (Plavix) 75 mg tablet Take 1 tablet (75 mg total) by mouth daily 90 tablet 1    Cyanocobalamin (VITAMIN B12) 1000 MCG TBCR Take 1 tablet by mouth daily      Flaxseed, Linseed, (FLAXSEED OIL) 1000 MG CAPS Take 1 capsule by mouth daily      Glucosamine-Chondroit-Vit C-Mn (GLUCOSAMINE 1500 COMPLEX) CAPS Take 1 capsule by mouth daily      levothyroxine 100 mcg tablet TAKE ONE TABLET BY MOUTH EVERY DAY 90 tablet 3    metoprolol tartrate (LOPRESSOR) 100 mg tablet TAKE ONE TABLET BY MOUTH TWICE A  tablet 3    Multiple Vitamin (MULTIVITAMIN) capsule Take 1 capsule by mouth daily      Omega-3 Fatty Acids (FISH OIL) 1200 MG CAPS Take 1 capsule by mouth daily      tamsulosin (FLOMAX) 0 4 mg TAKE ONE CAPSULE BY MOUTH EVERY DAY 90 capsule 3    doxycycline hyclate (VIBRA-TABS) 100 mg tablet Take 1 tablet (100 mg total) by mouth 2 (two) times a day for 7 days 14 tablet 0     No current facility-administered medications for this visit  Review of Systems      Objective:    /88   Pulse 81   Ht 5' 10" (1 778 m)   Wt 76 7 kg (169 lb)   SpO2 97%   BMI 24 25 kg/m²     BP Readings from Last 3 Encounters:   04/19/22 142/88   04/18/22 148/83   04/15/22 149/87                  Wt Readings from Last 3 Encounters:   04/19/22 76 7 kg (169 lb)   04/18/22 76 5 kg (168 lb 11 2 oz)   04/15/22 74 8 kg (165 lb)         Physical Exam  Vitals reviewed  Musculoskeletal:      Comments: Examination right upper arm reveals what appears to be retain mouth piece of the tick in the bicipital region with some soft tissue swelling         Universal Protocol:  Consent: Verbal consent obtained  Foreign body removal    Date/Time: 4/19/2022 9:24 AM  Performed by: Santy Reyez MD  Authorized by: Santy Reyez MD   Body area: skin  General location: upper extremity  Location details: right upper arm    Anesthesia:  Local Anesthetic: topical anesthetic  Complexity: simple  Objects recovered: Mouth piece of tick        Admission on 04/15/2022, Discharged on 04/15/2022   Component Date Value Ref Range Status    Activated Clotting Time, i-STAT 04/15/2022 327* 89 - 137 sec Final    Specimen Type 04/15/2022 VENOUS   Final         Santy Ryeez MD    Some or all of this note was generated with a voice recognition dictation system and therefore my contain grammatical or spelling errors

## 2022-04-20 LAB
ATRIAL RATE: 72 BPM
ATRIAL RATE: 79 BPM
PR INTERVAL: 302 MS
PR INTERVAL: 302 MS
QRS AXIS: -84 DEGREES
QRS AXIS: -85 DEGREES
QRSD INTERVAL: 196 MS
QRSD INTERVAL: 198 MS
QT INTERVAL: 456 MS
QT INTERVAL: 484 MS
QTC INTERVAL: 522 MS
QTC INTERVAL: 529 MS
T WAVE AXIS: 75 DEGREES
T WAVE AXIS: 76 DEGREES
VENTRICULAR RATE: 72 BPM
VENTRICULAR RATE: 79 BPM

## 2022-04-20 PROCEDURE — 93010 ELECTROCARDIOGRAM REPORT: CPT | Performed by: INTERNAL MEDICINE

## 2022-05-04 ENCOUNTER — OFFICE VISIT (OUTPATIENT)
Dept: CARDIAC SURGERY | Facility: CLINIC | Age: 87
End: 2022-05-04
Payer: COMMERCIAL

## 2022-05-04 ENCOUNTER — PREP FOR PROCEDURE (OUTPATIENT)
Dept: CARDIAC SURGERY | Facility: CLINIC | Age: 87
End: 2022-05-04

## 2022-05-04 VITALS
OXYGEN SATURATION: 97 % | HEART RATE: 78 BPM | DIASTOLIC BLOOD PRESSURE: 74 MMHG | HEIGHT: 70 IN | RESPIRATION RATE: 18 BRPM | SYSTOLIC BLOOD PRESSURE: 137 MMHG | WEIGHT: 165 LBS | BODY MASS INDEX: 23.62 KG/M2

## 2022-05-04 DIAGNOSIS — I35.0 SEVERE AORTIC STENOSIS: Primary | ICD-10-CM

## 2022-05-04 DIAGNOSIS — Z95.5 S/P DRUG ELUTING CORONARY STENT PLACEMENT: ICD-10-CM

## 2022-05-04 DIAGNOSIS — I25.10 CORONARY ARTERY DISEASE INVOLVING NATIVE CORONARY ARTERY OF NATIVE HEART WITHOUT ANGINA PECTORIS: Primary | ICD-10-CM

## 2022-05-04 PROCEDURE — 1036F TOBACCO NON-USER: CPT | Performed by: PHYSICIAN ASSISTANT

## 2022-05-04 PROCEDURE — 1160F RVW MEDS BY RX/DR IN RCRD: CPT | Performed by: PHYSICIAN ASSISTANT

## 2022-05-04 PROCEDURE — 99215 OFFICE O/P EST HI 40 MIN: CPT | Performed by: PHYSICIAN ASSISTANT

## 2022-05-04 RX ORDER — ATORVASTATIN CALCIUM 40 MG/1
40 TABLET, FILM COATED ORAL DAILY
Qty: 90 TABLET | Refills: 1 | Status: SHIPPED | OUTPATIENT
Start: 2022-05-04 | End: 2022-05-13 | Stop reason: ALTCHOICE

## 2022-05-04 RX ORDER — CHLORHEXIDINE GLUCONATE 0.12 MG/ML
15 RINSE ORAL ONCE
Status: CANCELLED | OUTPATIENT
Start: 2022-05-04 | End: 2022-05-04

## 2022-05-04 RX ORDER — CEFAZOLIN SODIUM 2 G/50ML
2000 SOLUTION INTRAVENOUS ONCE
Status: CANCELLED | OUTPATIENT
Start: 2022-05-04 | End: 2022-05-04

## 2022-05-04 NOTE — H&P (VIEW-ONLY)
Consultation - Cardiothoracic Surgery   Darian Ricci 80 y o  male MRN: [id number]    Reason for Consult / Principal Problem: Aortic stenosis, Non-Rheumatic    History of Present Illness: Darian Ricci is a 80y o  year old male who was previously evaluated in our office by POLLY Murdock  for transcatheter aortic valve replacement  During this initial consultation, arrangements were made for the following studies to be completed: echocardiogram, gated CTA of the chest, abdomen, and pelvis, cardiac catheterization, dental clearance and carotid ultrasound  The patient has a PMH notable for Afib not on [facility], SSS s/p PPM ([employer]) in 2016, hx of hepatitis A, hypothyroidism, GERD, BPH and arthritis who was last seen in the office on 4/18/2022  He was recommended to have a TF TAVR at that time  He is revisiting the office to be seen by Dr Felice Devine for evaluation  The patient has complaints of TILLEY with some improvement following his PCI to the RCA on ASA and Plavix  4/15/2022  He has known aortic stenosis with a MIRA 0 75 cm2, MG 41 and PG 58mm  He has overall symptoms of fatigue, TILLEY and decrease in activity tolerance  He notices TILLEY and chest pain such as yard work and 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  Dental clearance completed      Past Medical History:  Past Medical History:   Diagnosis Date    Arthritis     Ascending aortic aneurysm (HCC)     trileaflet AV, 41mm    BPH (benign prostatic hyperplasia)     Former tobacco use     pipe & cigar use socially & infrequently    GERD (gastroesophageal reflux disease)     Headache     h/o    Hepatitis A     History of melanoma     Hypertension     Hypothyroidism     Severe aortic stenosis     Vitamin B12 deficiency      Past Surgical History:   Past Surgical History:   Procedure Laterality Date    CARDIAC CATHETERIZATION  4/15/2022    Procedure: Cardiac catheterization;  Surgeon: Mahsa Arteaga DO; Location: [facility]; Service: Cardiology    CARDIAC CATHETERIZATION N/A 4/15/2022    Procedure: Cardiac Coronary Angiogram;  Surgeon: Lizzie Epps DO;  [facility]; Service: Cardiology    CARDIAC CATHETERIZATION N/A 4/15/2022    Procedure: Cardiac pci;  Surgeon: Lizzie Epps DO;  Location:  CARDIAC CATH LAB; Service: Cardiology    CARDIAC PACEMAKER PLACEMENT      INGUINAL HERNIA REPAIR Bilateral     SKIN CANCER EXCISION      melanoma, multiple     Family History:  Family History   Problem Relation Age of Onset    Cancer Family         gastrointestinal tract    Heart disease Mother     Substance Abuse Neg Hx     Mental illness Neg Hx        Social History:    Social History     Substance and Sexual Activity   Alcohol Use Yes    Comment: a beer once in a while  Social History     Substance and Sexual Activity   Drug Use No     Social History     Tobacco Use   Smoking Status Never Smoker   Smokeless Tobacco Never Used       Home Medications:   Prior to Admission medications    Medication Sig Start Date End Date Taking?  Authorizing Provider   amLODIPine (NORVASC) 5 mg tablet TAKE ONE TABLET BY MOUTH EVERY DAY 2/15/22   Cristian Pickard MD   Ascorbic Acid (VITAMIN C) 500 MG CAPS Take 1 tablet by mouth daily    Historical Provider, MD   aspirin (ECOTRIN LOW STRENGTH) 81 mg EC tablet Take 81 mg by mouth daily    Historical Provider, MD   clopidogrel (Plavix) 75 mg tablet Take 1 tablet (75 mg total) by mouth daily 4/16/22   Magaly SmokerDO   Cyanocobalamin (VITAMIN B12) 1000 MCG TBCR Take 1 tablet by mouth daily    Historical Provider, MD   Flaxseed, Linseed, (FLAXSEED OIL) 1000 MG CAPS Take 1 capsule by mouth daily    Historical Provider, MD   Glucosamine-Chondroit-Vit C-Mn (GLUCOSAMINE 1500 COMPLEX) CAPS Take 1 capsule by mouth daily    Historical Provider, MD   levothyroxine 100 mcg tablet TAKE ONE TABLET BY MOUTH EVERY DAY 2/28/22   Cristian Pickard MD metoprolol tartrate (LOPRESSOR) 100 mg tablet TAKE ONE TABLET BY MOUTH TWICE A DAY 4/13/22   Maritza Serrano MD   Multiple Vitamin (MULTIVITAMIN) capsule Take 1 capsule by mouth daily    Historical Provider, MD   Omega-3 Fatty Acids (FISH OIL) 1200 MG CAPS Take 1 capsule by mouth daily    Historical Provider, MD   tamsulosin (FLOMAX) 0 4 mg TAKE ONE CAPSULE BY MOUTH EVERY DAY 3/12/22   Maritza Serrano MD       Allergies: Allergies   Allergen Reactions    Amoxicillin Other (See Comments)     Severe weakness       Review of Systems:     Review of Systems   Constitutional: Positive for activity change and fatigue  HENT: Negative  Eyes: Negative  Respiratory: Positive for chest tightness and shortness of breath  Cardiovascular: Positive for chest pain  Gastrointestinal: Negative  Endocrine: Negative  Genitourinary: Negative  Musculoskeletal: Negative  Skin: Negative  Allergic/Immunologic: Negative  Neurological: Negative  Hematological: Negative  Psychiatric/Behavioral: Negative  Vital Signs:   5/4/22 9:34 AM  5/4/22 9:38 AM       BP  127/67   137/74     BP Location  Left arm   Right arm     Patient Position  Sitting   Sitting     Cuff Size  Standard      Pulse  78      Resp  18      SpO2  97%      Weight   74 8 kg (165 lb)      Height  5' 10" (1 778 m)      Pain Score  0-No pain       Physical Exam:     Physical Exam  Constitutional:       General: He is not in acute distress  Appearance: He is normal weight  He is not toxic-appearing  HENT:      Head: Normocephalic and atraumatic  Right Ear: External ear normal       Left Ear: External ear normal       Nose: Nose normal       Mouth/Throat:      Mouth: Mucous membranes are moist       Pharynx: Oropharynx is clear  No oropharyngeal exudate or posterior oropharyngeal erythema  Eyes:      General: No scleral icterus  Right eye: No discharge  Left eye: No discharge        Extraocular Movements: Extraocular movements intact  Conjunctiva/sclera: Conjunctivae normal       Pupils: Pupils are equal, round, and reactive to light  Neck:      Vascular: No carotid bruit  Cardiovascular:      Rate and Rhythm: Normal rate and regular rhythm  Pulses: Normal pulses  Heart sounds: Murmur heard  Comments: Pacemaker to left upper chest palpated through skin  III/VI systolic murmur  Pulmonary:      Effort: Pulmonary effort is normal  No respiratory distress  Breath sounds: Normal breath sounds  No wheezing  Abdominal:      General: Abdomen is flat  Bowel sounds are normal       Palpations: Abdomen is soft  Musculoskeletal:         General: Normal range of motion  Cervical back: Normal range of motion and neck supple  Right lower leg: No edema  Left lower leg: No edema  Skin:     General: Skin is warm and dry  Coloration: Skin is not pale  Findings: No erythema or rash  Neurological:      General: No focal deficit present  Mental Status: He is alert and oriented to person, place, and time  Cranial Nerves: No cranial nerve deficit  Sensory: No sensory deficit  Motor: No weakness  Coordination: Coordination normal       Gait: Gait normal       Deep Tendon Reflexes: Reflexes normal    Psychiatric:         Mood and Affect: Mood normal          Behavior: Behavior normal          Thought Content: Thought content normal          Judgment: Judgment normal          Imaging Studies:     Echocardiogram:   Findings    Left Ventricle Left ventricular cavity size is upper normal  Wall thickness is mildly increased  The left ventricular ejection fraction is 50%  Systolic function is low normal   Wall motion is normal  Unable to assess diastolic function due to E/A fusion  Right Ventricle Right ventricular cavity size is normal  Systolic function is normal  Wall thickness is normal    Left Atrium The atrium is normal in size     Right Atrium The atrium is normal in size  Aortic Valve The aortic valve is trileaflet  The leaflets are severely calcified  There is severely reduced mobility  There is mild regurgitation  There is severe stenosis  Mean gradient of 41 mmHg, MIRA of 0 75 cm2  Mitral Valve The mitral valve has normal function  There is mild annular calcification  There is mild regurgitation  There is no evidence of stenosis  The valve has normal function  Tricuspid Valve Tricuspid valve structure is normal  There is mild regurgitation  There is no evidence of stenosis  Pulmonic Valve The pulmonic valve was not well visualized  There is no evidence of regurgitation  There is no evidence of stenosis  Ascending Aorta The aortic root is normal in size  The ascending aorta is mildly dilated  IVC/SVC The inferior vena cava is normal in size  Pericardium There is no pericardial effusion  The pericardium is normal in appearance  Gated CTA of the chest/abdomen/pelvis:   FINDINGS:     VASCULAR STRUCTURES:       Annulus: diameter 31 4 x 26 6 mm      area: 663 4 sq mm    Annulus to LCA: 8 5 mm    Annulus to RCA: 19 8 mm    Minimal diameter right iliofemoral segment: 7 7 mm    Minimal diameter left iliofemoral segment: 6 7 mm     The ascending aorta is nonaneurysmal measuring 39 mm with minimal atherosclerosis  There is a type III aortic arch with classic branching anatomy and no stenosis in the visualized great vessels  The aortic arch is nonaneurysmal with mild   atherosclerosis  The descending thoracic aorta is nonaneurysmal with mild atherosclerosis      The abdominal aorta is nonaneurysmal with mild atherosclerosis  Bilateral iliofemoral arteries are nonaneurysmal with mild atherosclerosis  Celiac, superior mesenteric, and inferior mesenteric arteries are patent  Bilateral renal arteries are patent      Cardiac findings: There is moderate calcification of the aortic valve    The left ventricle is normal   The ventricular septum is normal   No prior valvular surgery  No prior bypass surgery  No pericardial effusion  No cardiac mass or thrombus  Coronary artery and   mitral annulus calcifications  Pacemaker leads terminate in the right atrium and right ventricle      OTHER FINDINGS:      CHEST     LUNGS:  Bilateral calcified granulomas  There is no tracheal or endobronchial lesion      PLEURA:  Unremarkable      MEDIASTINUM AND BERRY:  Calcified left hilar lymph nodes      CHEST WALL:   Left chest wall pacemaker generator is partially visualized      ABDOMEN     LIVER/BILIARY TREE:  Unremarkable      GALLBLADDER:  No calcified gallstones  No pericholecystic inflammatory change      SPLEEN:  Unremarkable      PANCREAS:  Unremarkable      ADRENAL GLANDS:  Unremarkable      KIDNEYS/URETERS:  Unremarkable  No hydronephrosis      STOMACH AND BOWEL:  Colonic diverticulosis      APPENDIX:  No findings to suggest appendicitis      ABDOMINOPELVIC CAVITY:  No ascites or free intraperitoneal air  No lymphadenopathy      PELVIS     REPRODUCTIVE ORGANS:  Unremarkable for patient's age      URINARY BLADDER:  Unremarkable      ABDOMINAL WALL/INGUINAL REGIONS:  Unremarkable      OSSEOUS STRUCTURES:  No acute fracture or destructive osseous lesion      IMPRESSION:  TAVR measurements as above  Cardiac catheterization:   Impression    · Codominant RCA 90% IAIN x 1 placed  · Small OM1 branch 80% (1 5-1 75mm diameter, small territory served)-medical therapy    Carotid artery ultrasound:   CONCLUSION:     Impression     RIGHT:  There is 70-99% stenosis noted in the internal carotid artery  Plaque is  heterogenous and irregular  Vertebral artery flow is antegrade  There is no significant subclavian artery  disease  LEFT:  There is <50% stenosis noted in the internal carotid artery  Plaque is  homogenous and smooth  Vertebral artery flow is antegrade  There is no significant subclavian artery  disease  I have personally reviewed pertinent reports  TAVR evaluation Assessment:     5 Meter Walk Test:      Attempt 1: 4   Attempt 2: 4   Attempt 3: 6    STS Risk Score: 2 5 %, mortality risk    [place]: III    KCCQ-12 was completed    Assessment:  Severe aortic stenosis     Plan:  Surgical options provided by Dr Renae Arrieta in the office today  SAVR option mostly due to coronary anatomy  Patient to discuss and review with family and call the office when ready for decisions  SIGNATURE: Tobi Perdue  DATE: May 4, 2022  TIME: 9:22 AM    * This note was completed in part utilizing [employer] fluency direct voice recognition software  Grammatical errors, random word insertion, spelling mistakes, and incomplete sentences may be an occasional consequence of the system secondary to software limitations, ambient noise and hardware issues  At the time of dictation, efforts were made to edit, clarify and /or correct errors  Please read the chart carefully and recognize, using context, where substitutions have occurred  If you have any questions or concerns about the context, text or information contained within the body of this dictation, please contact myself, the provider, for further clarification

## 2022-05-04 NOTE — H&P
Consultation - Cardiothoracic Surgery   Natanael Ch 80 y o  male MRN: [id number]    Reason for Consult / Principal Problem: Aortic stenosis, Non-Rheumatic    History of Present Illness: Natanael Ch is a 80y o  year old male who was previously evaluated in our office by POLLY Rodriguez  for transcatheter aortic valve replacement  During this initial consultation, arrangements were made for the following studies to be completed: echocardiogram, gated CTA of the chest, abdomen, and pelvis, cardiac catheterization, dental clearance and carotid ultrasound  The patient has a PMH notable for Afib not on [facility], SSS s/p PPM ([employer]) in 2016, hx of hepatitis A, hypothyroidism, GERD, BPH and arthritis who was last seen in the office on 4/18/2022  He was recommended to have a TF TAVR at that time  He is revisiting the office to be seen by Dr Rowena Hurt for evaluation  The patient has complaints of TILLEY with some improvement following his PCI to the RCA on ASA and Plavix  4/15/2022  He has known aortic stenosis with a MIRA 0 75 cm2, MG 41 and PG 58mm  He has overall symptoms of fatigue, TILLEY and decrease in activity tolerance  He notices TILLEY and chest pain such as yard work and 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  Dental clearance completed      Past Medical History:  Past Medical History:   Diagnosis Date    Arthritis     Ascending aortic aneurysm (HCC)     trileaflet AV, 41mm    BPH (benign prostatic hyperplasia)     Former tobacco use     pipe & cigar use socially & infrequently    GERD (gastroesophageal reflux disease)     Headache     h/o    Hepatitis A     History of melanoma     Hypertension     Hypothyroidism     Severe aortic stenosis     Vitamin B12 deficiency      Past Surgical History:   Past Surgical History:   Procedure Laterality Date    CARDIAC CATHETERIZATION  4/15/2022    Procedure: Cardiac catheterization;  Surgeon: Ovidio Naidu DO; Location: [facility]; Service: Cardiology    CARDIAC CATHETERIZATION N/A 4/15/2022    Procedure: Cardiac Coronary Angiogram;  Surgeon: Lizzie Epps DO;  Location: [facility]; Service: Cardiology    CARDIAC CATHETERIZATION N/A 4/15/2022    Procedure: Cardiac pci;  Surgeon: Lizzie Epps DO;  Location:  CARDIAC CATH LAB; Service: Cardiology    CARDIAC PACEMAKER PLACEMENT      INGUINAL HERNIA REPAIR Bilateral     SKIN CANCER EXCISION      melanoma, multiple     Family History:  Family History   Problem Relation Age of Onset    Cancer Family         gastrointestinal tract    Heart disease Mother     Substance Abuse Neg Hx     Mental illness Neg Hx        Social History:    Social History     Substance and Sexual Activity   Alcohol Use Yes    Comment: a beer once in a while  Social History     Substance and Sexual Activity   Drug Use No     Social History     Tobacco Use   Smoking Status Never Smoker   Smokeless Tobacco Never Used       Home Medications:   Prior to Admission medications    Medication Sig Start Date End Date Taking?  Authorizing Provider   amLODIPine (NORVASC) 5 mg tablet TAKE ONE TABLET BY MOUTH EVERY DAY 2/15/22   Cristian Pickard MD   Ascorbic Acid (VITAMIN C) 500 MG CAPS Take 1 tablet by mouth daily    Historical Provider, MD   aspirin (ECOTRIN LOW STRENGTH) 81 mg EC tablet Take 81 mg by mouth daily    Historical Provider, MD   clopidogrel (Plavix) 75 mg tablet Take 1 tablet (75 mg total) by mouth daily 4/16/22   Magaly SmokerDO   Cyanocobalamin (VITAMIN B12) 1000 MCG TBCR Take 1 tablet by mouth daily    Historical Provider, MD   Flaxseed, Linseed, (FLAXSEED OIL) 1000 MG CAPS Take 1 capsule by mouth daily    Historical Provider, MD   Glucosamine-Chondroit-Vit C-Mn (GLUCOSAMINE 1500 COMPLEX) CAPS Take 1 capsule by mouth daily    Historical Provider, MD   levothyroxine 100 mcg tablet TAKE ONE TABLET BY MOUTH EVERY DAY 2/28/22   Cristian Pickard MD metoprolol tartrate (LOPRESSOR) 100 mg tablet TAKE ONE TABLET BY MOUTH TWICE A DAY 4/13/22   Rah Anderson MD   Multiple Vitamin (MULTIVITAMIN) capsule Take 1 capsule by mouth daily    Historical Provider, MD   Omega-3 Fatty Acids (FISH OIL) 1200 MG CAPS Take 1 capsule by mouth daily    Historical Provider, MD   tamsulosin (FLOMAX) 0 4 mg TAKE ONE CAPSULE BY MOUTH EVERY DAY 3/12/22   Rah Anderson MD       Allergies: Allergies   Allergen Reactions    Amoxicillin Other (See Comments)     Severe weakness       Review of Systems:     Review of Systems   Constitutional: Positive for activity change and fatigue  HENT: Negative  Eyes: Negative  Respiratory: Positive for chest tightness and shortness of breath  Cardiovascular: Positive for chest pain  Gastrointestinal: Negative  Endocrine: Negative  Genitourinary: Negative  Musculoskeletal: Negative  Skin: Negative  Allergic/Immunologic: Negative  Neurological: Negative  Hematological: Negative  Psychiatric/Behavioral: Negative  Vital Signs:   5/4/22 9:34 AM  5/4/22 9:38 AM       BP  127/67   137/74     BP Location  Left arm   Right arm     Patient Position  Sitting   Sitting     Cuff Size  Standard      Pulse  78      Resp  18      SpO2  97%      Weight   74 8 kg (165 lb)      Height  5' 10" (1 778 m)      Pain Score  0-No pain       Physical Exam:     Physical Exam  Constitutional:       General: He is not in acute distress  Appearance: He is normal weight  He is not toxic-appearing  HENT:      Head: Normocephalic and atraumatic  Right Ear: External ear normal       Left Ear: External ear normal       Nose: Nose normal       Mouth/Throat:      Mouth: Mucous membranes are moist       Pharynx: Oropharynx is clear  No oropharyngeal exudate or posterior oropharyngeal erythema  Eyes:      General: No scleral icterus  Right eye: No discharge  Left eye: No discharge        Extraocular Movements: Extraocular movements intact  Conjunctiva/sclera: Conjunctivae normal       Pupils: Pupils are equal, round, and reactive to light  Neck:      Vascular: No carotid bruit  Cardiovascular:      Rate and Rhythm: Normal rate and regular rhythm  Pulses: Normal pulses  Heart sounds: Murmur heard  Comments: Pacemaker to left upper chest palpated through skin  III/VI systolic murmur  Pulmonary:      Effort: Pulmonary effort is normal  No respiratory distress  Breath sounds: Normal breath sounds  No wheezing  Abdominal:      General: Abdomen is flat  Bowel sounds are normal       Palpations: Abdomen is soft  Musculoskeletal:         General: Normal range of motion  Cervical back: Normal range of motion and neck supple  Right lower leg: No edema  Left lower leg: No edema  Skin:     General: Skin is warm and dry  Coloration: Skin is not pale  Findings: No erythema or rash  Neurological:      General: No focal deficit present  Mental Status: He is alert and oriented to person, place, and time  Cranial Nerves: No cranial nerve deficit  Sensory: No sensory deficit  Motor: No weakness  Coordination: Coordination normal       Gait: Gait normal       Deep Tendon Reflexes: Reflexes normal    Psychiatric:         Mood and Affect: Mood normal          Behavior: Behavior normal          Thought Content: Thought content normal          Judgment: Judgment normal          Imaging Studies:     Echocardiogram:   Findings    Left Ventricle Left ventricular cavity size is upper normal  Wall thickness is mildly increased  The left ventricular ejection fraction is 50%  Systolic function is low normal   Wall motion is normal  Unable to assess diastolic function due to E/A fusion  Right Ventricle Right ventricular cavity size is normal  Systolic function is normal  Wall thickness is normal    Left Atrium The atrium is normal in size     Right Atrium The atrium is normal in size  Aortic Valve The aortic valve is trileaflet  The leaflets are severely calcified  There is severely reduced mobility  There is mild regurgitation  There is severe stenosis  Mean gradient of 41 mmHg, MIRA of 0 75 cm2  Mitral Valve The mitral valve has normal function  There is mild annular calcification  There is mild regurgitation  There is no evidence of stenosis  The valve has normal function  Tricuspid Valve Tricuspid valve structure is normal  There is mild regurgitation  There is no evidence of stenosis  Pulmonic Valve The pulmonic valve was not well visualized  There is no evidence of regurgitation  There is no evidence of stenosis  Ascending Aorta The aortic root is normal in size  The ascending aorta is mildly dilated  IVC/SVC The inferior vena cava is normal in size  Pericardium There is no pericardial effusion  The pericardium is normal in appearance  Gated CTA of the chest/abdomen/pelvis:   FINDINGS:     VASCULAR STRUCTURES:       Annulus: diameter 31 4 x 26 6 mm      area: 663 4 sq mm    Annulus to LCA: 8 5 mm    Annulus to RCA: 19 8 mm    Minimal diameter right iliofemoral segment: 7 7 mm    Minimal diameter left iliofemoral segment: 6 7 mm     The ascending aorta is nonaneurysmal measuring 39 mm with minimal atherosclerosis  There is a type III aortic arch with classic branching anatomy and no stenosis in the visualized great vessels  The aortic arch is nonaneurysmal with mild   atherosclerosis  The descending thoracic aorta is nonaneurysmal with mild atherosclerosis      The abdominal aorta is nonaneurysmal with mild atherosclerosis  Bilateral iliofemoral arteries are nonaneurysmal with mild atherosclerosis  Celiac, superior mesenteric, and inferior mesenteric arteries are patent  Bilateral renal arteries are patent      Cardiac findings: There is moderate calcification of the aortic valve    The left ventricle is normal   The ventricular septum is normal   No prior valvular surgery  No prior bypass surgery  No pericardial effusion  No cardiac mass or thrombus  Coronary artery and   mitral annulus calcifications  Pacemaker leads terminate in the right atrium and right ventricle      OTHER FINDINGS:      CHEST     LUNGS:  Bilateral calcified granulomas  There is no tracheal or endobronchial lesion      PLEURA:  Unremarkable      MEDIASTINUM AND BERRY:  Calcified left hilar lymph nodes      CHEST WALL:   Left chest wall pacemaker generator is partially visualized      ABDOMEN     LIVER/BILIARY TREE:  Unremarkable      GALLBLADDER:  No calcified gallstones  No pericholecystic inflammatory change      SPLEEN:  Unremarkable      PANCREAS:  Unremarkable      ADRENAL GLANDS:  Unremarkable      KIDNEYS/URETERS:  Unremarkable  No hydronephrosis      STOMACH AND BOWEL:  Colonic diverticulosis      APPENDIX:  No findings to suggest appendicitis      ABDOMINOPELVIC CAVITY:  No ascites or free intraperitoneal air  No lymphadenopathy      PELVIS     REPRODUCTIVE ORGANS:  Unremarkable for patient's age      URINARY BLADDER:  Unremarkable      ABDOMINAL WALL/INGUINAL REGIONS:  Unremarkable      OSSEOUS STRUCTURES:  No acute fracture or destructive osseous lesion      IMPRESSION:  TAVR measurements as above  Cardiac catheterization:   Impression    · Codominant RCA 90% IAIN x 1 placed  · Small OM1 branch 80% (1 5-1 75mm diameter, small territory served)-medical therapy    Carotid artery ultrasound:   CONCLUSION:     Impression     RIGHT:  There is 70-99% stenosis noted in the internal carotid artery  Plaque is  heterogenous and irregular  Vertebral artery flow is antegrade  There is no significant subclavian artery  disease  LEFT:  There is <50% stenosis noted in the internal carotid artery  Plaque is  homogenous and smooth  Vertebral artery flow is antegrade  There is no significant subclavian artery  disease  I have personally reviewed pertinent reports  TAVR evaluation Assessment:     5 Meter Walk Test:      Attempt 1: 4   Attempt 2: 4   Attempt 3: 6    STS Risk Score: 2 5 %, mortality risk    [place]: III    KCCQ-12 was completed    Assessment:  Severe aortic stenosis     Plan:  Surgical options provided by Dr Denice Santos in the office today  SAVR option mostly due to coronary anatomy  Patient to discuss and review with family and call the office when ready for decisions  SIGNATURE: Tobi Cornejo  DATE: May 4, 2022  TIME: 9:22 AM    * This note was completed in part utilizing "[employer]" direct voice recognition software  Grammatical errors, random word insertion, spelling mistakes, and incomplete sentences may be an occasional consequence of the system secondary to software limitations, ambient noise and hardware issues  At the time of dictation, efforts were made to edit, clarify and /or correct errors  Please read the chart carefully and recognize, using context, where substitutions have occurred  If you have any questions or concerns about the context, text or information contained within the body of this dictation, please contact myself, the provider, for further clarification  Attestation signed by Marija Lucero DO at 5/4/2022 11:07 AM:  The patient was seen and examined, and I agree with the midlevel's history, physical exam, assessment and plan with the following additions:    Mr Albert Sharma was seen in the office today for evaluation of his aortic stenosis  I had a long conversation both he and his wife who was also present at the visit  His preoperative TAVR workup has been reviewed by the team   He is a suitable candidate for TAVR, with the exception that his left coronary ostium takeoff is very low and close to the annulus  This may put him at increased risk for left main occlusion especially given the amount of calcium present on his left coronary cusp  We discussed options of treatment    I feel he is better suited for open surgical aortic replacement in a controlled manner  If he is unwilling to proceed with open surgery, we did discuss proceeding with TAVR, but with the clear understanding that his complications from this procedure may be great, and he could require emergent surgery, and may even not survive, or if does with heart failure secondary to occlusion of his left main artery for period of time      He will let us know how he wishes to proceed

## 2022-05-04 NOTE — LETTER
May 4, 2022     MD Sridevi Rios  [address] [address]    Patient: Cleo Altman   YOB: 1932   Date of Visit: 5/4/2022       Dear Dr Lui Carr:    Thank you for referring Cristopher Durant to me for evaluation  Below are my notes for this consultation  If you have questions, please do not hesitate to call me  I look forward to following your patient along with you  Sincerely,        Justine Chapman,         CC: No Recipients  Meli Lay  5/4/2022 10:36 AM  Attested  Consultation - Cardiothoracic Surgery   Cleo Altman 80 y o  male MRN: [id number]    Reason for Consult / Principal Problem: Aortic stenosis, Non-Rheumatic    History of Present Illness: Cleo Altman is a 80y o  year old male who was previously evaluated in our office by POLLY Frankel  for transcatheter aortic valve replacement  During this initial consultation, arrangements were made for the following studies to be completed: echocardiogram, gated CTA of the chest, abdomen, and pelvis, cardiac catheterization, dental clearance and carotid ultrasound  The patient has a PMH notable for Afib not on [facility], operated by [facility], SSS s/p PPM ([employer]) in 2016, hx of hepatitis A, hypothyroidism, GERD, BPH and arthritis who was last seen in the office on 4/18/2022  He was recommended to have a TF TAVR at that time  He is revisiting the office to be seen by Dr Jhony Christiansen for evaluation  The patient has complaints of TILLEY with some improvement following his PCI to the RCA on ASA and Plavix  4/15/2022  He has known aortic stenosis with a MIRA 0 75 cm2, MG 41 and PG 58mm  He has overall symptoms of fatigue, TILLEY and decrease in activity tolerance  He notices TILLEY and chest pain such as yard work and he still works part time as a [employer]  COVID vaccinations x 3  Dental clearance completed      Past Medical History:  Past Medical History:   Diagnosis Date    Arthritis     Ascending aortic aneurysm (HCC)     trileaflet AV, 41mm    BPH (benign prostatic hyperplasia)     Former tobacco use     pipe & cigar use socially & infrequently    GERD (gastroesophageal reflux disease)     Headache     h/o    Hepatitis A     History of melanoma     Hypertension     Hypothyroidism     Severe aortic stenosis     Vitamin B12 deficiency      Past Surgical History:   Past Surgical History:   Procedure Laterality Date    CARDIAC CATHETERIZATION  4/15/2022    Procedure: Cardiac catheterization;  Surgeon: Teofilo Cast DO;  [facility]: [facility]; Service: Cardiology    CARDIAC CATHETERIZATION N/A 4/15/2022    Procedure: Cardiac Coronary Angiogram;  Surgeon: Teofilo Cast DO;  [facility]; Service: Cardiology    CARDIAC CATHETERIZATION N/A 4/15/2022    Procedure: Cardiac pci;  Surgeon: Teofilo Cast DO;  [facility]; Service: Cardiology    CARDIAC PACEMAKER PLACEMENT      INGUINAL HERNIA REPAIR Bilateral     SKIN CANCER EXCISION      melanoma, multiple     Family History:  Family History   Problem Relation Age of Onset    Cancer Family         gastrointestinal tract    Heart disease Mother     Substance Abuse Neg Hx     Mental illness Neg Hx        Social History:    Social History     Substance and Sexual Activity   Alcohol Use Yes    Comment: a beer once in a while  Social History     Substance and Sexual Activity   Drug Use No     Social History     Tobacco Use   Smoking Status Never Smoker   Smokeless Tobacco Never Used       Home Medications:   Prior to Admission medications    Medication Sig Start Date End Date Taking?  Authorizing Provider   amLODIPine (NORVASC) 5 mg tablet TAKE ONE TABLET BY MOUTH EVERY DAY 2/15/22   Donnie Osman MD   Ascorbic Acid (VITAMIN C) 500 MG CAPS Take 1 tablet by mouth daily    Historical Provider, MD   aspirin (ECOTRIN LOW STRENGTH) 81 mg EC tablet Take 81 mg by mouth daily    Historical Provider, MD   clopidogrel (Plavix) 75 mg tablet Take 1 tablet (75 mg total) by mouth daily 4/16/22   Dal Sever, DO   Cyanocobalamin (VITAMIN B12) 1000 MCG TBCR Take 1 tablet by mouth daily    Historical Provider, MD   Flaxdarron, Linseed, (FLAXSEED OIL) 1000 MG CAPS Take 1 capsule by mouth daily    Historical Provider, MD   Glucosamine-Chondroit-Vit C-Mn (GLUCOSAMINE 1500 COMPLEX) CAPS Take 1 capsule by mouth daily    Historical Provider, MD   levothyroxine 100 mcg tablet TAKE ONE TABLET BY MOUTH EVERY DAY 2/28/22   Yohana Officer, MD   metoprolol tartrate (LOPRESSOR) 100 mg tablet TAKE ONE TABLET BY MOUTH TWICE A DAY 4/13/22   Yohana Officer, MD   Multiple Vitamin (MULTIVITAMIN) capsule Take 1 capsule by mouth daily    Historical Provider, MD   Omega-3 Fatty Acids (FISH OIL) 1200 MG CAPS Take 1 capsule by mouth daily    Historical Provider, MD   tamsulosin (FLOMAX) 0 4 mg TAKE ONE CAPSULE BY MOUTH EVERY DAY 3/12/22   Yohana Officer, MD       Allergies: Allergies   Allergen Reactions    Amoxicillin Other (See Comments)     Severe weakness       Review of Systems:     Review of Systems   Constitutional: Positive for activity change and fatigue  HENT: Negative  Eyes: Negative  Respiratory: Positive for chest tightness and shortness of breath  Cardiovascular: Positive for chest pain  Gastrointestinal: Negative  Endocrine: Negative  Genitourinary: Negative  Musculoskeletal: Negative  Skin: Negative  Allergic/Immunologic: Negative  Neurological: Negative  Hematological: Negative  Psychiatric/Behavioral: Negative          Vital Signs:   5/4/22 9:34 AM  5/4/22 9:38 AM       BP  127/67   137/74     BP Location  Left arm   Right arm     Patient Position  Sitting   Sitting     Cuff Size  Standard      Pulse  78      Resp  18      SpO2  97%      Weight   74 8 kg (165 lb)      Height  5' 10" (1 778 m)      Pain Score  0-No pain       Physical Exam:     Physical Exam  Constitutional: General: He is not in acute distress  Appearance: He is normal weight  He is not toxic-appearing  HENT:      Head: Normocephalic and atraumatic  Right Ear: External ear normal       Left Ear: External ear normal       Nose: Nose normal       Mouth/Throat:      Mouth: Mucous membranes are moist       Pharynx: Oropharynx is clear  No oropharyngeal exudate or posterior oropharyngeal erythema  Eyes:      General: No scleral icterus  Right eye: No discharge  Left eye: No discharge  Extraocular Movements: Extraocular movements intact  Conjunctiva/sclera: Conjunctivae normal       Pupils: Pupils are equal, round, and reactive to light  Neck:      Vascular: No carotid bruit  Cardiovascular:      Rate and Rhythm: Normal rate and regular rhythm  Pulses: Normal pulses  Heart sounds: Murmur heard  Comments: Pacemaker to left upper chest palpated through skin  III/VI systolic murmur  Pulmonary:      Effort: Pulmonary effort is normal  No respiratory distress  Breath sounds: Normal breath sounds  No wheezing  Abdominal:      General: Abdomen is flat  Bowel sounds are normal       Palpations: Abdomen is soft  Musculoskeletal:         General: Normal range of motion  Cervical back: Normal range of motion and neck supple  Right lower leg: No edema  Left lower leg: No edema  Skin:     General: Skin is warm and dry  Coloration: Skin is not pale  Findings: No erythema or rash  Neurological:      General: No focal deficit present  Mental Status: He is alert and oriented to person, place, and time  Cranial Nerves: No cranial nerve deficit  Sensory: No sensory deficit  Motor: No weakness  Coordination: Coordination normal       Gait: Gait normal       Deep Tendon Reflexes: Reflexes normal    Psychiatric:         Mood and Affect: Mood normal          Behavior: Behavior normal          Thought Content:  Thought content normal          Judgment: Judgment normal          Imaging Studies:     Echocardiogram:   Findings    Left Ventricle Left ventricular cavity size is upper normal  Wall thickness is mildly increased  The left ventricular ejection fraction is 50%  Systolic function is low normal   Wall motion is normal  Unable to assess diastolic function due to E/A fusion  Right Ventricle Right ventricular cavity size is normal  Systolic function is normal  Wall thickness is normal    Left Atrium The atrium is normal in size  Right Atrium The atrium is normal in size  Aortic Valve The aortic valve is trileaflet  The leaflets are severely calcified  There is severely reduced mobility  There is mild regurgitation  There is severe stenosis  Mean gradient of 41 mmHg, MIRA of 0 75 cm2  Mitral Valve The mitral valve has normal function  There is mild annular calcification  There is mild regurgitation  There is no evidence of stenosis  The valve has normal function  Tricuspid Valve Tricuspid valve structure is normal  There is mild regurgitation  There is no evidence of stenosis  Pulmonic Valve The pulmonic valve was not well visualized  There is no evidence of regurgitation  There is no evidence of stenosis  Ascending Aorta The aortic root is normal in size  The ascending aorta is mildly dilated  IVC/SVC The inferior vena cava is normal in size  Pericardium There is no pericardial effusion  The pericardium is normal in appearance  Gated CTA of the chest/abdomen/pelvis:   FINDINGS:     VASCULAR STRUCTURES:       Annulus: diameter 31 4 x 26 6 mm      area: 663 4 sq mm    Annulus to LCA: 8 5 mm    Annulus to RCA: 19 8 mm    Minimal diameter right iliofemoral segment: 7 7 mm    Minimal diameter left iliofemoral segment: 6 7 mm     The ascending aorta is nonaneurysmal measuring 39 mm with minimal atherosclerosis  There is a type III aortic arch with classic branching anatomy and no stenosis in the visualized great vessels  The aortic arch is nonaneurysmal with mild   atherosclerosis  The descending thoracic aorta is nonaneurysmal with mild atherosclerosis      The abdominal aorta is nonaneurysmal with mild atherosclerosis  Bilateral iliofemoral arteries are nonaneurysmal with mild atherosclerosis  Celiac, superior mesenteric, and inferior mesenteric arteries are patent  Bilateral renal arteries are patent      Cardiac findings: There is moderate calcification of the aortic valve  The left ventricle is normal   The ventricular septum is normal   No prior valvular surgery  No prior bypass surgery  No pericardial effusion  No cardiac mass or thrombus  Coronary artery and   mitral annulus calcifications  Pacemaker leads terminate in the right atrium and right ventricle      OTHER FINDINGS:      CHEST     LUNGS:  Bilateral calcified granulomas  There is no tracheal or endobronchial lesion      PLEURA:  Unremarkable      MEDIASTINUM AND BERRY:  Calcified left hilar lymph nodes      CHEST WALL:   Left chest wall pacemaker generator is partially visualized      ABDOMEN     LIVER/BILIARY TREE:  Unremarkable      GALLBLADDER:  No calcified gallstones  No pericholecystic inflammatory change      SPLEEN:  Unremarkable      PANCREAS:  Unremarkable      ADRENAL GLANDS:  Unremarkable      KIDNEYS/URETERS:  Unremarkable  No hydronephrosis      STOMACH AND BOWEL:  Colonic diverticulosis      APPENDIX:  No findings to suggest appendicitis      ABDOMINOPELVIC CAVITY:  No ascites or free intraperitoneal air  No lymphadenopathy      PELVIS     REPRODUCTIVE ORGANS:  Unremarkable for patient's age      URINARY BLADDER:  Unremarkable      ABDOMINAL WALL/INGUINAL REGIONS:  Unremarkable      OSSEOUS STRUCTURES:  No acute fracture or destructive osseous lesion      IMPRESSION:  TAVR measurements as above      Cardiac catheterization:   Impression    · Codominant RCA 90% IAIN x 1 placed  · Small OM1 branch 80% (1 5-1 75mm diameter, small territory served)-medical therapy    Carotid artery ultrasound:   CONCLUSION:     Impression     RIGHT:  There is 70-99% stenosis noted in the internal carotid artery  Plaque is  heterogenous and irregular  Vertebral artery flow is antegrade  There is no significant subclavian artery  disease  LEFT:  There is <50% stenosis noted in the internal carotid artery  Plaque is  homogenous and smooth  Vertebral artery flow is antegrade  There is no significant subclavian artery  disease  I have personally reviewed pertinent reports  TAVR evaluation Assessment:     5 Meter Walk Test:      Attempt 1: 4   Attempt 2: 4   Attempt 3: 6    STS Risk Score: 2 5 %, mortality risk    NYHC: III    KCCQ-12 was completed    Assessment:  Severe aortic stenosis     Plan:  Surgical options provided by Dr Jcakie Vergara in the office today  SAVR option mostly due to coronary anatomy  Patient to discuss and review with family and call the office when ready for decisions  SIGNATURE: Tobi Gavin  DATE: May 4, 2022  TIME: 9:22 AM    * This note was completed in part utilizing [employer] direct voice recognition software  Grammatical errors, random word insertion, spelling mistakes, and incomplete sentences may be an occasional consequence of the system secondary to software limitations, ambient noise and hardware issues  At the time of dictation, efforts were made to edit, clarify and /or correct errors  Please read the chart carefully and recognize, using context, where substitutions have occurred  If you have any questions or concerns about the context, text or information contained within the body of this dictation, please contact myself, the provider, for further clarification    Attestation signed by Abhijit Loza DO at 5/4/2022 11:07 AM:  The patient was seen and examined, and I agree with the midlevel's history, physical exam, assessment and plan with the following additions:    Mr Marcus Whitaker was seen in the office today for evaluation of his aortic stenosis  I had a long conversation both he and his wife who was also present at the visit  His preoperative TAVR workup has been reviewed by the team   He is a suitable candidate for TAVR, with the exception that his left coronary ostium takeoff is very low and close to the annulus  This may put him at increased risk for left main occlusion especially given the amount of calcium present on his left coronary cusp  We discussed options of treatment  I feel he is better suited for open surgical aortic replacement in a controlled manner  If he is unwilling to proceed with open surgery, we did discuss proceeding with TAVR, but with the clear understanding that his complications from this procedure may be great, and he could require emergent surgery, and may even not survive, or if does with heart failure secondary to occlusion of his left main artery for period of time      He will let us know how he wishes to proceed

## 2022-05-04 NOTE — PROGRESS NOTES
Consultation - Cardiothoracic Surgery   Jamilah Mendoza 80 y o  male MRN: [id number]    Reason for Consult / Principal Problem: Aortic stenosis, Non-Rheumatic    History of Present Illness: Jamilah Mendoza is a 80y o  year old male who was previously evaluated in our office by POLLY Kumar  for transcatheter aortic valve replacement  During this initial consultation, arrangements were made for the following studies to be completed: echocardiogram, gated CTA of the chest, abdomen, and pelvis, cardiac catheterization, dental clearance and carotid ultrasound  The patient has a PMH notable for Afib not on [facility], SSS s/p PPM ([employer]) in 2016, hx of hepatitis A, hypothyroidism, GERD, BPH and arthritis who was last seen in the office on 4/18/2022  He was recommended to have a TF TAVR at that time  He is revisiting the office to be seen by Dr Raymond Kincaid for evaluation  The patient has complaints of TILLEY with some improvement following his PCI to the RCA on ASA and Plavix  4/15/2022  He has known aortic stenosis with a MIRA 0 75 cm2, MG 41 and PG 58mm  He has overall symptoms of fatigue, TILLEY and decrease in activity tolerance  He notices TILLEY and chest pain such as yard work and 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  Dental clearance completed      Past Medical History:  Past Medical History:   Diagnosis Date    Arthritis     Ascending aortic aneurysm (HCC)     trileaflet AV, 41mm    BPH (benign prostatic hyperplasia)     Former tobacco use     pipe & cigar use socially & infrequently    GERD (gastroesophageal reflux disease)     Headache     h/o    Hepatitis A     History of melanoma     Hypertension     Hypothyroidism     Severe aortic stenosis     Vitamin B12 deficiency      Past Surgical History:   Past Surgical History:   Procedure Laterality Date    CARDIAC CATHETERIZATION  4/15/2022    Procedure: Cardiac catheterization;  Surgeon: Grecia Moss DO; Location: [facility]; Service: Cardiology    CARDIAC CATHETERIZATION N/A 4/15/2022    Procedure: Cardiac Coronary Angiogram;  Surgeon: Wandy Elaine DO;  [facility]; Service: Cardiology    CARDIAC CATHETERIZATION N/A 4/15/2022    Procedure: Cardiac pci;  Surgeon: Wandy Elaine DO;  [facility]; Service: Cardiology    CARDIAC PACEMAKER PLACEMENT      INGUINAL HERNIA REPAIR Bilateral     SKIN CANCER EXCISION      melanoma, multiple     Family History:  Family History   Problem Relation Age of Onset    Cancer Family         gastrointestinal tract    Heart disease Mother     Substance Abuse Neg Hx     Mental illness Neg Hx        Social History:    Social History     Substance and Sexual Activity   Alcohol Use Yes    Comment: a beer once in a while  Social History     Substance and Sexual Activity   Drug Use No     Social History     Tobacco Use   Smoking Status Never Smoker   Smokeless Tobacco Never Used       Home Medications:   Prior to Admission medications    Medication Sig Start Date End Date Taking?  Authorizing Provider   amLODIPine (NORVASC) 5 mg tablet TAKE ONE TABLET BY MOUTH EVERY DAY 2/15/22   Sarah Denise MD   Ascorbic Acid (VITAMIN C) 500 MG CAPS Take 1 tablet by mouth daily    Historical Provider, MD   aspirin (ECOTRIN LOW STRENGTH) 81 mg EC tablet Take 81 mg by mouth daily    Historical Provider, MD   clopidogrel (Plavix) 75 mg tablet Take 1 tablet (75 mg total) by mouth daily 4/16/22   Rodrigo Perez DO   Cyanocobalamin (VITAMIN B12) 1000 MCG TBCR Take 1 tablet by mouth daily    Historical Provider, MD   Flaxseed, Linseed, (FLAXSEED OIL) 1000 MG CAPS Take 1 capsule by mouth daily    Historical Provider, MD   Glucosamine-Chondroit-Vit C-Mn (GLUCOSAMINE 1500 COMPLEX) CAPS Take 1 capsule by mouth daily    Historical Provider, MD   levothyroxine 100 mcg tablet TAKE ONE TABLET BY MOUTH EVERY DAY 2/28/22   Sarah Denise MD metoprolol tartrate (LOPRESSOR) 100 mg tablet TAKE ONE TABLET BY MOUTH TWICE A DAY 4/13/22   Jerald Colon MD   Multiple Vitamin (MULTIVITAMIN) capsule Take 1 capsule by mouth daily    Historical Provider, MD   Omega-3 Fatty Acids (FISH OIL) 1200 MG CAPS Take 1 capsule by mouth daily    Historical Provider, MD   tamsulosin (FLOMAX) 0 4 mg TAKE ONE CAPSULE BY MOUTH EVERY DAY 3/12/22   Jerald Colon MD       Allergies: Allergies   Allergen Reactions    Amoxicillin Other (See Comments)     Severe weakness       Review of Systems:     Review of Systems   Constitutional: Positive for activity change and fatigue  HENT: Negative  Eyes: Negative  Respiratory: Positive for chest tightness and shortness of breath  Cardiovascular: Positive for chest pain  Gastrointestinal: Negative  Endocrine: Negative  Genitourinary: Negative  Musculoskeletal: Negative  Skin: Negative  Allergic/Immunologic: Negative  Neurological: Negative  Hematological: Negative  Psychiatric/Behavioral: Negative  Vital Signs:   5/4/22 9:34 AM  5/4/22 9:38 AM       BP  127/67   137/74     BP Location  Left arm   Right arm     Patient Position  Sitting   Sitting     Cuff Size  Standard      Pulse  78      Resp  18      SpO2  97%      Weight   74 8 kg (165 lb)      Height  5' 10" (1 778 m)      Pain Score  0-No pain       Physical Exam:     Physical Exam  Constitutional:       General: He is not in acute distress  Appearance: He is normal weight  He is not toxic-appearing  HENT:      Head: Normocephalic and atraumatic  Right Ear: External ear normal       Left Ear: External ear normal       Nose: Nose normal       Mouth/Throat:      Mouth: Mucous membranes are moist       Pharynx: Oropharynx is clear  No oropharyngeal exudate or posterior oropharyngeal erythema  Eyes:      General: No scleral icterus  Right eye: No discharge  Left eye: No discharge        Extraocular Movements: Extraocular movements intact  Conjunctiva/sclera: Conjunctivae normal       Pupils: Pupils are equal, round, and reactive to light  Neck:      Vascular: No carotid bruit  Cardiovascular:      Rate and Rhythm: Normal rate and regular rhythm  Pulses: Normal pulses  Heart sounds: Murmur heard  Comments: Pacemaker to left upper chest palpated through skin  III/VI systolic murmur  Pulmonary:      Effort: Pulmonary effort is normal  No respiratory distress  Breath sounds: Normal breath sounds  No wheezing  Abdominal:      General: Abdomen is flat  Bowel sounds are normal       Palpations: Abdomen is soft  Musculoskeletal:         General: Normal range of motion  Cervical back: Normal range of motion and neck supple  Right lower leg: No edema  Left lower leg: No edema  Skin:     General: Skin is warm and dry  Coloration: Skin is not pale  Findings: No erythema or rash  Neurological:      General: No focal deficit present  Mental Status: He is alert and oriented to person, place, and time  Cranial Nerves: No cranial nerve deficit  Sensory: No sensory deficit  Motor: No weakness  Coordination: Coordination normal       Gait: Gait normal       Deep Tendon Reflexes: Reflexes normal    Psychiatric:         Mood and Affect: Mood normal          Behavior: Behavior normal          Thought Content: Thought content normal          Judgment: Judgment normal          Imaging Studies:     Echocardiogram:   Findings    Left Ventricle Left ventricular cavity size is upper normal  Wall thickness is mildly increased  The left ventricular ejection fraction is 50%  Systolic function is low normal   Wall motion is normal  Unable to assess diastolic function due to E/A fusion  Right Ventricle Right ventricular cavity size is normal  Systolic function is normal  Wall thickness is normal    Left Atrium The atrium is normal in size     Right Atrium The atrium is normal in size  Aortic Valve The aortic valve is trileaflet  The leaflets are severely calcified  There is severely reduced mobility  There is mild regurgitation  There is severe stenosis  Mean gradient of 41 mmHg, MIRA of 0 75 cm2  Mitral Valve The mitral valve has normal function  There is mild annular calcification  There is mild regurgitation  There is no evidence of stenosis  The valve has normal function  Tricuspid Valve Tricuspid valve structure is normal  There is mild regurgitation  There is no evidence of stenosis  Pulmonic Valve The pulmonic valve was not well visualized  There is no evidence of regurgitation  There is no evidence of stenosis  Ascending Aorta The aortic root is normal in size  The ascending aorta is mildly dilated  IVC/SVC The inferior vena cava is normal in size  Pericardium There is no pericardial effusion  The pericardium is normal in appearance  Gated CTA of the chest/abdomen/pelvis:   FINDINGS:     VASCULAR STRUCTURES:       Annulus: diameter 31 4 x 26 6 mm      area: 663 4 sq mm    Annulus to LCA: 8 5 mm    Annulus to RCA: 19 8 mm    Minimal diameter right iliofemoral segment: 7 7 mm    Minimal diameter left iliofemoral segment: 6 7 mm     The ascending aorta is nonaneurysmal measuring 39 mm with minimal atherosclerosis  There is a type III aortic arch with classic branching anatomy and no stenosis in the visualized great vessels  The aortic arch is nonaneurysmal with mild   atherosclerosis  The descending thoracic aorta is nonaneurysmal with mild atherosclerosis      The abdominal aorta is nonaneurysmal with mild atherosclerosis  Bilateral iliofemoral arteries are nonaneurysmal with mild atherosclerosis  Celiac, superior mesenteric, and inferior mesenteric arteries are patent  Bilateral renal arteries are patent      Cardiac findings: There is moderate calcification of the aortic valve    The left ventricle is normal   The ventricular septum is normal   No prior valvular surgery  No prior bypass surgery  No pericardial effusion  No cardiac mass or thrombus  Coronary artery and   mitral annulus calcifications  Pacemaker leads terminate in the right atrium and right ventricle      OTHER FINDINGS:      CHEST     LUNGS:  Bilateral calcified granulomas  There is no tracheal or endobronchial lesion      PLEURA:  Unremarkable      MEDIASTINUM AND BERRY:  Calcified left hilar lymph nodes      CHEST WALL:   Left chest wall pacemaker generator is partially visualized      ABDOMEN     LIVER/BILIARY TREE:  Unremarkable      GALLBLADDER:  No calcified gallstones  No pericholecystic inflammatory change      SPLEEN:  Unremarkable      PANCREAS:  Unremarkable      ADRENAL GLANDS:  Unremarkable      KIDNEYS/URETERS:  Unremarkable  No hydronephrosis      STOMACH AND BOWEL:  Colonic diverticulosis      APPENDIX:  No findings to suggest appendicitis      ABDOMINOPELVIC CAVITY:  No ascites or free intraperitoneal air  No lymphadenopathy      PELVIS     REPRODUCTIVE ORGANS:  Unremarkable for patient's age      URINARY BLADDER:  Unremarkable      ABDOMINAL WALL/INGUINAL REGIONS:  Unremarkable      OSSEOUS STRUCTURES:  No acute fracture or destructive osseous lesion      IMPRESSION:  TAVR measurements as above  Cardiac catheterization:   Impression    · Codominant RCA 90% IAIN x 1 placed  · Small OM1 branch 80% (1 5-1 75mm diameter, small territory served)-medical therapy    Carotid artery ultrasound:   CONCLUSION:     Impression     RIGHT:  There is 70-99% stenosis noted in the internal carotid artery  Plaque is  heterogenous and irregular  Vertebral artery flow is antegrade  There is no significant subclavian artery  disease  LEFT:  There is <50% stenosis noted in the internal carotid artery  Plaque is  homogenous and smooth  Vertebral artery flow is antegrade  There is no significant subclavian artery  disease  I have personally reviewed pertinent reports  TAVR evaluation Assessment:     5 Meter Walk Test:      Attempt 1: 4   Attempt 2: 4   Attempt 3: 6    STS Risk Score: 2 5 %, mortality risk    [place]: III    KCCQ-12 was completed    Assessment:  Severe aortic stenosis     Plan:  Surgical options provided by Dr Olya Rodriguez in the office today  SAVR option mostly due to coronary anatomy  Patient to discuss and review with family and call the office when ready for decisions  SIGNATURE: Tobi Husain  DATE: May 4, 2022  TIME: 9:22 AM    * This note was completed in part utilizing [employer] fluency direct voice recognition software  Grammatical errors, random word insertion, spelling mistakes, and incomplete sentences may be an occasional consequence of the system secondary to software limitations, ambient noise and hardware issues  At the time of dictation, efforts were made to edit, clarify and /or correct errors  Please read the chart carefully and recognize, using context, where substitutions have occurred  If you have any questions or concerns about the context, text or information contained within the body of this dictation, please contact myself, the provider, for further clarification

## 2022-05-05 ENCOUNTER — TELEPHONE (OUTPATIENT)
Dept: FAMILY MEDICINE CLINIC | Facility: HOSPITAL | Age: 87
End: 2022-05-05

## 2022-05-05 NOTE — TELEPHONE ENCOUNTER
Spoke with pt's wife  They saw 3 surgeons 2 said they want to do the valve replacement through pt's leg  Dr Wilmar Weaver wants to go through pt's chest  Pt and wife would like your recommendation  Please call after 4 if possible   Thanks

## 2022-05-10 ENCOUNTER — LAB (OUTPATIENT)
Dept: LAB | Facility: HOSPITAL | Age: 87
End: 2022-05-10
Payer: COMMERCIAL

## 2022-05-10 ENCOUNTER — LAB REQUISITION (OUTPATIENT)
Dept: LAB | Facility: HOSPITAL | Age: 87
End: 2022-05-10
Payer: COMMERCIAL

## 2022-05-10 DIAGNOSIS — Z00.00 HEALTHCARE MAINTENANCE: ICD-10-CM

## 2022-05-10 DIAGNOSIS — Z95.5 S/P DRUG ELUTING CORONARY STENT PLACEMENT: ICD-10-CM

## 2022-05-10 DIAGNOSIS — Z01.812 PRE-OPERATIVE LABORATORY EXAMINATION: ICD-10-CM

## 2022-05-10 DIAGNOSIS — I25.10 CORONARY ARTERY DISEASE INVOLVING NATIVE CORONARY ARTERY OF NATIVE HEART WITHOUT ANGINA PECTORIS: ICD-10-CM

## 2022-05-10 DIAGNOSIS — I35.0 SEVERE AORTIC STENOSIS: ICD-10-CM

## 2022-05-10 DIAGNOSIS — Z01.812 ENCOUNTER FOR PREPROCEDURAL LABORATORY EXAMINATION: ICD-10-CM

## 2022-05-10 DIAGNOSIS — Z01.812 ENCOUNTER FOR PRE-OPERATIVE LABORATORY TESTING: ICD-10-CM

## 2022-05-10 LAB
ABO GROUP BLD: NORMAL
ALBUMIN SERPL BCP-MCNC: 3.7 G/DL (ref 3.5–5)
ALP SERPL-CCNC: 75 U/L (ref 46–116)
ALT SERPL W P-5'-P-CCNC: 37 U/L (ref 12–78)
ANION GAP SERPL CALCULATED.3IONS-SCNC: 2 MMOL/L (ref 4–13)
APTT PPP: 30 SECONDS (ref 23–37)
AST SERPL W P-5'-P-CCNC: 33 U/L (ref 5–45)
BILIRUB SERPL-MCNC: 0.67 MG/DL (ref 0.2–1)
BLD GP AB SCN SERPL QL: NEGATIVE
BUN SERPL-MCNC: 19 MG/DL (ref 5–25)
CALCIUM SERPL-MCNC: 9.3 MG/DL (ref 8.3–10.1)
CHLORIDE SERPL-SCNC: 104 MMOL/L (ref 100–108)
CHOLEST SERPL-MCNC: 168 MG/DL
CO2 SERPL-SCNC: 29 MMOL/L (ref 21–32)
CREAT SERPL-MCNC: 0.94 MG/DL (ref 0.6–1.3)
ERYTHROCYTE [DISTWIDTH] IN BLOOD BY AUTOMATED COUNT: 13 % (ref 11.6–15.1)
EST. AVERAGE GLUCOSE BLD GHB EST-MCNC: 117 MG/DL
GFR SERPL CREATININE-BSD FRML MDRD: 71 ML/MIN/1.73SQ M
GLUCOSE P FAST SERPL-MCNC: 101 MG/DL (ref 65–99)
HBA1C MFR BLD: 5.7 %
HCT VFR BLD AUTO: 38.3 % (ref 36.5–49.3)
HDLC SERPL-MCNC: 45 MG/DL
HGB BLD-MCNC: 12.9 G/DL (ref 12–17)
INR PPP: 1.01 (ref 0.84–1.19)
LDLC SERPL CALC-MCNC: 106 MG/DL (ref 0–100)
MCH RBC QN AUTO: 31.4 PG (ref 26.8–34.3)
MCHC RBC AUTO-ENTMCNC: 33.7 G/DL (ref 31.4–37.4)
MCV RBC AUTO: 93 FL (ref 82–98)
NONHDLC SERPL-MCNC: 123 MG/DL
PLATELET # BLD AUTO: 225 THOUSANDS/UL (ref 149–390)
PMV BLD AUTO: 9.9 FL (ref 8.9–12.7)
POTASSIUM SERPL-SCNC: 4.2 MMOL/L (ref 3.5–5.3)
PROT SERPL-MCNC: 7.8 G/DL (ref 6.4–8.2)
PROTHROMBIN TIME: 12.9 SECONDS (ref 11.6–14.5)
RBC # BLD AUTO: 4.11 MILLION/UL (ref 3.88–5.62)
RH BLD: POSITIVE
SODIUM SERPL-SCNC: 135 MMOL/L (ref 136–145)
SPECIMEN EXPIRATION DATE: NORMAL
TRIGL SERPL-MCNC: 85 MG/DL
WBC # BLD AUTO: 4.26 THOUSAND/UL (ref 4.31–10.16)

## 2022-05-10 PROCEDURE — 87081 CULTURE SCREEN ONLY: CPT

## 2022-05-10 PROCEDURE — 80061 LIPID PANEL: CPT

## 2022-05-10 PROCEDURE — 85610 PROTHROMBIN TIME: CPT

## 2022-05-10 PROCEDURE — 83036 HEMOGLOBIN GLYCOSYLATED A1C: CPT

## 2022-05-10 PROCEDURE — 86850 RBC ANTIBODY SCREEN: CPT | Performed by: NURSE PRACTITIONER

## 2022-05-10 PROCEDURE — 86900 BLOOD TYPING SEROLOGIC ABO: CPT | Performed by: NURSE PRACTITIONER

## 2022-05-10 PROCEDURE — 85730 THROMBOPLASTIN TIME PARTIAL: CPT

## 2022-05-10 PROCEDURE — 86901 BLOOD TYPING SEROLOGIC RH(D): CPT | Performed by: NURSE PRACTITIONER

## 2022-05-10 PROCEDURE — 85027 COMPLETE CBC AUTOMATED: CPT

## 2022-05-10 PROCEDURE — 80053 COMPREHEN METABOLIC PANEL: CPT

## 2022-05-10 PROCEDURE — 36415 COLL VENOUS BLD VENIPUNCTURE: CPT

## 2022-05-11 LAB — MRSA NOSE QL CULT: NORMAL

## 2022-05-13 RX ORDER — HEPARIN SODIUM 1000 [USP'U]/ML
400 INJECTION, SOLUTION INTRAVENOUS; SUBCUTANEOUS ONCE
Status: CANCELLED | OUTPATIENT
Start: 2022-05-16

## 2022-05-13 RX ORDER — HEPARIN SODIUM 1000 [USP'U]/ML
10000 INJECTION, SOLUTION INTRAVENOUS; SUBCUTANEOUS ONCE
Status: CANCELLED | OUTPATIENT
Start: 2022-05-16

## 2022-05-13 NOTE — PRE-PROCEDURE INSTRUCTIONS
Pre-Surgery Instructions:   Medication Instructions    amLODIPine (NORVASC) 5 mg tablet optimized by cardio    aspirin (ECOTRIN LOW STRENGTH) 81 mg EC tablet optimized by cardio    clopidogrel (Plavix) 75 mg tablet Last dose 5/12; per cardio    Cyanocobalamin (VITAMIN B12) 1000 MCG TBCR Last dose 5/4; per cardio    Flaxseed, Linseed, (FLAXSEED OIL) 1000 MG CAPS last dose 5/4; per cardio    Glucosamine-Chondroit-Vit C-Mn (GLUCOSAMINE 1500 COMPLEX) CAPS last dose 5/4; per cardio    levothyroxine 100 mcg tablet optimized by cardio    metoprolol tartrate (LOPRESSOR) 100 mg tablet optimized by cardio    Multiple Vitamin (MULTIVITAMIN) capsule last dose 5/4; per cardio    mupirocin (BACTROBAN) 2 % nasal ointment optimized by cardio    Omega-3 Fatty Acids (FISH OIL) 1200 MG CAPS last dose 5/4; per cardio    tamsulosin (FLOMAX) 0 4 mg optimized by cardio     Pt's wife verbalizes understanding of the following:    - optimized by cardio  - Bathing instructions, has chg, neck down, no genitals  - No lotions, powders, sprays, deodorant, jewelry      - Bring list of meds with last dose noted  - [employer] cards & photo id

## 2022-05-16 ENCOUNTER — APPOINTMENT (INPATIENT)
Dept: RADIOLOGY | Facility: HOSPITAL | Age: 87
DRG: 219 | End: 2022-05-16
Payer: COMMERCIAL

## 2022-05-16 ENCOUNTER — ANESTHESIA (INPATIENT)
Dept: PERIOP | Facility: HOSPITAL | Age: 87
DRG: 219 | End: 2022-05-16
Payer: COMMERCIAL

## 2022-05-16 ENCOUNTER — APPOINTMENT (INPATIENT)
Dept: NON INVASIVE DIAGNOSTICS | Facility: HOSPITAL | Age: 87
DRG: 219 | End: 2022-05-16
Attending: THORACIC SURGERY (CARDIOTHORACIC VASCULAR SURGERY)
Payer: COMMERCIAL

## 2022-05-16 ENCOUNTER — ANESTHESIA EVENT (INPATIENT)
Dept: PERIOP | Facility: HOSPITAL | Age: 87
DRG: 219 | End: 2022-05-16
Payer: COMMERCIAL

## 2022-05-16 ENCOUNTER — HOSPITAL ENCOUNTER (INPATIENT)
Facility: HOSPITAL | Age: 87
LOS: 4 days | Discharge: HOME HEALTH | DRG: 219 | End: 2022-05-20
Attending: THORACIC SURGERY (CARDIOTHORACIC VASCULAR SURGERY) | Admitting: THORACIC SURGERY (CARDIOTHORACIC VASCULAR SURGERY)
Payer: COMMERCIAL

## 2022-05-16 DIAGNOSIS — I35.9 NONRHEUMATIC AORTIC VALVE DISORDER: ICD-10-CM

## 2022-05-16 DIAGNOSIS — Z95.2 S/P AVR: Primary | ICD-10-CM

## 2022-05-16 DIAGNOSIS — Z95.5 S/P DRUG ELUTING CORONARY STENT PLACEMENT: ICD-10-CM

## 2022-05-16 DIAGNOSIS — I35.0 SEVERE AORTIC STENOSIS: ICD-10-CM

## 2022-05-16 LAB
ABO GROUP BLD: NORMAL
ANION GAP SERPL CALCULATED.3IONS-SCNC: 6 MMOL/L (ref 4–13)
APTT PPP: 34 SECONDS (ref 23–37)
BASE EXCESS BLDA CALC-SCNC: -1 MMOL/L (ref -2–3)
BASE EXCESS BLDA CALC-SCNC: -1 MMOL/L (ref -2–3)
BASE EXCESS BLDA CALC-SCNC: -2 MMOL/L (ref -2–3)
BASE EXCESS BLDA CALC-SCNC: -4 MMOL/L (ref -2–3)
BASE EXCESS BLDA CALC-SCNC: 1 MMOL/L (ref -2–3)
BILIRUB UR QL STRIP: NEGATIVE
BUN SERPL-MCNC: 13 MG/DL (ref 5–25)
CA-I BLD-SCNC: 0.9 MMOL/L (ref 1.12–1.32)
CA-I BLD-SCNC: 0.97 MMOL/L (ref 1.12–1.32)
CA-I BLD-SCNC: 0.99 MMOL/L (ref 1.12–1.32)
CA-I BLD-SCNC: 1.11 MMOL/L (ref 1.12–1.32)
CA-I BLD-SCNC: 1.17 MMOL/L (ref 1.12–1.32)
CA-I BLD-SCNC: 1.21 MMOL/L (ref 1.12–1.32)
CA-I BLD-SCNC: 1.24 MMOL/L (ref 1.12–1.32)
CALCIUM SERPL-MCNC: 8.1 MG/DL (ref 8.3–10.1)
CHLORIDE SERPL-SCNC: 108 MMOL/L (ref 100–108)
CLARITY UR: CLEAR
CO2 SERPL-SCNC: 23 MMOL/L (ref 21–32)
COLOR UR: NORMAL
CREAT SERPL-MCNC: 0.83 MG/DL (ref 0.6–1.3)
FIBRINOGEN PPP-MCNC: 311 MG/DL (ref 227–495)
FIO2 GAS DIL.REBREATH: 60 L
GFR SERPL CREATININE-BSD FRML MDRD: 78 ML/MIN/1.73SQ M
GLUCOSE SERPL-MCNC: 103 MG/DL (ref 65–140)
GLUCOSE SERPL-MCNC: 103 MG/DL (ref 65–140)
GLUCOSE SERPL-MCNC: 109 MG/DL (ref 65–140)
GLUCOSE SERPL-MCNC: 111 MG/DL (ref 65–140)
GLUCOSE SERPL-MCNC: 117 MG/DL (ref 65–140)
GLUCOSE SERPL-MCNC: 128 MG/DL (ref 65–140)
GLUCOSE SERPL-MCNC: 138 MG/DL (ref 65–140)
GLUCOSE SERPL-MCNC: 156 MG/DL (ref 65–140)
GLUCOSE SERPL-MCNC: 170 MG/DL (ref 65–140)
GLUCOSE SERPL-MCNC: 202 MG/DL (ref 65–140)
GLUCOSE SERPL-MCNC: 218 MG/DL (ref 65–140)
GLUCOSE SERPL-MCNC: 220 MG/DL (ref 65–140)
GLUCOSE SERPL-MCNC: 225 MG/DL (ref 65–140)
GLUCOSE SERPL-MCNC: 94 MG/DL (ref 65–140)
GLUCOSE UR STRIP-MCNC: NEGATIVE MG/DL
HCO3 BLDA-SCNC: 22 MMOL/L (ref 22–28)
HCO3 BLDA-SCNC: 22.6 MMOL/L (ref 22–28)
HCO3 BLDA-SCNC: 23.5 MMOL/L (ref 24–30)
HCO3 BLDA-SCNC: 23.7 MMOL/L (ref 22–28)
HCO3 BLDA-SCNC: 25.4 MMOL/L (ref 22–28)
HCO3 BLDA-SCNC: 25.6 MMOL/L (ref 22–28)
HCO3 BLDA-SCNC: 26.4 MMOL/L (ref 24–30)
HCT VFR BLD AUTO: 28.1 % (ref 36.5–49.3)
HCT VFR BLD AUTO: 29.5 % (ref 36.5–49.3)
HCT VFR BLD CALC: 23 % (ref 36.5–49.3)
HCT VFR BLD CALC: 24 % (ref 36.5–49.3)
HCT VFR BLD CALC: 25 % (ref 36.5–49.3)
HCT VFR BLD CALC: 26 % (ref 36.5–49.3)
HCT VFR BLD CALC: 26 % (ref 36.5–49.3)
HCT VFR BLD CALC: 31 % (ref 36.5–49.3)
HCT VFR BLD CALC: 34 % (ref 36.5–49.3)
HGB BLD-MCNC: 10.1 G/DL (ref 12–17)
HGB BLD-MCNC: 9.5 G/DL (ref 12–17)
HGB BLDA-MCNC: 10.5 G/DL (ref 12–17)
HGB BLDA-MCNC: 11.6 G/DL (ref 12–17)
HGB BLDA-MCNC: 7.8 G/DL (ref 12–17)
HGB BLDA-MCNC: 8.2 G/DL (ref 12–17)
HGB BLDA-MCNC: 8.5 G/DL (ref 12–17)
HGB BLDA-MCNC: 8.8 G/DL (ref 12–17)
HGB BLDA-MCNC: 8.8 G/DL (ref 12–17)
HGB UR QL STRIP.AUTO: NEGATIVE
INR PPP: 1.29 (ref 0.84–1.19)
KCT BLD-ACNC: 126 SEC (ref 89–137)
KCT BLD-ACNC: 132 SEC (ref 89–137)
KCT BLD-ACNC: 478 SEC (ref 89–137)
KCT BLD-ACNC: 485 SEC (ref 89–137)
KCT BLD-ACNC: 492 SEC (ref 89–137)
KCT BLD-ACNC: 558 SEC (ref 89–137)
KCT BLD-ACNC: 571 SEC (ref 89–137)
KETONES UR STRIP-MCNC: NEGATIVE MG/DL
LEUKOCYTE ESTERASE UR QL STRIP: NEGATIVE
NITRITE UR QL STRIP: NEGATIVE
PCO2 BLD: 23 MMOL/L (ref 21–32)
PCO2 BLD: 24 MMOL/L (ref 21–32)
PCO2 BLD: 25 MMOL/L (ref 21–32)
PCO2 BLD: 25 MMOL/L (ref 21–32)
PCO2 BLD: 27 MMOL/L (ref 21–32)
PCO2 BLD: 27 MMOL/L (ref 21–32)
PCO2 BLD: 28 MMOL/L (ref 21–32)
PCO2 BLD: 37.1 MM HG (ref 36–44)
PCO2 BLD: 37.8 MM HG (ref 42–50)
PCO2 BLD: 39.1 MM HG (ref 36–44)
PCO2 BLD: 39.8 MM HG (ref 36–44)
PCO2 BLD: 40.4 MM HG (ref 36–44)
PCO2 BLD: 43.8 MM HG (ref 36–44)
PCO2 BLD: 46.4 MM HG (ref 42–50)
PH BLD: 7.31 [PH] (ref 7.35–7.45)
PH BLD: 7.36 [PH] (ref 7.3–7.4)
PH BLD: 7.39 [PH] (ref 7.35–7.45)
PH BLD: 7.39 [PH] (ref 7.35–7.45)
PH BLD: 7.4 [PH] (ref 7.3–7.4)
PH BLD: 7.41 [PH] (ref 7.35–7.45)
PH BLD: 7.41 [PH] (ref 7.35–7.45)
PH UR STRIP.AUTO: 6 [PH]
PLATELET # BLD AUTO: 165 THOUSANDS/UL (ref 149–390)
PLATELET # BLD AUTO: 175 THOUSANDS/UL (ref 149–390)
PMV BLD AUTO: 9.5 FL (ref 8.9–12.7)
PMV BLD AUTO: 9.7 FL (ref 8.9–12.7)
PO2 BLD: 333 MM HG (ref 75–129)
PO2 BLD: 366 MM HG (ref 75–129)
PO2 BLD: 372 MM HG (ref 75–129)
PO2 BLD: 40 MM HG (ref 35–45)
PO2 BLD: 50 MM HG (ref 35–45)
PO2 BLD: 98 MM HG (ref 75–129)
PO2 BLD: >400 MM HG (ref 75–129)
POTASSIUM BLD-SCNC: 3.7 MMOL/L (ref 3.5–5.3)
POTASSIUM BLD-SCNC: 4.1 MMOL/L (ref 3.5–5.3)
POTASSIUM BLD-SCNC: 4.2 MMOL/L (ref 3.5–5.3)
POTASSIUM BLD-SCNC: 4.3 MMOL/L (ref 3.5–5.3)
POTASSIUM BLD-SCNC: 4.3 MMOL/L (ref 3.5–5.3)
POTASSIUM BLD-SCNC: 4.5 MMOL/L (ref 3.5–5.3)
POTASSIUM BLD-SCNC: 5.6 MMOL/L (ref 3.5–5.3)
POTASSIUM SERPL-SCNC: 3.7 MMOL/L (ref 3.5–5.3)
POTASSIUM SERPL-SCNC: 4.3 MMOL/L (ref 3.5–5.3)
PROT UR STRIP-MCNC: NEGATIVE MG/DL
PROTHROMBIN TIME: 15.6 SECONDS (ref 11.6–14.5)
RH BLD: POSITIVE
SAO2 % BLD FROM PO2: 100 % (ref 60–85)
SAO2 % BLD FROM PO2: 73 % (ref 60–85)
SAO2 % BLD FROM PO2: 85 % (ref 60–85)
SAO2 % BLD FROM PO2: 97 % (ref 60–85)
SODIUM BLD-SCNC: 131 MMOL/L (ref 136–145)
SODIUM BLD-SCNC: 133 MMOL/L (ref 136–145)
SODIUM BLD-SCNC: 133 MMOL/L (ref 136–145)
SODIUM BLD-SCNC: 134 MMOL/L (ref 136–145)
SODIUM BLD-SCNC: 135 MMOL/L (ref 136–145)
SODIUM BLD-SCNC: 135 MMOL/L (ref 136–145)
SODIUM BLD-SCNC: 137 MMOL/L (ref 136–145)
SODIUM SERPL-SCNC: 137 MMOL/L (ref 136–145)
SP GR UR STRIP.AUTO: 1.02 (ref 1–1.03)
SPECIMEN SOURCE: ABNORMAL
SPECIMEN SOURCE: NORMAL
SPECIMEN SOURCE: NORMAL
UROBILINOGEN UR STRIP-ACNC: <2 MG/DL

## 2022-05-16 PROCEDURE — 88313 SPECIAL STAINS GROUP 2: CPT | Performed by: PATHOLOGY

## 2022-05-16 PROCEDURE — 30233R1 TRANSFUSION OF NONAUTOLOGOUS PLATELETS INTO PERIPHERAL VEIN, PERCUTANEOUS APPROACH: ICD-10-PCS | Performed by: THORACIC SURGERY (CARDIOTHORACIC VASCULAR SURGERY)

## 2022-05-16 PROCEDURE — 94760 N-INVAS EAR/PLS OXIMETRY 1: CPT

## 2022-05-16 PROCEDURE — 5A1223Z PERFORMANCE OF CARDIAC PACING, CONTINUOUS: ICD-10-PCS | Performed by: THORACIC SURGERY (CARDIOTHORACIC VASCULAR SURGERY)

## 2022-05-16 PROCEDURE — 99291 CRITICAL CARE FIRST HOUR: CPT | Performed by: STUDENT IN AN ORGANIZED HEALTH CARE EDUCATION/TRAINING PROGRAM

## 2022-05-16 PROCEDURE — 71045 X-RAY EXAM CHEST 1 VIEW: CPT

## 2022-05-16 PROCEDURE — 88311 DECALCIFY TISSUE: CPT | Performed by: PATHOLOGY

## 2022-05-16 PROCEDURE — 33405 REPLACEMENT AORTIC VALVE OPN: CPT | Performed by: PHYSICIAN ASSISTANT

## 2022-05-16 PROCEDURE — 82803 BLOOD GASES ANY COMBINATION: CPT

## 2022-05-16 PROCEDURE — 86901 BLOOD TYPING SEROLOGIC RH(D): CPT | Performed by: THORACIC SURGERY (CARDIOTHORACIC VASCULAR SURGERY)

## 2022-05-16 PROCEDURE — 85347 COAGULATION TIME ACTIVATED: CPT

## 2022-05-16 PROCEDURE — 85018 HEMOGLOBIN: CPT | Performed by: PHYSICIAN ASSISTANT

## 2022-05-16 PROCEDURE — 85384 FIBRINOGEN ACTIVITY: CPT | Performed by: PHYSICIAN ASSISTANT

## 2022-05-16 PROCEDURE — C1781 MESH (IMPLANTABLE): HCPCS | Performed by: THORACIC SURGERY (CARDIOTHORACIC VASCULAR SURGERY)

## 2022-05-16 PROCEDURE — 84132 ASSAY OF SERUM POTASSIUM: CPT

## 2022-05-16 PROCEDURE — 93005 ELECTROCARDIOGRAM TRACING: CPT

## 2022-05-16 PROCEDURE — 02HV33Z INSERTION OF INFUSION DEVICE INTO SUPERIOR VENA CAVA, PERCUTANEOUS APPROACH: ICD-10-PCS | Performed by: ANESTHESIOLOGY

## 2022-05-16 PROCEDURE — 84295 ASSAY OF SERUM SODIUM: CPT

## 2022-05-16 PROCEDURE — 02RF08Z REPLACEMENT OF AORTIC VALVE WITH ZOOPLASTIC TISSUE, OPEN APPROACH: ICD-10-PCS | Performed by: THORACIC SURGERY (CARDIOTHORACIC VASCULAR SURGERY)

## 2022-05-16 PROCEDURE — 82330 ASSAY OF CALCIUM: CPT

## 2022-05-16 PROCEDURE — 86920 COMPATIBILITY TEST SPIN: CPT

## 2022-05-16 PROCEDURE — 88305 TISSUE EXAM BY PATHOLOGIST: CPT | Performed by: PATHOLOGY

## 2022-05-16 PROCEDURE — 85014 HEMATOCRIT: CPT

## 2022-05-16 PROCEDURE — 85049 AUTOMATED PLATELET COUNT: CPT | Performed by: PHYSICIAN ASSISTANT

## 2022-05-16 PROCEDURE — 85610 PROTHROMBIN TIME: CPT | Performed by: PHYSICIAN ASSISTANT

## 2022-05-16 PROCEDURE — 94002 VENT MGMT INPAT INIT DAY: CPT

## 2022-05-16 PROCEDURE — 85049 AUTOMATED PLATELET COUNT: CPT | Performed by: THORACIC SURGERY (CARDIOTHORACIC VASCULAR SURGERY)

## 2022-05-16 PROCEDURE — 86900 BLOOD TYPING SEROLOGIC ABO: CPT | Performed by: THORACIC SURGERY (CARDIOTHORACIC VASCULAR SURGERY)

## 2022-05-16 PROCEDURE — 33405 REPLACEMENT AORTIC VALVE OPN: CPT | Performed by: THORACIC SURGERY (CARDIOTHORACIC VASCULAR SURGERY)

## 2022-05-16 PROCEDURE — 82948 REAGENT STRIP/BLOOD GLUCOSE: CPT

## 2022-05-16 PROCEDURE — P9035 PLATELET PHERES LEUKOREDUCED: HCPCS

## 2022-05-16 PROCEDURE — C1894 INTRO/SHEATH, NON-LASER: HCPCS | Performed by: THORACIC SURGERY (CARDIOTHORACIC VASCULAR SURGERY)

## 2022-05-16 PROCEDURE — 93355 ECHO TRANSESOPHAGEAL (TEE): CPT

## 2022-05-16 PROCEDURE — 84132 ASSAY OF SERUM POTASSIUM: CPT | Performed by: PHYSICIAN ASSISTANT

## 2022-05-16 PROCEDURE — 85014 HEMATOCRIT: CPT | Performed by: PHYSICIAN ASSISTANT

## 2022-05-16 PROCEDURE — 81003 URINALYSIS AUTO W/O SCOPE: CPT | Performed by: PHYSICIAN ASSISTANT

## 2022-05-16 PROCEDURE — 5A1221Z PERFORMANCE OF CARDIAC OUTPUT, CONTINUOUS: ICD-10-PCS | Performed by: THORACIC SURGERY (CARDIOTHORACIC VASCULAR SURGERY)

## 2022-05-16 PROCEDURE — 85730 THROMBOPLASTIN TIME PARTIAL: CPT | Performed by: PHYSICIAN ASSISTANT

## 2022-05-16 PROCEDURE — 82947 ASSAY GLUCOSE BLOOD QUANT: CPT

## 2022-05-16 PROCEDURE — 80048 BASIC METABOLIC PNL TOTAL CA: CPT | Performed by: PHYSICIAN ASSISTANT

## 2022-05-16 DEVICE — VALVE AORTIC MAGNA EASE 25MM: Type: IMPLANTABLE DEVICE | Site: HEART | Status: FUNCTIONAL

## 2022-05-16 RX ORDER — ALBUMIN, HUMAN INJ 5% 5 %
12.5 SOLUTION INTRAVENOUS ONCE
Status: COMPLETED | OUTPATIENT
Start: 2022-05-16 | End: 2022-05-16

## 2022-05-16 RX ORDER — OXYCODONE HYDROCHLORIDE 5 MG/1
5 TABLET ORAL EVERY 4 HOURS PRN
Status: DISCONTINUED | OUTPATIENT
Start: 2022-05-16 | End: 2022-05-16

## 2022-05-16 RX ORDER — LIDOCAINE HCL/PF 100 MG/5ML
100 SYRINGE (ML) INJECTION
Status: DISCONTINUED | OUTPATIENT
Start: 2022-05-16 | End: 2022-05-17

## 2022-05-16 RX ORDER — SODIUM CHLORIDE 450 MG/100ML
20 INJECTION, SOLUTION INTRAVENOUS CONTINUOUS
Status: DISCONTINUED | OUTPATIENT
Start: 2022-05-16 | End: 2022-05-17

## 2022-05-16 RX ORDER — CEFAZOLIN SODIUM 2 G/50ML
2000 SOLUTION INTRAVENOUS ONCE
Status: DISCONTINUED | OUTPATIENT
Start: 2022-05-16 | End: 2022-05-16

## 2022-05-16 RX ORDER — POTASSIUM CHLORIDE 14.9 MG/ML
20 INJECTION INTRAVENOUS
Status: DISCONTINUED | OUTPATIENT
Start: 2022-05-16 | End: 2022-05-17

## 2022-05-16 RX ORDER — CALCIUM CHLORIDE 100 MG/ML
1 INJECTION INTRAVENOUS; INTRAVENTRICULAR ONCE
Status: DISCONTINUED | OUTPATIENT
Start: 2022-05-16 | End: 2022-05-16

## 2022-05-16 RX ORDER — BISACODYL 10 MG
10 SUPPOSITORY, RECTAL RECTAL DAILY PRN
Status: DISCONTINUED | OUTPATIENT
Start: 2022-05-16 | End: 2022-05-20 | Stop reason: HOSPADM

## 2022-05-16 RX ORDER — CHLORHEXIDINE GLUCONATE 0.12 MG/ML
15 RINSE ORAL 2 TIMES DAILY
Status: DISCONTINUED | OUTPATIENT
Start: 2022-05-16 | End: 2022-05-16

## 2022-05-16 RX ORDER — PANTOPRAZOLE SODIUM 40 MG/1
40 TABLET, DELAYED RELEASE ORAL
Status: DISCONTINUED | OUTPATIENT
Start: 2022-05-17 | End: 2022-05-20 | Stop reason: HOSPADM

## 2022-05-16 RX ORDER — TAMSULOSIN HYDROCHLORIDE 0.4 MG/1
0.4 CAPSULE ORAL
Status: DISCONTINUED | OUTPATIENT
Start: 2022-05-16 | End: 2022-05-20 | Stop reason: HOSPADM

## 2022-05-16 RX ORDER — CEFAZOLIN SODIUM 2 G/50ML
SOLUTION INTRAVENOUS AS NEEDED
Status: DISCONTINUED | OUTPATIENT
Start: 2022-05-16 | End: 2022-05-16

## 2022-05-16 RX ORDER — AMINOCAPROIC ACID 250 MG/ML
INJECTION, SOLUTION INTRAVENOUS AS NEEDED
Status: DISCONTINUED | OUTPATIENT
Start: 2022-05-16 | End: 2022-05-16

## 2022-05-16 RX ORDER — HEPARIN SODIUM 1000 [USP'U]/ML
INJECTION, SOLUTION INTRAVENOUS; SUBCUTANEOUS AS NEEDED
Status: DISCONTINUED | OUTPATIENT
Start: 2022-05-16 | End: 2022-05-16

## 2022-05-16 RX ORDER — ALBUMIN, HUMAN INJ 5% 5 %
SOLUTION INTRAVENOUS
Status: DISPENSED
Start: 2022-05-16 | End: 2022-05-17

## 2022-05-16 RX ORDER — ACETAMINOPHEN 325 MG/1
975 TABLET ORAL EVERY 8 HOURS
Status: DISCONTINUED | OUTPATIENT
Start: 2022-05-16 | End: 2022-05-17

## 2022-05-16 RX ORDER — FENTANYL CITRATE 50 UG/ML
50 INJECTION, SOLUTION INTRAMUSCULAR; INTRAVENOUS ONCE
Status: COMPLETED | OUTPATIENT
Start: 2022-05-16 | End: 2022-05-16

## 2022-05-16 RX ORDER — ACETAMINOPHEN 650 MG/1
650 SUPPOSITORY RECTAL EVERY 4 HOURS PRN
Status: DISCONTINUED | OUTPATIENT
Start: 2022-05-16 | End: 2022-05-16

## 2022-05-16 RX ORDER — ALBUMIN, HUMAN INJ 5% 5 %
SOLUTION INTRAVENOUS
Status: COMPLETED
Start: 2022-05-16 | End: 2022-05-16

## 2022-05-16 RX ORDER — DEXMEDETOMIDINE HYDROCHLORIDE 4 UG/ML
.1-.7 INJECTION, SOLUTION INTRAVENOUS
Status: DISCONTINUED | OUTPATIENT
Start: 2022-05-16 | End: 2022-05-16

## 2022-05-16 RX ORDER — HYDROMORPHONE HCL/PF 1 MG/ML
0.5 SYRINGE (ML) INJECTION EVERY 2 HOUR PRN
Status: DISCONTINUED | OUTPATIENT
Start: 2022-05-16 | End: 2022-05-17

## 2022-05-16 RX ORDER — ONDANSETRON 2 MG/ML
INJECTION INTRAMUSCULAR; INTRAVENOUS AS NEEDED
Status: DISCONTINUED | OUTPATIENT
Start: 2022-05-16 | End: 2022-05-16

## 2022-05-16 RX ORDER — POLYETHYLENE GLYCOL 3350 17 G/17G
17 POWDER, FOR SOLUTION ORAL DAILY
Status: DISCONTINUED | OUTPATIENT
Start: 2022-05-17 | End: 2022-05-20 | Stop reason: HOSPADM

## 2022-05-16 RX ORDER — PROPOFOL 10 MG/ML
INJECTION, EMULSION INTRAVENOUS AS NEEDED
Status: DISCONTINUED | OUTPATIENT
Start: 2022-05-16 | End: 2022-05-16

## 2022-05-16 RX ORDER — MIDAZOLAM HYDROCHLORIDE 2 MG/2ML
INJECTION, SOLUTION INTRAMUSCULAR; INTRAVENOUS AS NEEDED
Status: DISCONTINUED | OUTPATIENT
Start: 2022-05-16 | End: 2022-05-16

## 2022-05-16 RX ORDER — CEFAZOLIN SODIUM 2 G/50ML
2000 SOLUTION INTRAVENOUS EVERY 8 HOURS
Status: COMPLETED | OUTPATIENT
Start: 2022-05-16 | End: 2022-05-17

## 2022-05-16 RX ORDER — CLOPIDOGREL BISULFATE 75 MG/1
75 TABLET ORAL DAILY
Status: DISCONTINUED | OUTPATIENT
Start: 2022-05-17 | End: 2022-05-20 | Stop reason: HOSPADM

## 2022-05-16 RX ORDER — VASOPRESSIN 20 U/ML
INJECTION PARENTERAL AS NEEDED
Status: DISCONTINUED | OUTPATIENT
Start: 2022-05-16 | End: 2022-05-16

## 2022-05-16 RX ORDER — NEOSTIGMINE METHYLSULFATE 1 MG/ML
INJECTION INTRAVENOUS AS NEEDED
Status: DISCONTINUED | OUTPATIENT
Start: 2022-05-16 | End: 2022-05-16

## 2022-05-16 RX ORDER — FENTANYL CITRATE 50 UG/ML
50 INJECTION, SOLUTION INTRAMUSCULAR; INTRAVENOUS
Status: DISCONTINUED | OUTPATIENT
Start: 2022-05-16 | End: 2022-05-16

## 2022-05-16 RX ORDER — METOPROLOL TARTRATE 5 MG/5ML
INJECTION INTRAVENOUS AS NEEDED
Status: DISCONTINUED | OUTPATIENT
Start: 2022-05-16 | End: 2022-05-16

## 2022-05-16 RX ORDER — PROTAMINE SULFATE 10 MG/ML
INJECTION, SOLUTION INTRAVENOUS AS NEEDED
Status: DISCONTINUED | OUTPATIENT
Start: 2022-05-16 | End: 2022-05-16

## 2022-05-16 RX ORDER — FONDAPARINUX SODIUM 2.5 MG/.5ML
2.5 INJECTION SUBCUTANEOUS DAILY
Status: DISCONTINUED | OUTPATIENT
Start: 2022-05-17 | End: 2022-05-20 | Stop reason: HOSPADM

## 2022-05-16 RX ORDER — AMIODARONE HYDROCHLORIDE 200 MG/1
200 TABLET ORAL EVERY 8 HOURS SCHEDULED
Status: DISCONTINUED | OUTPATIENT
Start: 2022-05-16 | End: 2022-05-19

## 2022-05-16 RX ORDER — VANCOMYCIN HYDROCHLORIDE 1 G/20ML
INJECTION, POWDER, LYOPHILIZED, FOR SOLUTION INTRAVENOUS AS NEEDED
Status: DISCONTINUED | OUTPATIENT
Start: 2022-05-16 | End: 2022-05-16 | Stop reason: HOSPADM

## 2022-05-16 RX ORDER — LEVOTHYROXINE SODIUM 0.1 MG/1
100 TABLET ORAL
Status: DISCONTINUED | OUTPATIENT
Start: 2022-05-17 | End: 2022-05-20 | Stop reason: HOSPADM

## 2022-05-16 RX ORDER — ATORVASTATIN CALCIUM 80 MG/1
80 TABLET, FILM COATED ORAL
Status: DISCONTINUED | OUTPATIENT
Start: 2022-05-16 | End: 2022-05-20 | Stop reason: HOSPADM

## 2022-05-16 RX ORDER — FUROSEMIDE 10 MG/ML
40 INJECTION INTRAMUSCULAR; INTRAVENOUS EVERY 6 HOURS PRN
Status: DISCONTINUED | OUTPATIENT
Start: 2022-05-16 | End: 2022-05-17

## 2022-05-16 RX ORDER — MAGNESIUM HYDROXIDE 1200 MG/15ML
LIQUID ORAL AS NEEDED
Status: DISCONTINUED | OUTPATIENT
Start: 2022-05-16 | End: 2022-05-16 | Stop reason: HOSPADM

## 2022-05-16 RX ORDER — OXYCODONE HYDROCHLORIDE 10 MG/1
10 TABLET ORAL EVERY 4 HOURS PRN
Status: DISCONTINUED | OUTPATIENT
Start: 2022-05-16 | End: 2022-05-17

## 2022-05-16 RX ORDER — ONDANSETRON 2 MG/ML
4 INJECTION INTRAMUSCULAR; INTRAVENOUS EVERY 6 HOURS PRN
Status: DISCONTINUED | OUTPATIENT
Start: 2022-05-16 | End: 2022-05-17

## 2022-05-16 RX ORDER — ROCURONIUM BROMIDE 10 MG/ML
INJECTION, SOLUTION INTRAVENOUS AS NEEDED
Status: DISCONTINUED | OUTPATIENT
Start: 2022-05-16 | End: 2022-05-16

## 2022-05-16 RX ORDER — GLYCOPYRROLATE 0.2 MG/ML
INJECTION INTRAMUSCULAR; INTRAVENOUS AS NEEDED
Status: DISCONTINUED | OUTPATIENT
Start: 2022-05-16 | End: 2022-05-16

## 2022-05-16 RX ORDER — ALBUMIN, HUMAN INJ 5% 5 %
25 SOLUTION INTRAVENOUS ONCE
Status: COMPLETED | OUTPATIENT
Start: 2022-05-16 | End: 2022-05-16

## 2022-05-16 RX ORDER — POTASSIUM CHLORIDE 14.9 MG/ML
20 INJECTION INTRAVENOUS ONCE AS NEEDED
Status: DISCONTINUED | OUTPATIENT
Start: 2022-05-16 | End: 2022-05-17

## 2022-05-16 RX ORDER — CHLORHEXIDINE GLUCONATE 0.12 MG/ML
15 RINSE ORAL ONCE
Status: COMPLETED | OUTPATIENT
Start: 2022-05-16 | End: 2022-05-16

## 2022-05-16 RX ORDER — SODIUM CHLORIDE 9 MG/ML
INJECTION, SOLUTION INTRAVENOUS CONTINUOUS PRN
Status: DISCONTINUED | OUTPATIENT
Start: 2022-05-16 | End: 2022-05-16

## 2022-05-16 RX ORDER — MAGNESIUM SULFATE HEPTAHYDRATE 40 MG/ML
2 INJECTION, SOLUTION INTRAVENOUS ONCE
Status: COMPLETED | OUTPATIENT
Start: 2022-05-16 | End: 2022-05-16

## 2022-05-16 RX ORDER — FENTANYL CITRATE 50 UG/ML
INJECTION, SOLUTION INTRAMUSCULAR; INTRAVENOUS AS NEEDED
Status: DISCONTINUED | OUTPATIENT
Start: 2022-05-16 | End: 2022-05-16

## 2022-05-16 RX ORDER — OXYCODONE HYDROCHLORIDE 5 MG/1
2.5 TABLET ORAL EVERY 4 HOURS PRN
Status: DISCONTINUED | OUTPATIENT
Start: 2022-05-16 | End: 2022-05-16

## 2022-05-16 RX ORDER — ASPIRIN 81 MG/1
81 TABLET, CHEWABLE ORAL DAILY
Status: DISCONTINUED | OUTPATIENT
Start: 2022-05-16 | End: 2022-05-20 | Stop reason: HOSPADM

## 2022-05-16 RX ORDER — OXYCODONE HYDROCHLORIDE 5 MG/1
5 TABLET ORAL EVERY 4 HOURS PRN
Status: DISCONTINUED | OUTPATIENT
Start: 2022-05-16 | End: 2022-05-17

## 2022-05-16 RX ADMIN — MIDAZOLAM 2 MG: 1 INJECTION INTRAMUSCULAR; INTRAVENOUS at 12:55

## 2022-05-16 RX ADMIN — HEPARIN SODIUM 29900 UNITS: 1000 INJECTION INTRAVENOUS; SUBCUTANEOUS at 12:38

## 2022-05-16 RX ADMIN — SODIUM CHLORIDE 20 ML/HR: 0.45 INJECTION, SOLUTION INTRAVENOUS at 15:23

## 2022-05-16 RX ADMIN — ALBUMIN, HUMAN INJ 5% 12.5 G: 5 SOLUTION at 22:20

## 2022-05-16 RX ADMIN — FENTANYL CITRATE 250 MCG: 50 INJECTION, SOLUTION INTRAMUSCULAR; INTRAVENOUS at 11:53

## 2022-05-16 RX ADMIN — POTASSIUM CHLORIDE 20 MEQ: 14.9 INJECTION, SOLUTION INTRAVENOUS at 21:08

## 2022-05-16 RX ADMIN — ACETAMINOPHEN 975 MG: 325 TABLET, FILM COATED ORAL at 23:06

## 2022-05-16 RX ADMIN — MIDAZOLAM 2 MG: 1 INJECTION INTRAMUSCULAR; INTRAVENOUS at 11:48

## 2022-05-16 RX ADMIN — DEXMEDETOMIDINE HYDROCHLORIDE 0.2 MCG/KG/HR: 400 INJECTION INTRAVENOUS at 15:36

## 2022-05-16 RX ADMIN — ONDANSETRON 4 MG: 2 INJECTION INTRAMUSCULAR; INTRAVENOUS at 14:15

## 2022-05-16 RX ADMIN — PROPOFOL 100 MG: 10 INJECTION, EMULSION INTRAVENOUS at 11:53

## 2022-05-16 RX ADMIN — GLYCOPYRROLATE 0.4 MG: 0.2 INJECTION, SOLUTION INTRAMUSCULAR; INTRAVENOUS at 14:15

## 2022-05-16 RX ADMIN — NICARDIPINE HYDROCHLORIDE 2.5 MG/HR: 2.5 INJECTION, SOLUTION INTRAVENOUS at 15:27

## 2022-05-16 RX ADMIN — SODIUM CHLORIDE, SODIUM LACTATE, POTASSIUM CHLORIDE, AND CALCIUM CHLORIDE 500 ML: .6; .31; .03; .02 INJECTION, SOLUTION INTRAVENOUS at 15:20

## 2022-05-16 RX ADMIN — CEFAZOLIN SODIUM 2000 MG: 2 SOLUTION INTRAVENOUS at 12:20

## 2022-05-16 RX ADMIN — CHLORHEXIDINE GLUCONATE 15 ML: 1.2 SOLUTION ORAL at 10:52

## 2022-05-16 RX ADMIN — AMINOCAPROIC ACID 7.5 G: 250 INJECTION, SOLUTION INTRAVENOUS at 14:20

## 2022-05-16 RX ADMIN — PROTAMINE SULFATE 10 MG: 10 INJECTION, SOLUTION INTRAVENOUS at 13:53

## 2022-05-16 RX ADMIN — FENTANYL CITRATE 50 MCG: 50 INJECTION, SOLUTION INTRAMUSCULAR; INTRAVENOUS at 15:23

## 2022-05-16 RX ADMIN — PHENYLEPHRINE HYDROCHLORIDE 50 MCG/MIN: 50 INJECTION INTRAVENOUS at 15:24

## 2022-05-16 RX ADMIN — ALBUMIN, HUMAN INJ 5% 25 G: 5 SOLUTION at 17:14

## 2022-05-16 RX ADMIN — VASOPRESSIN 1 UNITS: 20 INJECTION INTRAVENOUS at 14:00

## 2022-05-16 RX ADMIN — ALBUMIN (HUMAN) 25 G: 12.5 INJECTION, SOLUTION INTRAVENOUS at 17:14

## 2022-05-16 RX ADMIN — OXYCODONE HYDROCHLORIDE 10 MG: 10 TABLET ORAL at 21:25

## 2022-05-16 RX ADMIN — FENTANYL CITRATE 250 MCG: 50 INJECTION, SOLUTION INTRAMUSCULAR; INTRAVENOUS at 12:55

## 2022-05-16 RX ADMIN — SODIUM CHLORIDE, SODIUM LACTATE, POTASSIUM CHLORIDE, AND CALCIUM CHLORIDE 500 ML: .6; .31; .03; .02 INJECTION, SOLUTION INTRAVENOUS at 16:11

## 2022-05-16 RX ADMIN — TAMSULOSIN HYDROCHLORIDE 0.4 MG: 0.4 CAPSULE ORAL at 18:18

## 2022-05-16 RX ADMIN — CEFAZOLIN SODIUM 2000 MG: 2 SOLUTION INTRAVENOUS at 21:05

## 2022-05-16 RX ADMIN — SODIUM CHLORIDE 5 UNITS/HR: 9 INJECTION, SOLUTION INTRAVENOUS at 15:14

## 2022-05-16 RX ADMIN — CEFAZOLIN SODIUM 2000 MG: 2 SOLUTION INTRAVENOUS at 14:08

## 2022-05-16 RX ADMIN — MUPIROCIN 1 APPLICATION: 20 OINTMENT TOPICAL at 21:07

## 2022-05-16 RX ADMIN — MAGNESIUM SULFATE HEPTAHYDRATE 2 G: 40 INJECTION, SOLUTION INTRAVENOUS at 15:25

## 2022-05-16 RX ADMIN — AMINOCAPROIC ACID 1 G/HR: 250 INJECTION, SOLUTION INTRAVENOUS at 15:35

## 2022-05-16 RX ADMIN — PHENYLEPHRINE HYDROCHLORIDE 100 MCG/MIN: 10 INJECTION INTRAVENOUS at 14:10

## 2022-05-16 RX ADMIN — HYDROMORPHONE HYDROCHLORIDE 0.5 MG: 1 INJECTION, SOLUTION INTRAMUSCULAR; INTRAVENOUS; SUBCUTANEOUS at 18:17

## 2022-05-16 RX ADMIN — MUPIROCIN 1 APPLICATION: 20 OINTMENT TOPICAL at 10:52

## 2022-05-16 RX ADMIN — OXYCODONE HYDROCHLORIDE 5 MG: 5 TABLET ORAL at 17:33

## 2022-05-16 RX ADMIN — NEOSTIGMINE 3 MG: 1 INJECTION INTRAVENOUS at 14:15

## 2022-05-16 RX ADMIN — AMIODARONE HYDROCHLORIDE 200 MG: 200 TABLET ORAL at 21:07

## 2022-05-16 RX ADMIN — METOPROLOL TARTRATE 2.5 MG: 5 INJECTION, SOLUTION INTRAVENOUS at 11:45

## 2022-05-16 RX ADMIN — FENTANYL CITRATE 50 MCG: 50 INJECTION, SOLUTION INTRAMUSCULAR; INTRAVENOUS at 15:14

## 2022-05-16 RX ADMIN — HYDROMORPHONE HYDROCHLORIDE 0.5 MG: 1 INJECTION, SOLUTION INTRAMUSCULAR; INTRAVENOUS; SUBCUTANEOUS at 20:03

## 2022-05-16 RX ADMIN — VASOPRESSIN 2 UNITS: 20 INJECTION INTRAVENOUS at 13:58

## 2022-05-16 RX ADMIN — SODIUM CHLORIDE: 0.9 INJECTION, SOLUTION INTRAVENOUS at 11:52

## 2022-05-16 RX ADMIN — ROCURONIUM BROMIDE 100 MG: 50 INJECTION INTRAVENOUS at 11:53

## 2022-05-16 RX ADMIN — ALBUMIN (HUMAN) 12.5 G: 12.5 INJECTION, SOLUTION INTRAVENOUS at 22:20

## 2022-05-16 NOTE — INTERVAL H&P NOTE
H&P reviewed  After examining the patient I find no changes in the patients condition since the H&P had been written  Vitals:    05/16/22 1009   BP: 143/90   Pulse: 77   Resp: 20   Temp: 97 5 °F (36 4 °C)   SpO2: 97%     Anticipated Length of Stay:  Patient will be admitted on an Inpatient basis with an anticipated length of stay of  greater than 2 midnights  Justification for Hospital Stay:  Post surgical recovery following open heart surgery

## 2022-05-16 NOTE — ANESTHESIA PROCEDURE NOTES
Arterial Line Insertion    Date/Time: 5/16/2022 12:36 PM  Performed by: Rama Verma MD  Authorized by: Rama Verma MD   Consent: Written consent obtained  Risks and benefits: risks, benefits and alternatives were discussed  Consent given by: patient  Patient understanding: patient states understanding of the procedure being performed  Patient consent: the patient's understanding of the procedure matches consent given  Patient identity confirmed: verbally with patient and arm band  Preparation: Patient was prepped and draped in the usual sterile fashion  Indications: hemodynamic monitoring    Procedure Details:  Needle gauge: 20  Seldinger technique: Seldinger technique used  Number of attempts: 1    Post-procedure:  Post-procedure: dressing applied  Waveform: good waveform  Post-procedure CNS: normal and unchanged  Patient tolerance: Patient tolerated the procedure well with no immediate complications  Comments: Single skin and vessel puncture  Easy thread of wires  No apparent complications

## 2022-05-16 NOTE — ANESTHESIA POSTPROCEDURE EVALUATION
Post-Op Assessment Note    CV Status:  Stable       Hydration Status:  Stable   PONV Controlled:  None   Airway Patency:  Patent  Airway: intubated   There is a medical reason for not screening for obstructive sleep apnea and/or for not using two or more mitigation strategies   Post Op Vitals Reviewed: Yes      Staff: Anesthesiologist     full report to ICU NP/MD;  BPs 110s/60s on low-dose phenylephrine; SpO2 98%, PACED      No complications documented      BP      Temp      Pulse     Resp      SpO2

## 2022-05-16 NOTE — ANESTHESIA PROCEDURE NOTES
Introducer/Panfilo-Michael  Performed by: Aleksandr Arroyo MD  Authorized by: Aleksandr Arroyo MD     Date/Time: 5/16/2022 12:13 PM  Consent: Written consent obtained  Risks and benefits: risks, benefits and alternatives were discussed  Consent given by: patient  Patient understanding: patient states understanding of the procedure being performed  Patient consent: the patient's understanding of the procedure matches consent given  Patient identity confirmed: verbally with patient and arm band  Indications: vascular access  Location details: right internal jugular  Catheter size: 9 Fr  Assessment: blood return through all ports and free fluid flow  Preparation: skin prepped with 2% chlorhexidine  Skin prep agent dried: skin prep agent completely dried prior to procedure  Sterile barriers: all five maximum sterile barriers used - cap, mask, sterile gown, sterile gloves, and large sterile sheet  Hand hygiene: hand hygiene performed prior to central venous catheter insertion  Ultrasound guidance: yes  ultrasound permanent image saved  Pre-procedure: landmarks identified  Number of attempts: 1  Successful placement: yes  Post-procedure: line sutured and dressing applied  Patient tolerance: Patient tolerated the procedure well with no immediate complications  Comments: Single skin and vessel puncture  Easy thread of wires  Wires confirmed in SVC with ultrasound  No apparent complications

## 2022-05-16 NOTE — ANESTHESIA PREPROCEDURE EVALUATION
Procedure:  REPLACEMENT VALVE AORTIC (TISSUE AVR) W/ ROJELIO (N/A Chest)    Relevant Problems   CARDIO   (+) Coronary artery disease involving native coronary artery 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 pectoris   (+) Essential hypertension   (+) Paroxysmal atrial fibrillation (HCC)   (+) Severe aortic stenosis   (+) Sick sinus syndrome (HCC)      ENDO   (+) Acquired hypothyroidism      GI/HEPATIC   (+) GERD without esophagitis      NEURO/PSYCH   (+) Chronic nonintractable headache     NYHA 1-2 SX  RECENT LAD STENT       St  [address] [address]     Transthoracic Echocardiogram  2D, M-mode, Doppler, and Color Doppler     Study date:  06-Aug-2021     Patient: Grazyna Melo  MR number: [id number]  Account number: [de-identified]  : 07-Oct-1932  Age: 80 years  Gender: Male  Status: Outpatient  Location: [facility]  Height: 70 in  Weight: 169 lb  BP: 132/ 80 mmHg     Indications: Aortic stenosis      Diagnoses: I35 0 - Nonrheumatic aortic (valve) stenosis     Sonographer:  CADE Orlando RDCS RVT  Primary Physician:  Talisha Reardon MD  Referring Physician:  Jaqui Aguilar MD  Group:  [facility]  Interpreting Physician:  Rhonda Kumari DO     SUMMARY     LEFT VENTRICLE:  Systolic function was at the lower limits of normal  Ejection fraction was estimated to be 53 %  There was severe hypokinesis of the distal apex and possibly the basal inferior myocardial wall segment  Wall thickness was at the upper limits of normal   Doppler parameters were consistent with abnormal left ventricular relaxation (grade 1 diastolic dysfunction)      MITRAL VALVE:  There was mild annular calcification  There was moderately restricted mobility of the posterior leaflet  There was mild regurgitation      AORTIC VALVE:  The valve was trileaflet   Leaflets exhibited markedly increased thickness, marked calcification, markedly reduced cuspal separation, reduced mobility, and sclerosis  Transaortic velocity was increased due to valvular stenosis  There was severe stenosis  There was mild regurgitation  The peak valve velocity was 4 cm/s  Valve mean gradient was 49 mmHg  Aortic valve area was 0 7 cmï¾² by the continuity equation      TRICUSPID VALVE:  There was trace regurgitation  Lab Results   Component Value Date    SODIUM 135 (L) 05/10/2022    K 4 2 05/10/2022    BUN 19 05/10/2022    CREATININE 0 94 05/10/2022    EGFR 71 05/10/2022    GLUCOSE 110 (H) 06/15/2017     Lab Results   Component Value Date    HGBA1C 5 7 (H) 05/10/2022       Lab Results   Component Value Date    HGB 12 9 05/10/2022    HGB 12 7 (L) 04/05/2022    HGB 13 1 08/10/2021     05/10/2022     04/05/2022     08/10/2021     Lab Results   Component Value Date    WBC 4 26 (L) 05/10/2022       Lab Results   Component Value Date    CREATININE 0 94 05/10/2022    CREATININE 0 85 04/05/2022    CREATININE 0 87 08/10/2021       Lab Results   Component Value Date    INR 1 01 05/10/2022     Lab Results   Component Value Date    PTT 30 05/10/2022       No results found for: LACTATE      Type and Screen  A                  Physical Exam    Airway    Mallampati score: II  TM Distance: >3 FB       Dental   upper dentures,     Cardiovascular      Pulmonary      Other Findings        Anesthesia Plan  ASA Score- 3     Anesthesia Type- general with ASA Monitors  Additional Monitors: arterial line, central venous line and pulmonary artery catheter  Airway Plan: ETT  Comment:  MARILIA Novoa , have personally seen and evaluated the patient prior to anesthetic care  I have reviewed the pre-anesthetic record, and other DENIES medical records if appropriate to the anesthetic care  If a CRNA is involved in the case, I have reviewed the CRNA assessment, if present, and agree        Risks/benefits and alternatives discussed with patient including possible PONV, sore throat, and possibility of rare anesthetic and surgical emergencies          Plan Factors-    Chart reviewed  Existing labs reviewed  Patient summary reviewed  Patient is not a current smoker  Patient instructed to abstain from smoking on day of procedure  There is medical exclusion for perioperative obstructive sleep apnea risk education  Induction- intravenous  Postoperative Plan- Plan for postoperative opioid use  Planned trial extubation    Informed Consent- Anesthetic plan and risks discussed with patient  I personally reviewed this patient with the CRNA  Discussed and agreed on the Anesthesia Plan with the CRNA  Cammie Rea

## 2022-05-16 NOTE — ANESTHESIA PROCEDURE NOTES
Procedure Performed: ROJELIO Anesthesia  Start Time:  5/16/2022 12:16 PM        Preanesthesia Checklist    Patient identified, IV assessed, risks and benefits discussed, monitors and equipment assessed, procedure being performed at surgeon's request and anesthesia consent obtained  Procedure    Diagnostic Indications for ROJELIO:  assessment of surgical repair  Type of ROJELIO: complete ROJELIO with interpretation  Images Saved: ultrasound permanent image saved  Physician Requesting Echo: Erica Stevenson DO  Echocardiographic and Doppler Measurements    PREPROCEDURE    LEFT VENTRICLE:  Systolic Function: normal  Ejection Fraction: 65%  Regional Wall Motion Abnormalities: none  RIGHT VENTRICLE:  Systolic Function: normal                 AORTIC VALVE:  Leaflets: trileaflet  Leaflet motions restricted  Stenosis: severe  Peak Gradient: 43 mmHg  Regurgitation: mild  MITRAL VALVE:  Leaflets: calcified and thickened  Regurgitation: trace  TRICUSPID VALVE:  Leaflets: normal    Regurgitation: trace  OTHER VALVE FINDINGS:  Aov calcified and restricted; sinus 2 4 cm; asc ao 4 @ rpa  ASCENDING AORTA:  Size:  normal         AORTIC ARCH:     Grade 2: severe intimal thickening without protruding atheroma  DESCENDING AORTA:     Grade 2: severe intimal thickening without protruding atheroma  RIGHT ATRIUM:   No spontaneous echo contrast     LEFT ATRIUM:   No spontaneous echo contrast     LEFT ATRIAL APPENDAGE:   No spontaneous echo contrast         ATRIAL SEPTUM:  Intra-atrial septal morphology: normal           VENTRICULAR SEPTUM:  Intra-ventricular septum morphology: normal          EPIAORTIC:  Plaque Thickness: 0-5 mm  OTHER FINDINGS:  Pericardium:  normal          POSTPROCEDURE    LEFT VENTRICLE: Unchanged   Systolic Function: normal        RIGHT VENTRICLE: Unchanged   AORTIC VALVE:   Leaflets: bioprosthetic  Stenosis: none  Regurgitation: none        MITRAL VALVE: Unchanged   Leaflets: native  TRICUSPID VALVE: Unchanged   PULMONIC VALVE: Unchanged         OTHER VALVE FINDINGS: S/p 25 biovalve; well seated no pvl/tvl  ATRIA: Unchanged   Left Atrial Appendage Ligate: No  Residual Flow in Left Atrial Appendage by Color Flow Doppler: No        AORTA: Unchanged   REMOVAL:  Probe Removal: atraumatic

## 2022-05-16 NOTE — OP NOTE
OPERATIVE REPORT  PATIENT NAME: Jazmín Spicer    :  10/7/1932  MRN: [id number]  Pt Location:  OR ROOM 16    SURGERY DATE: 2022     SURGEON: Yunior Nava DO    ASSISTANT: Wesley Hernandez PA-C    ADDITIONAL ASSISTANT: N/A    PREOPERATIVE DIAGNOSIS: Symptomatic severe aortic stenosis    POSTOPERATIVE DIAGNOSIS:  Symptomatic severe aortic stenosis    NYHA Class: 3  CCS Class: 3    PROCEDURE:   Aortic valve replacement with a 25mm Paul Magna Ease pericardial tissue valve    CARDIOPULMONARY BYPASS TIME: 58 minutes  CROSSCLAMP TIME: 47 minutes  ANESTHESIA: General endotracheal anesthesia with transesophageal echocardiogram guidance, Dr Skylar Soriano     INDICATIONS:  The patient is a 80y o  year-old male with clinical and echocardiographic findings consistent with Symptomatic severe aortic stenosis  FINDINGS:  1  Intraoperative transesophageal echocardiogram revealed normal LVEF with LVH and severe AS and trileaflet valve  2  Epiaortic ultrasound showed less than 5 mm non-mobile plaque  3  Aortic valve was trileaflet, heavily calcified  4  Final transesophageal echocardiogram demonstrated prosthetic valve with normal function without evidence of perivalvular leak, normal LVEF  OPERATIVE TECHNIQUE:    The patient was taken to the operating room and placed supine on the operating table  Following the satisfactory induction of general anesthesia and placement of monitoring lines, the patient was prepped and draped in the usual sterile fashion  A time-out procedure was performed  The patient underwent a median sternotomy, pericardiotomy, and systemic heparinization  Epiaortic ultrasound showed less than 5 mm non-mobile plaque  Cannulation for cardiopulmonary bypass was performed with an arterial dispersion cannula, double stage venous cannula and a vented antegrade cardioplegia cannula   Once the ACT was greater than 480 seconds we placed the patient on cardiopulmonary bypass, the ascending aorta was crossclamped and cardiac arrest was obtained without complications with [place] cardioplegia  A left ventricular vent was placed through the right superior pulmonary vein while giving cardioplegia  A CO2 flooded field was used during the entire case  Aortotomy was performed and the aortic valve was exposed  The aortic valve was found to be trileaflet, heavily calcified  The leaflets were excised and the annulus was debrided of calcium  The annulus was sized, I choose a 25mm [facility] Ease pericardial tissue valve  Pledgeted 2-0 Ethibond sutures were placed around the aortic valve annulus  The prosthetic valve was brought to the field, the sutures were passed through the sewing ring, the prosthetic valve was seated in a supra annular fashion and the sutures were tied down  Clearance of the coronary ostia was confirmed  Extensive irrigation of the aortic root and LVOT was performed to remove any debris  While de-airing the heart, the aortotomy was closed with two layers of running 4-0 Prolene suture  The heart was extensively de-aired, a dose of warm blood was delivered antegrade in the aortic root, and the crossclamp was then removed  Atrial and ventricular pacing wires were placed  Following a period of reperfusion, the patient was weaned from cardiopulmonary bypass and decannulated  Protamine was administered with normalization of the ACT  Hemostasis was confirmed in all fields  Thoracostomy tubes were placed  The sternum was closed with stainless steel wires  The fascia, subdermis and skin were closed with multiple layers of running absorbable suture  [facility] directly assisted with valve removal and implantation and canulation, decanulation, and closing  COMPLICATIONS: None    PACKS/TUBES/DRAINS: Chest tubes x 2  EBL: 250mL  TRANSFUSION: 1 Plts  SPECIMENS: Aortic Valve         As the attending surgeon, I was present and scrubbed for all critical portions of this procedure  There was no qualified surgical resident available  Sponge, needle and instrument counts were reported as correct by the nursing staff  The assistance of a PA was required to complete this case, specifically for assistance with cannulation, decannulation, and exposure of the aortic valve         SIGNATURE: Vanesa Barraza DO  DATE: May 16, 2022  TIME: 2:29 PM

## 2022-05-16 NOTE — RESPIRATORY THERAPY NOTE
05/16/22 1915   Respiratory Assessment   Assessment Type Assess only   General Appearance Drowsy   Respiratory Pattern Shallow   Chest Assessment Chest expansion symmetrical   Bilateral Breath Sounds Diminished   Cough None   Resp Comments Patient extubated about three hours ago  He is resting in bed, nasal cannula 6 L/min, c/o incisional chest pain, no respiratory complaints  Will educate on incentive spirometer when patient able to participate     O2 Device NC   Additional Assessments   SpO2 97 %

## 2022-05-16 NOTE — ANESTHESIA PROCEDURE NOTES
Central Line Insertion  Performed by: Clarisse Frey MD  Authorized by: Clarisse Frye MD     Date/Time: 5/16/2022 12:12 PM  Catheter Type:  triple lumen  Consent: Written consent obtained  Risks and benefits: risks, benefits and alternatives were discussed  Consent given by: patient  Patient understanding: patient states understanding of the procedure being performed  Patient consent: the patient's understanding of the procedure matches consent given  Patient identity confirmed: verbally with patient and arm band  Indications: vascular access  Location details: right internal jugular  Catheter size: 7 Fr  Patient position: Trendelenburg  Assessment: blood return through all ports and free fluid flow  Preparation: skin prepped with 2% chlorhexidine  Skin prep agent dried: skin prep agent completely dried prior to procedure  Sterile barriers: all five maximum sterile barriers used - cap, mask, sterile gown, sterile gloves, and large sterile sheet  Hand hygiene: hand hygiene performed prior to central venous catheter insertion  Ultrasound guidance: yes  sterile gel and probe cover used in ultrasound-guided central venous catheter insertionultrasound permanent image saved  Vessel of Catheter Tip End: svc  Number of attempts: 1  Successful placement: yes  Post-procedure: line sutured and dressing applied  Patient tolerance: Patient tolerated the procedure well with no immediate complications  Comments: Single skin and vessel puncture  Easy thread of wires  Wires confirmed in SVC with ultrasound  No apparent complications

## 2022-05-16 NOTE — CONSULTS
Chris Kirk 80 y o  male MRN: [id number]  Unit/Bed#: [facility] 414-01 Encounter: [id number]    Inpatient consult to Ramakrishna Ace performed by: Kai Marroquin PA-C  Consult ordered by: Adri Shultz PA-C          Physician Requesting Consult: Vanesa Barraza DO    Reason for Consult / Principal Problem: Severe aortic stenosis    HPI: Scarlet Moss is a 80 y o  male w/ severe AS who now presents s/p AVR  Patient has a longstanding cardiovascular history and has been closely followed by his outpatient cardiologist  Bhavin Hernandezjoanna has had serial echocardiograms demonstrating AS with worsened symptoms over the last year and progression of his disease  He was initially referred to cardiac surgery for TAVR w/u however his [place] ostium was very close to the annulus of the aortic valve and therefore an open procedure was felt to be a safer option  On pre-operative cardiac cath was found to have RCA lesion which was treated with IAIN and angioplasty  Patient now presents s/p bioprosthetic AVR  PMHx: AF not on AC, SSS s/p PPM, hepatitis A, hypothyroidism, GERD, BPH    History obtained from chart review due to patient being intubated and sedated  ---------------------------------------------------------------------------------------------------------------------------------------------------------------------  Impressions:  1  Severe AS s/p AVR  2  CAD s/p IAIN to RCA (04/2022)  3  SSS s/p PPM  4  Atrial fibrillation not on coumadin   5  Hep A  6  Hypothyroidism   7  GERD  8  BPH    Plan:    Neuro: d/c continuous sedation  ATC tylenol, PRN oxy for pain  Trend neuro exam   Delirium precautions  CV: MAP goal >65  SBP goal <130  CI>2 2  Post-op medications: Neosynephrine, 30 mcg/min  Wean leopoldo as able  Volume resuscitation as needed  Monitor rhythm on telemetry  Epicardial pacing wires  Intra-op ROJELIO LVEF 65%  Lung: Check STAT post-op ABG and CXR   Wean vent with spontaneous breathing trial with goal to extubate   GI: GI prophylaxis with PPI  Bowel regimen  Zofran PRN for nausea  FEN: NPO  Replenish K >4 0, mag >2 0 and calcium >7 0  : Check STAT post-op BMP  Arias in place  Monitor UOP with goal >0 5cc/kg/hour  Lasix versus volume resuscitate as needed depending on hemodynamics and volume status  ID: Prophylactic post-op abx  Maintain normothermia  Trend temps  Heme: Check STAT post-op H/H and platelets  Monitor incision site, invasive lines, and chest tube outputs for bleeding  Send coag panel if needed  Endo: Insulin gtt for blood sugar control  Results from last 6 Months   Lab Units 05/10/22  0830   HEMOGLOBIN A1C % 5 7*     Disposition: ICU Care   ---------------------------------------------------------------------------------------------------------------------------------------------------------------------  Historical Information   Past Medical History:   Diagnosis Date    Angina pectoris ([address] )     Arthritis     Ascending aortic aneurysm (HCC)     trileaflet AV, 41mm    BPH (benign prostatic hyperplasia)     Cancer (HCC)     skin    Coronary artery disease     Former tobacco use     pipe & cigar use socially & infrequently    GERD (gastroesophageal reflux disease)     controlled w/ diet    Headache     h/o    Hepatitis A     History of melanoma     Hypertension     Hypothyroidism     Pacemaker     Severe aortic stenosis     Shortness of breath     with exertion    Vitamin B12 deficiency     Wears dentures      Past Surgical History:   Procedure Laterality Date    CARDIAC CATHETERIZATION  04/15/2022    Procedure: Cardiac catheterization;  Surgeon: Sofia Neville DO;  [facility]: [facility]; Service: Cardiology    CARDIAC CATHETERIZATION N/A 04/15/2022    Procedure: Cardiac Coronary Angiogram;  Surgeon: Sofia Neville DO;  [facility];   Service: Cardiology    CARDIAC CATHETERIZATION N/A 04/15/2022    Procedure: Cardiac pci;  Surgeon: Clementina Jeronimo DO;  [facility]: [facility]; Service: Cardiology    CARDIAC PACEMAKER PLACEMENT      COLONOSCOPY      INGUINAL HERNIA REPAIR Bilateral     SKIN CANCER EXCISION      melanoma, multiple     Social History   Social History     Substance and Sexual Activity   Alcohol Use Yes    Comment: a beer once in a while  Social History     Substance and Sexual Activity   Drug Use No     Social History     Tobacco Use   Smoking Status Never Smoker   Smokeless Tobacco Never Used     Family History   Problem Relation Age of Onset    Cancer Family         gastrointestinal tract    Heart disease Mother     Substance Abuse Neg Hx     Mental illness Neg Hx      I have reviewed this patient's family history and commented on sigificant items within the HPI    ROS: ROS unable to be obtained due to patient being intubated and sedated  Allergies: Allergies   Allergen Reactions    Amoxicillin Other (See Comments)     Severe weakness       Home Medications:   Prior to Admission medications    Medication Sig Start Date End Date Taking?  Authorizing Provider   amLODIPine (NORVASC) 5 mg tablet TAKE ONE TABLET BY MOUTH EVERY DAY 2/15/22  Yes Donnie Christian MD   aspirin (ECOTRIN LOW STRENGTH) 81 mg EC tablet Take 81 mg by mouth daily   Yes Historical Provider, MD   clopidogrel (Plavix) 75 mg tablet Take 1 tablet (75 mg total) by mouth daily 4/16/22  Yes Maximilian Rasmussen DO   Cyanocobalamin (VITAMIN B12) 1000 MCG TBCR Take 1 tablet by mouth daily   Yes Historical Provider, MD   Flaxseed, Linseed, (FLAXSEED OIL) 1000 MG CAPS Take 1 capsule by mouth daily   Yes Historical Provider, MD   Glucosamine-Chondroit-Vit C-Mn (GLUCOSAMINE 1500 COMPLEX) CAPS Take 1 capsule by mouth daily   Yes Historical Provider, MD   levothyroxine 100 mcg tablet TAKE ONE TABLET BY MOUTH EVERY DAY 2/28/22  Yes Donnie Christian MD   metoprolol tartrate (LOPRESSOR) 100 mg tablet TAKE ONE TABLET BY MOUTH TWICE A DAY 4/13/22  Yes Donnie Osman MD   Multiple Vitamin (MULTIVITAMIN) capsule Take 1 capsule by mouth daily   Yes Historical Provider, MD   mupirocin (BACTROBAN) 2 % nasal ointment into each nostril 2 (two) times a day Start 5 days prior to procedure 5/4/22  Yes Davina Dodd PA-C   Omega-3 Fatty Acids (FISH OIL) 1200 MG CAPS Take 1 capsule by mouth daily   Yes Historical Provider, MD   tamsulosin (FLOMAX) 0 4 mg TAKE ONE CAPSULE BY MOUTH EVERY DAY 3/12/22  Yes Donnie Osman MD   Ascorbic Acid (VITAMIN C) 500 MG CAPS Take 1 tablet by mouth daily    Historical Provider, MD     Inpatient Medications:  Scheduled Meds:  Current Facility-Administered Medications   Medication Dose Route Frequency Provider Last Rate    acetaminophen  650 mg Rectal Q4H PRN Pallavi Tillman PA-C      acetaminophen  975 mg Oral Q8H Esther Kat PA-C      aminocaproic acid (AMICAR) IV  1 g/hr Intravenous Once Pallavi Tillman PA-C 1 g/hr (05/16/22 1535)    amiodarone  200 mg Oral Q8H [address]      aspirin  81 mg Oral Daily [place], [place]      atorvastatin  80 mg Oral Daily With [place] [employer], [place]      bisacodyl  10 mg Rectal Daily PRN Pallavi Tillman PA-C      calcium chloride  1 g Intravenous Once Pallavi Tillman PA-C      cefazolin  2,000 mg Intravenous [address] [place], [place]      chlorhexidine  15 mL Swish & Spit BID [place], [place]      [START ON 5/17/2022] clopidogrel  75 mg Oral Daily [place], [place]      dexmedetomidine  0 1-0 7 mcg/kg/hr Intravenous Titrated Mayank Abrams PA-C 0 4 mcg/kg/hr (05/16/22 1551)    fentanyl citrate (PF)  50 mcg Intravenous Q1H PRN Pallavi Tillman PA-C      [START ON 5/17/2022] fondaparinux  2 5 mg Subcutaneous Daily Pallavi Tillman PA-C      furosemide  40 mg Intravenous Q6H PRN Pallavi Tillman PA-C      HYDROmorphone  0 5 mg Intravenous Q2H PRN Pallavi Tillman PA-C      insulin regular (HumuLIN R,NovoLIN R) infusion  0 3-21 Units/hr Intravenous Titrated Milta Sherry, PA-C 5 Units/hr (05/16/22 1514)    lactated ringers  500 mL Intravenous Q30 Min PRN Milta Sherry, PA-C 250 mL/hr at 05/16/22 1535    [START ON 5/17/2022] levothyroxine  100 mcg Oral Early Morning Milta Sherry, PA-C      lidocaine (cardiac)  100 mg Intravenous Q30 Min PRN Milta Sherry, PA-C      magnesium sulfate  2 g Intravenous Once Milta Sherry, PA-C      mupirocin  1 application Nasal [address] [place], [place]      niCARdipine  2 5-15 mg/hr Intravenous Titrated Milta Sherry, PA-C Stopped (05/16/22 1552)    ondansetron  4 mg Intravenous Q6H PRN Milta Sherry, PA-C      oxyCODONE  2 5 mg Oral Q4H PRN Milta Sherry, PA-C      oxyCODONE  5 mg Oral Q4H PRN Milta Sherry, PA-C      [START ON 5/17/2022] pantoprazole  40 mg Oral Early Morning Milta Sherry, [place]      phenylephine   mcg/min Intravenous Titrated Milta Sherry, PA-C 130 mcg/min (05/16/22 1553)    [START ON 5/17/2022] polyethylene glycol  17 g Oral Daily Milta Sherry, PA-C      potassium chloride  20 mEq Intravenous Once PRN Milta Sherry, PA-C      potassium chloride  20 mEq Intravenous Q1H PRN Milta Sherry, PA-C      potassium chloride  20 mEq Intravenous Q30 Min PRN Milta Sherry, PA-C      sodium chloride  20 mL/hr Intravenous Continuous Milta Sherry, PA-C 20 mL/hr (05/16/22 1523)    tamsulosin  0 4 mg Oral Daily With [facility], PA-C       Continuous Infusions:dexmedetomidine, 0 1-0 7 mcg/kg/hr, Last Rate: 0 4 mcg/kg/hr (05/16/22 1551)  insulin regular (HumuLIN R,NovoLIN R) infusion, 0 3-21 Units/hr, Last Rate: 5 Units/hr (05/16/22 1514)  niCARdipine, 2 5-15 mg/hr, Last Rate: Stopped (05/16/22 1552)  phenylephine,  mcg/min, Last Rate: 130 mcg/min (05/16/22 1553)  sodium chloride, 20 mL/hr, Last Rate: 20 mL/hr (05/16/22 1523)      PRN Meds:  acetaminophen, 650 mg, Q4H PRN  bisacodyl, 10 mg, Daily PRN  fentanyl citrate (PF), 50 mcg, Q1H PRN  furosemide, 40 mg, Q6H PRN  HYDROmorphone, 0 5 mg, Q2H PRN  lactated ringers, 500 mL, Q30 Min PRN  lidocaine (cardiac), 100 mg, Q30 Min PRN  ondansetron, 4 mg, Q6H PRN  oxyCODONE, 2 5 mg, Q4H PRN  oxyCODONE, 5 mg, Q4H PRN  potassium chloride, 20 mEq, Once PRN  potassium chloride, 20 mEq, Q1H PRN  potassium chloride, 20 mEq, Q30 Min PRN      ---------------------------------------------------------------------------------------------------------------------------------------------------------------------  Vitals:   Vitals:    22 1014 22 1009 22 1500   BP:  143/90    Pulse:  77 78   Resp:  20    Temp:  97 5 °F (36 4 °C)    TempSrc:  Temporal    SpO2:  97% 97%   Weight: 74 8 kg (165 lb) 72 1 kg (159 lb)    Height: 5' 10" (1 778 m) 5' 8 5" (1 74 m)      Arterial Line:          Temperature: Temp (24hrs), Av 5 °F (36 4 °C), Min:97 5 °F (36 4 °C), Max:97 5 °F (36 4 °C)  Current: Temperature: 97 5 °F (36 4 °C)    Weights: IBW (Ideal Body Weight): 69 55 kg  Body mass index is 23 82 kg/m²  Hemodynamic Monitoring:  PAP:  , CVP:  , CO:  , CI:  , SVR:      Ventilator Settings:  Respiratory  Report   Lab Data (Last 4 hours)    None         O2/Vent Data (Last 4 hours)       1500          Patient safety screen outcome: Failed                 Labs:   Results from last 7 days   Lab Units 22  1508 22  1507 22  1408 22  1326 22  1325 22  1209 05/10/22  0830   WBC Thousand/uL  --   --   --   --   --   --  4 26*   HEMOGLOBIN g/dL  --  10 1*  --   --   --   --  12 9   I STAT HEMOGLOBIN g/dl 8 8*  --  8 2*   < >  --    < >  --    HEMATOCRIT %  --  29 5*  --   --   --   --  38 3   HEMATOCRIT, ISTAT % 26*  --  24*   < >  --    < >  --    PLATELETS Thousands/uL  --  175  --   --  165  --  225    < > = values in this interval not displayed       Results from last 7 days   Lab Units 22  1508 22  1507 22  1408 22  1326 22  1209 05/10/22  0830   SODIUM mmol/L  --  137  --   --   --  135*   POTASSIUM mmol/L  --  4 3  --   --   --  4 2   CHLORIDE mmol/L  --  108  --   --   --  104   CO2 mmol/L  --  23  --   --   --  29   CO2, I-STAT mmol/L 23  --  24 27   < >  --    BUN mg/dL  --  13  --   --   --  19   CREATININE mg/dL  --  0 83  --   --   --  0 94   CALCIUM mg/dL  --  8 1*  --   --   --  9 3   ALK PHOS U/L  --   --   --   --   --  75   ALT U/L  --   --   --   --   --  37   AST U/L  --   --   --   --   --  33   GLUCOSE, ISTAT mg/dl 220*  --  170* 128   < >  --     < > = values in this interval not displayed  Post-op /43/98/22/-4/97%  Post-op CXR: Lines and tubes in position, no PTX I have personally reviewed pertinent films in PACS  Post-op EKG: V paced, diffuse inverted T waves  This was personally reviewed by myself  Physical Exam  HENT:      Head: Normocephalic  Mouth/Throat:      Mouth: Mucous membranes are moist    Eyes:      Pupils: Pupils are equal, round, and reactive to light  Neck:      Comments: RIJ lines  Cardiovascular:      Rate and Rhythm: Normal rate and regular rhythm  Pulmonary:      Effort: Pulmonary effort is normal       Breath sounds: Normal breath sounds  Abdominal:      General: There is distension  Tenderness: There is no abdominal tenderness  Musculoskeletal:      Cervical back: Normal range of motion  Skin:     General: Skin is warm  Capillary Refill: Capillary refill takes less than 2 seconds  Neurological:      General: No focal deficit present  Mental Status: He is alert  Invasive lines and devices:   Invasive Devices  Report    Central Venous Catheter Line  Duration           CVC Central Lines 22 Triple <1 day    Introducer 22 <1 day          Peripheral Intravenous Line  Duration           Peripheral IV 22 Right Wrist <1 day          Arterial Line  Duration           Arterial Line 22 <1 day          Line Duration           Pacer Wires <1 day          Drain  Duration           Chest Tube 1 Posterior;Mediastinal 32 Fr  <1 day    Chest Tube 2 Anterior;Mediastinal 32 Fr  <1 day    Urethral Catheter Non-latex;Straight-tip; Temperature probe 16 Fr  <1 day          Airway  Duration           ETT  Hi-Lo; Cuffed;Oral 8 mm <1 day              ---------------------------------------------------------------------------------------------------------------------------------------------------------------------  Care Time Delivered:   No Critical Care time spent     SIGNATURE: Selwyn Vasquez PA-C  DATE: May 16, 2022  TIME: 3:54 PM

## 2022-05-16 NOTE — RESPIRATORY THERAPY NOTE
RT Ventilator Management Note  Cleo Altman 80 y o  male MRN: [id number]  Unit/Bed#: [facility] 414-01 Encounter: [id number]      Daily Screen         5/16/2022  1500             Patient safety screen outcome[de-identified] Failed    Not Ready for Weaning due to[de-identified] Underline problem not resolved              Physical Exam:   Assessment Type: Assess only  General Appearance: Sedated  Respiratory Pattern: Assisted  Chest Assessment: Chest expansion symmetrical  Bilateral Breath Sounds: Diminished, Coarse      Resp Comments: Pt received at this time from the OR S/P AVR and placed on the vent with S-CMV    ETT found at the 22 from the lip, bilateral BS heard  No acute resp distress noted  Chart reviewed per resp/acp protocol  Pt has no pulmonary HX and takes no bronchodilators at home  Per ACP pt will be instructed on IS post extubation  Will continue to monitor per resp protocol

## 2022-05-17 ENCOUNTER — TELEPHONE (OUTPATIENT)
Dept: FAMILY MEDICINE CLINIC | Facility: HOSPITAL | Age: 87
End: 2022-05-17

## 2022-05-17 ENCOUNTER — APPOINTMENT (INPATIENT)
Dept: RADIOLOGY | Facility: HOSPITAL | Age: 87
DRG: 219 | End: 2022-05-17
Payer: COMMERCIAL

## 2022-05-17 PROBLEM — D62 ACUTE POSTOPERATIVE ANEMIA DUE TO EXPECTED BLOOD LOSS: Status: ACTIVE | Noted: 2022-05-17

## 2022-05-17 PROBLEM — D72.829 LEUKOCYTOSIS: Status: ACTIVE | Noted: 2022-05-17

## 2022-05-17 LAB
ABO GROUP BLD BPU: NORMAL
ANION GAP SERPL CALCULATED.3IONS-SCNC: 7 MMOL/L (ref 4–13)
ATRIAL RATE: 375 BPM
ATRIAL RATE: 74 BPM
BASE EX.OXY STD BLDV CALC-SCNC: 51.9 % (ref 60–80)
BASE EXCESS BLDV CALC-SCNC: -5.4 MMOL/L
BODY TEMPERATURE: 99.5 DEGREES FEHRENHEIT
BPU ID: NORMAL
BUN SERPL-MCNC: 15 MG/DL (ref 5–25)
CALCIUM SERPL-MCNC: 7.9 MG/DL (ref 8.3–10.1)
CHLORIDE SERPL-SCNC: 111 MMOL/L (ref 100–108)
CO2 SERPL-SCNC: 21 MMOL/L (ref 21–32)
CREAT SERPL-MCNC: 0.89 MG/DL (ref 0.6–1.3)
CROSSMATCH: NORMAL
ERYTHROCYTE [DISTWIDTH] IN BLOOD BY AUTOMATED COUNT: 13.4 % (ref 11.6–15.1)
GFR SERPL CREATININE-BSD FRML MDRD: 75 ML/MIN/1.73SQ M
GLUCOSE SERPL-MCNC: 135 MG/DL (ref 65–140)
GLUCOSE SERPL-MCNC: 137 MG/DL (ref 65–140)
GLUCOSE SERPL-MCNC: 144 MG/DL (ref 65–140)
GLUCOSE SERPL-MCNC: 145 MG/DL (ref 65–140)
GLUCOSE SERPL-MCNC: 146 MG/DL (ref 65–140)
GLUCOSE SERPL-MCNC: 148 MG/DL (ref 65–140)
GLUCOSE SERPL-MCNC: 152 MG/DL (ref 65–140)
GLUCOSE SERPL-MCNC: 170 MG/DL (ref 65–140)
GLUCOSE SERPL-MCNC: 171 MG/DL (ref 65–140)
HCO3 BLDV-SCNC: 20.6 MMOL/L (ref 24–30)
HCT VFR BLD AUTO: 25.5 % (ref 36.5–49.3)
HGB BLD-MCNC: 8.6 G/DL (ref 12–17)
MAGNESIUM SERPL-MCNC: 2.5 MG/DL (ref 1.6–2.6)
MCH RBC QN AUTO: 31.9 PG (ref 26.8–34.3)
MCHC RBC AUTO-ENTMCNC: 33.7 G/DL (ref 31.4–37.4)
MCV RBC AUTO: 94 FL (ref 82–98)
MV E'TISSUE VEL-LAT: 9 CM/S
NASAL CANNULA: 2
O2 CT BLDV-SCNC: 6.7 ML/DL
PCO2 BLD: 43.2 MM HG (ref 42–50)
PCO2 BLDV: 42.3 MM HG (ref 42–50)
PH BLD: 7.3 [PH] (ref 7.3–7.4)
PH BLDV: 7.3 [PH] (ref 7.3–7.4)
PLATELET # BLD AUTO: 149 THOUSANDS/UL (ref 149–390)
PMV BLD AUTO: 9.6 FL (ref 8.9–12.7)
PO2 BLDV: 28.9 MM HG (ref 35–45)
PO2 VENOUS TEMP CORRECTED: 29.9 MM HG (ref 35–45)
POTASSIUM SERPL-SCNC: 4.5 MMOL/L (ref 3.5–5.3)
POTASSIUM SERPL-SCNC: 4.8 MMOL/L (ref 3.5–5.3)
QRS AXIS: -20 DEGREES
QRS AXIS: -89 DEGREES
QRSD INTERVAL: 114 MS
QRSD INTERVAL: 198 MS
QT INTERVAL: 410 MS
QT INTERVAL: 482 MS
QTC INTERVAL: 442 MS
QTC INTERVAL: 535 MS
RBC # BLD AUTO: 2.7 MILLION/UL (ref 3.88–5.62)
SODIUM SERPL-SCNC: 139 MMOL/L (ref 136–145)
T WAVE AXIS: -86 DEGREES
T WAVE AXIS: 79 DEGREES
UNIT DISPENSE STATUS: NORMAL
UNIT PRODUCT CODE: NORMAL
UNIT PRODUCT VOLUME: 300 ML
UNIT PRODUCT VOLUME: 350 ML
UNIT RH: NORMAL
VENTRICULAR RATE: 70 BPM
VENTRICULAR RATE: 74 BPM
WBC # BLD AUTO: 11 THOUSAND/UL (ref 4.31–10.16)

## 2022-05-17 PROCEDURE — 93010 ELECTROCARDIOGRAM REPORT: CPT | Performed by: INTERNAL MEDICINE

## 2022-05-17 PROCEDURE — 85027 COMPLETE CBC AUTOMATED: CPT | Performed by: PHYSICIAN ASSISTANT

## 2022-05-17 PROCEDURE — 99024 POSTOP FOLLOW-UP VISIT: CPT | Performed by: THORACIC SURGERY (CARDIOTHORACIC VASCULAR SURGERY)

## 2022-05-17 PROCEDURE — 84132 ASSAY OF SERUM POTASSIUM: CPT | Performed by: PHYSICIAN ASSISTANT

## 2022-05-17 PROCEDURE — 94760 N-INVAS EAR/PLS OXIMETRY 1: CPT

## 2022-05-17 PROCEDURE — 97163 PT EVAL HIGH COMPLEX 45 MIN: CPT

## 2022-05-17 PROCEDURE — 82948 REAGENT STRIP/BLOOD GLUCOSE: CPT

## 2022-05-17 PROCEDURE — NC001 PR NO CHARGE: Performed by: STUDENT IN AN ORGANIZED HEALTH CARE EDUCATION/TRAINING PROGRAM

## 2022-05-17 PROCEDURE — 99223 1ST HOSP IP/OBS HIGH 75: CPT | Performed by: INTERNAL MEDICINE

## 2022-05-17 PROCEDURE — 83735 ASSAY OF MAGNESIUM: CPT | Performed by: PHYSICIAN ASSISTANT

## 2022-05-17 PROCEDURE — 71045 X-RAY EXAM CHEST 1 VIEW: CPT

## 2022-05-17 PROCEDURE — 97167 OT EVAL HIGH COMPLEX 60 MIN: CPT

## 2022-05-17 PROCEDURE — 80048 BASIC METABOLIC PNL TOTAL CA: CPT | Performed by: PHYSICIAN ASSISTANT

## 2022-05-17 PROCEDURE — 82805 BLOOD GASES W/O2 SATURATION: CPT | Performed by: PHYSICIAN ASSISTANT

## 2022-05-17 RX ORDER — FUROSEMIDE 10 MG/ML
40 INJECTION INTRAMUSCULAR; INTRAVENOUS
Status: DISCONTINUED | OUTPATIENT
Start: 2022-05-17 | End: 2022-05-19

## 2022-05-17 RX ORDER — DOCUSATE SODIUM 100 MG/1
100 CAPSULE, LIQUID FILLED ORAL 2 TIMES DAILY
Status: DISCONTINUED | OUTPATIENT
Start: 2022-05-17 | End: 2022-05-20 | Stop reason: HOSPADM

## 2022-05-17 RX ORDER — OXYCODONE HYDROCHLORIDE 5 MG/1
5 TABLET ORAL EVERY 4 HOURS PRN
Status: DISCONTINUED | OUTPATIENT
Start: 2022-05-17 | End: 2022-05-20 | Stop reason: HOSPADM

## 2022-05-17 RX ORDER — ONDANSETRON 2 MG/ML
4 INJECTION INTRAMUSCULAR; INTRAVENOUS EVERY 6 HOURS PRN
Status: DISCONTINUED | OUTPATIENT
Start: 2022-05-17 | End: 2022-05-20 | Stop reason: HOSPADM

## 2022-05-17 RX ORDER — ALBUMIN, HUMAN INJ 5% 5 %
12.5 SOLUTION INTRAVENOUS ONCE
Status: COMPLETED | OUTPATIENT
Start: 2022-05-17 | End: 2022-05-17

## 2022-05-17 RX ORDER — TEMAZEPAM 15 MG/1
15 CAPSULE ORAL
Status: DISCONTINUED | OUTPATIENT
Start: 2022-05-17 | End: 2022-05-20 | Stop reason: HOSPADM

## 2022-05-17 RX ORDER — ACETAMINOPHEN 325 MG/1
975 TABLET ORAL EVERY 8 HOURS
Status: DISCONTINUED | OUTPATIENT
Start: 2022-05-17 | End: 2022-05-20 | Stop reason: HOSPADM

## 2022-05-17 RX ORDER — POTASSIUM CHLORIDE 20 MEQ/1
20 TABLET, EXTENDED RELEASE ORAL 2 TIMES DAILY
Status: DISCONTINUED | OUTPATIENT
Start: 2022-05-17 | End: 2022-05-19

## 2022-05-17 RX ORDER — OXYCODONE HYDROCHLORIDE 5 MG/1
2.5 TABLET ORAL EVERY 4 HOURS PRN
Status: DISCONTINUED | OUTPATIENT
Start: 2022-05-17 | End: 2022-05-20 | Stop reason: HOSPADM

## 2022-05-17 RX ORDER — INSULIN LISPRO 100 [IU]/ML
1-6 INJECTION, SOLUTION INTRAVENOUS; SUBCUTANEOUS
Status: DISCONTINUED | OUTPATIENT
Start: 2022-05-17 | End: 2022-05-20 | Stop reason: HOSPADM

## 2022-05-17 RX ORDER — ASCORBIC ACID 500 MG
500 TABLET ORAL DAILY
Status: DISCONTINUED | OUTPATIENT
Start: 2022-05-17 | End: 2022-05-20 | Stop reason: HOSPADM

## 2022-05-17 RX ORDER — FONDAPARINUX SODIUM 2.5 MG/.5ML
2.5 INJECTION SUBCUTANEOUS DAILY
Status: DISCONTINUED | OUTPATIENT
Start: 2022-05-17 | End: 2022-05-17

## 2022-05-17 RX ORDER — FUROSEMIDE 10 MG/ML
20 INJECTION INTRAMUSCULAR; INTRAVENOUS ONCE
Status: COMPLETED | OUTPATIENT
Start: 2022-05-17 | End: 2022-05-17

## 2022-05-17 RX ADMIN — POLYETHYLENE GLYCOL 3350 17 G: 17 POWDER, FOR SOLUTION ORAL at 08:15

## 2022-05-17 RX ADMIN — INSULIN LISPRO 1 UNITS: 100 INJECTION, SOLUTION INTRAVENOUS; SUBCUTANEOUS at 16:35

## 2022-05-17 RX ADMIN — CEFAZOLIN SODIUM 2000 MG: 2 SOLUTION INTRAVENOUS at 06:10

## 2022-05-17 RX ADMIN — AMIODARONE HYDROCHLORIDE 200 MG: 200 TABLET ORAL at 22:39

## 2022-05-17 RX ADMIN — POTASSIUM CHLORIDE 20 MEQ: 1500 TABLET, EXTENDED RELEASE ORAL at 17:40

## 2022-05-17 RX ADMIN — ACETAMINOPHEN 975 MG: 325 TABLET, FILM COATED ORAL at 06:36

## 2022-05-17 RX ADMIN — METOPROLOL TARTRATE 25 MG: 25 TABLET, FILM COATED ORAL at 08:15

## 2022-05-17 RX ADMIN — POTASSIUM CHLORIDE 20 MEQ: 1500 TABLET, EXTENDED RELEASE ORAL at 08:15

## 2022-05-17 RX ADMIN — MUPIROCIN 1 APPLICATION: 20 OINTMENT TOPICAL at 20:30

## 2022-05-17 RX ADMIN — ASPIRIN 81 MG CHEWABLE TABLET 81 MG: 81 TABLET CHEWABLE at 08:15

## 2022-05-17 RX ADMIN — NICARDIPINE HYDROCHLORIDE 2.5 MG/HR: 2.5 INJECTION, SOLUTION INTRAVENOUS at 20:31

## 2022-05-17 RX ADMIN — OXYCODONE HYDROCHLORIDE 5 MG: 5 TABLET ORAL at 09:44

## 2022-05-17 RX ADMIN — TAMSULOSIN HYDROCHLORIDE 0.4 MG: 0.4 CAPSULE ORAL at 16:28

## 2022-05-17 RX ADMIN — ONDANSETRON 4 MG: 2 INJECTION INTRAMUSCULAR; INTRAVENOUS at 11:10

## 2022-05-17 RX ADMIN — ACETAMINOPHEN 975 MG: 325 TABLET, FILM COATED ORAL at 16:27

## 2022-05-17 RX ADMIN — FUROSEMIDE 40 MG: 10 INJECTION, SOLUTION INTRAMUSCULAR; INTRAVENOUS at 08:16

## 2022-05-17 RX ADMIN — LEVOTHYROXINE SODIUM 100 MCG: 100 TABLET ORAL at 06:10

## 2022-05-17 RX ADMIN — EPINEPHRINE 1 MCG/MIN: 1 INJECTION, SOLUTION, CONCENTRATE INTRAVENOUS at 02:00

## 2022-05-17 RX ADMIN — OXYCODONE HYDROCHLORIDE 5 MG: 5 TABLET ORAL at 18:38

## 2022-05-17 RX ADMIN — AMIODARONE HYDROCHLORIDE 200 MG: 200 TABLET ORAL at 13:08

## 2022-05-17 RX ADMIN — ATORVASTATIN CALCIUM 80 MG: 80 TABLET, FILM COATED ORAL at 16:26

## 2022-05-17 RX ADMIN — ACETAMINOPHEN 975 MG: 325 TABLET, FILM COATED ORAL at 22:38

## 2022-05-17 RX ADMIN — AMIODARONE HYDROCHLORIDE 200 MG: 200 TABLET ORAL at 06:10

## 2022-05-17 RX ADMIN — ALBUMIN (HUMAN) 12.5 G: 12.5 INJECTION, SOLUTION INTRAVENOUS at 01:13

## 2022-05-17 RX ADMIN — ACETAMINOPHEN 975 MG: 325 TABLET, FILM COATED ORAL at 08:15

## 2022-05-17 RX ADMIN — HYDROMORPHONE HYDROCHLORIDE 0.5 MG: 1 INJECTION, SOLUTION INTRAMUSCULAR; INTRAVENOUS; SUBCUTANEOUS at 01:24

## 2022-05-17 RX ADMIN — OXYCODONE HYDROCHLORIDE 5 MG: 5 TABLET ORAL at 22:39

## 2022-05-17 RX ADMIN — OXYCODONE HYDROCHLORIDE 5 MG: 5 TABLET ORAL at 04:53

## 2022-05-17 RX ADMIN — DOCUSATE SODIUM 100 MG: 100 CAPSULE, LIQUID FILLED ORAL at 08:15

## 2022-05-17 RX ADMIN — CLOPIDOGREL BISULFATE 75 MG: 75 TABLET ORAL at 08:16

## 2022-05-17 RX ADMIN — NICARDIPINE HYDROCHLORIDE 3 MG/HR: 2.5 INJECTION, SOLUTION INTRAVENOUS at 06:25

## 2022-05-17 RX ADMIN — MUPIROCIN 1 APPLICATION: 20 OINTMENT TOPICAL at 08:15

## 2022-05-17 RX ADMIN — PHENYLEPHRINE HYDROCHLORIDE 20 MCG/MIN: 50 INJECTION INTRAVENOUS at 00:29

## 2022-05-17 RX ADMIN — ALBUMIN (HUMAN) 12.5 G: 12.5 INJECTION, SOLUTION INTRAVENOUS at 00:29

## 2022-05-17 RX ADMIN — FUROSEMIDE 40 MG: 10 INJECTION, SOLUTION INTRAMUSCULAR; INTRAVENOUS at 16:31

## 2022-05-17 RX ADMIN — PANTOPRAZOLE SODIUM 40 MG: 40 TABLET, DELAYED RELEASE ORAL at 06:10

## 2022-05-17 RX ADMIN — METOPROLOL TARTRATE 25 MG: 25 TABLET, FILM COATED ORAL at 20:30

## 2022-05-17 RX ADMIN — FONDAPARINUX SODIUM 2.5 MG: 2.5 INJECTION, SOLUTION SUBCUTANEOUS at 08:15

## 2022-05-17 RX ADMIN — ALBUMIN (HUMAN) 12.5 G: 12.5 INJECTION, SOLUTION INTRAVENOUS at 02:09

## 2022-05-17 RX ADMIN — CEFAZOLIN SODIUM 2000 MG: 2 SOLUTION INTRAVENOUS at 13:18

## 2022-05-17 RX ADMIN — OXYCODONE HYDROCHLORIDE AND ACETAMINOPHEN 500 MG: 500 TABLET ORAL at 08:16

## 2022-05-17 RX ADMIN — DOCUSATE SODIUM 100 MG: 100 CAPSULE, LIQUID FILLED ORAL at 17:40

## 2022-05-17 RX ADMIN — FUROSEMIDE 20 MG: 10 INJECTION, SOLUTION INTRAMUSCULAR; INTRAVENOUS at 06:10

## 2022-05-17 NOTE — UTILIZATION REVIEW
Initial Clinical Review    Elective IP surgical procedure  Age/Sex: 80 y o  male  Surgery Date: 5/16/2022  Procedure: Aortic valve replacement with a 25mm Paul Magna Ease pericardial tissue valve  Anesthesia: General endotracheal anesthesia with transesophageal echocardiogram guidance  Operative Findings:   1  Intraoperative transesophageal echocardiogram revealed normal LVEF with LVH and severe AS and trileaflet valve  2  Epiaortic ultrasound showed less than 5 mm non-mobile plaque  3  Aortic valve was trileaflet, heavily calcified  4  Final transesophageal echocardiogram demonstrated prosthetic valve with normal function without evidence of perivalvular leak, normal LVEF  POD#1 Progress Note:  Intraop received 1 U of plts with amicar postop, no further bleeding issues, did not receive Plavix yesterday  Weaned off of leopoldo, placed on epi at 1 for high SVR and low filling pressures, most recent CI 2 4  On cardene at 3  Biotronic interrogated PPM without issues  Low UOP, just received 20 of lasix  No complaints, not passing flatus, denies nausea, using IS (250)  D/C Epinephrine, 1 mcg/min, wean Cardene, 3 mg/hour, then d/c jo-ann once off  D/c swan briana cath  Start lopressor 25 mg po BID  Restart plavix for recent stent in April  Maintain epicardial pacing wirse for BB initiation  SCDs  Cardiology consulted  Currently on 4L O2 nc  wean as rashel for sat > 90%  Chest tube output remains persistently high; Continue chest tubes to suction today  Start Lasix 40 IV BID, po potassium  D/c srivastava cath  Continue flomax  Continue tylenol q8 standing, decrease oxy to 5 mg po q4 prn  TLC sodium diet, 1800 fluid restriction  Continue stool softeners and prn supp  Gi ppx  D/c Insulin gtt, add insulin sliding scale coverage   Transfer to [facility] 1 step down unit    Admission Orders: Date/Time/Statement:   Admission Orders (From admission, onward)     Ordered        05/16/22 0812  Inpatient Admission  Once                      Orders Placed This Encounter   Procedures    Inpatient Admission     Standing Status:   Standing     Number of Occurrences:   1     Order Specific Question:   Level of Care     Answer:   Critical Care [15]     Order Specific Question:   Estimated length of stay     Answer:   More than 2 Midnights     Order Specific Question:   Certification     Answer:   I certify that inpatient services are medically necessary for this patient for a duration of greater than two midnights  See H&P and MD Progress Notes for additional information about the patient's course of treatment       Vital Signs  Date/Time Temp Pulse Resp BP MAP (mmHg) Arterial Line BP MAP SpO2 FiO2 (%) Calculated FIO2 (%) - Nasal Cannula Nasal Cannula O2 Flow Rate (L/min) O2 Device CO CI Patient Position - Orthostatic VS CVP (mean)   05/17/22 0900 98 2 °F (36 8 °C) 75 11 Abnormal  110/59 77 124/50 69 mmHg 94 % -- -- -- -- 4 9 L/min 2 5 L/min/m2 -- 5 mmHg   05/17/22 0800 98 4 °F (36 9 °C) 75 13 106/56 80 116/44 63 mmHg 92 % -- 36 4 L/min Nasal cannula 5 4 L/min 2 8 L/min/m2 Sitting 6 mmHg   05/17/22 0000 99 7 °F (37 6 °C) 72 14 100/61 75 106/51 68 mmHg 96 % -- 32 3 L/min Nasal cannula 3 9 L/min 2 L/min/m2 Lying 6 mmHg   05/16/22 2000 99 7 °F (37 6 °C) 75 12 100/59 77 126/57 79 mmHg 97 % -- 40 5 L/min Nasal cannula 4 7 L/min 2 5 L/min/m2 Lying 13 mmHg   05/16/22 1800 99 7 °F (37 6 °C) 69 16 -- -- 102/50 67 mmHg 97 % -- -- -- -- 4 8 L/min 2 5 L/min/m2 -- 14 mmHg   05/16/22 1700 99 9 °F (37 7 °C) 72 16 -- -- 93/48 61 mmHg 95 % -- -- -- -- 4 2 L/min 2 2 L/min/m2 -- 8 mmHg   05/16/22 1600 98 6 °F (37 °C) 69 15 -- -- 111/54 72 mmHg 94 % -- 44 6 L/min Nasal cannula 5 1 L/min 2 6 L/min/m2 -- 32 mmHg   05/16/22 1515 97 3 °F (36 3 °C) Abnormal  79 16 -- -- -- -- 97 % 60 -- -- Ventilator 4 1 L/min 2 1 L/min/m2 -- --   05/16/22 1009 97 5 °F (36 4 °C) 77 20 143/90 -- -- -- 97 % -- -- -- None (Room air) -- -- -- --       Pertinent Labs/Diagnostic Test Results:   XR chest portable ICU   Final Result by Niki Giles MD (05/16 2130)      Expected appearance after AVR  XR chest portable    (Results Pending)         Results from last 7 days   Lab Units 05/17/22  0401 05/16/22 2258 05/16/22  1508 05/16/22  1507 05/16/22  1408 05/16/22  1326 05/16/22  1325   WBC Thousand/uL 11 00*  --   --   --   --   --   --    HEMOGLOBIN g/dL 8 6* 9 5*  --  10 1*  --   --   --    I STAT HEMOGLOBIN g/dl  --   --  8 8*  --  8 2*   < >  --    HEMATOCRIT % 25 5* 28 1*  --  29 5*  --   --   --    HEMATOCRIT, ISTAT %  --   --  26*  --  24*   < >  --    PLATELETS Thousands/uL 149  --   --  175  --   --  165    < > = values in this interval not displayed       Results from last 7 days   Lab Units 05/17/22  0401 05/17/22  0010 05/16/22  1929 05/16/22  1508 05/16/22  1507 05/16/22  1408 05/16/22  1326 05/16/22  1317 05/16/22  1303   SODIUM mmol/L 139  --   --   --  137  --   --   --   --    POTASSIUM mmol/L 4 8 4 5 3 7  --  4 3  --   --   --   --    CHLORIDE mmol/L 111*  --   --   --  108  --   --   --   --    CO2 mmol/L 21  --   --   --  23  --   --   --   --    CO2, I-STAT mmol/L  --   --   --  23  --  24 27 25 25   ANION GAP mmol/L 7  --   --   --  6  --   --   --   --    BUN mg/dL 15  --   --   --  13  --   --   --   --    CREATININE mg/dL 0 89  --   --   --  0 83  --   --   --   --    EGFR ml/min/1 73sq m 75  --   --   --  78  --   --   --   --    CALCIUM mg/dL 7 9*  --   --   --  8 1*  --   --   --   --    CALCIUM, IONIZED, ISTAT mmol/L  --   --   --  1 21  --  1 24 0 99* 0 97* 0 90*   MAGNESIUM mg/dL 2 5  --   --   --   --   --   --   --   --      Results from last 7 days   Lab Units 05/17/22  0848 05/17/22  0619 05/17/22  0400 05/17/22  0219 05/17/22  0010 05/16/22  2159 05/16/22  1928 05/16/22  1810 05/16/22  1605 05/16/22  1504 05/16/22  1013   POC GLUCOSE mg/dl 171* 170* 148* 135 145* 156* 103 103 202* 225* 109     Results from last 7 days   Lab Units 05/17/22  0401 05/16/22  1507   GLUCOSE RANDOM mg/dL 144* 218*     Results from last 7 days   Lab Units 05/17/22  0131   PH RAJESH  7 305   PCO2 RAJESH mm Hg 42 3   PO2 RAJESH mm Hg 28 9*   HCO3 RAJESH mmol/L 20 6*   BASE EXC RAJESH mmol/L -5 4   O2 CONTENT RAJESH ml/dL 6 7   O2 HGB, VENOUS % 51 9*     Results from last 7 days   Lab Units 05/16/22  1508 05/16/22  1408 05/16/22  1326 05/16/22  1317 05/16/22  1244 05/16/22  1209   PH, RAJESH I-STAT   --   --   --  7 402*  --  7 363   PCO2, RAJESH ISTAT mm HG  --   --   --  37 8*  --  46 4   PO2, RAJESH ISTAT mm HG  --   --   --  50 0*  --  40 0   HCO3, RAJESH ISTAT mmol/L  --   --   --  23 5*  --  26 4   I STAT BASE EXC mmol/L -4* -2 1 -1   < > 1   I STAT O2 SAT % 97*  --  100* 85   < > 73   ISTAT PH ART  7 309* 7 393 7 409  --    < >  --    I STAT ART PCO2 mm HG 43 8 37 1 40 4  --    < >  --    I STAT ART PO2 mm HG 98 0 >400 0* 333 0*  --    < >  --    I STAT ART HCO3 mmol/L 22 0 22 6 25 6  --    < >  --     < > = values in this interval not displayed       Results from last 7 days   Lab Units 05/16/22  1507   PROTIME seconds 15 6*   INR  1 29*   PTT seconds 34       Results from last 7 days   Lab Units 05/16/22  1206   CLARITY UA  Clear   COLOR UA  Light Yellow   SPEC GRAV UA  1 016   PH UA  6 0   GLUCOSE UA mg/dl Negative   KETONES UA mg/dl Negative   BLOOD UA  Negative   PROTEIN UA mg/dl Negative   NITRITE UA  Negative   BILIRUBIN UA  Negative   UROBILINOGEN UA (BE) mg/dl <2 0   LEUKOCYTES UA  Negative     Scheduled Medications:  acetaminophen, 975 mg, Oral, Q8H  amiodarone, 200 mg, Oral, Q8H JANELL  ascorbic acid, 500 mg, Oral, Daily  aspirin, 81 mg, Oral, Daily  atorvastatin, 80 mg, Oral, Daily With Dinner  cefazolin, 2,000 mg, Intravenous, Q8H  clopidogrel, 75 mg, Oral, Daily  docusate sodium, 100 mg, Oral, BID  fondaparinux, 2 5 mg, Subcutaneous, Daily  furosemide, 40 mg, Intravenous, BID (diuretic)  insulin lispro, 1-6 Units, Subcutaneous, TID AC  insulin lispro, 1-6 Units, Subcutaneous, HS  levothyroxine, 100 mcg, Oral, Early Morning  metoprolol tartrate, 25 mg, Oral, Q12H JANELL  mupirocin, 1 application, Nasal, L23J JANELL  pantoprazole, 40 mg, Oral, Early Morning  polyethylene glycol, 17 g, Oral, Daily  potassium chloride, 20 mEq, Oral, BID  tamsulosin, 0 4 mg, Oral, Daily With Dinner    Continuous IV Infusions:  niCARdipine, 1-15 mg/hr, Intravenous, Titrated    PRN Meds:  bisacodyl, 10 mg, Rectal, Daily PRN  hydromorphone 0 5 mg IV Q2H PRN 5/16 x2, 5/17 x1 then d/c'd  ondansetron, 4 mg, Intravenous, Q6H PRN  oxyCODONE, 2 5 mg, Oral, Q4H PRN  oxyCODONE, 5 mg, Oral, Q4H PRN 5/17 x1  oxyCODONE, 10 mg, Oral, Q4H PRN 5/16 x1 then d/c'd  temazepam, 15 mg, Oral, HS PRN        Network Utilization Review Department  ATTENTION: Please call with any questions or concerns to [phone] and carefully listen to the prompts so that you are directed to the right person  All voicemails are confidential   Chrystine Can all requests for admission clinical reviews, approved or denied determinations and any other requests to dedicated fax number below belonging to the campus where the patient is receiving treatment   List of dedicated fax numbers for the Facilities:  [address] DENIALS (Administrative/Medical Necessity) [phone]   [address] (Maternity/NICU/Pediatrics) [phone]   [address]  [address] Medical [place] [address] [address] [place] [address] [address] [phone]

## 2022-05-17 NOTE — PROCEDURES
Procedure: Epicardial Wire Removal    05/17/22    Patient was returned to bed  EPW x 1 d/c'd in typical fashion  No immediate complications  Site dressed with dry sterile dressing  Patient and nurse aware of mandatory 1 hour bedrest protocol  Vital signs ordered Q 15 minutes for one hour as per protocol      Richard Guerrero PA-C

## 2022-05-17 NOTE — PLAN OF CARE
Problem: CARDIOVASCULAR - ADULT  Goal: Maintains optimal cardiac output and hemodynamic stability  Description: INTERVENTIONS:  - Monitor I/O, vital signs and rhythm  - Monitor for S/S and trends of decreased cardiac output  - Administer and titrate ordered vasoactive medications to optimize hemodynamic stability  - Assess quality of pulses, skin color and temperature  - Assess for signs of decreased coronary artery perfusion  - Instruct patient to report change in severity of symptoms  Outcome: Progressing  Goal: Absence of cardiac dysrhythmias or at baseline rhythm  Description: INTERVENTIONS:  - Continuous cardiac monitoring, vital signs, obtain 12 lead EKG if ordered  - Administer antiarrhythmic and heart rate control medications as ordered  - Monitor electrolytes and administer replacement therapy as ordered  Outcome: Progressing     Problem: RESPIRATORY - ADULT  Goal: Achieves optimal ventilation and oxygenation  Description: INTERVENTIONS:  - Assess for changes in respiratory status  - Assess for changes in mentation and behavior  - Position to facilitate oxygenation and minimize respiratory effort  - Oxygen administered by appropriate delivery if ordered  - Initiate smoking cessation education as indicated  - Encourage broncho-pulmonary hygiene including cough, deep breathe, Incentive Spirometry  - Assess the need for suctioning and aspirate as needed  - Assess and instruct to report SOB or any respiratory difficulty  - Respiratory Therapy support as indicated  Outcome: Progressing     Problem: METABOLIC, FLUID AND ELECTROLYTES - ADULT  Goal: Electrolytes maintained within normal limits  Description: INTERVENTIONS:  - Monitor labs and assess patient for signs and symptoms of electrolyte imbalances  - Administer electrolyte replacement as ordered  - Monitor response to electrolyte replacements, including repeat lab results as appropriate  - Instruct patient on fluid and nutrition as appropriate  Outcome: Progressing  Goal: Fluid balance maintained  Description: INTERVENTIONS:  - Monitor labs   - Monitor I/O and daily WT  - Instruct patient on fluid and nutrition as appropriate  - Assess for signs & symptoms of volume excess or deficit  Outcome: Progressing  Goal: Glucose maintained within target range  Description: INTERVENTIONS:  - Monitor Blood Glucose Q2hr  - Assess for signs and symptoms of hyperglycemia and hypoglycemia  - Administer ordered medications to maintain glucose within target range  - Assess nutritional intake and initiate nutrition service referral as needed  Outcome: Progressing

## 2022-05-17 NOTE — PHYSICAL THERAPY NOTE
Physical Therapy Evaluation     Patient's Name: Carissa Craft    Admitting Diagnosis  Severe aortic stenosis [I35 0]    Problem List  Patient Active Problem List   Diagnosis    Severe aortic stenosis    Chronic nonintractable headache    Enlarged prostate without lower urinary tract symptoms (luts)    GERD without esophagitis    Essential hypertension    Acquired hypothyroidism    Sick sinus syndrome (HCC)    Presence of permanent cardiac pacemaker    Paroxysmal atrial fibrillation (HCC)    Balance disorder    Coronary artery disease involving native coronary artery 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 pectoris    S/P drug eluting coronary stent placement    S/P AVR    Leukocytosis    Acute postoperative anemia due to expected blood loss       Past Medical History  Past Medical History:   Diagnosis Date    Angina pectoris ([facility] )     Arthritis     Ascending aortic aneurysm (HCC)     trileaflet AV, 41mm    BPH (benign prostatic hyperplasia)     Cancer ([facility] )     skin    Coronary artery disease     Former tobacco use     pipe & cigar use socially & infrequently    GERD (gastroesophageal reflux disease)     controlled w/ diet    Headache     h/o    Hepatitis A     History of melanoma     Hypertension     Hypothyroidism     Pacemaker     Severe aortic stenosis     Shortness of breath     with exertion    Vitamin B12 deficiency     Wears dentures        Past Surgical History  Past Surgical History:   Procedure Laterality Date    CARDIAC CATHETERIZATION  04/15/2022    Procedure: Cardiac catheterization;  Surgeon: Lizzie Epps DO;  [facility]: [facility] CARD[facility]AC [facility]; Service: Cardiology    CARDIAC CATHETERIZATION N/A 04/15/2022    Procedure: Cardiac Coronary Angiogram;  Surgeon: Lizzie Epps DO;  [facility]: [facility] CARDIAC C[facility]; Service: Cardiology    CARDIAC CATHETERIZATION N/A 04/15/2022    Procedure: Cardiac pci;  Surgeon: Lizzie Epps DO;  Location: [facility] CARDIAC C[facility];   Service: Cardiology CARDIAC PACEMAKER PLACEMENT      COLONOSCOPY      INGUINAL HERNIA REPAIR Bilateral     MD RPLCMT AORTIC VALVE OPN W/STENTLESS TISSUE VALVE N/A 5/16/2022    Procedure: REPLACEMENT VALVE AORTIC (TISSUE AVR) WITH 25 MM AORTIC MAGNA EASE VALVE W/ ROJELIO;  Surgeon: Chayito Thompson DO;  Location: [facility] MAIN OR;  Service: Cardiac Surgery    SKIN CANCER EXCISION      melanoma, multiple        05/17/22 1002   PT Last Visit   PT Visit Date 05/17/22   Note Type   Note type Evaluation   Pain Assessment   Pain Assessment Tool FLACC   Pain Location/Orientation Orientation: Mid;Location: Chest;Location: Incision   Pain Onset/Description Onset: Ongoing;Frequency: Intermittent; Descriptor: Aching;Descriptor: Discomfort   Effect of Pain on Daily Activities guarding   Patient's Stated Pain Goal No pain   Hospital Pain Intervention(s) Repositioned; Ambulation/increased activity; Emotional support   Pain Rating: FLACC (Rest) - Face 0   Pain Rating: FLACC (Rest) - Legs 0   Pain Rating: FLACC (Rest) - Activity 0   Pain Rating: FLACC (Rest) - Cry 0   Pain Rating: FLACC (Rest) - Consolability 0   Score: FLACC (Rest) 0   Pain Rating: FLACC (Activity) - Face 1   Pain Rating: FLACC (Activity) - Legs 0   Pain Rating: FLACC (Activity) - Activity 0   Pain Rating: FLACC (Activity) - Cry 1   Pain Rating: FLACC (Activity) - Consolability 1   Score: FLACC (Activity) 3   Restrictions/Precautions   Braces or Orthoses   (none, as per family)   Other Precautions Cardiac/sternal;Multiple lines;Telemetry;O2;Fall Risk;Pain  (CT; a-line)   Home Living   Type of Home House   Home Layout One level  (1 CHRISTEL)   Prior Function   Level of [place] Independent with ADLs and functional mobility  (amb w/o AD)   Lives With Spouse   General   Additional Pertinent History cleared for assessment (spoke to nsg)   Family/Caregiver Present Yes   Cognition   Overall Cognitive Status WFL   Arousal/Participation Cooperative   Orientation Level Oriented to person;Oriented to place;Oriented to situation   Memory Decreased recall of precautions   Following Commands Follows one step commands with increased time or repetition   Subjective   Subjective Pt is resting in the chair; arousable but initially remains to be somewhat somnolent; mild dizziness reported upon sitting/standing w/ BP stable per a-line;   RUE Assessment   RUE Assessment WFL  (AROM)   LUE Assessment   LUE Assessment WFL  (AROM)   RLE Assessment   RLE Assessment WFL  (AROM)   Strength RLE   RLE Overall Strength   (fair/ fair + (grossly))   LLE Assessment   LLE Assessment WFL  (AROM)   Strength LLE   LLE Overall Strength   (fair/ fair + (grossly))   Bed Mobility   Sit to Supine 3  Moderate assistance   Additional items Assist x 2; Increased time required;Verbal cues;LE management  (repositioned higher in bed w/ (A)x2)   Transfers   Sit to Stand 3  Moderate assistance   Additional items Assist x 2; Increased time required;Verbal cues   Stand to Sit 3  Moderate assistance   Additional items Assist x 2; Increased time required;Verbal cues   Stand pivot 3  Moderate assistance   Additional items Assist x 2; Increased time required;Verbal cues  (took a few steps from chair to bed to the (L) side)   Ambulation/Elevation   Gait pattern Excessively slow; Short stride; Inconsistent iglesia   Gait Assistance 3  Moderate assist   Additional items Assist x 2;Verbal cues; Tactile cues   Assistive Device Rolling walker   Distance 3 ft total distance for chair to bed transition   Balance   Static Sitting Fair -   Dynamic Sitting Poor +   Static Standing Poor   Dynamic Standing Poor -   Ambulatory Poor -   Activity Tolerance   Activity Tolerance Patient limited by fatigue; Other (Comment)  (transient dizziness)   Medical Staff Made Aware Co-eval performed w/ OTR due to complexity of medical status and multiple comorbidities   Nurse Made Aware spoke to [facility], [address] [place] [address]   Assessment   Prognosis Good   Problem List Decreased strength;Decreased endurance; Impaired balance;Decreased mobility   Assessment Pt is 80 y o  male admitted with Dx of Severe aortic stenosis and underwent Aortic valve replacement with a 25mm Paul Magna Ease pericardial tissue valve on 5/16/2022  Pt 's comorbidities affecting POC include: of Angina pectoris ([facility] ), Arthritis, Ascending aortic aneurysm ([facility] ), Cancer ([facility] ), Coronary artery disease, Former tobacco use, and Hypertension and personal factors of: advanced age and CHRISTEL  Pt's clinical presentation is currently unstable/unpredictable which is evident in ongoing telemetry monitoring while in a critical care unit w/ a-line in place, abnormal lab values being monitored/trending, suppl O2 needs, CT in place and inability to progress further w/ mobilization at this time  Pt presents w/ generalized weakness, incl decreased LE strength, decreased functional endurance and activity tolerance, impaired balance w/ associated gait deviations requiring use of rw at this time and fall risk  Will cont to follow pt in PT for progressive mobilization to address above functional deficits and to max level of (I), endurance, and safety  Otherwise, anticipate pt will return home w/ available family support upon D/C provided he cont improving w/ mobility skills, safety, and endurance (incl on the step) and when medically cleared; home PT follow up is recommended at this time; will follow  Goals   Patient Goals none expressed   [facility] Expiration Date 05/27/22   Short Term Goal #1 7-10 days  Pt will amb 300 ft w/ least restrictive assistive device, mod (I) in order to facilitate safe return to premorbid environment and community amb status  Pt will negotiate 1 step w/ appropriate AD, (S)x1 in order to navigate in and out of home environment safely  Pt will achieve (I) level w/ bed mob in order to facilitate safety with OOB and back to bed transitions in own living environment   Pt will perform transfers w/ mod (I) to assure (I) and safety w/ functional mobility/transitions w/ all aspects of mobility/locomotion  Pt will participate in LE therex and balance activities to max progression w/ mobility skills  PT Treatment Day 0   Plan   Treatment/Interventions Functional transfer training;LE strengthening/ROM; Elevations; Therapeutic exercise; Endurance training;Bed mobility;Gait training;Spoke to nursing;OT;Family   PT Frequency 4-6x/wk   Recommendation   PT Discharge Recommendation Home with home health rehabilitation  (home PT)   Equipment Recommended [phone] Recommended Wheeled walker   Yvette Griffiths 435   Turning in Bed Without Bedrails 2   Lying on Back to Sitting on Edge of Flat Bed 2   Moving Bed to Chair 2   Standing Up From Chair 2   Walk in Room 2   Climb 3-5 Stairs 1   Basic Mobility Inpatient Raw Score 11   Basic Mobility Standardized Score 30 25   Highest Level Of Mobility   JH-HLM Goal 4: Move to chair/commode   JH-HLM Achieved 4: Move to chair/commode   Modified Michael Scale   Modified [place] Scale 4   End of Consult   Patient Position at End of Consult Supine; All needs within reach           [facility], PT

## 2022-05-17 NOTE — CONSULTS
Consultation - Cardiology Team One  Terrence Berger 80 y o  male MRN: [id number]  Unit/Bed#: [facility] 414-01 Encounter: [id number]    Inpatient consult to Cardiology  Consult performed by: MELINA Bacon  Consult ordered by: Anthony Esparza PA-C          Physician Requesting Consult: Janelle Cr DO     Reason for Consult / Principal Problem: post AVR      Assessment/ Plan    1  Severe Aortic stenosis: s/p Tissue AVR    POD #1   Pt moved out of critical care unit this AM just prior to my exam  Feeling nauseas and whoozy has incisional pain  BP on jo-ann 109  Hrs in 70s; vpacing  Pacer just interrogated by rep  SPo2 adequate on supplemental O2  Post op ekg 5/16 showed Vpacing    He is on appropriate medical therapy with ASA/plavix, statin, amiodarone per protocol, and starting beta blocker  Biventricular function preserved on intra op ROJELIO  RN to check on pain meds  Getting IV lasix for post op volume overload; + 3L overall  CT remain   EPW removed  VS and labs stable    Continue post op care per cardiac surgery  Encouraged oral intake, IS and ambulating when able  2  Post op anemia: hgb 8 7 today from 12 9 pre-op; continue to monitor  3  CAD: prior RCA stent placed at pre-op cardiac cath 4/15/22; at that time had 80% small OM1 branch stenosis medically managed due to small territory/small vessel  Also non-obstructive disease in prox and mid LAD  Back on DAPT with ASA/plavix; did not get Plavix yesterday; on BB and high intensity statin; no chest pain  Post op ekg showing vpacing  4  SSS s/p PPM: Bio dual camber PPM implanted 2016 at [address] interrogation 3/22/22 showed %,  52%; no significant high rate episodes  5  PAF: Vpaced on telemetry; recent device interrogation has not shown afib; he has declined AC     6  HTN: Avg 101/57; starting BB today    History of Present Illness   HPI: Terrence Berger is a 80y o  year old male who has a history of Aortic stenosis, PAF, SSS s/p PPM 2016, HTN  He follows with cardiologist Dr Spencer Ross  Pt with known aortic stenosis being followed by Dr Vandana Perkins  In severe Range on echo 8/2021 but asymptomatic  More recently he was having some exertional symptoms and thus referred to cardiac surgery for evaluation  He underwent workup for TAVR including cath during which he received IAIN x1 to RCA and returned for evaluation  Due to location of left coronary ostium takeoff TAVR was deemed higher risk and Tissue SAVR was planned  He presented on 5/16 and underwent elective Aortic valve replacement with a 25mm [facility] Ease pericardial tissue valve  Surgery uncomplicated per OP report  ROJELIO showed preserved Biventricular function  He was taken to CC unit post op; was able to be extubated without issue  Did require epinephrine and Cardene overnight but they are currently on hold  He was able to walk to his room on floor  At time of my exam he is feeling nauseas and a bit whoozy after walking  Pain in incision area  Wife is present at bedside  EKG reviewed personally:  5/16/22  Vpaced    Telemetry reviewed personally:   Vpacing    Review of Systems   Constitutional: Positive for decreased appetite and malaise/fatigue  Cardiovascular: Positive for chest pain (incisional pain)  Negative for dyspnea on exertion, leg swelling, orthopnea, palpitations and syncope  Respiratory: Negative for cough and shortness of breath  Gastrointestinal: Positive for nausea  Negative for abdominal pain and vomiting  Genitourinary: Negative for dysuria  Neurological: Positive for light-headedness  Psychiatric/Behavioral: Negative for altered mental status  All other systems reviewed and are negative       Historical Information   Past Medical History:   Diagnosis Date    Angina pectoris (HCC)     Arthritis     Ascending aortic aneurysm (HCC)     trileaflet AV, 41mm    BPH (benign prostatic hyperplasia)     Cancer (HCC)     skin    Coronary artery disease     Former tobacco use     pipe & cigar use socially & infrequently    GERD (gastroesophageal reflux disease)     controlled w/ diet    Headache     h/o    Hepatitis A     History of melanoma     Hypertension     Hypothyroidism     Pacemaker     Severe aortic stenosis     Shortness of breath     with exertion    Vitamin B12 deficiency     Wears dentures      Past Surgical History:   Procedure Laterality Date    CARDIAC CATHETERIZATION  04/15/2022    Procedure: Cardiac catheterization;  Surgeon: Nestor Durbin DO;  [facility]; Service: Cardiology    CARDIAC CATHETERIZATION N/A 04/15/2022    Procedure: Cardiac Coronary Angiogram;  Surgeon: Nestor Durbin DO;  [facility]; Service: Cardiology    CARDIAC CATHETERIZATION N/A 04/15/2022    Procedure: Cardiac pci;  Surgeon: Nestor Durbin DO;  [facility]; Service: Cardiology    CARDIAC PACEMAKER PLACEMENT      COLONOSCOPY      INGUINAL HERNIA REPAIR Bilateral     DE RPLCMT AORTIC VALVE OPN W/STENTLESS TISSUE VALVE N/A 5/16/2022    Procedure: REPLACEMENT VALVE AORTIC (TISSUE AVR) WITH 25 MM AORTIC MAGNA EASE VALVE W/ ROJELIO;  Surgeon: Veto Flowers DO;  Location: [facility];  Service: Cardiac Surgery    SKIN CANCER EXCISION      melanoma, multiple     Social History     Substance and Sexual Activity   Alcohol Use Yes    Comment: a beer once in a while       Social History     Substance and Sexual Activity   Drug Use No     Social History     Tobacco Use   Smoking Status Never Smoker   Smokeless Tobacco Never Used     Family History:   Family History   Problem Relation Age of Onset    Cancer Family         gastrointestinal tract    Heart disease Mother     Substance Abuse Neg Hx     Mental illness Neg Hx      Meds/Allergies   current meds:   Current Facility-Administered Medications   Medication Dose Route Frequency    acetaminophen (TYLENOL) tablet 975 mg 975 mg Oral Q8H    amiodarone tablet 200 mg  200 mg Oral Q8H [address]    ascorbic acid (VITAMIN C) tablet 500 mg  500 mg Oral Daily    aspirin chewable tablet 81 mg  81 mg Oral Daily    atorvastatin (LIPITOR) tablet 80 mg  80 mg Oral Daily With Dinner    bisacodyl (DULCOLAX) rectal suppository 10 mg  10 mg Rectal Daily PRN    ceFAZolin (ANCEF) IVPB (premix in dextrose) 2,000 mg 50 mL  2,000 mg Intravenous Q8H    clopidogrel (PLAVIX) tablet 75 mg  75 mg Oral Daily    docusate sodium (COLACE) capsule 100 mg  100 mg Oral BID    fondaparinux (ARIXTRA) subcutaneous injection 2 5 mg  2 5 mg Subcutaneous Daily    furosemide (LASIX) injection 40 mg  40 mg Intravenous BID (diuretic)    insulin lispro (HumaLOG) 100 units/mL subcutaneous injection 1-6 Units  1-6 Units Subcutaneous TID AC    insulin lispro (HumaLOG) 100 units/mL subcutaneous injection 1-6 Units  1-6 Units Subcutaneous HS    levothyroxine tablet 100 mcg  100 mcg Oral Early Morning    metoprolol tartrate (LOPRESSOR) tablet 25 mg  25 mg Oral Q12H JANELL    mupirocin (BACTROBAN) 2 % nasal ointment 1 application  1 application Nasal F99U JANELL    niCARdipine (CARDENE) 25 mg (STANDARD CONCENTRATION) in sodium chloride 0 9% 250 mL  1-15 mg/hr Intravenous Titrated    ondansetron (ZOFRAN) injection 4 mg  4 mg Intravenous Q6H PRN    oxyCODONE (ROXICODONE) IR tablet 2 5 mg  2 5 mg Oral Q4H PRN    oxyCODONE (ROXICODONE) IR tablet 5 mg  5 mg Oral Q4H PRN    pantoprazole (PROTONIX) EC tablet 40 mg  40 mg Oral Early Morning    polyethylene glycol (MIRALAX) packet 17 g  17 g Oral Daily    potassium chloride (K-DUR,KLOR-CON) CR tablet 20 mEq  20 mEq Oral BID    tamsulosin (FLOMAX) capsule 0 4 mg  0 4 mg Oral Daily With Dinner    temazepam (RESTORIL) capsule 15 mg  15 mg Oral HS PRN     niCARdipine, 1-15 mg/hr, Last Rate: 2 5 mg/hr (05/17/22 1021)      Allergies   Allergen Reactions    Amoxicillin Other (See Comments)     Severe weakness     Objective   Vitals: Blood pressure 110/59, pulse 77, temperature 98 2 °F (36 8 °C), resp  rate 15, height 5' 8 5" (1 74 m), weight 78 kg (171 lb 15 3 oz), SpO2 94 %  ,     Body mass index is 25 77 kg/m²  ,     Systolic (21ZIW), AFD:782 , Min:79 , YI     Diastolic (32FDN), CNC:10, Min:52, Max:61      Intake/Output Summary (Last 24 hours) at 2022 1138  Last data filed at 2022 1137  Gross per 24 hour   Intake 7514 69 ml   Output 4345 ml   Net 3169 69 ml     Weight (last 2 days)     Date/Time Weight    22 0538 78 (171 96)    22 1009 72 1 (159)        Invasive Devices  Report    Central Venous Catheter Line  Duration           CVC Central Lines 22 Triple <1 day          Peripheral Intravenous Line  Duration           Peripheral IV 22 Right Wrist <1 day          Arterial Line  Duration           Arterial Line 22 <1 day          Drain  Duration           Chest Tube 1 Posterior;Mediastinal 32 Fr  <1 day    Chest Tube 2 Anterior;Mediastinal 32 Fr  <1 day              Physical Exam  Vitals reviewed  Constitutional:       Appearance: Normal appearance  Comments: Pt sitting up in recliner; NAD   HENT:      Head: Normocephalic  Mouth/Throat:      Mouth: Mucous membranes are moist    Neck:      Comments: RIJ TLC intact  Cardiovascular:      Rate and Rhythm: Normal rate and regular rhythm  Heart sounds: S1 normal and S2 normal  No murmur heard  Comments: MS incision dsg intact  CT intact  Pulmonary:      Comments: Poor effort; LS decreased; on O2 via NC  Abdominal:      Palpations: Abdomen is soft  Musculoskeletal:      Right lower leg: No edema  Left lower leg: No edema  Skin:     General: Skin is warm  Neurological:      Mental Status: He is alert and oriented to person, place, and time     Psychiatric:         Mood and Affect: Mood normal        LABORATORY RESULTS:      CBC with diff:   Results from last 7 days   Lab Units 22  0401 22  2258 22  6528 05/16/22  1507 05/16/22  1408 05/16/22  1326 05/16/22  1325 05/16/22  1317   WBC Thousand/uL 11 00*  --   --   --   --   --   --   --    HEMOGLOBIN g/dL 8 6* 9 5*  --  10 1*  --   --   --   --    I STAT HEMOGLOBIN g/dl  --   --  8 8*  --  8 2* 8 8*  --  8 5*   HEMATOCRIT % 25 5* 28 1*  --  29 5*  --   --   --   --    HEMATOCRIT, ISTAT %  --   --  26*  --  24* 26*  --  25*   MCV fL 94  --   --   --   --   --   --   --    PLATELETS Thousands/uL 149  --   --  175  --   --  165  --    MCH pg 31 9  --   --   --   --   --   --   --    MCHC g/dL 33 7  --   --   --   --   --   --   --    RDW % 13 4  --   --   --   --   --   --   --    MPV fL 9 6  --   --  9 7  --   --  9 5  --      CMP:  Results from last 7 days   Lab Units 05/17/22  0401 05/17/22  0010 05/16/22  1929 05/16/22  1508 05/16/22  1507 05/16/22  1408 05/16/22  1326 05/16/22  1317 05/16/22  1303 05/16/22  1244 05/16/22  1209   POTASSIUM mmol/L 4 8 4 5 3 7  --  4 3  --   --   --   --   --   --    CHLORIDE mmol/L 111*  --   --   --  108  --   --   --   --   --   --    CO2 mmol/L 21  --   --   --  23  --   --   --   --   --   --    CO2, I-STAT mmol/L  --   --   --  23  --  24 27 25 25 27 28   BUN mg/dL 15  --   --   --  13  --   --   --   --   --   --    CREATININE mg/dL 0 89  --   --   --  0 83  --   --   --   --   --   --    GLUCOSE, ISTAT mg/dl  --   --   --  220*  --  170* 128 117 111 138 94   CALCIUM mg/dL 7 9*  --   --   --  8 1*  --   --   --   --   --   --    EGFR ml/min/1 73sq m 75  --   --   --  78  --   --   --   --   --   --      BMP:  Results from last 7 days   Lab Units 05/17/22  0401 05/17/22  0010 05/16/22  1929 05/16/22  1508 05/16/22  1507 05/16/22  1408 05/16/22  1326 05/16/22  1317 05/16/22  1303   POTASSIUM mmol/L 4 8 4 5 3 7  --  4 3  --   --   --   --    CHLORIDE mmol/L 111*  --   --   --  108  --   --   --   --    CO2 mmol/L 21  --   --   --  23  --   --   --   --    CO2, I-STAT mmol/L  --   --   --  23  --  24 27 25 25   BUN mg/dL 15  -- --   --  13  --   --   --   --    CREATININE mg/dL 0 89  --   --   --  0 83  --   --   --   --    GLUCOSE, ISTAT mg/dl  --   --   --  220*  --  170* 128 117 111   CALCIUM mg/dL 7 9*  --   --   --  8 1*  --   --   --   --       Results from last 7 days   Lab Units 22  0401   MAGNESIUM mg/dL 2 5     Results from last 7 days   Lab Units 22  1507   INR  1 29*     Lipid Profile:   No results found for: CHOL  Lab Results   Component Value Date    HDL 45 05/10/2022     Lab Results   Component Value Date    LDLCALC 106 (H) 05/10/2022     Lab Results   Component Value Date    TRIG 85 05/10/2022         Cardiac testing:   Results for orders placed during the hospital encounter of 21    Echo complete with contrast if indicated    Narrative  [employer] [address]    Transthoracic Echocardiogram  2D, M-mode, Doppler, and Color Doppler    Study date:  06-Aug-2021    Patient: Rosalio Rivera  MR number: [id number]  Account number: [de-identified]  : 07-Oct-1932  Age: 80 years  Gender: Male  Status: Outpatient  Location: [facility]  Height: 70 in  Weight: 169 lb  BP: 132/ 80 mmHg    Indications: Aortic stenosis  Diagnoses: I35 0 - Nonrheumatic aortic (valve) stenosis    Sonographer:  CADE Vogel [facility] RVT  Primary Physician:  Kevan Monte MD  Referring Physician:  Dominique Real MD  Group:  [facility]  Interpreting Physician:  Louise Esquivel DO    SUMMARY    LEFT VENTRICLE:  Systolic function was at the lower limits of normal  Ejection fraction was estimated to be 53 %  There was severe hypokinesis of the distal apex and possibly the basal inferior myocardial wall segment  Wall thickness was at the upper limits of normal   Doppler parameters were consistent with abnormal left ventricular relaxation (grade 1 diastolic dysfunction)  MITRAL VALVE:  There was mild annular calcification    There was moderately restricted mobility of the posterior leaflet  There was mild regurgitation  AORTIC VALVE:  The valve was trileaflet  Leaflets exhibited markedly increased thickness, marked calcification, markedly reduced cuspal separation, reduced mobility, and sclerosis  Transaortic velocity was increased due to valvular stenosis  There was severe stenosis  There was mild regurgitation  The peak valve velocity was 4 cm/s  Valve mean gradient was 49 mmHg  Aortic valve area was 0 7 cmï¾² by the continuity equation  TRICUSPID VALVE:  There was trace regurgitation  HISTORY: PRIOR HISTORY: A-fib, pacemaker  Risk factors: hypertension  PROCEDURE: The study was performed in the [facility]  This was a routine study  The transthoracic approach was used  The study included complete 2D imaging, M-mode, complete spectral Doppler, and color Doppler  Images  were obtained from the parasternal, apical, subcostal, and suprasternal notch acoustic windows  Image quality was adequate  LEFT VENTRICLE: Size was normal  Systolic function was at the lower limits of normal  Ejection fraction was estimated to be 53 %  There was severe hypokinesis of the distal apex and possibly the basal inferior myocardial wall segment  Wall  thickness was at the upper limits of normal  DOPPLER: Doppler parameters were consistent with abnormal left ventricular relaxation (grade 1 diastolic dysfunction)  RIGHT VENTRICLE: The size was normal  Systolic function was normal  Wall thickness was normal     LEFT ATRIUM: Size was normal     RIGHT ATRIUM: Size was normal     MITRAL VALVE: There was mild annular calcification  There was moderately restricted mobility of the posterior leaflet  DOPPLER: There was no evidence for stenosis  There was mild regurgitation  AORTIC VALVE: The valve was trileaflet   Leaflets exhibited markedly increased thickness, marked calcification, markedly reduced cuspal separation, reduced mobility, and sclerosis  DOPPLER: Transaortic velocity was increased due to valvular  stenosis  There was severe stenosis  There was mild regurgitation  TRICUSPID VALVE: The valve structure was normal  There was normal leaflet separation  DOPPLER: The transtricuspid velocity was within the normal range  There was no evidence for stenosis  There was trace regurgitation  PULMONIC VALVE: Not well visualized  PERICARDIUM: There was no pericardial effusion  The pericardium was normal in appearance  AORTA: The root exhibited normal size  SYSTEMIC VEINS: IVC: The inferior vena cava was normal in size and course  Respirophasic changes were normal     PULMONARY ARTERY: DOPPLER: Systolic pressure was within the normal range  Estimated peak pressure was 25 mmHg  MEASUREMENT TABLES    DOPPLER MEASUREMENTS  Aortic valve   (Reference normals)  Peak clarisa   4 cm/s   (--)  Mean gradient   49 mmHg   (--)  Valve area, cont   0 7 cmï¾²   (--)    SYSTEM MEASUREMENT TABLES    2D  %FS: 26 71 %  Ao Diam: 3 17 cm  EDV(Teich): 132 54 ml  EF Biplane: 53 4 %  EF(Teich): 51 8 %  ESV(Teich): 63 89 ml  IVSd: 1 cm  LA Area: 18 38 cm2  LA Diam: 4 15 cm  LVEDV MOD A2C: 133 67 ml  LVEDV MOD A4C: 142 68 ml  LVEDV MOD BP: 138 06 ml  LVEF MOD A2C: 54 01 %  LVEF MOD A4C: 53 28 %  LVESV MOD A2C: 61 48 ml  LVESV MOD A4C: 66 66 ml  LVESV MOD BP: 64 34 ml  LVIDd: 5 25 cm  LVIDs: 3 85 cm  LVLd A2C: 9 39 cm  LVLd A4C: 9 43 cm  LVLs A2C: 8 42 cm  LVLs A4C: 8 25 cm  LVOT Diam: 2 27 cm  LVPWd: 0 89 cm  RA Area: 17 16 cm2  RVIDd: 3 49 cm  SV MOD A2C: 72 2 ml  SV MOD A4C: 76 02 ml  SV(Teich): 68 65 ml    CW  AR Dec [place]: 3 19 m/s2  AR Dec Time: 1395 58 ms  AR PHT: 404 72 ms  AR Vmax: 4 45 m/s  AR maxP 21 mmHg  AV Env  Ti: 329 53 ms  AV VTI: 96 94 cm  AV Vmax: 3 87 m/s  AV Vmean: 2 96 m/s  AV maxP 01 mmHg  AV meanP 93 mmHg  TR Vmax: 2 38 m/s  TR maxP 67 mmHg    MM  TAPSE: 2 41 cm    PW  MIRA (VTI): 0 64 cm2  MIRA Vmax: 0 69 cm2  LVOT Env  Ti: 303 2 ms  LVOT VTI: 15 39 cm  LVOT Vmax: 0 67 m/s  LVOT Vmean: 0 51 m/s  LVOT maxP 78 mmHg  LVOT meanP 11 mmHg  LVSV Dopp: 61 99 ml    [facility]etal Commission Accredited Echocardiography Laboratory    Prepared and electronically signed by    Moises Mitchell DO  Signed 06-Aug-2021 13:13:13    Imaging: I have personally reviewed pertinent reports  XR chest portable    Result Date: 2022  Narrative: CHEST INDICATION:   AVR on 2022  COMPARISON:  Chest radiograph from 2022 and chest CT from 2022  EXAM PERFORMED/VIEWS:  XR CHEST PORTABLE FINDINGS:  ET tube and NG tube removed  Right jugular catheter in SVC  Right jugular pulmonary artery catheter in main pulmonary artery  Normal heart size  Sternal wires well aligned  AVR  Two mediastinal drains  Temporary epicardial pacemaker wires  Mild bibasilar atelectasis  No effusion or pneumothorax  Osseous structures appear within normal limits for patient age  Impression: No acute cardiopulmonary disease  Workstation performed: [id number]     ROJELIO Anesthesia    Result Date: 2022  Narrative: Eligio Holland MD     2022  2:37 PM Procedure Performed: ROJELIO Anesthesia Start Time:  2022 12:16 PM Preanesthesia Checklist Patient identified, IV assessed, risks and benefits discussed, monitors and equipment assessed, procedure being performed at surgeon's request and anesthesia consent obtained  Procedure Diagnostic Indications for ROJELIO:  assessment of surgical repair  Type of ROJELIO: complete ROJELIO with interpretation  Images Saved: ultrasound permanent image saved  Physician Requesting Echo: Stana Kussmaul, DO  Echocardiographic and Doppler Measurements PREPROCEDURE LEFT VENTRICLE: Systolic Function: normal  Ejection Fraction: 65%  Regional Wall Motion Abnormalities: none  RIGHT VENTRICLE: Systolic Function: normal     AORTIC VALVE: Leaflets: trileaflet  Leaflet motions restricted  Stenosis: severe  Peak Gradient: 43 mmHg  Regurgitation: mild  MITRAL VALVE: Leaflets: calcified and thickened  Regurgitation: trace  TRICUSPID VALVE: Leaflets: normal    Regurgitation: trace  OTHER VALVE FINDINGS: Aov calcified and restricted; sinus 2 4 cm; asc ao 4 @ rpa  ASCENDING AORTA: Size:  normal     AORTIC ARCH:    Grade 2: severe intimal thickening without protruding atheroma  DESCENDING AORTA:    Grade 2: severe intimal thickening without protruding atheroma  RIGHT ATRIUM:  No spontaneous echo contrast  LEFT ATRIUM:  No spontaneous echo contrast  LEFT ATRIAL APPENDAGE:  No spontaneous echo contrast ATRIAL SEPTUM: Intra-atrial septal morphology: normal   VENTRICULAR SEPTUM: Intra-ventricular septum morphology: normal  EPIAORTIC: Plaque Thickness: 0-5 mm  OTHER FINDINGS: Pericardium:  normal  POSTPROCEDURE LEFT VENTRICLE: Unchanged   Systolic Function: normal    RIGHT VENTRICLE: Unchanged   AORTIC VALVE: Leaflets: bioprosthetic  Stenosis: none  Regurgitation: none  MITRAL VALVE: Unchanged   Leaflets: native  TRICUSPID VALVE: Unchanged   PULMONIC VALVE: Unchanged   OTHER VALVE FINDINGS: S/p 25 biovalve; well seated no pvl/tvl  ATRIA: Unchanged   Left Atrial Appendage [place]igate: No  Residual Flow in Left Atrial Appendage by Color Flow Doppler: No   AORTA: Unchanged   REMOVAL: Probe Removal: atraumatic  XR chest portable ICU    Result Date: 5/16/2022  Narrative: CHEST INDICATION:   S/P open heart  COMPARISON:  Chest radiograph from 3/25/2019 and chest CT from 4/7/2022  EXAM PERFORMED/VIEWS:  XR CHEST PORTABLE ICU FINDINGS:  ET tube 6 cm above the caty  Right jugular catheter in upper SVC  Right jugular pulmonary artery catheter in right pulmonary artery  Mild cardiomegaly with left subclavian pacemaker leads in the right atrium and right ventricle  AVR  Sternal wires well aligned  Two mediastinal drains  Temporary epicardial pacemaker wires  The lungs are clear  No pneumothorax or pleural effusion   Osseous structures appear within normal limits for patient age  Impression: Expected appearance after AVR  Workstation performed: [id number]     ROJELIO intraop interventional w/realtime guidance of cardiac procedures    Result Date: 5/17/2022  Narrative: This order contains the linked images for the ROJELIO that was performed by the Anesthesiologist   Please see the  CARDIAC ROJELIO ANESTHESIA procedure for results  Assessment  Principal Problem:    Severe aortic stenosis  Active Problems:    GERD without esophagitis    Essential hypertension    Presence of permanent cardiac pacemaker    Paroxysmal atrial fibrillation (HCC)    Coronary artery disease involving native coronary artery 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 pectoris    S/P drug eluting coronary stent placement    S/P AVR    Leukocytosis    Acute postoperative anemia due to expected blood loss    Thank you for allowing us to participate in this patient's care  This pt will follow up with Dr Jose Ernandez once discharged  Counseling / Coordination of Care  Total floor / unit time spent today 45 minutes  Greater than 50% of total time was spent with the patient and / or family counseling and / or coordination of care  A description of the counseling / coordination of care: Review of history, current assessment, development of a plan  Code Status: Level 1 - Full Code    ** Please Note: Dragon 360 Dictation voice to text software may have been used in the creation of this document   **

## 2022-05-17 NOTE — CASE MANAGEMENT
Case Management Note    Patient name Rafa Atkins  Location [facility] 426/[facility] 055-02 MRN [id number]  : 10/7/1932 Date 2022       Current Admission Date: 2022  Current Admission Diagnosis:Severe aortic stenosis      LOS (days): 1  Geometric Mean LOS (GMLOS) (days): 8 90  Days to GMLOS:7 7     OBJECTIVE:  Risk of Unplanned Readmission Score: 13 84     Current admission status: Inpatient     Primary Insurance: BLUE CROSS [facility]    DISCHARGE DETAILS:    Discharge planning discussed with[de-identified] Patient  Freedom of Choice: Yes  Were Treatment Team discharge recommendations reviewed with patient/caregiver?: Yes  Did patient/caregiver verbalize understanding of patient care needs?: Yes  Were patient/caregiver advised of the risks associated with not following Treatment Team discharge recommendations?: Yes    [facility]         Is the patient interested in [facility] at discharge?: Yes  [address] requested[de-identified] [address] Name[de-identified] [facility] Provider[de-identified] PCP  [address] Needed[de-identified] Post-Op Care and Assessment  Homebound Criteria Met[de-identified] Requires the Assistance of Another Person for Safe Ambulation or to Leave the Home  Supporting Clincal Findings[de-identified] Limited Endurance    Treatment Team Recommendation: Home with  [facility]    Additional Comments: Pt is S/P AVR and is recommended to have in-home post-op skilled nursing care via a VNA for his aftercare plan  CM spoke to pt about this aftercare recommendation  Pt is agreeable w/ recommendation  CM provided pt w/ freedom of choice for VNA referrals  Pt requested referral to [address] [place] [address]  CM made Ecin referral to [facility]  CM to follow

## 2022-05-17 NOTE — PLAN OF CARE
Problem: OCCUPATIONAL THERAPY ADULT  Goal: Performs self-care activities at highest level of function for planned discharge setting  See evaluation for individualized goals  Description: Treatment Interventions: ADL retraining, Endurance training, Patient/family training, Compensatory technique education, Continued evaluation, Energy conservation, Activityengagement          See flowsheet documentation for full assessment, interventions and recommendations  5/17/2022 1350 by Dodie Martinez OT  Note: Limitation: Decreased ADL status, Decreased Safe judgement during ADL, Decreased endurance, Decreased self-care trans, Decreased high-level ADLs  Prognosis: Good  Assessment: Pt is a 80 y o  YO  male admitted to \[facility]\"" on 5/16/2022 w/ severe aortic stenosis s/p AVR  Pt  has a past medical history of Angina pectoris ([facility] [address] ), Arthritis, Ascending aortic aneurysm ([facility] [address] ), BPH (benign prostatic hyperplasia), Cancer ([facility] ), Coronary artery disease, Former tobacco use, GERD (gastroesophageal reflux disease), Headache, Hepatitis A, History of melanoma, Hypertension, Hypothyroidism, Pacemaker, Severe aortic stenosis, Shortness of breath, Vitamin B12 deficiency  Pt with active OT orders and ambulate  orders    Pt resides in a house with spouse  Pt was I w/  ADLS and IADLS, (+) drove, & required no use of DME PTA  Currently pt is Min A for UB ADls, Mod A for Lb ADLs, and Mod A x 2 for transfers  Pt is limited at this time 2*: pain, endurance, activity tolerance, functional mobility, functional standing tolerance, decreased I w/ ADLS/IADLS, decreased safety awareness and decreased insight into deficits  The following Occupational Performance Areas to address include: bathing/shower, dressing, functional mobility, community mobility and household maintenance  Based on the aforementioned OT evaluation, functional performance deficits, and assessments, pt has been identified as a high complexity evaluation   From OT standpoint, anticipate d/c home with family support pending progress  Pt to continue to benefit from acute immediate OT services to address the following goals 3-5x/week to  w/in 10-14 days:   The patient's raw score on the AM-PAC Daily Activity inpatient short form is 16, standardized score is 35 96, less than 39 4  Patients at this level are likely to benefit from discharge to post-acute rehabilitation services  Please refer to the recommendation of the Occupational Therapist for safe discharge planning  OT Discharge Recommendation: Home with home health rehabilitation (pending progress)  OT - OK to Discharge: Yes    2022 1349 by Arthur Calderon OT  Note: Limitation: Decreased ADL status, Decreased Safe judgement during ADL, Decreased endurance, Decreased self-care trans, Decreased high-level ADLs  Prognosis: Good  Assessment: Pt is a 80 y o  YO  male admitted to \[facility]\"" on 2022 w/ severe aortic stenosis s/p AVR  Pt  has a past medical history of Angina pectoris ([facility] [address] ), Arthritis, Ascending aortic aneurysm ([facility] [address] ), BPH (benign prostatic hyperplasia), Cancer ([facility] ), Coronary artery disease, Former tobacco use, GERD (gastroesophageal reflux disease), Headache, Hepatitis A, History of melanoma, Hypertension, Hypothyroidism, Pacemaker, Severe aortic stenosis, Shortness of breath, Vitamin B12 deficiency  Pt with active OT orders and ambulate  orders    Pt resides in a house with spouse  Pt was I w/  ADLS and IADLS, (+) drove, & required no use of DME PTA  Currently pt is Min A for UB ADls, Mod A for Lb ADLs, and Mod A x 2 for transfers  Pt is limited at this time 2*: pain, endurance, activity tolerance, functional mobility, functional standing tolerance, decreased I w/ ADLS/IADLS, decreased safety awareness and decreased insight into deficits  The following Occupational Performance Areas to address include: bathing/shower, dressing, functional mobility, community mobility and household maintenance   Based on the aforementioned OT evaluation, functional performance deficits, and assessments, pt has been identified as a high complexity evaluation  From OT standpoint, anticipate d/c home with family support pending progress  Pt to continue to benefit from acute immediate OT services to address the following goals 3-5x/week to  w/in 10-14 days:   The patient's raw score on the AM-PAC Daily Activity inpatient short form is 16, standardized score is 35 96, less than 39 4  Patients at this level are likely to benefit from discharge to post-acute rehabilitation services  Please refer to the recommendation of the Occupational Therapist for safe discharge planning  OT Discharge Recommendation: Home with home health rehabilitation (pending progress)  OT - OK to Discharge:  Yes

## 2022-05-17 NOTE — OCCUPATIONAL THERAPY NOTE
Occupational Therapy Evaluation     Patient Name: Roc TITUSKM'INGRID Date: 5/17/2022  Problem List  Principal Problem:    Severe aortic stenosis  Active Problems:    GERD without esophagitis    Essential hypertension    Presence of permanent cardiac pacemaker    Paroxysmal atrial fibrillation (HCC)    Coronary artery disease involving native coronary artery 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 pectoris    S/P drug eluting coronary stent placement    S/P AVR    Leukocytosis    Acute postoperative anemia due to expected blood loss    Past Medical History  Past Medical History:   Diagnosis Date    Angina pectoris ([facility] )     Arthritis     Ascending aortic aneurysm (HCC)     trileaflet AV, 41mm    BPH (benign prostatic hyperplasia)     Cancer ([facility] )     skin    Coronary artery disease     Former tobacco use     pipe & cigar use socially & infrequently    GERD (gastroesophageal reflux disease)     controlled w/ diet    Headache     h/o    Hepatitis A     History of melanoma     Hypertension     Hypothyroidism     Pacemaker     Severe aortic stenosis     Shortness of breath     with exertion    Vitamin B12 deficiency     Wears dentures      Past Surgical History  Past Surgical History:   Procedure Laterality Date    CARDIAC CATHETERIZATION  04/15/2022    Procedure: Cardiac catheterization;  Surgeon: Lamin Kim DO;  Location: [facility]; Service: Cardiology    CARDIAC CATHETERIZATION N/A 04/15/2022    Procedure: Cardiac Coronary Angiogram;  Surgeon: Lamin Kim DO;  Location: [facility]; Service: Cardiology    CARDIAC CATHETERIZATION N/A 04/15/2022    Procedure: Cardiac pci;  Surgeon: Lamin Kim DO;  Location: [facility];   Service: Cardiology    CARDIAC PACEMAKER PLACEMENT      COLONOSCOPY      INGUINAL HERNIA REPAIR Bilateral     MN RPLCMT AORTIC VALVE OPN W/STENTLESS TISSUE VALVE N/A 5/16/2022    Procedure: REPLACEMENT VALVE AORTIC (TISSUE AVR) WITH 25 MM AORTIC MAGNA EASE VALVE W/ ROJELIO;  Surgeon: Erlin Lorenz DO;  Location: [facility] MAIN OR;  Service: Cardiac Surgery    SKIN CANCER EXCISION      melanoma, multiple         05/17/22 1001   OT Last Visit   OT Visit Date 05/17/22   Note Type   Note type Evaluation   Restrictions/Precautions   Other Precautions Cardiac/sternal;Multiple lines;Telemetry;O2;Fall Risk   Pain Assessment   Pain Assessment Tool FLACC   Pain Rating: FLACC (Rest) - Face 0   Pain Rating: FLACC (Rest) - Legs 0   Pain Rating: FLACC (Rest) - Activity 0   Pain Rating: FLACC (Rest) - Cry 0   Pain Rating: FLACC (Rest) - Consolability 0   Score: FLACC (Rest) 0   Pain Rating: FLACC (Activity) - Face 1   Pain Rating: FLACC (Activity) - Legs 0   Pain Rating: FLACC (Activity) - Activity 0   Pain Rating: FLACC (Activity) - Cry 1   Pain Rating: FLACC (Activity) - Consolability 1   Score: FLACC (Activity) 3   Home Living   Type of Home House   Home Layout One level;Stairs to enter with rails   Additional Comments pt very lethargic   Prior Function   Level of [place] Independent with ADLs and functional mobility   Lives With Spouse   Receives Help From Family   ADL Assistance Independent   IADLs Independent   Falls in the last 6 months 0   Vocational Retired  (works sometimes as )   Comments limited historian 2* fatigue   Lifestyle   Autonomy pta pt reports I in ADls/IADls/functional mobilty   Reciprocal Relationships supportive spouse   Service to Others retired, works sometimes as    Intrinsic Gratification limited historian 2* fatigue   Psychosocial   Psychosocial (WDL) WDL   Subjective   Subjective "I feel terrible"   ADL   Where Assessed Chair   Eating Assistance 5  Supervision/Setup   Grooming Assistance 5  Supervision/Setup   UB Bathing Assistance 4  Minimal Assistance   LB Bathing Assistance 3  Moderate Assistance   UB Dressing Assistance 4  Minimal Assistance   LB Dressing Assistance 3  Moderate Assistance   Toileting Assistance  3  Moderate Assistance   Bed Mobility   Sit to Supine 3  Moderate assistance   Additional items Assist x 2; Increased time required;LE management   Transfers   Sit to Stand 3  Moderate assistance   Additional items Assist x 2; Increased time required;Verbal cues   Stand to Sit 3  Moderate assistance   Additional items Assist x 2; Increased time required;Verbal cues   Functional Mobility   Functional Mobility 3  Moderate assistance   Additional Comments a x 2   Additional items Rolling walker   Balance   Static Sitting Fair -   Dynamic Sitting Poor +   Static Standing Poor   Dynamic Standing Poor -   Ambulatory Poor -   Activity Tolerance   Activity Tolerance Patient limited by fatigue  (some dizziness, RN present)   Medical Staff Made Aware PT Jair 2* medical complexity/comorbidities'   Nurse Made Aware okay to see per RN   RUE Assessment   RUE Assessment WFL   LUE Assessment   LUE Assessment WFL   Hand Function   Gross Motor Coordination Functional   Fine Motor Coordination Functional   Cognition   Overall Cognitive Status WFL   Arousal/Participation Lethargic   Attention Attends with cues to redirect   Orientation Level Oriented to person;Oriented to place;Oriented to time;Disoriented to situation   Memory Decreased recall of precautions   Following Commands Follows one step commands with increased time or repetition   Comments pt very lethargic, cues for redirect   Assessment   Limitation Decreased ADL status; Decreased Safe judgement during ADL;Decreased endurance;Decreased self-care trans;Decreased high-level ADLs   Prognosis Good   Assessment Pt is a 80 y o  YO  male admitted to [facility] on 5/16/2022 w/ severe aortic stenosis s/p AVR   Pt  has a past medical history of Angina pectoris ([facility] [address] ), Arthritis, Ascending aortic aneurysm ([facility] [address] ), BPH (benign prostatic hyperplasia), Cancer ([facility] [address] ), Coronary artery disease, Former tobacco use, GERD (gastroesophageal reflux disease), Headache, Hepatitis A, History of melanoma, Hypertension, Hypothyroidism, Pacemaker, Severe aortic stenosis, Shortness of breath, Vitamin B12 deficiency  Pt with active OT orders and ambulate  orders    Pt resides in a house with spouse  Pt was I w/  ADLS and IADLS, (+) drove, & required no use of DME PTA  Currently pt is Min A for UB ADls, Mod A for Lb ADLs, and Mod A x 2 for transfers  Pt is limited at this time 2*: pain, endurance, activity tolerance, functional mobility, functional standing tolerance, decreased I w/ ADLS/IADLS, decreased safety awareness and decreased insight into deficits  The following Occupational Performance Areas to address include: bathing/shower, dressing, functional mobility, community mobility and household maintenance  Based on the aforementioned OT evaluation, functional performance deficits, and assessments, pt has been identified as a high complexity evaluation  From OT standpoint, anticipate d/c home with family support pending progress  Pt to continue to benefit from acute immediate OT services to address the following goals 3-5x/week to  w/in 10-14 days:   The patient's raw score on the AM-PAC Daily Activity inpatient short form is 16, standardized score is 35 96, less than 39 4  Patients at this level are likely to benefit from discharge to post-acute rehabilitation services  Please refer to the recommendation of the Occupational Therapist for safe discharge planning  Goals   Patient Goals to lay down   LTG Time Frame 10-14   Plan   Treatment Interventions ADL retraining; Endurance training;Patient/family training; Compensatory technique education;Continued evaluation; Energy conservation; Activityengagement   Goal Expiration Date 22   OT Frequency 3-5x/wk   Recommendation   OT Discharge Recommendation Home with home health rehabilitation  (pending progress)   OT - OK to Discharge Yes   AM-PAC Daily Activity Inpatient   Lower Body Dressing 2   Bathing 2   Toileting 2   Upper Body Dressing 3   Grooming 3 Eating 4   Daily Activity Raw Score 16   Daily Activity Standardized Score (Calc for Raw Score >=11) 35 96   AM-PAC Applied Cognition Inpatient   Following a Speech/Presentation 3   Understanding Ordinary Conversation 3   Taking Medications 3   Remembering Where Things Are Placed or Put Away 3   Remembering List of 4-5 Errands 3   Taking Care of Complicated Tasks 3   Applied Cognition Raw Score 18   Applied Cognition Standardized Score 38 07   Modified [place] Scale   Modified [place] Scale 4     GOALS    1) Pt will increase activity tolerance to G for 30 min txment sessions    2) Pt will complete UB/LB dressing/self care w/ mod I using adaptive device and DME as needed    3) Pt will complete bathing w/ Mod I w/ use of AE and DME as needed    4) Pt will complete toileting w/ mod I w/ G hygiene/thoroughness using DME as needed    5) Pt will improve functional transfers to Mod I on/off all surfaces using DME as needed w/ G balance/safety     6) Pt will improve functional mobility during ADL/IADL/leisure tasks to Mod I using DME as needed w/ G balance/safety     7) Pt will participate in simulated IADL management task to increase independence to  w/ G safety and endurance    8) Pt will demonstrate G carryover of pt/caregiver cardiac education and training as appropriate  9) Pt will demonstrate 100% carryover of energy conservation techniques t/o functional I/ADL/leisure tasks w/o cues s/p skilled education    10) Pt will independently identify and utilize 2-3 coping strategies to increase positive affect and promote overall well-being      11) Pt will engage in ongoing cognitive assessment w/ G participation to assist w/ safe d/c planning/recommendations (as needed)    Satya Chow MS, OTR/L

## 2022-05-17 NOTE — PROGRESS NOTES
Progress Note - Cardiothoracic Surgery   Trista Lizama 80 y o  male MRN: [id number]  Unit/Bed#: [facility] 414-01 Encounter: [id number]    Aortic stenosis, Non-Rheumatic  S/P aortic valve replacement; POD # 1    24 Hour Events: Intraop received 1 U of plts with amicar postop, no further bleeding issues, did not receive Plavix yesterday  Weaned off of leopoldo, placed on epi at 1 for high SVR and low filling pressures, most recent CI 2 4  On cardene at 3  Biotronic interrogated PPM without issues  Low UOP, just received 20 of lasix  No complaints, not passing flatus, denies nausea, using IS (250)       Medications:   Scheduled Meds:  Current Facility-Administered Medications   Medication Dose Route Frequency Provider Last Rate    acetaminophen  975 mg Oral [address] [place], [place] [address]VETO      amiodarone  200 mg Oral [facility] VETO Kat      aspirin  81 mg Oral Daily [facility] [place]      atorvastatin  80 mg Oral Daily With [address] [place] [address]      bisacodyl  10 mg Rectal Daily PRN Vinicio Feng PA-C      cefazolin  2,000 mg Intravenous [address] [place], [place] [address]VETO 2,000 mg (05/17/22 0610)    clopidogrel  75 mg Oral Daily [place], [place]      epinephrine  1-20 mcg/min Intravenous Titrated Blaire Watson PA-C 1 mcg/min (05/17/22 0350)    fondaparinux  2 5 mg Subcutaneous Daily Vinicio Feng PA-C      furosemide  40 mg Intravenous Q6H PRN Vinicio Feng PA-C      HYDROmorphone  0 5 mg Intravenous Q2H PRN Vinicio Feng PA-C      insulin regular (HumuLIN R,NovoLIN R) infusion  0 3-21 Units/hr Intravenous Titrated Vinicio Feng PA-C 1 Units/hr (05/17/22 0402)    levothyroxine  100 mcg Oral Early Morning Vinicio Feng PA-C      lidocaine (cardiac)  100 mg Intravenous Q30 Min PRN Vinicio Feng PA-C      mupirocin  1 application Nasal [address] [place], [place]      niCARdipine  1-15 mg/hr Intravenous Titrated Selwyn Vasquez PA-C 3 mg/hr (05/17/22 0431)   Yohana Bran ondansetron  4 mg Intravenous Q6H PRN VETO Pugh      oxyCODONE  10 mg Oral Q4H PRN Steve Aguilar PA-C      oxyCODONE  5 mg Oral Q4H PRN Steve Aguilar PA-C      pantoprazole  40 mg Oral Early Morning VETO Pugh      polyethylene glycol  17 g Oral Daily [place], [place]      potassium chloride  20 mEq Intravenous Once PRN VETO Pugh      potassium chloride  20 mEq Intravenous Q1H PRN VETO Pugh Stopped (05/17/22 0000)    potassium chloride  20 mEq Intravenous Q30 Min PRN VETO Pugh      sodium chloride  20 mL/hr Intravenous Continuous VETO Pugh 20 mL/hr (05/16/22 1523)    tamsulosin  0 4 mg Oral Daily With VETO GARCIA       Continuous Infusions:epinephrine, 1-20 mcg/min, Last Rate: 1 mcg/min (05/17/22 0350)  insulin regular (HumuLIN R,NovoLIN R) infusion, 0 3-21 Units/hr, Last Rate: 1 Units/hr (05/17/22 0402)  niCARdipine, 1-15 mg/hr, Last Rate: 3 mg/hr (05/17/22 0625)  sodium chloride, 20 mL/hr, Last Rate: 20 mL/hr (05/16/22 1523)      PRN Meds:   bisacodyl    furosemide    HYDROmorphone    lidocaine (cardiac)    ondansetron    oxyCODONE    oxyCODONE    potassium chloride    potassium chloride    potassium chloride    Vitals:   Vitals:    05/17/22 0500 05/17/22 0538 05/17/22 0600 05/17/22 0629   BP: 113/60   109/57   BP Location: Right arm      Pulse: 75  79 75   Resp: 16  21 13   Temp: 99 1 °F (37 3 °C)  98 8 °F (37 1 °C)    TempSrc: Core  Core    SpO2: 93%  92% 92%   Weight:  78 kg (171 lb 15 3 oz)     Height:       /45, MAP 64, rate of 90, R 13, CVP of 6    Hemodynamics:  PAP: (24-41)/(5-20) 24/10  CO:  [3 9 L/min-6 3 L/min] 6 3 L/min  CI:  [2 L/min/m2-3 3 L/min/m2] 3 3 L/min/m2    Respiratory:   SpO2: SpO2: 92 %; 4 LPM    Intake/Output:   I/O       05/15 0701 05/16 0700 05/16 0701 05/17 0700    P  O   420    I V  (mL/kg)  2336 (29 9)    Blood  200    IV Piggyback  3250    Cell Saver  674 Total Intake(mL/kg)  6881 (88 2)    Urine (mL/kg/hr)  1670    Blood  675    Chest Tube  380    Total Output  2725    Net  +4156            UOP 1 67L, net +4 1L     Chest tube Output:    Mediastinal tubes: 180 mL/8 hours  380 mL/24 hours          Weights:   Weight (last 2 days)     Date/Time Weight    05/17/22 0538 78 (171 96)    05/16/22 1009 72 1 (159)          Results:   Results from last 7 days   Lab Units 05/17/22  0401 05/16/22 2258 05/16/22  1508 05/16/22  1507 05/16/22  1326 05/16/22  1325 05/16/22  1209 05/10/22  0830   WBC Thousand/uL 11 00*  --   --   --   --   --   --  4 26*   HEMOGLOBIN g/dL 8 6* 9 5*  --  10 1*  --   --   --  12 9   I STAT HEMOGLOBIN g/dl  --   --  8 8*  --    < >  --    < >  --    HEMATOCRIT % 25 5* 28 1*  --  29 5*  --   --   --  38 3   HEMATOCRIT, ISTAT %  --   --  26*  --    < >  --    < >  --    PLATELETS Thousands/uL 149  --   --  175  --  165  --  225    < > = values in this interval not displayed  Results from last 7 days   Lab Units 05/17/22 0401 05/17/22  0010 05/16/22  1929 05/16/22  1508 05/16/22  1507 05/16/22  1209 05/10/22  0830   SODIUM mmol/L 139  --   --   --  137  --  135*   POTASSIUM mmol/L 4 8 4 5 3 7  --  4 3  --  4 2   CHLORIDE mmol/L 111*  --   --   --  108  --  104   CO2 mmol/L 21  --   --   --  23  --  29   CO2, I-STAT mmol/L  --   --   --  23  --    < >  --    BUN mg/dL 15  --   --   --  13  --  19   CREATININE mg/dL 0 89  --   --   --  0 83  --  0 94   GLUCOSE, ISTAT mg/dl  --   --   --  220*  --    < >  --    CALCIUM mg/dL 7 9*  --   --   --  8 1*  --  9 3    < > = values in this interval not displayed  Results from last 7 days   Lab Units 05/16/22  1507 05/10/22  0830   INR  1 29* 1 01   PTT seconds 34 30       Studies:  CXR: 5/17: No ptx/effusion  Mild pulmonary vascular congestion  Large gastric bubble  EKG: SR with 1st degree AVB      I have personally reviewed pertinent films in PACS    Invasive Lines/Tubes:  Invasive Devices  Report Central Venous Catheter Line  Duration           CVC Central Lines 05/16/22 Triple <1 day    Introducer 05/16/22 <1 day          Peripheral Intravenous Line  Duration           Peripheral IV 05/16/22 Right Wrist <1 day          Arterial Line  Duration           Arterial Line 05/16/22 <1 day          Line  Duration           Pacer Wires <1 day          Drain  Duration           Chest Tube 1 Posterior;Mediastinal 32 Fr  <1 day    Chest Tube 2 Anterior;Mediastinal 32 Fr  <1 day    Urethral Catheter Non-latex;Straight-tip; Temperature probe 16 Fr  <1 day                Physical Exam:    HEENT/NECK:  Normocephalic  Atraumatic  No jugular venous distention  Cardiac: Regular rate and rhythm and No murmurs/rubs/gallops  Pulmonary:  Breath sounds diminished in the bases bilaterally , No rales/rhonchi/wheezes and Poor inspiratory effort  Abdomen:  Non-tender, Non-distended and Hypo-active bowel sounds  Incisions: Sternum is stable  Incision dressed with Acticoat  No erythema or drainage  Extremities: Extremities warm/dry and No edema B/L  Neuro: Alert and oriented X 3 and No focal deficits  Skin: Warm/Dry, without rashes or lesions  Assessment:  Principal Problem:    Severe aortic stenosis  Active Problems:    GERD without esophagitis    Essential hypertension    Presence of permanent cardiac pacemaker    Paroxysmal atrial fibrillation (HCC)    Coronary artery disease involving native coronary artery 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 pectoris    S/P drug eluting coronary stent placement    S/P AVR       Aortic stenosis, Non-Rheumatic  S/P aortic valve replacement; POD # 1    Plan:    1   Cardiac:   Cardiac infusions: D/C Epinephrine, 1 mcg/min, wean Cardene, 3 mg/hour, then d/c jo-ann once off  Cardiac index > 2 2  D/C Robbins Michael catheter  NSR; HR/BP well-controlled  Start Lopressor, 25mg PO BID  Continue ASA and Statin therapy  Restart plavix for recent stent in April  Maintain epicardial pacing wires for BB initiation  Maintain central IV access today for medication  Continue DVT prophylaxis  Consult cardiology for postoperative medical management    2  Pulmonary:   Extubated  CXR findings: as above  Acute post-op pulmonary insufficiency; Requiring 4 liters via nasal cannla, secondary to atelectasis, pulmonary vascular congestion and poor inspiratory effort secondary to pain  Continue incentive spirometry/coughing/deep breathing exercises  Wean supplemental oxygen as tolerated for saturation > 90%  Chest tube output remains persistently high; Continue chest tubes to suction today    3  Renal:   Intake/Output net: + 4 15 L/24 hours  Post op volume overload:    Start Lasix, 40 mg IV BID  Start Potassium supplementation, 20 mEq BID  Post op Creatinine stable; Follow up labs prn  Discontinue potassium replacement scales  Discontinue srivasatva catheter  Continue flomax    4  Neuro:  Neurologically intact; No active issues  Incisional pain well-controlled  Continue Tylenol, 975 mg PO q 8, standing dose  Decrease Oxycodone, 2 5 to 5 mg PO q 4 hours prn pain    5  GI:  Tolerating TLC 2 3 gm sodium diet  Maintain 1800 mL daily fluid restriction   Continue stool softeners and prn suppository  Continue GI prophylaxis    6  Endo:   Glucose well-controlled  Discontinue continuous insulin infusion and add Insulin sliding scale coverage    7  Hematology:   Post-operative acute blood loss anemia; Hemoglobin 8 6; trend prn and Leukocytosis    8   Disposition:  Transfer from ICU to Stepdown, level 1    VTE Pharmacologic Prophylaxis: Fondaparinux (Arixtra)  VTE Mechanical Prophylaxis: sequential compression device    Collaborative rounds completed with supervising physician and with the bedside nurse    SIGNATURE: Anthony Esparza PA-C  DATE: May 17, 2022  TIME: 6:44 AM

## 2022-05-17 NOTE — PLAN OF CARE
Problem: PHYSICAL THERAPY ADULT  Goal: Performs mobility at highest level of function for planned discharge setting  See evaluation for individualized goals  Description: Treatment/Interventions: Functional transfer training, LE strengthening/ROM, Elevations, Therapeutic exercise, Endurance training, Bed mobility, Gait training, Spoke to nursing, OT, Family  Equipment Recommended: Shari Negrete       See flowsheet documentation for full assessment, interventions and recommendations  Note: Prognosis: Good  Problem List: Decreased strength, Decreased endurance, Impaired balance, Decreased mobility  Assessment: Pt is 80 y o  male admitted with Dx of Severe aortic stenosis and underwent Aortic valve replacement with a 25mm Paul Magna Ease pericardial tissue valve on 5/16/2022  Pt 's comorbidities affecting POC include: of Angina pectoris ([facility] ), Arthritis, Ascending aortic aneurysm ([facility] ), Cancer ([facility] ), Coronary artery disease, Former tobacco use, and Hypertension and personal factors of: advanced age and CHRISTEL  Pt's clinical presentation is currently unstable/unpredictable which is evident in ongoing telemetry monitoring while in a critical care unit w/ a-line in place, abnormal lab values being monitored/trending, suppl O2 needs, CT in place and inability to progress further w/ mobilization at this time  Pt presents w/ generalized weakness, incl decreased LE strength, decreased functional endurance and activity tolerance, impaired balance w/ associated gait deviations requiring use of rw at this time and fall risk  Will cont to follow pt in PT for progressive mobilization to address above functional deficits and to max level of (I), endurance, and safety  Otherwise, anticipate pt will return home w/ available family support upon D/C provided he cont improving w/ mobility skills, safety, and endurance (incl on the step) and when medically cleared; home PT follow up is recommended at this time; will follow             PT Discharge Recommendation: Home with home health rehabilitation (home PT)          See flowsheet documentation for full assessment

## 2022-05-17 NOTE — CASE MANAGEMENT
Case Management Assessment    Patient name Lynn Ramirez  Location [facility] 426/[facility] 081-78 MRN [id number]  : 10/7/1932 Date 2022       Current Admission Date: 2022  Current Admission Diagnosis:Severe aortic stenosis   Patient Active Problem List    Diagnosis Date Noted    Leukocytosis 2022    Acute postoperative anemia due to expected blood loss 2022    S/P AVR 2022    Coronary artery disease involving native coronary artery 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 pectoris 2022    S/P drug eluting coronary stent placement 2022    Balance disorder 2019    Sick sinus syndrome ([facility] [facility] ) 2019    Presence of permanent cardiac pacemaker 2019    [facility]    Chronic nonintractable headache 2017    Severe aortic stenosis 10/19/2015    Acquired hypothyroidism 2015    GERD without esophagitis 2015    Enlarged prostate without lower urinary tract symptoms (luts) 10/14/2014    Essential hypertension 10/14/2014      LOS (days): 1  Geometric Mean LOS (GMLOS) (days): 8 90  Days to GMLOS:7 7     OBJECTIVE:    Risk of Unplanned Readmission Score: 13 84     Current admission status: Inpatient    Preferred Pharmacy:   [facility] [address] [id number] [address]  [address]  [facility] [address]  Phone: [phone] Fax: [phone]    Primary Care Provider: Bj Teresa MD    Primary Insurance: [facility]  Secondary Insurance:     ASSESSMENT:  [address] Proxies    There are no active Health Care Proxies on file         Advance Directives  Does patient have a [address]?: No  Does patient have Advance Directives?: No  Primary Contact: Pt's wife Hayley Goel / phone: [phone]    Patient Information  Admitted from[de-identified] Home  Mental Status: Alert  Assessment information provided by[de-identified] Patient  Primary Caregiver: Self  Support Systems: Spouse/significant other  [place] of Residence: [address] [facility] [address] entry access options   Select all that apply : Stairs  Number of steps to enter home : 2  Type of Current Residence: [facility] [place]  Upon entering residence, is there a bedroom on the main floor (no further steps)?: Yes  Upon entering residence, is there a bathroom on the main floor (no further steps)?: Yes  In the last 12 months, was there a time when you were not able to pay the mortgage or rent on time?: No  In the last 12 months, how many places have you lived?: 1  In the last 12 months, was there a time when you did not have a steady place to sleep or slept in a shelter (including now)?: No  Homeless/housing insecurity resource given?: N/A  Living Arrangements: Lives w/ Spouse/significant other    Activities of Daily Living Prior to Admission  Functional Status: Independent  Completes ADLs independently?: Yes  Ambulates independently?: Yes  Does patient use assisted devices?: No  Does patient currently own DME?: No  Does patient have a history of Outpatient Therapy (PT/OT)?: No  Does the patient have a history of Short-Term Rehab?: No  Does patient have a history of HHC?: No  [facility]: No    Patient Information Continued  Income Source: Pension/intermediate  Does patient have prescription coverage?: Yes  Within the past 12 months, you worried that your food would run out before you got the money to buy more : Never true  Within the past 12 months, the food you bought just didn't last and you didn't have money to get more : Never true  Food insecurity resource given?: N/A  Does patient receive dialysis treatments?: No  Does patient have a history of substance abuse?: No  Does patient have a history of Mental Health Diagnosis?: No    Means of Transportation  Means of Transport to Appts[de-identified] Drives Self  In the past 12 months, has lack of transportation kept you from medical appointments or from getting medications?: No  In the past 12 months, has lack of transportation kept you from meetings, work, or from getting things needed for daily living?: No  Was application for public transport provided?: N/A     Additional Comments: CM reviewed d/c planning process including the following: identifying help at home, patient preference for d/c planning needs, Discharge Lounge, [facility] Meds to Bed program, availability of treatment team to discuss questions or concerns patient and/or family may have regarding understanding medications and recognizing signs and symptoms once discharged  CM also encouraged patient to follow up with all recommended appointments after discharge  Patient advised of importance for patient and family to participate in managing patients medical well being

## 2022-05-18 ENCOUNTER — HOME HEALTH ADMISSION (OUTPATIENT)
Dept: HOME HEALTH SERVICES | Facility: HOME HEALTHCARE | Age: 87
End: 2022-05-18
Payer: COMMERCIAL

## 2022-05-18 LAB
ANION GAP SERPL CALCULATED.3IONS-SCNC: 7 MMOL/L (ref 4–13)
BUN SERPL-MCNC: 18 MG/DL (ref 5–25)
CALCIUM SERPL-MCNC: 7.4 MG/DL (ref 8.3–10.1)
CHLORIDE SERPL-SCNC: 106 MMOL/L (ref 100–108)
CO2 SERPL-SCNC: 24 MMOL/L (ref 21–32)
CREAT SERPL-MCNC: 0.67 MG/DL (ref 0.6–1.3)
ERYTHROCYTE [DISTWIDTH] IN BLOOD BY AUTOMATED COUNT: 13.6 % (ref 11.6–15.1)
GFR SERPL CREATININE-BSD FRML MDRD: 85 ML/MIN/1.73SQ M
GLUCOSE SERPL-MCNC: 117 MG/DL (ref 65–140)
GLUCOSE SERPL-MCNC: 120 MG/DL (ref 65–140)
GLUCOSE SERPL-MCNC: 129 MG/DL (ref 65–140)
GLUCOSE SERPL-MCNC: 138 MG/DL (ref 65–140)
GLUCOSE SERPL-MCNC: 149 MG/DL (ref 65–140)
HCT VFR BLD AUTO: 25.8 % (ref 36.5–49.3)
HGB BLD-MCNC: 8.8 G/DL (ref 12–17)
MCH RBC QN AUTO: 31.7 PG (ref 26.8–34.3)
MCHC RBC AUTO-ENTMCNC: 34.1 G/DL (ref 31.4–37.4)
MCV RBC AUTO: 93 FL (ref 82–98)
PLATELET # BLD AUTO: 151 THOUSANDS/UL (ref 149–390)
PMV BLD AUTO: 10.4 FL (ref 8.9–12.7)
POTASSIUM SERPL-SCNC: 3.6 MMOL/L (ref 3.5–5.3)
RBC # BLD AUTO: 2.78 MILLION/UL (ref 3.88–5.62)
SODIUM SERPL-SCNC: 137 MMOL/L (ref 136–145)
WBC # BLD AUTO: 11.96 THOUSAND/UL (ref 4.31–10.16)

## 2022-05-18 PROCEDURE — 99024 POSTOP FOLLOW-UP VISIT: CPT | Performed by: THORACIC SURGERY (CARDIOTHORACIC VASCULAR SURGERY)

## 2022-05-18 PROCEDURE — 97116 GAIT TRAINING THERAPY: CPT

## 2022-05-18 PROCEDURE — 99232 SBSQ HOSP IP/OBS MODERATE 35: CPT | Performed by: INTERNAL MEDICINE

## 2022-05-18 PROCEDURE — 85027 COMPLETE CBC AUTOMATED: CPT | Performed by: PHYSICIAN ASSISTANT

## 2022-05-18 PROCEDURE — 82948 REAGENT STRIP/BLOOD GLUCOSE: CPT

## 2022-05-18 PROCEDURE — 97530 THERAPEUTIC ACTIVITIES: CPT

## 2022-05-18 PROCEDURE — 99024 POSTOP FOLLOW-UP VISIT: CPT | Performed by: PHYSICIAN ASSISTANT

## 2022-05-18 PROCEDURE — 80048 BASIC METABOLIC PNL TOTAL CA: CPT | Performed by: PHYSICIAN ASSISTANT

## 2022-05-18 RX ADMIN — MUPIROCIN 1 APPLICATION: 20 OINTMENT TOPICAL at 21:55

## 2022-05-18 RX ADMIN — FUROSEMIDE 40 MG: 10 INJECTION, SOLUTION INTRAMUSCULAR; INTRAVENOUS at 08:15

## 2022-05-18 RX ADMIN — ASPIRIN 81 MG CHEWABLE TABLET 81 MG: 81 TABLET CHEWABLE at 08:12

## 2022-05-18 RX ADMIN — MUPIROCIN 1 APPLICATION: 20 OINTMENT TOPICAL at 08:15

## 2022-05-18 RX ADMIN — POTASSIUM CHLORIDE 20 MEQ: 1500 TABLET, EXTENDED RELEASE ORAL at 08:11

## 2022-05-18 RX ADMIN — OXYCODONE HYDROCHLORIDE 5 MG: 5 TABLET ORAL at 04:52

## 2022-05-18 RX ADMIN — POTASSIUM CHLORIDE 20 MEQ: 1500 TABLET, EXTENDED RELEASE ORAL at 17:20

## 2022-05-18 RX ADMIN — PANTOPRAZOLE SODIUM 40 MG: 40 TABLET, DELAYED RELEASE ORAL at 05:02

## 2022-05-18 RX ADMIN — OXYCODONE HYDROCHLORIDE 5 MG: 5 TABLET ORAL at 19:51

## 2022-05-18 RX ADMIN — METOPROLOL TARTRATE 25 MG: 25 TABLET, FILM COATED ORAL at 08:12

## 2022-05-18 RX ADMIN — ATORVASTATIN CALCIUM 80 MG: 80 TABLET, FILM COATED ORAL at 17:19

## 2022-05-18 RX ADMIN — ACETAMINOPHEN 975 MG: 325 TABLET, FILM COATED ORAL at 17:19

## 2022-05-18 RX ADMIN — FONDAPARINUX SODIUM 2.5 MG: 2.5 INJECTION, SOLUTION SUBCUTANEOUS at 08:11

## 2022-05-18 RX ADMIN — AMIODARONE HYDROCHLORIDE 200 MG: 200 TABLET ORAL at 21:55

## 2022-05-18 RX ADMIN — ACETAMINOPHEN 975 MG: 325 TABLET, FILM COATED ORAL at 08:12

## 2022-05-18 RX ADMIN — DOCUSATE SODIUM 100 MG: 100 CAPSULE, LIQUID FILLED ORAL at 08:12

## 2022-05-18 RX ADMIN — TAMSULOSIN HYDROCHLORIDE 0.4 MG: 0.4 CAPSULE ORAL at 17:19

## 2022-05-18 RX ADMIN — DOCUSATE SODIUM 100 MG: 100 CAPSULE, LIQUID FILLED ORAL at 17:20

## 2022-05-18 RX ADMIN — AMIODARONE HYDROCHLORIDE 200 MG: 200 TABLET ORAL at 05:02

## 2022-05-18 RX ADMIN — LEVOTHYROXINE SODIUM 100 MCG: 100 TABLET ORAL at 05:02

## 2022-05-18 RX ADMIN — AMIODARONE HYDROCHLORIDE 200 MG: 200 TABLET ORAL at 13:31

## 2022-05-18 RX ADMIN — POLYETHYLENE GLYCOL 3350 17 G: 17 POWDER, FOR SOLUTION ORAL at 08:15

## 2022-05-18 RX ADMIN — METOPROLOL TARTRATE 25 MG: 25 TABLET, FILM COATED ORAL at 21:55

## 2022-05-18 RX ADMIN — CLOPIDOGREL BISULFATE 75 MG: 75 TABLET ORAL at 08:15

## 2022-05-18 RX ADMIN — OXYCODONE HYDROCHLORIDE AND ACETAMINOPHEN 500 MG: 500 TABLET ORAL at 08:12

## 2022-05-18 RX ADMIN — ACETAMINOPHEN 975 MG: 325 TABLET, FILM COATED ORAL at 22:36

## 2022-05-18 NOTE — OCCUPATIONAL THERAPY NOTE
Occupational Therapy Progress Note     Patient Name: Katie Mckeon  NTRYE'O Date: 5/18/2022  Problem List  Principal Problem:    Severe aortic stenosis  Active Problems:    GERD without esophagitis    Essential hypertension    Presence of permanent cardiac pacemaker    Paroxysmal atrial fibrillation (HCC)    Coronary artery disease involving native coronary artery 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 pectoris    S/P drug eluting coronary stent placement    S/P AVR    Leukocytosis    Acute postoperative anemia due to expected blood loss            05/18/22 0921   OT Last Visit   OT Visit Date 05/18/22   Note Type   Note Type Treatment   Restrictions/Precautions   Weight Bearing Precautions Per Order No   Other Precautions Cardiac/sternal;Multiple lines;Telemetry; Fall Risk;Pain;O2  (CT)   General   Response to Previous Treatment Patient with no complaints from previous session   Lifestyle   Autonomy pta pt reports I in ADls/IADls/functional mobilty   Reciprocal Relationships supportive spouse   Service to Others retired, works sometimes as stone alicia   Intrinsic Gratification limited historian 2* fatigue   Pain Assessment   Pain Assessment Tool 0-10   Pain Score 5   Pain Location/Orientation Location: Chest   Hospital Pain Intervention(s) Ambulation/increased activity; Emotional support;Repositioned   ADL   Where Assessed Chair   LB Dressing Assistance 2  Maximal Assistance   LB Dressing Deficit Setup;Supervision/safety; Don/doff R sock; Don/doff L sock   Transfers   Sit to Stand 4  Minimal assistance   Additional items Assist x 1; Increased time required;Verbal cues   Stand to Sit 4  Minimal assistance   Additional items Assist x 1; Increased time required;Verbal cues   Additional Comments min-mod A for transfers   Functional Mobility   Functional Mobility 4  Minimal assistance   Additional Comments Pt demonstrated household mobility with one seated rest break     Cognition   Overall Cognitive Status [facility] Arousal/Participation Alert; Cooperative   Attention Within functional limits   Orientation Level Oriented X4   Memory Decreased recall of precautions   Following Commands Follows one step commands without difficulty   Comments Pt is overall pleasant and cooperative to work with therapy  Activity Tolerance   Activity Tolerance Patient limited by fatigue   Assessment   Assessment Patient participated in Skilled OT session this date with interventions consisting of ADL re training with the use of correct body mechanics, maintaining sternal precautions and  therapeutic activities to: increase activity tolerance   Patient agreeable to OT treatment session, upon arrival patient was found seated OOB to Chair  In comparison to previous session, patient with improvements in activity tolerance and functional transfers  Patient requiring frequent rest periods  Patient continues to be functioning below baseline level, occupational performance remains limited secondary to factors listed above and increased risk for falls and injury  From OT standpoint, recommendation at time of d/c would be Home with family support and Home OT pending progress  Patient to benefit from continued Occupational Therapy treatment while in the hospital to address deficits as defined above and maximize level of functional independence with ADLs and functional mobility     Plan   Treatment Interventions ADL retraining;Functional transfer training;Continued evaluation;Cardiac education   Goal Expiration Date 05/31/22   OT Treatment Day 1   OT Frequency 3-5x/wk   Recommendation   OT Discharge Recommendation Home with home health rehabilitation  (pending progress)   OT - OK to Discharge Yes  (When medically appropriate)   AM-PAC Daily Activity Inpatient   Lower Body Dressing 2   Bathing 2   Toileting 2   Upper Body Dressing 3   Grooming 4   Eating 4   Daily Activity Raw Score 17   Daily Activity Standardized Score (Calc for Raw Score >=11) 37 26 AM-PAC Applied Cognition Inpatient   Following a Speech/Presentation 3   Understanding Ordinary Conversation 4   Taking Medications 4   Remembering Where Things Are Placed or Put Away 4   Remembering List of 4-5 Errands 3   Taking Care of Complicated Tasks 3   Applied Cognition Raw Score 21   Applied Cognition Standardized Score 44 3   Modified Gilmer Scale   Modified Gilmer Scale 4       Gale Guardado, [place], OTR/L

## 2022-05-18 NOTE — PROGRESS NOTES
Progress Note - Cardiothoracic Surgery   Ronni Samuel 80 y o  male MRN: [id number]  Unit/Bed#: [facility] 426-01 Encounter: [id number]    Aortic stenosis, Non-Rheumatic  S/P transfemoral transcatheter aortic valve replacement; POD # 2      24 Hour Events: Transferred from ICU to telemetry  Weaned to room air  Cardene weaned off       Medications:   Scheduled Meds:  Current Facility-Administered Medications   Medication Dose Route Frequency Provider Last Rate    acetaminophen  975 mg Oral Q8H [facility], PA-ARIANA      amiodarone  200 mg Oral [facility] [place]      ascorbic acid  500 mg Oral Daily [facility], [place]      aspirin  81 mg Oral Daily [facility], [place]      atorvastatin  80 mg Oral Daily With [employer], PA-ARIANA      bisacodyl  10 mg Rectal Daily PRN [facility], PA-C      clopidogrel  75 mg Oral Daily Yanira Flaquito, PA-C      docusate sodium  100 mg Oral BID [facility], PA-C      fondaparinux  2 5 mg Subcutaneous Daily Yanira Flaquito, PA-C      furosemide  40 mg Intravenous BID (diuretic) [facility], PA-C      insulin lispro  1-6 Units Subcutaneous TID AC [facility], PA-C      insulin lispro  1-6 Units Subcutaneous HS [facility], PA-C      levothyroxine  100 mcg Oral Early Morning [facility], PA-C      metoprolol tartrate  25 mg Oral Q12H [address] [facility], PA-C      mupirocin  1 application Nasal K81V [address] [place], [place]      niCARdipine  1-15 mg/hr Intravenous Titrated [facility], PA-C Stopped (05/18/22 0000)    ondansetron  4 mg Intravenous Q6H PRN [facility], PA-C      oxyCODONE  2 5 mg Oral Q4H PRN [facility], PA-C      oxyCODONE  5 mg Oral Q4H PRN [facility], PA-C      pantoprazole  40 mg Oral Early Morning [facility], PA-C      polyethylene glycol  17 g Oral Daily [facility], [place]      potassium chloride  20 mEq Oral BID [facility], PA-C      tamsulosin  0 4 mg Oral Daily With [employer] Flaquito, PA-C      temazepam  15 mg Oral HS PRN Petr Harris PA-C       Continuous Infusions:niCARdipine, 1-15 mg/hr, Last Rate: Stopped (05/18/22 0000)      PRN Meds:   bisacodyl    ondansetron    oxyCODONE    oxyCODONE    temazepam    Vitals:   Vitals:    05/18/22 0700 05/18/22 0749 05/18/22 0800 05/18/22 0812   BP: 124/72 108/57  121/63   Pulse: 72 72  74   Resp:       Temp:       TempSrc:       SpO2: 94% 94% 94%    Weight:       Height:           Telemetry: NSR; Heart Rate: 74    Respiratory:   SpO2: SpO2: 94 %, SpO2 Activity: SpO2 Activity: At Rest; Room Air    Intake/Output:     Intake/Output Summary (Last 24 hours) at 5/18/2022 1058  Last data filed at 5/18/2022 0906  Gross per 24 hour   Intake 280 84 ml   Output 1880 ml   Net -1599 16 ml        Chest tube Output:    Mediastinal tubes: 50 mL/8 hours  100 mL/24 hours           Weights:   Weight (last 2 days)     Date/Time Weight    05/18/22 0600 71 8 (158 29)    05/17/22 0538 78 (171 96)    05/16/22 1009 72 1 (159)            Results:   Results from last 7 days   Lab Units 05/18/22 0447 05/17/22  0401 05/16/22  2258 05/16/22  1508 05/16/22  1507   WBC Thousand/uL 11 96* 11 00*  --   --   --    HEMOGLOBIN g/dL 8 8* 8 6* 9 5*  --  10 1*   I STAT HEMOGLOBIN   --   --   --    < >  --    HEMATOCRIT % 25 8* 25 5* 28 1*  --  29 5*   HEMATOCRIT, ISTAT   --   --   --    < >  --    PLATELETS Thousands/uL 151 149  --   --  175    < > = values in this interval not displayed       Results from last 7 days   Lab Units 05/18/22 0447 05/17/22  0401 05/17/22  0010 05/16/22  1929 05/16/22  1508 05/16/22  1507   SODIUM mmol/L 137 139  --   --   --  137   POTASSIUM mmol/L 3 6 4 8 4 5   < >  --  4 3   CHLORIDE mmol/L 106 111*  --   --   --  108   CO2 mmol/L 24 21  --   --   --  23   CO2, I-STAT mmol/L  --   --   --   --  23  --    BUN mg/dL 18 15  --   --   --  13   CREATININE mg/dL 0 67 0 89  --   --   --  0 83   GLUCOSE, ISTAT mg/dl  --   --   --   --  220*  --    CALCIUM mg/dL 7 4* 7 9*  --   --   --  8 1*    < > = values in this interval not displayed  Results from last 7 days   Lab Units 05/16/22  1507   INR  1 29*   PTT seconds 34     Point of care glucose: 117-120    Studies:  No new studies    I have personally reviewed pertinent reports  and I have personally reviewed pertinent films in PACS    Invasive Lines/Tubes:  Invasive Devices  Report    Central Venous Catheter Line  Duration           CVC Central Lines 05/16/22 Triple 1 day          Peripheral Intravenous Line  Duration           Peripheral IV 05/16/22 Right Wrist 1 day          Arterial Line  Duration           Arterial Line 05/16/22 1 day          Drain  Duration           Chest Tube 1 Posterior;Mediastinal 32 Fr  1 day    Chest Tube 2 Anterior;Mediastinal 32 Fr  1 day                Physical Exam:    HEENT/NECK:  Normocephalic  Atraumatic  No jugular venous distention  Cardiac: Regular rate and rhythm and No murmurs/rubs/gallops  Pulmonary:  Breath sounds clear bilaterally  Abdomen:  Non-tender and Non-distended  Incisions: Sternum is stable  Incision is clean, dry, and intact  Extremities: Extremities warm/dry  Neuro: Alert and oriented X 3  Skin: Warm/Dry, without rashes or lesions  Assessment:  Principal Problem:    Severe aortic stenosis  Active Problems:    GERD without esophagitis    Essential hypertension    Presence of permanent cardiac pacemaker    Paroxysmal atrial fibrillation (HCC)    Coronary artery disease involving native coronary artery 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 pectoris    S/P drug eluting coronary stent placement    S/P AVR    Leukocytosis    Acute postoperative anemia due to expected blood loss       Aortic stenosis, Non-Rheumatic  S/P aortic valve replacement; POD # 2    Plan:    1  Cardiac:   NSR; HR/BP well-controlled   Cardene weaned off   D/C arterial line     Continue Lopressor, 25mg PO BID  Continue ASA and Statin therapy  Epicardial pacing wires out  Maintain central IV access today   Continue DVT prophylaxis    2  Pulmonary:   Good Room air oxygen saturation; Continue incentive spirometry/Coughing/Deep breathing exercises  Chest tube drainage diminished; D/C today    3  Renal:   Intake/Output net: -1800 mL/24 hours  Diuretic Regimen:  Continue Lasix 40 mg IV BID  Continue Potassium Chloride 20 mEq PO BID  Post op Creatinine stable; Follow up labs prn    4  Neuro:  Neurologically intact; No active issues  Incisional pain well-controlled  Continue Tylenol, 975 mg PO q 8, standing dose  Continue Oxycodone, 2 5 to 5 mg PO q 4 hours prn pain    5  GI:  Tolerating TLC 2 3 gm sodium diet  Maintain 1800 mL daily fluid restriction   Continue stool softeners and prn suppository  Continue GI prophylaxis    6  Endo:   Glucose well-controlled with sliding scale coverage    7    Hematology:    Post-operative blood count acceptable; Trend prn    8     Disposition:      Ambulating independently, Anticipate discharge to home Thursday vs Friday     VTE Pharmacologic Prophylaxis: Sequential compression device (Venodyne)  and Fondaparinux (Arixtra)  VTE Mechanical Prophylaxis: sequential compression device    Collaborative rounds completed with supervising physician  Plan of care discussed with bedside nurse    SIGNATURE: Miladis Blank PA-C  DATE: May 18, 2022  TIME: 10:58 AM

## 2022-05-18 NOTE — PROGRESS NOTES
General Cardiology   Progress Note -  Team One   Darian Ricci 80 y o  male MRN: [id number]    Unit/Bed#: [facility] 426-01 Encounter: [id number]    Assessment/ Plan    1  Severe aortic stenosis s/p AVR with a 25mm [facility] Ease pericardial tissue valve  POD # 2  Final transesophageal echocardiogram demonstrated prosthetic valve with normal function without evidence of perivalvular leak, normal LVEF  On amiodarone 200 mg PO Q 8 hours for rhythm control  On aspirin, statin and BB  Diuretics per primary team  On furosemide 40 mg IV BID  Monitor I/Os -1 8 L in 24 hours   Creatinine stable: 0 67  Daily weights     2  Anemia postoperatively  Hemoglobin 8 8    3  CAD s/p IAIN to Mid RCA 4/15/2022  On DAPT: aspirin and plavix, statin and BB     4  SSS s/p DC PPM 2016    5  Paroxysmal atrial fibrillation   Not on [facility]    Subjective  Patient feels "whoozy" today  No complaint of SOB or palpitations  Chest pain well tolerated  No fever or chills  No appetite  Review of Systems   Constitutional: Positive for decreased appetite  Negative for chills and fever  HENT: Negative for congestion  Cardiovascular: Negative for chest pain, dyspnea on exertion, leg swelling, orthopnea and palpitations  Respiratory: Negative for shortness of breath  Musculoskeletal: Negative for falls  Gastrointestinal: Negative for bloating, nausea and vomiting  Neurological: Positive for light-headedness  Negative for dizziness  Psychiatric/Behavioral: Negative for altered mental status  Objective:   Vitals: Blood pressure 121/63, pulse 74, temperature 98 °F (36 7 °C), temperature source Oral, resp  rate 16, height 5' 8 5" (1 74 m), weight 71 8 kg (158 lb 4 6 oz), SpO2 94 %  ,       Body mass index is 23 72 kg/m²  ,     Systolic (81EBU), YBO:951 , Min:106 , CBW:291     Diastolic (75MOX), ULL:30, Min:57, Max:74          Intake/Output Summary (Last 24 hours) at 5/18/2022 1109  Last data filed at 5/18/2022 0906  Gross per 24 hour Intake 280 84 ml   Output 1880 ml   Net -1599 16 ml     Weight (last 2 days)     Date/Time Weight    05/18/22 0600 71 8 (158 29)    05/17/22 0538 78 (171 96)    05/16/22 1009 72 1 (159)            Telemetry Review: Normal sinus rhythm     Physical Exam  Constitutional:       General: He is not in acute distress  HENT:      Head: Normocephalic  Mouth/Throat:      Mouth: Mucous membranes are moist    Cardiovascular:      Rate and Rhythm: Normal rate and regular rhythm  Pulses: Normal pulses  Pulmonary:      Effort: Pulmonary effort is normal  No respiratory distress  Breath sounds: Normal breath sounds  Abdominal:      General: Bowel sounds are normal       Palpations: Abdomen is soft  Musculoskeletal:         General: No swelling  Normal range of motion  Cervical back: Neck supple  Skin:     General: Skin is warm and dry  Neurological:      General: No focal deficit present  Mental Status: He is alert and oriented to person, place, and time     Psychiatric:         Mood and Affect: Mood normal          LABORATORY RESULTS      CBC with diff:   Results from last 7 days   Lab Units 05/18/22  0447 05/17/22  0401 05/16/22  2258 05/16/22  1508 05/16/22  1507 05/16/22  1408 05/16/22  1326 05/16/22  1325   WBC Thousand/uL 11 96* 11 00*  --   --   --   --   --   --    HEMOGLOBIN g/dL 8 8* 8 6* 9 5*  --  10 1*  --   --   --    I STAT HEMOGLOBIN g/dl  --   --   --  8 8*  --  8 2* 8 8*  --    HEMATOCRIT % 25 8* 25 5* 28 1*  --  29 5*  --   --   --    HEMATOCRIT, ISTAT %  --   --   --  26*  --  24* 26*  --    MCV fL 93 94  --   --   --   --   --   --    PLATELETS Thousands/uL 151 149  --   --  175  --   --  165   MCH pg 31 7 31 9  --   --   --   --   --   --    MCHC g/dL 34 1 33 7  --   --   --   --   --   --    RDW % 13 6 13 4  --   --   --   --   --   --    MPV fL 10 4 9 6  --   --  9 7  --   --  9 5       CMP:  Results from last 7 days   Lab Units 05/18/22  0447 05/17/22  9112 05/17/22  0010 05/16/22 1929 05/16/22  1508 05/16/22  1507 05/16/22  1408 05/16/22  1326 05/16/22  1317 05/16/22  1303 05/16/22  1244 05/16/22  1209   POTASSIUM mmol/L 3 6 4 8 4 5 3 7  --  4 3  --   --   --   --   --   --    CHLORIDE mmol/L 106 111*  --   --   --  108  --   --   --   --   --   --    CO2 mmol/L 24 21  --   --   --  23  --   --   --   --   --   --    CO2, I-STAT mmol/L  --   --   --   --  23  --  24 27 25 25 27 28   BUN mg/dL 18 15  --   --   --  13  --   --   --   --   --   --    CREATININE mg/dL 0 67 0 89  --   --   --  0 83  --   --   --   --   --   --    GLUCOSE, ISTAT mg/dl  --   --   --   --  220*  --  170* 128 117 111 138 94   CALCIUM mg/dL 7 4* 7 9*  --   --   --  8 1*  --   --   --   --   --   --    EGFR ml/min/1 73sq m 85 75  --   --   --  78  --   --   --   --   --   --        BMP:  Results from last 7 days   Lab Units 05/18/22  0447 05/17/22  0401 05/17/22  0010 05/16/22 1929 05/16/22  1508 05/16/22  1507 05/16/22  1408 05/16/22  1326 05/16/22  1317   POTASSIUM mmol/L 3 6 4 8 4 5 3 7  --  4 3  --   --   --    CHLORIDE mmol/L 106 111*  --   --   --  108  --   --   --    CO2 mmol/L 24 21  --   --   --  23  --   --   --    CO2, I-STAT mmol/L  --   --   --   --  23  --  24 27 25   BUN mg/dL 18 15  --   --   --  13  --   --   --    CREATININE mg/dL 0 67 0 89  --   --   --  0 83  --   --   --    GLUCOSE, ISTAT mg/dl  --   --   --   --  220*  --  170* 128 117   CALCIUM mg/dL 7 4* 7 9*  --   --   --  8 1*  --   --   --        No results found for: NTBNP          Results from last 7 days   Lab Units 05/17/22  0401   MAGNESIUM mg/dL 2 5                     Results from last 7 days   Lab Units 05/16/22  1507   INR  1 29*       Lipid Profile:   No results found for: CHOL  Lab Results   Component Value Date    HDL 45 05/10/2022     Lab Results   Component Value Date    LDLCALC 106 (H) 05/10/2022     Lab Results   Component Value Date    TRIG 85 05/10/2022       Cardiac testing:   Results for orders placed during the hospital encounter of 21    Echo complete with contrast if indicated    Narrative  [address]  [facility], [address] [place] [address]    Transthoracic Echocardiogram  2D, M-mode, Doppler, and Color Doppler    Study date:  06-Aug-2021    Patient: Dora Martin  MR number: [id number]  Account number: [de-identified]  : 07-Oct-1932  Age: 80 years  Gender: Male  Status: Outpatient  Location: [facility]  Height: 70 in  Weight: 169 lb  BP: 132/ 80 mmHg    Indications: Aortic stenosis  Diagnoses: I35 0 - Nonrheumatic aortic (valve) stenosis    Sonographer:  CADE Miller RD RVT  Primary Physician:  Ru Stephens MD  Referring Physician:  Jacoby Perea MD  Group:  [facility]  Interpreting Physician:  Sarah Hughes DO    SUMMARY    LEFT VENTRICLE:  Systolic function was at the lower limits of normal  Ejection fraction was estimated to be 53 %  There was severe hypokinesis of the distal apex and possibly the basal inferior myocardial wall segment  Wall thickness was at the upper limits of normal   Doppler parameters were consistent with abnormal left ventricular relaxation (grade 1 diastolic dysfunction)  MITRAL VALVE:  There was mild annular calcification  There was moderately restricted mobility of the posterior leaflet  There was mild regurgitation  AORTIC VALVE:  The valve was trileaflet  Leaflets exhibited markedly increased thickness, marked calcification, markedly reduced cuspal separation, reduced mobility, and sclerosis  Transaortic velocity was increased due to valvular stenosis  There was severe stenosis  There was mild regurgitation  The peak valve velocity was 4 cm/s  Valve mean gradient was 49 mmHg  Aortic valve area was 0 7 cmï¾² by the continuity equation  TRICUSPID VALVE:  There was trace regurgitation  HISTORY: PRIOR HISTORY: A-fib, pacemaker  Risk factors: hypertension      PROCEDURE: The study was performed in the [facility]  This was a routine study  The transthoracic approach was used  The study included complete 2D imaging, M-mode, complete spectral Doppler, and color Doppler  Images  were obtained from the parasternal, apical, subcostal, and suprasternal notch acoustic windows  Image quality was adequate  LEFT VENTRICLE: Size was normal  Systolic function was at the lower limits of normal  Ejection fraction was estimated to be 53 %  There was severe hypokinesis of the distal apex and possibly the basal inferior myocardial wall segment  Wall  thickness was at the upper limits of normal  DOPPLER: Doppler parameters were consistent with abnormal left ventricular relaxation (grade 1 diastolic dysfunction)  RIGHT VENTRICLE: The size was normal  Systolic function was normal  Wall thickness was normal     LEFT ATRIUM: Size was normal     RIGHT ATRIUM: Size was normal     MITRAL VALVE: There was mild annular calcification  There was moderately restricted mobility of the posterior leaflet  DOPPLER: There was no evidence for stenosis  There was mild regurgitation  AORTIC VALVE: The valve was trileaflet  Leaflets exhibited markedly increased thickness, marked calcification, markedly reduced cuspal separation, reduced mobility, and sclerosis  DOPPLER: Transaortic velocity was increased due to valvular  stenosis  There was severe stenosis  There was mild regurgitation  TRICUSPID VALVE: The valve structure was normal  There was normal leaflet separation  DOPPLER: The transtricuspid velocity was within the normal range  There was no evidence for stenosis  There was trace regurgitation  PULMONIC VALVE: Not well visualized  PERICARDIUM: There was no pericardial effusion  The pericardium was normal in appearance  AORTA: The root exhibited normal size  SYSTEMIC VEINS: IVC: The inferior vena cava was normal in size and course   Respirophasic changes were normal     PULMONARY ARTERY: DOPPLER: Systolic pressure was within the normal range  Estimated peak pressure was 25 mmHg  MEASUREMENT TABLES    DOPPLER MEASUREMENTS  Aortic valve   (Reference normals)  Peak clarisa   4 cm/s   (--)  Mean gradient   49 mmHg   (--)  Valve area, cont   0 7 cmï¾²   (--)    SYSTEM MEASUREMENT TABLES    2D  %FS: 26 71 %  Ao Diam: 3 17 cm  EDV(Teich): 132 54 ml  EF Biplane: 53 4 %  EF(Teich): 51 8 %  ESV(Teich): 63 89 ml  IVSd: 1 cm  LA Area: 18 38 cm2  LA Diam: 4 15 cm  LVEDV MOD A2C: 133 67 ml  LVEDV MOD A4C: 142 68 ml  LVEDV MOD BP: 138 06 ml  LVEF MOD A2C: 54 01 %  LVEF MOD A4C: 53 28 %  LVESV MOD A2C: 61 48 ml  LVESV MOD A4C: 66 66 ml  LVESV MOD BP: 64 34 ml  LVIDd: 5 25 cm  LVIDs: 3 85 cm  LVLd A2C: 9 39 cm  LVLd A4C: 9 43 cm  LVLs A2C: 8 42 cm  LVLs A4C: 8 25 cm  LVOT Diam: 2 27 cm  LVPWd: 0 89 cm  RA Area: 17 16 cm2  RVIDd: 3 49 cm  SV MOD A2C: 72 2 ml  SV MOD A4C: 76 02 ml  SV(Teich): 68 65 ml    CW  AR Dec Tucker: 3 19 m/s2  AR Dec Time: 1395 58 ms  AR PHT: 404 72 ms  AR Vmax: 4 45 m/s  AR maxP 21 mmHg  AV Env  Ti: 329 53 ms  AV VTI: 96 94 cm  AV Vmax: 3 87 m/s  AV Vmean: 2 96 m/s  AV maxP 01 mmHg  AV meanP 93 mmHg  TR Vmax: 2 38 m/s  TR maxP 67 mmHg    MM  TAPSE: 2 41 cm    PW  MIRA (VTI): 0 64 cm2  MIRA Vmax: 0 69 cm2  LVOT Env  Ti: 303 2 ms  LVOT VTI: 15 39 cm  LVOT Vmax: 0 67 m/s  LVOT Vmean: 0 51 m/s  LVOT maxP 78 mmHg  LVOT meanP 11 mmHg  LVSV Dopp: 61 99 ml    [facility] Accredited Echocardiography [facility]    Prepared and electronically signed by    Shayan Wyatt DO  Signed 06-Aug-2021 13:13:13    No results found for this or any previous visit  No results found for this or any previous visit  No valid procedures specified  No results found for this or any previous visit      Meds/Allergies   all current active meds have been reviewed and current meds:   Current Facility-Administered Medications   Medication Dose Route Frequency  acetaminophen (TYLENOL) tablet 975 mg  975 mg Oral Q8H    amiodarone tablet 200 mg  200 mg Oral Q8H [facility] [place]    ascorbic acid (VITAMIN C) tablet 500 mg  500 mg Oral Daily    aspirin chewable tablet 81 mg  81 mg Oral Daily    atorvastatin (LIPITOR) tablet 80 mg  80 mg Oral Daily With [employer]inner    bisacodyl (DULCOLAX) rectal suppository 10 mg  10 mg Rectal Daily PRN    clopidogrel (PLAVIX) tablet 75 mg  75 mg Oral Daily    docusate sodium (COLACE) capsule 100 mg  100 mg Oral BID    fondaparinux (ARIXTRA) subcutaneous injection 2 5 mg  2 5 mg Subcutaneous Daily    furosemide (LASIX) injection 40 mg  40 mg Intravenous BID (diuretic)    insulin lispro (HumaLOG) 100 units/mL subcutaneous injection 1-6 Units  1-6 Units Subcutaneous TID AC    insulin lispro (HumaLOG) 100 units/mL subcutaneous injection 1-6 Units  1-6 Units Subcutaneous HS    levothyroxine tablet 100 mcg  100 mcg Oral Early Morning    metoprolol tartrate (LOPRESSOR) tablet 25 mg  25 mg Oral Q12H JANELL    mupirocin (BACTROBAN) 2 % nasal ointment 1 application  1 application Nasal X88W JANELL    niCARdipine (CARDENE) 25 mg (STANDARD CONCENTRATION) in sodium chloride 0 9% 250 mL  1-15 mg/hr Intravenous Titrated    ondansetron (ZOFRAN) injection 4 mg  4 mg Intravenous Q6H PRN    oxyCODONE (ROXICODONE) IR tablet 2 5 mg  2 5 mg Oral Q4H PRN    oxyCODONE (ROXICODONE) IR tablet 5 mg  5 mg Oral Q4H PRN    pantoprazole (PROTONIX) EC tablet 40 mg  40 mg Oral Early Morning    polyethylene glycol (MIRALAX) packet 17 g  17 g Oral Daily    potassium chloride (K-DUR,KLOR-CON) CR tablet 20 mEq  20 mEq Oral BID    tamsulosin (FLOMAX) capsule 0 4 mg  0 4 mg Oral Daily With [employer]    temazepam (RESTORIL) capsule 15 mg  15 mg Oral HS PRN     Medications Prior to Admission   Medication    amLODIPine (NORVASC) 5 mg tablet    aspirin (ECOTRIN LOW STRENGTH) 81 mg EC tablet    clopidogrel (Plavix) 75 mg tablet    Cyanocobalamin (VITAMIN B12) 1000 MCG TBCR    Flaxseed, Linseed, (FLAXSEED OIL) 1000 MG CAPS    Glucosamine-Chondroit-Vit C-Mn (GLUCOSAMINE 1500 COMPLEX) CAPS    levothyroxine 100 mcg tablet    metoprolol tartrate (LOPRESSOR) 100 mg tablet    Multiple Vitamin (MULTIVITAMIN) capsule    mupirocin (BACTROBAN) 2 % nasal ointment    Omega-3 Fatty Acids (FISH OIL) 1200 MG CAPS    tamsulosin (FLOMAX) 0 4 mg    Ascorbic Acid (VITAMIN C) 500 MG CAPS       niCARdipine, 1-15 mg/hr, Last Rate: Stopped (05/18/22 0000)      Counseling / Coordination of Care  Total floor / unit time spent today 20 minutes  Greater than 50% of total time was spent with the patient and / or family counseling and / or coordination of care  ** Please Note: Dragon 360 Dictation voice to text software may have been used in the creation of this document   **

## 2022-05-18 NOTE — PLAN OF CARE
Problem: PHYSICAL THERAPY ADULT  Goal: Performs mobility at highest level of function for planned discharge setting  See evaluation for individualized goals  Description: Treatment/Interventions: Functional transfer training, LE strengthening/ROM, Elevations, Therapeutic exercise, Endurance training, Bed mobility, Gait training, Spoke to nursing, OT, Family  Equipment Recommended: Mat Prader       See flowsheet documentation for full assessment, interventions and recommendations  Outcome: Progressing  Note: Prognosis: Good  Problem List: Decreased strength, Decreased endurance, Decreased coordination, Impaired balance, Decreased mobility, Pain  Assessment: Pt seen for session for setup, transfers, gait w/ rest time, reposiitoning  Pt cooperative, willing to mobilize,  overall, note improved strength and mobility tolerance  some LOB and minimal buckling when fatigued  Pt is progressing, continue to lean towards home w/ RW, home PT pending continued progress           PT Discharge Recommendation: Home with home health rehabilitation          See flowsheet documentation for full assessment

## 2022-05-18 NOTE — PLAN OF CARE
Problem: OCCUPATIONAL THERAPY ADULT  Goal: Performs self-care activities at highest level of function for planned discharge setting  See evaluation for individualized goals  Description: Treatment Interventions: ADL retraining, Endurance training, Patient/family training, Compensatory technique education, Continued evaluation, Energy conservation, Activityengagement          See flowsheet documentation for full assessment, interventions and recommendations  Outcome: Progressing  Note: Limitation: Decreased ADL status, Decreased Safe judgement during ADL, Decreased endurance, Decreased self-care trans, Decreased high-level ADLs  Prognosis: Good  Assessment: Patient participated in Skilled OT session this date with interventions consisting of ADL re training with the use of correct body mechanics, maintaining sternal precautions and  therapeutic activities to: increase activity tolerance   Patient agreeable to OT treatment session, upon arrival patient was found seated OOB to Chair  In comparison to previous session, patient with improvements in activity tolerance and functional transfers  Patient requiring frequent rest periods  Patient continues to be functioning below baseline level, occupational performance remains limited secondary to factors listed above and increased risk for falls and injury  From OT standpoint, recommendation at time of d/c would be Home with family support and Home OT pending progress  Patient to benefit from continued Occupational Therapy treatment while in the hospital to address deficits as defined above and maximize level of functional independence with ADLs and functional mobility       OT Discharge Recommendation: Home with home health rehabilitation (pending progress)  OT - OK to Discharge: Yes (When medically appropriate)

## 2022-05-18 NOTE — PHYSICAL THERAPY NOTE
Physical Therapy Treatment Note       05/18/22 0920   PT Last Visit   PT Visit Date 05/18/22   Note Type   Note Type Treatment   Pain Assessment   Pain Assessment Tool 0-10   Pain Score 5   Pain Location/Orientation Location: Chest;Location: Incision   Patient's Stated Pain Goal No pain   Hospital Pain Intervention(s) Ambulation/increased activity   Restrictions/Precautions   Weight Bearing Precautions Per Order No   Other Precautions Cardiac/sternal;Multiple lines;Telemetry;O2;Fall Risk;Pain   General   Chart Reviewed Yes   Family/Caregiver Present Yes   Cognition   Overall Cognitive Status WFL   Arousal/Participation Responsive   Attention Attends with cues to redirect   Orientation Level Oriented to person;Oriented to place;Oriented to situation   Memory Unable to assess   Following Commands Follows one step commands without difficulty   Subjective   Subjective stateshe feels a bit better  willing to mobilize   Transfers   Sit to Stand 4  Minimal assistance   Additional items Assist x 1   Stand to Sit 4  Minimal assistance   Additional items Assist x 1   Additional Comments time spent for setup, stood at chair x 2 min prior to gait    time also spent for reposiitoning   Ambulation/Elevation   Gait pattern   (slow, ataxia, short step length, forward flexion, antalgic)   Gait Assistance 4  Minimal assist   Additional items Assist x 1  (w/ 2 others for lines, chair follow)   Assistive Device Rolling walker   Distance 70'x2 w/ 5-7 min seated rest break   Balance   Static Sitting Good   Dynamic Sitting Fair -   Static Standing Poor +   Dynamic Standing Poor +   Ambulatory Poor +   Endurance Deficit   Endurance Deficit Yes   Endurance Deficit Description fatigue, weakness, pain   Activity Tolerance   Activity Tolerance Patient limited by fatigue;Patient limited by pain;Treatment limited secondary to medical complications (Comment)   Nurse Made Aware yes   Assessment   Prognosis Good   Problem List Decreased strength;Decreased endurance;Decreased coordination; Impaired balance;Decreased mobility;Pain   Assessment Pt seen for session for setup, transfers, gait w/ rest time, reposiitoning  Pt cooperative, willing to mobilize,  overall, note improved strength and mobility tolerance  some LOB and minimal buckling when fatigued  Pt is progressing, continue to lean towards home w/ RW, home PT pending continued progress   Goals   Patient Goals none stated   STG Expiration Date 05/27/22   PT Treatment Day 1   Plan   Treatment/Interventions Functional transfer training;LE strengthening/ROM; Therapeutic exercise; Endurance training;Patient/family training;Equipment eval/education; Bed mobility;Gait training   Progress Progressing toward goals   PT Frequency 4-6x/wk   Recommendation   PT Discharge Recommendation Home with home health rehabilitation   [place] walker   AM-PAC Basic Mobility Inpatient   Turning in Bed Without Bedrails 3   Lying on Back to Sitting on Edge of Flat Bed 3   Moving Bed to Chair 3   Standing Up From Chair 3   Walk in Room 2   Climb 3-5 Stairs 2   Basic Mobility Inpatient Raw Score 16   Basic Mobility Standardized Score 38 32   Highest Level Of Mobility   JH-HLM Goal 5: Stand one or more mins   JH-HLM Achieved 7: Walk 25 feet or more   Lamar Nunez PT, DPT CSRS

## 2022-05-18 NOTE — RESPIRATORY THERAPY NOTE
05/17/22 2148   Respiratory Assessment   Assessment Type Assess only   General Appearance Alert; Awake   Respiratory Pattern Normal   Chest Assessment Chest expansion symmetrical   Bilateral Breath Sounds Diminished;Clear   Cough None   Resp Comments Patient currently resting in bed, nasal cannula 2L/min, c/o incisional pain with deep inspiration  Incentive spirometry performed with good technique, 750cc inspiratory volume achieved     O2 Device NC   Additional Assessments   SpO2 93 %

## 2022-05-18 NOTE — PROCEDURES
05/18/22    Procedure: Chest tube removal    Chest tubes removed in routine fashion without incident  Insertion site dressed with Acticoat  Limmie Don Kooker tolerated the procedure well  Nurse notified      SIGNATURE: Kathy Cohen PA-C  DATE: May 18, 2022  TIME: 12:38 PM

## 2022-05-19 LAB
GLUCOSE SERPL-MCNC: 100 MG/DL (ref 65–140)
GLUCOSE SERPL-MCNC: 103 MG/DL (ref 65–140)
GLUCOSE SERPL-MCNC: 114 MG/DL (ref 65–140)
GLUCOSE SERPL-MCNC: 130 MG/DL (ref 65–140)

## 2022-05-19 PROCEDURE — 82948 REAGENT STRIP/BLOOD GLUCOSE: CPT

## 2022-05-19 PROCEDURE — 97530 THERAPEUTIC ACTIVITIES: CPT

## 2022-05-19 PROCEDURE — 99232 SBSQ HOSP IP/OBS MODERATE 35: CPT | Performed by: INTERNAL MEDICINE

## 2022-05-19 PROCEDURE — 97116 GAIT TRAINING THERAPY: CPT

## 2022-05-19 PROCEDURE — 99024 POSTOP FOLLOW-UP VISIT: CPT | Performed by: THORACIC SURGERY (CARDIOTHORACIC VASCULAR SURGERY)

## 2022-05-19 RX ORDER — TORSEMIDE 20 MG/1
20 TABLET ORAL DAILY
Status: DISCONTINUED | OUTPATIENT
Start: 2022-05-19 | End: 2022-05-20 | Stop reason: HOSPADM

## 2022-05-19 RX ORDER — BISACODYL 10 MG
10 SUPPOSITORY, RECTAL RECTAL ONCE
Status: COMPLETED | OUTPATIENT
Start: 2022-05-19 | End: 2022-05-19

## 2022-05-19 RX ORDER — POTASSIUM CHLORIDE 20 MEQ/1
20 TABLET, EXTENDED RELEASE ORAL DAILY
Status: DISCONTINUED | OUTPATIENT
Start: 2022-05-20 | End: 2022-05-20 | Stop reason: HOSPADM

## 2022-05-19 RX ADMIN — MUPIROCIN 1 APPLICATION: 20 OINTMENT TOPICAL at 08:26

## 2022-05-19 RX ADMIN — CLOPIDOGREL BISULFATE 75 MG: 75 TABLET ORAL at 08:26

## 2022-05-19 RX ADMIN — TAMSULOSIN HYDROCHLORIDE 0.4 MG: 0.4 CAPSULE ORAL at 16:10

## 2022-05-19 RX ADMIN — BISACODYL 10 MG: 10 SUPPOSITORY RECTAL at 12:29

## 2022-05-19 RX ADMIN — METOPROLOL TARTRATE 25 MG: 25 TABLET, FILM COATED ORAL at 20:23

## 2022-05-19 RX ADMIN — POTASSIUM CHLORIDE 20 MEQ: 1500 TABLET, EXTENDED RELEASE ORAL at 08:26

## 2022-05-19 RX ADMIN — DOCUSATE SODIUM 100 MG: 100 CAPSULE, LIQUID FILLED ORAL at 08:26

## 2022-05-19 RX ADMIN — PANTOPRAZOLE SODIUM 40 MG: 40 TABLET, DELAYED RELEASE ORAL at 06:15

## 2022-05-19 RX ADMIN — ATORVASTATIN CALCIUM 80 MG: 80 TABLET, FILM COATED ORAL at 16:10

## 2022-05-19 RX ADMIN — POLYETHYLENE GLYCOL 3350 17 G: 17 POWDER, FOR SOLUTION ORAL at 08:26

## 2022-05-19 RX ADMIN — AMIODARONE HYDROCHLORIDE 200 MG: 200 TABLET ORAL at 06:15

## 2022-05-19 RX ADMIN — FONDAPARINUX SODIUM 2.5 MG: 2.5 INJECTION, SOLUTION SUBCUTANEOUS at 08:27

## 2022-05-19 RX ADMIN — ACETAMINOPHEN 975 MG: 325 TABLET, FILM COATED ORAL at 08:26

## 2022-05-19 RX ADMIN — DOCUSATE SODIUM 100 MG: 100 CAPSULE, LIQUID FILLED ORAL at 17:02

## 2022-05-19 RX ADMIN — FUROSEMIDE 40 MG: 10 INJECTION, SOLUTION INTRAMUSCULAR; INTRAVENOUS at 08:27

## 2022-05-19 RX ADMIN — ASPIRIN 81 MG CHEWABLE TABLET 81 MG: 81 TABLET CHEWABLE at 08:26

## 2022-05-19 RX ADMIN — ACETAMINOPHEN 975 MG: 325 TABLET, FILM COATED ORAL at 16:10

## 2022-05-19 RX ADMIN — MUPIROCIN 1 APPLICATION: 20 OINTMENT TOPICAL at 20:23

## 2022-05-19 RX ADMIN — OXYCODONE HYDROCHLORIDE AND ACETAMINOPHEN 500 MG: 500 TABLET ORAL at 08:26

## 2022-05-19 RX ADMIN — LEVOTHYROXINE SODIUM 100 MCG: 100 TABLET ORAL at 06:15

## 2022-05-19 RX ADMIN — ACETAMINOPHEN 975 MG: 325 TABLET, FILM COATED ORAL at 22:45

## 2022-05-19 RX ADMIN — METOPROLOL TARTRATE 25 MG: 25 TABLET, FILM COATED ORAL at 08:26

## 2022-05-19 NOTE — PROGRESS NOTES
Progress Note - Cardiothoracic Surgery   Gonzales Olivera 80 y o  male MRN: [id number]  Unit/Bed#: [facility] 426-01 Encounter: [id number]    Aortic stenosis, Non-Rheumatic  S/P transfemoral transcatheter aortic valve replacement; POD # 3      24 Hour Events: C/O Nausea and decreased appetite  No vomiting       Medications:   Scheduled Meds:  Current Facility-Administered Medications   Medication Dose Route Frequency Provider Last Rate    acetaminophen  975 mg Oral Q8H Bacilio Katie, VETO      amiodarone  200 mg Oral [facility] Bacilio Katie, [place]      ascorbic acid  500 mg Oral Daily Baciloi Katie, [place]      aspirin  81 mg Oral Daily Bacilio Katie, [place]      atorvastatin  80 mg Oral Daily With VETO [place]      bisacodyl  10 mg Rectal Daily PRN Bacilio Katie, VETO      clopidogrel  75 mg Oral Daily Yanira VETO [place]      docusate sodium  100 mg Oral BID Bacilio Katie, VETO      fondaparinux  2 5 mg Subcutaneous Daily Yanira VETO [place]      furosemide  40 mg Intravenous BID (diuretic) Bacilio Katie, VETO      insulin lispro  1-6 Units Subcutaneous TID AC Bacilio Katie, PA-ARIANA      insulin lispro  1-6 Units Subcutaneous HS Bacilio Katie, VETO      levothyroxine  100 mcg Oral Early Morning Bacilio Katie, VETO      metoprolol tartrate  25 mg Oral Q12H [address] Bacilio Katie, VETO      mupirocin  1 application Nasal O79T [address] Bacilio [place], [place]      niCARdipine  1-15 mg/hr Intravenous Titrated Bacilio Katie, VETO Stopped (05/18/22 0000)    ondansetron  4 mg Intravenous Q6H PRN Bacilio Katie, VETO      oxyCODONE  2 5 mg Oral Q4H PRN Bacilio Katie, VETO      oxyCODONE  5 mg Oral Q4H PRN Bacilio Katie, VETO      pantoprazole  40 mg Oral Early Morning Bacilio Katie, PAMELINA      polyethylene glycol  17 g Oral Daily Bacilio Katie, [place]      potassium chloride  20 mEq Oral BID Bacilio Katie, VETO      tamsulosin  0 4 mg Oral Daily With [employer] VETO [place]      temazepam  15 mg Oral HS PRN Bacilio Katie, PA-C Continuous Infusions:niCARdipine, 1-15 mg/hr, Last Rate: Stopped (05/18/22 0000)      PRN Meds:   bisacodyl    ondansetron    oxyCODONE    oxyCODONE    temazepam    Vitals:   Vitals:    05/19/22 0305 05/19/22 0600 05/19/22 0710 05/19/22 0830   BP: 114/68  142/75    Pulse: 76  76    Resp: 15  14    Temp: 98 1 °F (36 7 °C)  97 8 °F (36 6 °C)    TempSrc:       SpO2: 91%  93% 91%   Weight:  75 1 kg (165 lb 9 1 oz)     Height:           Telemetry: V paced at 70    Respiratory:   SpO2: SpO2: 91 %, SpO2 Activity: SpO2 Activity: At Rest; Room Air    Intake/Output:     Intake/Output Summary (Last 24 hours) at 5/19/2022 0852  Last data filed at 5/19/2022 0601  Gross per 24 hour   Intake 118 ml   Output 1700 ml   Net -1582 ml        Weights:   Weight (last 2 days)     Date/Time Weight    05/19/22 0600 75 1 (165 57)    05/18/22 0600 71 8 (158 29)    05/17/22 0538 78 (171 96)            Results:   Results from last 7 days   Lab Units 05/18/22 0447 05/17/22  0401 05/16/22  2258 05/16/22  1508 05/16/22  1507   WBC Thousand/uL 11 96* 11 00*  --   --   --    HEMOGLOBIN g/dL 8 8* 8 6* 9 5*  --  10 1*   I STAT HEMOGLOBIN   --   --   --    < >  --    HEMATOCRIT % 25 8* 25 5* 28 1*  --  29 5*   HEMATOCRIT, ISTAT   --   --   --    < >  --    PLATELETS Thousands/uL 151 149  --   --  175    < > = values in this interval not displayed  Results from last 7 days   Lab Units 05/18/22 0447 05/17/22  0401 05/17/22  0010 05/16/22  1929 05/16/22  1508 05/16/22  1507   SODIUM mmol/L 137 139  --   --   --  137   POTASSIUM mmol/L 3 6 4 8 4 5   < >  --  4 3   CHLORIDE mmol/L 106 111*  --   --   --  108   CO2 mmol/L 24 21  --   --   --  23   CO2, I-STAT mmol/L  --   --   --   --  23  --    BUN mg/dL 18 15  --   --   --  13   CREATININE mg/dL 0 67 0 89  --   --   --  0 83   GLUCOSE, ISTAT mg/dl  --   --   --   --  220*  --    CALCIUM mg/dL 7 4* 7 9*  --   --   --  8 1*    < > = values in this interval not displayed       Results from last 7 days   Lab Units 05/16/22  1507   INR  1 29*   PTT seconds 34     Point of care glucose: 103-138    Studies:  No new studies    Invasive Lines/Tubes:  Invasive Devices  Report    Central Venous Catheter Line  Duration           CVC Central Lines 05/16/22 Triple 2 days          Peripheral Intravenous Line  Duration           Peripheral IV 05/16/22 Right Wrist 2 days              Physical Exam:    HEENT/NECK:  Normocephalic  Atraumatic  No jugular venous distention  Cardiac: Regular rate and rhythm and No murmurs/rubs/gallops  Pulmonary:  Breath sounds clear bilaterally  Abdomen:  Non-tender, Non-distended and Normal bowel sounds  Incisions: Sternum is stable  Incision is clean, dry, and intact  Extremities: Extremities warm/dry  Neuro: Alert and oriented X 3  Skin: Warm/Dry, without rashes or lesions  Assessment:  Principal Problem:    Severe aortic stenosis  Active Problems:    GERD without esophagitis    Essential hypertension    Presence of permanent cardiac pacemaker    Paroxysmal atrial fibrillation (HCC)    Coronary artery disease involving native coronary artery 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 pectoris    S/P drug eluting coronary stent placement    S/P AVR    Leukocytosis    Acute postoperative anemia due to expected blood loss       Aortic stenosis, Non-Rheumatic  S/P aortic valve replacement; POD # 3    Plan:    1  Cardiac:   NSR; HR/BP well-controlled       Continue Lopressor, 25mg PO BID  Continue ASA and Statin therapy  Epicardial pacing wires out  Remove central IV access today   Continue DVT prophylaxis    2  Pulmonary:   Good Room air oxygen saturation; Continue incentive spirometry/Coughing/Deep breathing exercises      3  Renal:   Intake/Output net: -1500 mL/24 hours  Diuretic Regimen:  D/C IV Lasix  Torsemide, 20 mg PO QD  Continue Potassium Chloride 20 mEq PO BID  Post op Creatinine stable; Follow up labs prn    4  Neuro:  Neurologically intact;  No active issues  Incisional pain well-controlled  Continue Tylenol, 975 mg PO q 8, standing dose  Continue Oxycodone, 2 5 to 5 mg PO q 4 hours prn pain    5  GI:  Nausea and decreased appetite   D/C PO Amiodarone   Dulcolax suppository now  Tolerating TLC 2 3 gm sodium diet  Maintain 1800 mL daily fluid restriction   Continue stool softeners and prn suppository  Continue GI prophylaxis    6  Endo:   Glucose well-controlled with sliding scale coverage    7    Hematology:    Post-operative blood count acceptable; Trend prn    8     Disposition:      Ambulating independently, Anticipate discharge to home  Friday     VTE Pharmacologic Prophylaxis: Sequential compression device (Venodyne)  and Fondaparinux (Arixtra)  VTE Mechanical Prophylaxis: sequential compression device    Collaborative rounds completed with supervising physician  Plan of care discussed with bedside nurse    SIGNATURE: Ulises Sainz PA-C  DATE: May 19, 2022  TIME: 8:52 AM

## 2022-05-19 NOTE — PROGRESS NOTES
General Cardiology   Progress Note -  Team One   Jim Andrea 80 y o  male MRN: [id number]    Unit/Bed#: [facility] 426-01 Encounter: [id number]    Assessment/ Plan    1  Severe aortic stenosis s/p AVR with a 25mm Paul Magna Ease pericardial tissue valve  POD # 3  Final transesophageal echocardiogram demonstrated prosthetic valve with normal function without evidence of perivalvular leak, normal LVEF  On aspirin, plavix, statin and BB  Diuretics per primary team  On torsemide 20 mg PO daily   Monitor I/Os -1 5 L in 24 hours   Daily weights 165 lbs      2  Anemia postoperatively  Hemoglobin 8 8 (yesterday)      3  CAD s/p IAIN to Mid RCA 4/15/2022  On DAPT: aspirin and plavix, statin and BB      4  SSS s/p DC PPM 2016     5  Paroxysmal atrial fibrillation   Not on [facility]    Subjective  Patient is feeling much better today  Nausea has improved  Chest pain controlled  No complaint of SOB  No fever or chills  He ate a little of his lunch today improved from yesterday  Encouraged PO intake  Review of Systems   Constitutional: Negative for chills and fever  HENT: Negative for congestion  Cardiovascular: Negative for chest pain, dyspnea on exertion, leg swelling, orthopnea and palpitations  Respiratory: Negative for shortness of breath  Musculoskeletal: Negative for falls  Gastrointestinal: Negative for bloating, nausea and vomiting  Neurological: Negative for dizziness and light-headedness  Psychiatric/Behavioral: Negative for altered mental status  All other systems reviewed and are negative  Objective:   Vitals: Blood pressure 101/59, pulse 71, temperature 98 3 °F (36 8 °C), resp  rate 14, height 5' 8 5" (1 74 m), weight 75 1 kg (165 lb 9 1 oz), SpO2 92 %  ,       Body mass index is 24 81 kg/m²  ,     Systolic (00PHC), IWK:460 , Min:101 , GCU:601     Diastolic (27DQD), AJZ:21, Min:55, Max:75      Intake/Output Summary (Last 24 hours) at 5/19/2022 1207  Last data filed at 5/19/2022 1103  Gross per 24 hour   Intake 118 ml   Output 1975 ml   Net -1857 ml     Weight (last 2 days)     Date/Time Weight    05/19/22 0600 75 1 (165 57)    05/18/22 0600 71 8 (158 29)    05/17/22 0538 78 (171 96)        Telemetry Review: NSR with paced beats     Physical Exam  Constitutional:       General: He is not in acute distress  HENT:      Head: Normocephalic  Mouth/Throat:      Mouth: Mucous membranes are moist    Cardiovascular:      Rate and Rhythm: Normal rate and regular rhythm  Pulses: Normal pulses  Heart sounds: No murmur heard  Pulmonary:      Effort: Pulmonary effort is normal  No respiratory distress  Breath sounds: Normal breath sounds  Abdominal:      General: Bowel sounds are normal       Palpations: Abdomen is soft  Musculoskeletal:         General: No swelling  Normal range of motion  Cervical back: Neck supple  Skin:     General: Skin is warm and dry  Capillary Refill: Capillary refill takes less than 2 seconds  Comments: Mid sternum incision PAOLA    Neurological:      General: No focal deficit present  Mental Status: He is alert and oriented to person, place, and time     Psychiatric:         Mood and Affect: Mood normal          LABORATORY RESULTS      CBC with diff:   Results from last 7 days   Lab Units 05/18/22  0447 05/17/22  0401 05/16/22  2258 05/16/22  1508 05/16/22  1507 05/16/22  1408 05/16/22  1326 05/16/22  1325   WBC Thousand/uL 11 96* 11 00*  --   --   --   --   --   --    HEMOGLOBIN g/dL 8 8* 8 6* 9 5*  --  10 1*  --   --   --    I STAT HEMOGLOBIN g/dl  --   --   --  8 8*  --  8 2* 8 8*  --    HEMATOCRIT % 25 8* 25 5* 28 1*  --  29 5*  --   --   --    HEMATOCRIT, ISTAT %  --   --   --  26*  --  24* 26*  --    MCV fL 93 94  --   --   --   --   --   --    PLATELETS Thousands/uL 151 149  --   --  175  --   --  165   MCH pg 31 7 31 9  --   --   --   --   --   --    MCHC g/dL 34 1 33 7  --   --   --   --   --   --    RDW % 13 6 13 4  --   --   --   --   -- --    MPV fL 10 4 9 6  --   --  9 7  --   --  9 5       CMP:  Results from last 7 days   Lab Units 05/18/22 0447 05/17/22 0401 05/17/22 0010 05/16/22 1929 05/16/22  1508 05/16/22  1507 05/16/22  1408 05/16/22  1326 05/16/22  1317 05/16/22  1303 05/16/22  1244 05/16/22  1209   POTASSIUM mmol/L 3 6 4 8 4 5 3 7  --  4 3  --   --   --   --   --   --    CHLORIDE mmol/L 106 111*  --   --   --  108  --   --   --   --   --   --    CO2 mmol/L 24 21  --   --   --  23  --   --   --   --   --   --    CO2, I-STAT mmol/L  --   --   --   --  23  --  24 27 25 25 27 28   BUN mg/dL 18 15  --   --   --  13  --   --   --   --   --   --    CREATININE mg/dL 0 67 0 89  --   --   --  0 83  --   --   --   --   --   --    GLUCOSE, ISTAT mg/dl  --   --   --   --  220*  --  170* 128 117 111 138 94   CALCIUM mg/dL 7 4* 7 9*  --   --   --  8 1*  --   --   --   --   --   --    EGFR ml/min/1 73sq m 85 75  --   --   --  78  --   --   --   --   --   --        BMP:  Results from last 7 days   Lab Units 05/18/22 0447 05/17/22 0401 05/17/22 0010 05/16/22 1929 05/16/22 1508 05/16/22  1507 05/16/22  1408 05/16/22  1326 05/16/22  1317   POTASSIUM mmol/L 3 6 4 8 4 5 3 7  --  4 3  --   --   --    CHLORIDE mmol/L 106 111*  --   --   --  108  --   --   --    CO2 mmol/L 24 21  --   --   --  23  --   --   --    CO2, I-STAT mmol/L  --   --   --   --  23  --  24 27 25   BUN mg/dL 18 15  --   --   --  13  --   --   --    CREATININE mg/dL 0 67 0 89  --   --   --  0 83  --   --   --    GLUCOSE, ISTAT mg/dl  --   --   --   --  220*  --  170* 128 117   CALCIUM mg/dL 7 4* 7 9*  --   --   --  8 1*  --   --   --         Results from last 7 days   Lab Units 05/17/22  0401   MAGNESIUM mg/dL 2 5     Results from last 7 days   Lab Units 05/16/22  1507   INR  1 29*       Lipid Profile:   No results found for: CHOL  Lab Results   Component Value Date    HDL 45 05/10/2022     Lab Results   Component Value Date    LDLCALC 106 (H) 05/10/2022     Lab Results   Component Value Date    TRIG 85 05/10/2022       Cardiac testing:   Results for orders placed during the hospital encounter of 21    Echo complete with contrast if indicated    Narrative  [address]  [place], [address] [place] [address]    Transthoracic Echocardiogram  2D, M-mode, Doppler, and Color Doppler    Study date:  06-Aug-2021    Patient: Anai Parson  MR number: [id number]  Account number: [de-identified]  : 07-Oct-1932  Age: 80 years  Gender: Male  Status: Outpatient  Location: [facility]  Height: 70 in  Weight: 169 lb  BP: 132/ 80 mmHg    Indications: Aortic stenosis  Diagnoses: I35 0 - Nonrheumatic aortic (valve) stenosis    Sonographer:  CADE Vaughn RDCS RVT  Primary Physician:  Jerry Collier MD  Referring Physician:  Spencer Ross MD  Group:  [facility]  Interpreting Physician:  Marcin Pandey DO    SUMMARY    LEFT VENTRICLE:  Systolic function was at the lower limits of normal  Ejection fraction was estimated to be 53 %  There was severe hypokinesis of the distal apex and possibly the basal inferior myocardial wall segment  Wall thickness was at the upper limits of normal   Doppler parameters were consistent with abnormal left ventricular relaxation (grade 1 diastolic dysfunction)  MITRAL VALVE:  There was mild annular calcification  There was moderately restricted mobility of the posterior leaflet  There was mild regurgitation  AORTIC VALVE:  The valve was trileaflet  Leaflets exhibited markedly increased thickness, marked calcification, markedly reduced cuspal separation, reduced mobility, and sclerosis  Transaortic velocity was increased due to valvular stenosis  There was severe stenosis  There was mild regurgitation  The peak valve velocity was 4 cm/s  Valve mean gradient was 49 mmHg  Aortic valve area was 0 7 cmï¾² by the continuity equation      TRICUSPID VALVE:  There was trace regurgitation  HISTORY: PRIOR HISTORY: A-fib, pacemaker  Risk factors: hypertension  PROCEDURE: The study was performed in the [facility]  This was a routine study  The transthoracic approach was used  The study included complete 2D imaging, M-mode, complete spectral Doppler, and color Doppler  Images  were obtained from the parasternal, apical, subcostal, and suprasternal notch acoustic windows  Image quality was adequate  LEFT VENTRICLE: Size was normal  Systolic function was at the lower limits of normal  Ejection fraction was estimated to be 53 %  There was severe hypokinesis of the distal apex and possibly the basal inferior myocardial wall segment  Wall  thickness was at the upper limits of normal  DOPPLER: Doppler parameters were consistent with abnormal left ventricular relaxation (grade 1 diastolic dysfunction)  RIGHT VENTRICLE: The size was normal  Systolic function was normal  Wall thickness was normal     LEFT ATRIUM: Size was normal     RIGHT ATRIUM: Size was normal     MITRAL VALVE: There was mild annular calcification  There was moderately restricted mobility of the posterior leaflet  DOPPLER: There was no evidence for stenosis  There was mild regurgitation  AORTIC VALVE: The valve was trileaflet  Leaflets exhibited markedly increased thickness, marked calcification, markedly reduced cuspal separation, reduced mobility, and sclerosis  DOPPLER: Transaortic velocity was increased due to valvular  stenosis  There was severe stenosis  There was mild regurgitation  TRICUSPID VALVE: The valve structure was normal  There was normal leaflet separation  DOPPLER: The transtricuspid velocity was within the normal range  There was no evidence for stenosis  There was trace regurgitation  PULMONIC VALVE: Not well visualized  PERICARDIUM: There was no pericardial effusion  The pericardium was normal in appearance  AORTA: The root exhibited normal size      SYSTEMIC VEINS: IVC: The inferior vena cava was normal in size and course  Respirophasic changes were normal     PULMONARY ARTERY: DOPPLER: Systolic pressure was within the normal range  Estimated peak pressure was 25 mmHg  MEASUREMENT TABLES    DOPPLER MEASUREMENTS  Aortic valve   (Reference normals)  Peak clarisa   4 cm/s   (--)  Mean gradient   49 mmHg   (--)  Valve area, cont   0 7 cmï¾²   (--)    SYSTEM MEASUREMENT TABLES    2D  %FS: 26 71 %  Ao Diam: 3 17 cm  EDV(Teich): 132 54 ml  EF Biplane: 53 4 %  EF(Teich): 51 8 %  ESV(Teich): 63 89 ml  IVSd: 1 cm  LA Area: 18 38 cm2  LA Diam: 4 15 cm  LVEDV MOD A2C: 133 67 ml  LVEDV MOD A4C: 142 68 ml  LVEDV MOD BP: 138 06 ml  LVEF MOD A2C: 54 01 %  LVEF MOD A4C: 53 28 %  LVESV MOD A2C: 61 48 ml  LVESV MOD A4C: 66 66 ml  LVESV MOD BP: 64 34 ml  LVIDd: 5 25 cm  LVIDs: 3 85 cm  LVLd A2C: 9 39 cm  LVLd A4C: 9 43 cm  LVLs A2C: 8 42 cm  LVLs A4C: 8 25 cm  LVOT Diam: 2 27 cm  LVPWd: 0 89 cm  RA Area: 17 16 cm2  RVIDd: 3 49 cm  SV MOD A2C: 72 2 ml  SV MOD A4C: 76 02 ml  SV(Teich): 68 65 ml    CW  AR Dec Riley: 3 19 m/s2  AR Dec Time: 1395 58 ms  AR PHT: 404 72 ms  AR Vmax: 4 45 m/s  AR maxP 21 mmHg  AV Env  Ti: 329 53 ms  AV VTI: 96 94 cm  AV Vmax: 3 87 m/s  AV Vmean: 2 96 m/s  AV maxP 01 mmHg  AV meanP 93 mmHg  TR Vmax: 2 38 m/s  TR maxP 67 mmHg    MM  TAPSE: 2 41 cm    PW  MIRA (VTI): 0 64 cm2  MIRA Vmax: 0 69 cm2  LVOT Env  Ti: 303 2 ms  LVOT VTI: 15 39 cm  LVOT Vmax: 0 67 m/s  LVOT Vmean: 0 51 m/s  LVOT maxP 78 mmHg  LVOT meanP 11 mmHg  LVSV Dopp: 61 99 ml    [facility] Accredited Echocardiography [facility]    Prepared and electronically signed by    Yvette Ho DO  Signed 06-Aug-2021 13:13:13    No results found for this or any previous visit  No results found for this or any previous visit  No valid procedures specified  No results found for this or any previous visit      Meds/Allergies   all current active meds have been reviewed and current meds:   Current Facility-Administered Medications   Medication Dose Route Frequency    acetaminophen (TYLENOL) tablet 975 mg  975 mg Oral Q8H    ascorbic acid (VITAMIN C) tablet 500 mg  500 mg Oral Daily    aspirin chewable tablet 81 mg  81 mg Oral Daily    atorvastatin (LIPITOR) tablet 80 mg  80 mg Oral Daily With Dinner    bisacodyl (DULCOLAX) rectal suppository 10 mg  10 mg Rectal Daily PRN    bisacodyl (DULCOLAX) rectal suppository 10 mg  10 mg Rectal Once    clopidogrel (PLAVIX) tablet 75 mg  75 mg Oral Daily    docusate sodium (COLACE) capsule 100 mg  100 mg Oral BID    fondaparinux (ARIXTRA) subcutaneous injection 2 5 mg  2 5 mg Subcutaneous Daily    insulin lispro (HumaLOG) 100 units/mL subcutaneous injection 1-6 Units  1-6 Units Subcutaneous TID AC    insulin lispro (HumaLOG) 100 units/mL subcutaneous injection 1-6 Units  1-6 Units Subcutaneous HS    levothyroxine tablet 100 mcg  100 mcg Oral Early Morning    metoprolol tartrate (LOPRESSOR) tablet 25 mg  25 mg Oral Q12H JANELL    mupirocin (BACTROBAN) 2 % nasal ointment 1 application  1 application Nasal P82I JANELL    niCARdipine (CARDENE) 25 mg (STANDARD CONCENTRATION) in sodium chloride 0 9% 250 mL  1-15 mg/hr Intravenous Titrated    ondansetron (ZOFRAN) injection 4 mg  4 mg Intravenous Q6H PRN    oxyCODONE (ROXICODONE) IR tablet 2 5 mg  2 5 mg Oral Q4H PRN    oxyCODONE (ROXICODONE) IR tablet 5 mg  5 mg Oral Q4H PRN    pantoprazole (PROTONIX) EC tablet 40 mg  40 mg Oral Early Morning    polyethylene glycol (MIRALAX) packet 17 g  17 g Oral Daily    [START ON 5/20/2022] potassium chloride (K-DUR,KLOR-CON) CR tablet 20 mEq  20 mEq Oral Daily    tamsulosin (FLOMAX) capsule 0 4 mg  0 4 mg Oral Daily With Dinner    temazepam (RESTORIL) capsule 15 mg  15 mg Oral HS PRN    torsemide (DEMADEX) tablet 20 mg  20 mg Oral Daily     Medications Prior to Admission   Medication    amLODIPine (NORVASC) 5 mg tablet    aspirin (ECOTRIN LOW STRENGTH) 81 mg EC tablet    clopidogrel (Plavix) 75 mg tablet    Cyanocobalamin (VITAMIN B12) 1000 MCG TBCR    Flaxseed, Linseed, (FLAXSEED OIL) 1000 MG CAPS    Glucosamine-Chondroit-Vit C-Mn (GLUCOSAMINE 1500 COMPLEX) CAPS    levothyroxine 100 mcg tablet    metoprolol tartrate (LOPRESSOR) 100 mg tablet    Multiple Vitamin (MULTIVITAMIN) capsule    mupirocin (BACTROBAN) 2 % nasal ointment    Omega-3 Fatty Acids (FISH OIL) 1200 MG CAPS    tamsulosin (FLOMAX) 0 4 mg    Ascorbic Acid (VITAMIN C) 500 MG CAPS       niCARdipine, 1-15 mg/hr, Last Rate: Stopped (05/18/22 0000)          Counseling / Coordination of Care  Total floor / unit time spent today 20 minutes  Greater than 50% of total time was spent with the patient and / or family counseling and / or coordination of care  ** Please Note: Dragon 360 Dictation voice to text software may have been used in the creation of this document   **

## 2022-05-19 NOTE — RESTORATIVE TECHNICIAN NOTE
Restorative Technician Note      Patient Name: Darian Ricci     Restorative Tech Visit Date: 5/19/2022  Note Type: Mobility  Patient Position Upon Consult: Bedside chair  Activity Performed: Ambulated  Assistive Device: Roller walker  Patient Position at End of Consult: All needs within reach;  Bedside chair

## 2022-05-19 NOTE — PLAN OF CARE
Problem: PHYSICAL THERAPY ADULT  Goal: Performs mobility at highest level of function for planned discharge setting  See evaluation for individualized goals  Description: Treatment/Interventions: Functional transfer training, LE strengthening/ROM, Elevations, Therapeutic exercise, Endurance training, Bed mobility, Gait training, Spoke to nursing, OT, Family  Equipment Recommended: Shari Negrete       See flowsheet documentation for full assessment, interventions and recommendations  Outcome: Progressing  Note: Prognosis: Good  Problem List: Decreased strength, Decreased endurance, Impaired balance, Decreased mobility, Pain  Assessment: Pt seen for session for setup, transfers, gait w  rest time, repositioning  Pt cooperative, willing to mobilize  Needed a bit less mobility assist, and note improving endurance and balance  continue to anticipate home w/ home PT           PT Discharge Recommendation: Home with home health rehabilitation          See flowsheet documentation for full assessment

## 2022-05-19 NOTE — PHYSICAL THERAPY NOTE
Physical Therapy Treatment Note       05/19/22 0920   PT Last Visit   PT Visit Date 05/19/22   Note Type   Note Type Treatment   Pain Assessment   Pain Assessment Tool 0-10   Pain Score 3   Pain Location/Orientation Location: Chest;Location: Incision   Patient's Stated Pain Goal No pain   Hospital Pain Intervention(s) Ambulation/increased activity   Restrictions/Precautions   Weight Bearing Precautions Per Order No   Other Precautions Cardiac/sternal;Multiple lines;Telemetry; Fall Risk;Pain   General   Chart Reviewed Yes   Family/Caregiver Present Yes   Cognition   Overall Cognitive Status WFL   Arousal/Participation Responsive   Attention Attends with cues to redirect   Orientation Level Oriented X4   Memory Unable to assess   Following Commands Follows one step commands without difficulty   Subjective   Subjective states he feels better overall  willing to mobilize   Transfers   Sit to Stand 4  Minimal assistance   Additional items Assist x 1   Stand to Sit 4  Minimal assistance   Additional items Assist x 1   Ambulation/Elevation   Gait pattern   (slow, antalgic, short step length, forward flexion)   Gait Assistance 4  Minimal assist   Additional items Assist x 1  (w/ 2nd for lines)   Assistive Device Rolling walker   Distance 120'x2, standing rest x 2-3 min, time spent for setup, repositioning  Balance   Static Sitting Normal   Dynamic Sitting Good   Static Standing Fair   Dynamic Standing Fair -   Ambulatory Fair -   Endurance Deficit   Endurance Deficit Yes   Endurance Deficit Description fatigue, weakness, pain   Activity Tolerance   Activity Tolerance Patient limited by fatigue;Patient limited by pain;Treatment limited secondary to medical complications (Comment)   Nurse Made Aware yes   Assessment   Prognosis Good   Problem List Decreased strength;Decreased endurance; Impaired balance;Decreased mobility;Pain   Assessment Pt seen for session for setup, transfers, gait w  rest time, repositioning    Pt cooperative, willing to mobilize  Needed a bit less mobility assist, and note improving endurance and balance  continue to anticipate home w/ home PT   Goals   Patient Goals to get better   STG Expiration Date 05/27/22   PT Treatment Day 2   Plan   Treatment/Interventions Functional transfer training; Therapeutic exercise; Endurance training;Patient/family training;Equipment eval/education; Bed mobility;Gait training   Progress Progressing toward goals   PT Frequency 4-6x/wk   Recommendation   PT Discharge Recommendation Home with home health rehabilitation   [place] walker   AM-PAC Basic Mobility Inpatient   Turning in Bed Without Bedrails 3   Lying on Back to Sitting on Edge of Flat Bed 3   Moving Bed to Chair 3   Standing Up From Chair 3   Walk in Room 3   Climb 3-5 Stairs 2   Basic Mobility Inpatient Raw Score 17   Basic Mobility Standardized Score 39 67   Highest Level Of Mobility   JH-HLM Goal 5: Stand one or more mins   JH-HLM Achieved 7: Walk 25 feet or more   Kj Doherty PT, DPT CSRS

## 2022-05-19 NOTE — RESTORATIVE TECHNICIAN NOTE
Restorative Technician Note      Patient Name: Leslie Robertson     Restorative Tech Visit Date: 5/19/2022  Note Type: Mobility  Patient Position Upon Consult: Bedside chair  Activity Performed: Ambulated  Assistive Device: Roller walker  Patient Position at End of Consult: All needs within reach;  Bedside chair

## 2022-05-19 NOTE — PLAN OF CARE
Problem: Potential for Falls  Goal: Patient will remain free of falls  Description: INTERVENTIONS:  - Educate patient/family on patient safety including physical limitations  - Instruct patient to call for assistance with activity   - Consult OT/PT to assist with strengthening/mobility   - Keep Call bell within reach  - Keep bed low and locked with side rails adjusted as appropriate  - Keep care items and personal belongings within reach  - Initiate and maintain comfort rounds  - Make Fall Risk Sign visible to staff  - Offer Toileting every 3 Hours, in advance of need  - Obtain necessary fall risk management equipment: walker  - Apply yellow socks and bracelet for high fall risk patients  - Consider moving patient to room near nurses station  Outcome: Progressing

## 2022-05-20 VITALS
RESPIRATION RATE: 16 BRPM | TEMPERATURE: 98.1 F | OXYGEN SATURATION: 95 % | WEIGHT: 164.68 LBS | HEIGHT: 69 IN | BODY MASS INDEX: 24.39 KG/M2 | SYSTOLIC BLOOD PRESSURE: 109 MMHG | HEART RATE: 73 BPM | DIASTOLIC BLOOD PRESSURE: 67 MMHG

## 2022-05-20 LAB
ANION GAP SERPL CALCULATED.3IONS-SCNC: 7 MMOL/L (ref 4–13)
BUN SERPL-MCNC: 23 MG/DL (ref 5–25)
CALCIUM SERPL-MCNC: 8.4 MG/DL (ref 8.3–10.1)
CHLORIDE SERPL-SCNC: 101 MMOL/L (ref 100–108)
CO2 SERPL-SCNC: 26 MMOL/L (ref 21–32)
CREAT SERPL-MCNC: 0.67 MG/DL (ref 0.6–1.3)
GFR SERPL CREATININE-BSD FRML MDRD: 85 ML/MIN/1.73SQ M
GLUCOSE SERPL-MCNC: 106 MG/DL (ref 65–140)
GLUCOSE SERPL-MCNC: 107 MG/DL (ref 65–140)
POTASSIUM SERPL-SCNC: 3.8 MMOL/L (ref 3.5–5.3)
SODIUM SERPL-SCNC: 134 MMOL/L (ref 136–145)

## 2022-05-20 PROCEDURE — 97116 GAIT TRAINING THERAPY: CPT

## 2022-05-20 PROCEDURE — 99024 POSTOP FOLLOW-UP VISIT: CPT | Performed by: PHYSICIAN ASSISTANT

## 2022-05-20 PROCEDURE — 82948 REAGENT STRIP/BLOOD GLUCOSE: CPT

## 2022-05-20 PROCEDURE — 80048 BASIC METABOLIC PNL TOTAL CA: CPT | Performed by: PHYSICIAN ASSISTANT

## 2022-05-20 RX ORDER — ATORVASTATIN CALCIUM 20 MG/1
20 TABLET, FILM COATED ORAL
Qty: 30 TABLET | Refills: 2 | Status: SHIPPED | OUTPATIENT
Start: 2022-05-20 | End: 2022-06-20 | Stop reason: SDUPTHER

## 2022-05-20 RX ORDER — DOCUSATE SODIUM 100 MG/1
100 CAPSULE, LIQUID FILLED ORAL 2 TIMES DAILY
Qty: 30 CAPSULE | Refills: 2 | Status: SHIPPED | OUTPATIENT
Start: 2022-05-20 | End: 2022-05-27 | Stop reason: CLARIF

## 2022-05-20 RX ORDER — POLYETHYLENE GLYCOL 3350 17 G/17G
17 POWDER, FOR SOLUTION ORAL DAILY
Qty: 510 G | Refills: 0 | Status: SHIPPED | OUTPATIENT
Start: 2022-05-20 | End: 2022-05-27 | Stop reason: CLARIF

## 2022-05-20 RX ORDER — SENNOSIDES 8.6 MG
650 CAPSULE ORAL EVERY 8 HOURS PRN
Qty: 30 TABLET | Refills: 0 | Status: SHIPPED | OUTPATIENT
Start: 2022-05-20 | End: 2022-06-02

## 2022-05-20 RX ORDER — OMEPRAZOLE 20 MG/1
20 CAPSULE, DELAYED RELEASE ORAL DAILY
Qty: 30 CAPSULE | Refills: 0 | Status: SHIPPED | OUTPATIENT
Start: 2022-05-20 | End: 2022-05-27 | Stop reason: CLARIF

## 2022-05-20 RX ORDER — TORSEMIDE 20 MG/1
20 TABLET ORAL DAILY
Qty: 7 TABLET | Refills: 0 | Status: SHIPPED | OUTPATIENT
Start: 2022-05-21 | End: 2022-06-02

## 2022-05-20 RX ORDER — OXYCODONE HYDROCHLORIDE 5 MG/1
5 TABLET ORAL EVERY 6 HOURS PRN
Qty: 28 TABLET | Refills: 0 | Status: SHIPPED | OUTPATIENT
Start: 2022-05-20 | End: 2022-05-27 | Stop reason: CLARIF

## 2022-05-20 RX ORDER — POTASSIUM CHLORIDE 20 MEQ/1
20 TABLET, EXTENDED RELEASE ORAL DAILY
Qty: 7 TABLET | Refills: 0 | Status: SHIPPED | OUTPATIENT
Start: 2022-05-21 | End: 2022-06-02

## 2022-05-20 RX ADMIN — POTASSIUM CHLORIDE 20 MEQ: 1500 TABLET, EXTENDED RELEASE ORAL at 08:37

## 2022-05-20 RX ADMIN — MUPIROCIN 1 APPLICATION: 20 OINTMENT TOPICAL at 08:37

## 2022-05-20 RX ADMIN — PANTOPRAZOLE SODIUM 40 MG: 40 TABLET, DELAYED RELEASE ORAL at 06:02

## 2022-05-20 RX ADMIN — ACETAMINOPHEN 975 MG: 325 TABLET, FILM COATED ORAL at 08:37

## 2022-05-20 RX ADMIN — ASPIRIN 81 MG CHEWABLE TABLET 81 MG: 81 TABLET CHEWABLE at 08:37

## 2022-05-20 RX ADMIN — DOCUSATE SODIUM 100 MG: 100 CAPSULE, LIQUID FILLED ORAL at 08:37

## 2022-05-20 RX ADMIN — POLYETHYLENE GLYCOL 3350 17 G: 17 POWDER, FOR SOLUTION ORAL at 08:37

## 2022-05-20 RX ADMIN — CLOPIDOGREL BISULFATE 75 MG: 75 TABLET ORAL at 08:37

## 2022-05-20 RX ADMIN — FONDAPARINUX SODIUM 2.5 MG: 2.5 INJECTION, SOLUTION SUBCUTANEOUS at 08:37

## 2022-05-20 RX ADMIN — METOPROLOL TARTRATE 25 MG: 25 TABLET, FILM COATED ORAL at 08:37

## 2022-05-20 RX ADMIN — LEVOTHYROXINE SODIUM 100 MCG: 100 TABLET ORAL at 06:02

## 2022-05-20 RX ADMIN — TORSEMIDE 20 MG: 20 TABLET ORAL at 08:37

## 2022-05-20 RX ADMIN — OXYCODONE HYDROCHLORIDE AND ACETAMINOPHEN 500 MG: 500 TABLET ORAL at 08:37

## 2022-05-20 NOTE — PLAN OF CARE
Problem: PHYSICAL THERAPY ADULT  Goal: Performs mobility at highest level of function for planned discharge setting  See evaluation for individualized goals  Description: Treatment/Interventions: Functional transfer training, LE strengthening/ROM, Elevations, Therapeutic exercise, Endurance training, Bed mobility, Gait training, Spoke to nursing, OT, Family  Equipment Recommended: Diana Caro       See flowsheet documentation for full assessment, interventions and recommendations  Outcome: Completed  Note: Prognosis: Good  Problem List: Decreased strength, Decreased endurance, Impaired balance, Decreased mobility, Pain  Assessment: Pt seen for PT session today with a focus on functional transfers, gait, endurance, and stair training  Pt has made good progress towards mobility goals, ambulating increased distances and progressing to LRAD  At this time, pt is able to perform transfers at mod (I) level  Pt with very little reliance on RW when ambulating, was able to progress to no AD during which he was steady with no episodes of LOB  Initiated stair training today during which pt was able to complete without any issues  Pt denies any concerns about returning home upon d/c  Based on pt's progress, no further acute PT needs identified  PT signing off  Recommending home with no needs  PT Discharge Recommendation: No rehabilitation needs          See flowsheet documentation for full assessment

## 2022-05-20 NOTE — PHYSICAL THERAPY NOTE
Physical Therapy Treatment Note    Patient's Name: Carissa Craft  : 10/7/1932       05/20/22 0926   PT Last Visit   PT Visit Date 22   Note Type   Note Type Treatment   Pain Assessment   Pain Assessment Tool 0-10   Pain Score No Pain   General   Chart Reviewed Yes   Family/Caregiver Present No   Cognition   Overall Cognitive Status WFL   Attention Within functional limits   Orientation Level Oriented X4   Memory Within functional limits   Following Commands Follows all commands and directions without difficulty   Transfers   Sit to Stand 6  Modified independent   Additional items Increased time required   Stand to Sit 6  Modified independent   Additional items Increased time required   Additional Comments Transfers with RW   Ambulation/Elevation   Gait pattern Step through pattern   Gait Assistance 5  Supervision   Assistive Device Rolling walker;None   Distance 120ft with RW at mod (I) level--> 380ft with no AD at supervision level   Stair Management Assistance 5  Supervision   Additional items Assist x 1   Stair Management Technique One rail R;Alternating pattern; Foreward   Number of Stairs 7   Balance   Static Sitting Good   Dynamic Sitting Good   Static Standing Fair +   Dynamic Standing Fair   Ambulatory Fair   Activity Tolerance   Activity Tolerance Patient tolerated treatment well   Nurse Made Aware ok to see per RN   Assessment   Prognosis Good   Assessment Pt seen for PT session today with a focus on functional transfers, gait, endurance, and stair training  Pt has made good progress towards mobility goals, ambulating increased distances and progressing to LRAD  At this time, pt is able to perform transfers at mod (I) level  Pt with very little reliance on RW when ambulating, was able to progress to no AD during which he was steady with no episodes of LOB  Initiated stair training today during which pt was able to complete without any issues   Pt denies any concerns about returning home upon d/c  Based on pt's progress, no further acute PT needs identified  PT signing off  Recommending home with no needs  Goals   Patient Goals to go home   Plan   Progress Improving as expected   PT Frequency Other (Comment)  (D/C PT)   Recommendation   PT Discharge Recommendation No rehabilitation needs   AM-PAC Basic Mobility Inpatient   Turning in Bed Without Bedrails 4   Lying on Back to Sitting on Edge of Flat Bed 4   Moving Bed to Chair 4   Standing Up From Chair 4   Walk in Room 4   Climb 3-5 Stairs 3   Basic Mobility Inpatient Raw Score 23   Basic Mobility Standardized Score 50 88   Highest Level Of Mobility   JH-HLM Goal 7: Walk 25 feet or more   JH-HLM Achieved 8: Walk 250 feet ot more   Education   Education Provided Mobility training;Home exercise program  (reviewed seated HEP)   End of Consult   Patient Position at End of Consult Bedside chair; All needs within reach       Radha Limon, PT, DPT

## 2022-05-20 NOTE — PROGRESS NOTES
Progress Note - Cardiothoracic Surgery   Sanjay Aponte 80 y o  male MRN: [id number]  Unit/Bed#: [facility] 426-01 Encounter: [id number]    Aortic stenosis, Non-Rheumatic  S/P transfemoral transcatheter aortic valve replacement; POD # 4      24 Hour Events: Nausea and appetite improved       Medications:   Scheduled Meds:  Current Facility-Administered Medications   Medication Dose Route Frequency Provider Last Rate    [facility]      ascorbic acid  500 mg Oral Daily Madhuri Castañeda PA-C      aspirin  81 mg Oral Daily Tobi Coon      atorvastatin  80 mg Oral Daily With VETO Moore      bisacodyl  10 mg Rectal Daily PRN Madhuri Castañeda PA-C      clopidogrel  75 mg Oral Daily Yanirajunaid Huddleston PA-C      docusate sodium  100 mg Oral BID Madhuri Castañeda PA-C      fondaparinux  2 5 mg Subcutaneous Daily Yanira Huddleston PA-C      insulin lispro  1-6 Units Subcutaneous TID AC Madhuri Castañeda PA-C      insulin lispro  1-6 Units Subcutaneous HS Madhuri Castañeda PA-C      levothyroxine  100 mcg Oral Early Morning Madhuri Castañeda PA-C      metoprolol tartrate  25 mg Oral Q12H [address] Madhuri Castañeda PA-C      mupirocin  1 application Nasal M43D [address] [place], [place]      niCARdipine  1-15 mg/hr Intravenous Titrated Madhuri Castañeda PA-C Stopped (05/18/22 0000)    ondansetron  4 mg Intravenous Q6H PRN Madhuri Castañeda PA-C      oxyCODONE  2 5 mg Oral Q4H PRN Madhuri Castañeda PA-C      oxyCODONE  5 mg Oral Q4H PRN Madhuri Castañeda PA-C      pantoprazole  40 mg Oral Early Morning Madhuri Castañeda PA-C      polyethylene glycol  17 g Oral Daily Madhuri Castañeda [place]      potassium chloride  20 mEq Oral Daily Sergey Herrera PA-C      tamsulosin  0 4 mg Oral Daily With [employer] [facility] Huddleston      temazepam  15 mg Oral HS PRN Madhuri Castañeda PA-C      torsemide  20 mg Oral Daily Sergey Herrera PA-C       Continuous Infusions:niCARdipine, 1-15 mg/hr, Last Rate: Stopped (05/18/22 0000)      PRN Meds:   bisacodyl    ondansetron    oxyCODONE    oxyCODONE    temazepam    Vitals:   Vitals:    05/19/22 1900 05/19/22 2216 05/20/22 0256 05/20/22 0600   BP: 113/62 104/70 141/74    Pulse: 76 77 76    Resp:   16    Temp:  97 8 °F (36 6 °C) 98 1 °F (36 7 °C)    TempSrc:       SpO2: 94% 94% 92%    Weight:    74 7 kg (164 lb 10 9 oz)   Height:           Telemetry: V paced at 70    Respiratory:   SpO2: SpO2: 92 %, SpO2 Activity: SpO2 Activity: At Rest; Room Air    Intake/Output:     Intake/Output Summary (Last 24 hours) at 5/20/2022 1024  Last data filed at 5/20/2022 0814  Gross per 24 hour   Intake 596 ml   Output 1600 ml   Net -1004 ml        Weights:   Weight (last 2 days)     Date/Time Weight    05/20/22 0600 74 7 (164 68)    05/19/22 0600 75 1 (165 57)    05/18/22 0600 71 8 (158 29)            Results:   Results from last 7 days   Lab Units 05/18/22 0447 05/17/22  0401 05/16/22  2258 05/16/22  1508 05/16/22  1507   WBC Thousand/uL 11 96* 11 00*  --   --   --    HEMOGLOBIN g/dL 8 8* 8 6* 9 5*  --  10 1*   I STAT HEMOGLOBIN   --   --   --    < >  --    HEMATOCRIT % 25 8* 25 5* 28 1*  --  29 5*   HEMATOCRIT, ISTAT   --   --   --    < >  --    PLATELETS Thousands/uL 151 149  --   --  175    < > = values in this interval not displayed  Results from last 7 days   Lab Units 05/20/22  0457 05/18/22  0447 05/17/22  0401 05/16/22  1929 05/16/22  1508   SODIUM mmol/L 134* 137 139  --   --    POTASSIUM mmol/L 3 8 3 6 4 8   < >  --    CHLORIDE mmol/L 101 106 111*  --   --    CO2 mmol/L 26 24 21  --   --    CO2, I-STAT mmol/L  --   --   --   --  23   BUN mg/dL 23 18 15  --   --    CREATININE mg/dL 0 67 0 67 0 89  --   --    GLUCOSE, ISTAT mg/dl  --   --   --   --  220*   CALCIUM mg/dL 8 4 7 4* 7 9*  --   --     < > = values in this interval not displayed       Results from last 7 days   Lab Units 05/16/22  1507   INR  1 29*   PTT seconds 34     Point of care glucose: 107    Studies:  No new studies    Invasive Lines/Tubes:  Invasive Devices  Report    Peripheral Intravenous Line  Duration           Peripheral IV 05/16/22 Right Wrist 3 days              Physical Exam:    HEENT/NECK:  Normocephalic  Atraumatic  No jugular venous distention  Cardiac: Regular rate and rhythm  Pulmonary:  Breath sounds clear bilaterally  Abdomen:  Non-tender and Non-distended  Incisions: Sternum is stable  Incision is clean, dry, and intact  Extremities: Extremities warm/dry  Neuro: Alert and oriented X 3  Skin: Warm/Dry, without rashes or lesions  Assessment:  Principal Problem:    Severe aortic stenosis  Active Problems:    GERD without esophagitis    Essential hypertension    Presence of permanent cardiac pacemaker    Paroxysmal atrial fibrillation (HCC)    Coronary artery disease involving native coronary artery 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 pectoris    S/P drug eluting coronary stent placement    S/P AVR    Leukocytosis    Acute postoperative anemia due to expected blood loss       Aortic stenosis, Non-Rheumatic  S/P aortic valve replacement; POD # 4    Plan:    1  Cardiac:   NSR; HR/BP well-controlled       Continue Lopressor, 25mg PO BID  Continue ASA and Statin therapy  Epicardial pacing wires out  Remove central IV access today   Continue DVT prophylaxis    2  Pulmonary:   Good Room air oxygen saturation; Continue incentive spirometry/Coughing/Deep breathing exercises      3  Renal:   Intake/Output net: -1200mL/24 hours  Diuretic Regimen: Torsemide, 20 mg PO QD  Continue Potassium Chloride 20 mEq PO BID  Post op Creatinine stable; Follow up labs prn    4  Neuro:  Neurologically intact; No active issues  Incisional pain well-controlled  Continue Tylenol, 975 mg PO q 8, standing dose  Continue Oxycodone, 2 5 to 5 mg PO q 4 hours prn pain    5   GI:  Nausea and decreased appetite   Resolved   + BS on exam    + Flatus   Tolerating TLC 2 3 gm sodium diet  Maintain 1800 mL daily fluid restriction   Continue stool softeners and prn suppository  Continue GI prophylaxis    6  Endo:   Glucose well-controlled with sliding scale coverage    7    Hematology:    Post-operative blood count acceptable; Trend prn    8     Disposition:      Ambulating independently, Anticipate discharge to home  Friday     VTE Pharmacologic Prophylaxis: Sequential compression device (Venodyne)  and Fondaparinux (Arixtra)  VTE Mechanical Prophylaxis: sequential compression device    Collaborative rounds completed with supervising physician  Plan of care discussed with bedside nurse    SIGNATURE: Giles Hill PA-C  DATE: May 20, 2022  TIME: 10:24 AM

## 2022-05-20 NOTE — CASE MANAGEMENT
Case Management Discharge Note    Patient name Sofia Dove  Location [facility] 426/[facility] 113-68 MRN [id number]  : 10/7/1932 Date 2022       Current Admission Date: 2022  Current Admission Diagnosis:Severe aortic stenosis      LOS (days): 4  Geometric Mean LOS (GMLOS) (days): 8 90  Days to GMLOS:4 9     OBJECTIVE:  Risk of Unplanned Readmission Score: 10 88     Current admission status: Inpatient     Primary Insurance: BLUE CROSS [facility]    DISCHARGE DETAILS:    Discharge planning discussed with[de-identified] Patient  Freedom of Choice: Yes  CM contacted family/caregiver?: Yes  Were Treatment Team discharge recommendations reviewed with patient/caregiver?: Yes  Did patient/caregiver verbalize understanding of patient care needs?: Yes  Were patient/caregiver advised of the risks associated with not following Treatment Team discharge recommendations?: Yes    Contacts  Patient Contacts: Roberto Perry (pt's wife)  Relationship to Patient[de-identified] Family  Contact Method: Phone  Phone Number: [phone]  Reason/Outcome: Emergency [address]         Is the [facility] in [facility] at discharge?: Yes  Via Ranjit Teague requested[de-identified] Nursing, Occupational Therapy, Physical [address] Name[de-identified] [facility] Provider[de-identified] PCP  Home Health Services Needed[de-identified] Evaluate Functional Status and Safety, Gait/ADL Training, Post-Op Care and Assessment, Strengthening/Theraputic Exercises to Improve Function  Homebound Criteria Met[de-identified] Requires the Assistance of Another Person for Safe Ambulation or to Leave the Home  Supporting Clincal Findings[de-identified] Limited Endurance    Treatment Team Recommendation: Home with  [employer] [address]  Discharge Destination Plan[de-identified] Home with Rody at Discharge : Family    Additional Comments: Pt is cleared for d/c by Cardiothoracic Surgery [facility] Price was notified of pt's d/c order   Pt is accepted for services by [facility] for his aftercare plan  The pt and his wife Mallory Infante were both informed of d/c  Wife will transport pt home later this day, pickup time TBD  IMM signed by pt  No chart copy required  CM to follow

## 2022-05-20 NOTE — DISCHARGE INSTRUCTIONS
Sternal Precautions   AMBULATORY CARE:   Sternal precautions  are used to help protect your sternum (breastbone) after open chest surgery  Wires are placed during surgery to hold the sternum together as it heals  Sternal precautions help prevent the wires from cutting through the sternum  The precautions also help prevent the sternum from coming apart from an injury, and prevent pain and bleeding  You may need to use the precautions for up to 12 weeks after surgery  Your surgeon will give you specific instructions based on the type of surgery you had  It is important to follow the instructions carefully  An injury to the healing sternum can be life-threatening  General sternal precautions:  Start slowly and do more as you get stronger  Pain medicine might make it harder for you to know when to slow down or be careful  Stop immediately if you hear a crunch or pop in your sternum  Protect your sternum  Hug a pillow to your chest or cross your arms over your chest when you laugh, sneeze, or cough  Be careful when you get into or out of a chair or bed  Hug a pillow or cross your arms when you stand or sit  Do not twist as you move  Use only your legs to sit and stand  You may need to use a raised toilet seat if you have trouble standing up without using your arms  Your healthcare provider may teach you to use your elbow for support as you move from lying to sitting  Ask when you may take a bath or shower  You may need to use a bath chair if you have trouble getting into or out of the tub  Do not use a grab bar  Do not lift or carry anything heavier than 5 pounds  For example, a gallon of milk weighs 8 pounds  Keep your arms down as much as possible  Do not put your arms out to the side, behind you, or over your head  Do not let anyone pull your arms to help you move or dress  Do not reach for items  Do not push or pull anything  Examples include a car door or a vacuum        Do not drive while you are healing  Your surgeon will tell you when it is safe for you to start driving again  Call 911 for any of the following: You have sudden pain in your sternum and hear a crunch or pop  You have bleeding that does not stop even after you apply pressure for 5 minutes  Seek care immediately if:   You hear crunching or grinding in your sternum  You have signs of an infection, such as a fever, red or warm skin, or pus in the surgery wound  Contact your healthcare provider if:   You continue to feel pain, even after you take your pain medicine  You have new or worsening pain, or any pain with movement  You have questions or concerns about your condition or care  Care for your surgery wound:  Always wash your hands before you care for your wound  Wash your wound as directed  Do not rub the wound as you wash or dry the area  Pat the area dry with a clean towel  Change the bandages as directed and when they get wet or dirty  Do not smoke:  Nicotine can damage blood vessels and make it more difficult to heal  Do not use e-cigarettes or smokeless tobacco in place of cigarettes or to help you quit  They still contain nicotine  Ask your healthcare provider for information if you currently smoke and need help quitting  Follow up with your doctor as directed:  Write down your questions so you remember to ask them during your visits  © Copyright [employer] 2022 Information is for End User's use only and may not be sold, redistributed or otherwise used for commercial purposes  All illustrations and images included in CareNotes® are the copyrighted property of A [employer] M [employer]  or Ishmael Cummings   The above information is an  only  It is not intended as medical advice for individual conditions or treatments  Talk to your doctor, nurse or pharmacist before following any medical regimen to see if it is safe and effective for you

## 2022-05-20 NOTE — DISCHARGE SUMMARY
Discharge Summary - Cardiothoracic Surgery   Cleo Altman 80 y o  male MRN: [id number]  [facility] Encounter: [id number]    Admission Date: 5/16/2022     Discharge Date: 05/20/22    Admitting Diagnosis: Severe aortic stenosis [I35 0]    Primary Discharge Diagnosis:   Aortic stenosis, Non-Rheumatic  S/P aortic valve replacement;    Secondary Discharge Diagnosis:   CAD s/p PCI to [facility] 4/15/22, Afib (not on [facility] [place]), SSS s/p PPM ([employer]) in 2016, hx of hepatitis A, hypothyroidism, GERD, BPH, arthritis, Ascending Aortic Aneurysm 41mm, h/o melanoma     Attending: POLLY Perez  Consulting Physician(s):   Cardiology  Medical/Critical Care      Procedures Performed:   Procedure(s):  REPLACEMENT VALVE AORTIC (TISSUE AVR) WITH 25 MM AORTIC MAGNA EASE VALVE W/ ROJELIO     Hospital Course: The patient was seen in consultation prior to this admission for evaluation of Aortic stenosis, Non-Rheumatic  Risks and benefits of aortic valve replacement were discussed in detail, and patient was agreeable  Routine preoperative evaluation was completed and informed consent was obtained prior to admission  5/16: Elective admission for AVR #25mm [facility] Ease  Transfused 1 Plt intraoperatively  Transferred to ICU supported with Phenylephrine  No significant postoperative bleeding  Wean towardsb extubation  A paced 70  [employer] notified to interrogate PPM  UA neg       5/17: No further bleeding issues, did not receive Plavix yesterday  Weaned off of leopoldo, placed on epi at 1 for high SVR and low filling pressures, most recent CI 2 4  On cardene at 3  [employer] interrogated PPM without issues  Low UOP, just received 20 of lasix  On 4 L NC, net + 4 1 L, wean O2 as able, start lasix 40 mg BID, d/c srivastava  D/C epi, wean cardene then d/c jo-ann, start lopressor 25 mg BID  Restart plavix  Decrease oxycodone  Transition to SSI  Transfer to step down  5/18: Transferred from ICU to telemetry   Weaned to room air  NSR; HR/BP well-controlled  Cardene weaned off;  D/C arterial line  Chest tubes removed  Level of care changed to telemetry  5/19: Nausea and decreased appetite; D/C PO Amiodarone and give dulcolax suppository now  D/C central IV today  D/C IV Lasix and add Torsemide, 20 mg PO QD       5/20: Nausea and poor appetite resolved  Good response to oral diuretic  Fit for discharge to home  Condition at Discharge:   good     Discharge Physical Exam:    Please see the documented physical exam from this morning's progress note for details  Discharge Data:  Results from last 7 days   Lab Units 05/18/22 0447 05/17/22  0401 05/16/22  2258 05/16/22  1508 05/16/22  1507   WBC Thousand/uL 11 96* 11 00*  --   --   --    HEMOGLOBIN g/dL 8 8* 8 6* 9 5*  --  10 1*   I STAT HEMOGLOBIN   --   --   --    < >  --    HEMATOCRIT % 25 8* 25 5* 28 1*  --  29 5*   HEMATOCRIT, ISTAT   --   --   --    < >  --    PLATELETS Thousands/uL 151 149  --   --  175    < > = values in this interval not displayed  Results from last 7 days   Lab Units 05/20/22  0457 05/18/22 0447 05/17/22  0401 05/16/22  1929 05/16/22  1508   POTASSIUM mmol/L 3 8 3 6 4 8   < >  --    CHLORIDE mmol/L 101 106 111*  --   --    CO2 mmol/L 26 24 21  --   --    CO2, I-STAT mmol/L  --   --   --   --  23   BUN mg/dL 23 18 15  --   --    CREATININE mg/dL 0 67 0 67 0 89  --   --    GLUCOSE, ISTAT mg/dl  --   --   --   --  220*   CALCIUM mg/dL 8 4 7 4* 7 9*  --   --     < > = values in this interval not displayed  Results from last 7 days   Lab Units 05/16/22  1507   INR  1 29*   PTT seconds 34       Discharge instructions/Information to patient and family:   See after visit summary for information provided to patient and family  Scarlet Moss was educated on restrictions regarding driving and lifting, and techniques of proper incisional care    They were specifically counselled on signs and symptoms of an incisional infection, and advised to contact our service immediately should they develop fevers, sweats, chill, redness or drainage at the site of any incisions  Provisions for Follow-Up Care:  See after visit summary for information related to follow-up care and any pertinent home health orders  Disposition:  Home    Planned Readmission:   No    Discharge Medications:  See after visit summary for reconciled discharge medications provided to patient and family  Darian Ricci was provided contact information and scheduled a follow up appointment with POLLY Cain  Additionally, follow up appointments have been scheduled for their primary care physician and primary cardiologist   Contact information was provided  Upon admission, troponins were Not checked    Darian Ricci was counseled on the importance of avoiding tobacco products  As with all patients whom have undergone open heart surgery, tobacco cessation medication was contraindicated at the time of discharge  ACE/ARB was Contraindicated secondary to EF > 40% and no history of heart failure    Beta Blocker was Prescribed at discharge    Aspirin was Prescribed at discharge    Statin was Prescribed at discharge      The patient was discharged on ongoing diuretic therapy with Torsemide 20 mg, PO QD and Potassium Chloride 20 mEq, PO QD  They were advised to continue these medications for 7 days, unless otherwise directed  Narcotic pain medication was prescribed in the form of Oxycodone  Prior to prescribing, their prescription profile was reviewed on the [facility] of health prescription drug monitoring program     The patient was informed that following their postoperative surgical evaluation, they will be referred to outpatient cardiac rehabilitation  They were counseled that this program is run by specialists who will help them safely strengthen their heart and prevent more heart disease    Cardiac rehabilitation will include exercise, relaxation, stress management, and heart-healthy nutrition  Caregivers will also check to make sure their medication regimen is working  During this admission, the patient was questioned on their use of tobacco, alcohol, and illicit/non-prescription drug use in the  previous 24 months  Katie Mckeon states that they have not used any of these substances in this time frame  I spent 30 minutes discharging the patient  This time was spent on the day of discharge  I had direct contact with the patient on the day of discharge  Additional documentation is required if more than 30 minutes were spent on discharge       SIGNATURE: Bryan Huerta PA-C  DATE: May 20, 2022  TIME: 10:40 AM

## 2022-05-21 ENCOUNTER — NURSE TRIAGE (OUTPATIENT)
Dept: OTHER | Facility: OTHER | Age: 87
End: 2022-05-21

## 2022-05-21 NOTE — TELEPHONE ENCOUNTER
Reason for Disposition   [1] Pharmacy calling with prescription question AND [2] triager unable to answer question    Answer Assessment - Initial Assessment Questions  1  NAME of MEDICATION: "What medicine are you calling about?"      Colace 100 mg   2  QUESTION: "What is your question?" (e g , medication refill, side effect)      He is having Diarreha  3  PRESCRIBING HCP: "Who prescribed it?" Reason: if prescribed by specialist, call should be referred to that group  Cardiology Ulysees Bound PA  4  SYMPTOMS: "Do you have any symptoms?"      Diarreha  5   SEVERITY: If symptoms are present, ask "Are they mild, moderate or severe?"      Watery bad    Protocols used: MEDICATION QUESTION CALL-ADULT-

## 2022-05-21 NOTE — TELEPHONE ENCOUNTER
Regarding: Diarrhea and stool softener  ----- Message from Leslie Heath sent at 5/21/2022 12:44 PM EDT -----  "My  just got out of the hospital after having open heart surgery  They gave him a stool softener, but now he has diarrhea   Should he stop taking the stool softener?"

## 2022-05-22 ENCOUNTER — HOME CARE VISIT (OUTPATIENT)
Dept: HOME HEALTH SERVICES | Facility: HOME HEALTHCARE | Age: 87
End: 2022-05-22
Payer: COMMERCIAL

## 2022-05-22 VITALS
HEIGHT: 70 IN | OXYGEN SATURATION: 96 % | RESPIRATION RATE: 18 BRPM | HEART RATE: 69 BPM | BODY MASS INDEX: 22.76 KG/M2 | TEMPERATURE: 98.1 F | DIASTOLIC BLOOD PRESSURE: 68 MMHG | SYSTOLIC BLOOD PRESSURE: 126 MMHG | WEIGHT: 159 LBS

## 2022-05-22 PROCEDURE — 400013 VN SOC

## 2022-05-22 PROCEDURE — G0299 HHS/HOSPICE OF RN EA 15 MIN: HCPCS

## 2022-05-22 NOTE — CASE COMMUNICATION
[facility] has admitted your patient to [address] [place] [address] service with the following disciplines:      SN  This report is informational only, no responses is needed  Primary focus of home health care CP assess related to aortic valve replacement  Patient stated goals of care to decrease medication that he must take and to mow his lawn again  Anticipated visit pattern and next visit date 3w1, 2d5  next visit: 5/24/22  See medication list  - not taking miralax and colace related to diarrhea  did not  oxycodone from pharmacy as pain is manageable with acetaminophen  Significant clinical findings: NA  Potential barriers to goal achievement: pain, endurance, fatigue  Other pertinent information: NA    Thank you for allowing us to participate in the care of your patient        Segundo Blair RN with [facility]

## 2022-05-23 ENCOUNTER — HOME CARE VISIT (OUTPATIENT)
Dept: HOME HEALTH SERVICES | Facility: HOME HEALTHCARE | Age: 87
End: 2022-05-23
Payer: COMMERCIAL

## 2022-05-23 VITALS — DIASTOLIC BLOOD PRESSURE: 68 MMHG | SYSTOLIC BLOOD PRESSURE: 110 MMHG

## 2022-05-23 PROCEDURE — G0151 HHCP-SERV OF PT,EA 15 MIN: HCPCS

## 2022-05-23 NOTE — CASE COMMUNICATION
Patient was evaluated by Physical therapy  He is not in need of further therapy intervention at this time

## 2022-05-23 NOTE — UTILIZATION REVIEW
Notification of Discharge   This is a Notification of Discharge from our facility [address]  Please be advised that this patient has been discharge from our facility  Below you will find the admission and discharge date and time including the patients disposition  UTILIZATION REVIEW CONTACT:  Rey Harrington  Utilization   Network Utilization Review Department  Phone: [phone] x carefully listen to the prompts  All voicemails are confidential   Email: [email or web address]     PHYSICIAN ADVISORY SERVICES:  FOR EGQY-SS-RHFT REVIEW - MEDICAL NECESSITY DENIAL  Phone: [phone]  Fax: [phone]  Email: [email or web address]     PRESENTATION DATE: 5/16/2022  9:41 AM  OBERVATION ADMISSION DATE:   INPATIENT ADMISSION DATE: 5/16/22  9:41 AM   DISCHARGE DATE: 5/20/2022 11:43 AM  DIS[facility] [address] INFORMATION:  Send all requests for admission clinical reviews, approved or denied determinations and any other requests to dedicated fax number below belonging to the campus where the patient is receiving treatment   List of dedicated fax numbers:  [address] DENIALS (Administrative/Medical Necessity) [phone]   [address] (Maternity/NICU/Pediatrics) [phone]   [facility] [phone]   130 [facility] [phone]   [address] [place] [address] [phone]   2000 [facility] [address] [place] [address],[address] [phone]   [facility]  [phone]   [address] [place], [place] [address] [facility] 1000 [facility] [phone]

## 2022-05-24 ENCOUNTER — TRANSITIONAL CARE MANAGEMENT (OUTPATIENT)
Dept: FAMILY MEDICINE CLINIC | Facility: HOSPITAL | Age: 87
End: 2022-05-24

## 2022-05-24 ENCOUNTER — HOME CARE VISIT (OUTPATIENT)
Dept: HOME HEALTH SERVICES | Facility: HOME HEALTHCARE | Age: 87
End: 2022-05-24
Payer: COMMERCIAL

## 2022-05-24 VITALS
OXYGEN SATURATION: 97 % | RESPIRATION RATE: 16 BRPM | SYSTOLIC BLOOD PRESSURE: 130 MMHG | DIASTOLIC BLOOD PRESSURE: 78 MMHG | TEMPERATURE: 97.8 F | HEART RATE: 86 BPM

## 2022-05-24 PROCEDURE — G0299 HHS/HOSPICE OF RN EA 15 MIN: HCPCS

## 2022-05-27 ENCOUNTER — HOME CARE VISIT (OUTPATIENT)
Dept: HOME HEALTH SERVICES | Facility: HOME HEALTHCARE | Age: 87
End: 2022-05-27
Payer: COMMERCIAL

## 2022-05-27 ENCOUNTER — TELEPHONE (OUTPATIENT)
Dept: CARDIAC SURGERY | Facility: CLINIC | Age: 87
End: 2022-05-27

## 2022-05-27 VITALS
SYSTOLIC BLOOD PRESSURE: 110 MMHG | OXYGEN SATURATION: 97 % | DIASTOLIC BLOOD PRESSURE: 70 MMHG | RESPIRATION RATE: 16 BRPM | TEMPERATURE: 97.7 F | HEART RATE: 84 BPM

## 2022-05-27 DIAGNOSIS — Z95.2 S/P AVR: Primary | ICD-10-CM

## 2022-05-27 PROCEDURE — G0299 HHS/HOSPICE OF RN EA 15 MIN: HCPCS

## 2022-05-31 ENCOUNTER — HOME CARE VISIT (OUTPATIENT)
Dept: HOME HEALTH SERVICES | Facility: HOME HEALTHCARE | Age: 87
End: 2022-05-31
Payer: COMMERCIAL

## 2022-05-31 VITALS
SYSTOLIC BLOOD PRESSURE: 122 MMHG | DIASTOLIC BLOOD PRESSURE: 74 MMHG | TEMPERATURE: 97.2 F | RESPIRATION RATE: 16 BRPM | HEART RATE: 80 BPM | OXYGEN SATURATION: 98 %

## 2022-05-31 PROCEDURE — G0299 HHS/HOSPICE OF RN EA 15 MIN: HCPCS

## 2022-06-01 PROCEDURE — G0180 MD CERTIFICATION HHA PATIENT: HCPCS | Performed by: PHYSICIAN ASSISTANT

## 2022-06-02 ENCOUNTER — OFFICE VISIT (OUTPATIENT)
Dept: FAMILY MEDICINE CLINIC | Facility: HOSPITAL | Age: 87
End: 2022-06-02
Payer: COMMERCIAL

## 2022-06-02 VITALS
WEIGHT: 160 LBS | SYSTOLIC BLOOD PRESSURE: 150 MMHG | OXYGEN SATURATION: 96 % | DIASTOLIC BLOOD PRESSURE: 90 MMHG | BODY MASS INDEX: 24.25 KG/M2 | HEIGHT: 68 IN | HEART RATE: 82 BPM

## 2022-06-02 DIAGNOSIS — I10 ESSENTIAL HYPERTENSION: ICD-10-CM

## 2022-06-02 DIAGNOSIS — Z95.5 S/P DRUG ELUTING CORONARY STENT PLACEMENT: ICD-10-CM

## 2022-06-02 DIAGNOSIS — Z95.2 S/P AVR: Primary | ICD-10-CM

## 2022-06-02 PROBLEM — D72.829 LEUKOCYTOSIS: Status: RESOLVED | Noted: 2022-05-17 | Resolved: 2022-06-02

## 2022-06-02 PROCEDURE — 1111F DSCHRG MED/CURRENT MED MERGE: CPT | Performed by: INTERNAL MEDICINE

## 2022-06-02 PROCEDURE — 99495 TRANSJ CARE MGMT MOD F2F 14D: CPT | Performed by: INTERNAL MEDICINE

## 2022-06-02 RX ORDER — METOPROLOL TARTRATE 50 MG/1
50 TABLET, FILM COATED ORAL EVERY 12 HOURS SCHEDULED
Start: 2022-06-02 | End: 2022-06-28

## 2022-06-02 NOTE — PROGRESS NOTES
Assessment/Plan:      Diagnoses and all orders for this visit:    S/P AVR    S/P drug eluting coronary stent placement    Essential hypertension         Subjective:     Patient ID: Lizz Tariq is a 80 y o  male  HPI     Patient seen back after being discharged hospital happen open aortic valve replacement  Postop course was somewhat complicated by hemoglobin of 8 8  He also had some nausea and decreased appetite  Appetite is back  Stopped the omeprazole  Review of Systems   All other systems reviewed and are negative  Objective:     Physical Exam  Vitals reviewed  Constitutional:       Appearance: Normal appearance  Cardiovascular:      Rate and Rhythm: Normal rate and regular rhythm  Comments: Well-healed median sternotomy  Pulmonary:      Effort: Pulmonary effort is normal       Breath sounds: Normal breath sounds  Abdominal:      General: Abdomen is flat  Bowel sounds are normal       Palpations: Abdomen is soft  Musculoskeletal:      Right lower leg: No edema  Left lower leg: No edema  Neurological:      Mental Status: He is alert  Vitals:    06/02/22 1254   BP: 150/90   Pulse: 82   SpO2: 96%   Weight: 72 6 kg (160 lb)   Height: 5' 8" (1 727 m)       Wt Readings from Last 3 Encounters:   06/02/22 72 6 kg (160 lb)   05/22/22 72 1 kg (159 lb)   05/20/22 74 7 kg (164 lb 10 9 oz)       Transitional Care Management Review:  Lizz Tariq is a 80 y o  male here for TCM follow up       During the TCM phone call patient stated:    TCM Call (since 5/2/2022)     Hospital care reviewed  Records reviewed    Patient was hospitialized at  [facility]    Date of Admission  05/16/22    Date of discharge  05/20/22    Diagnosis  severe aortic stenosis    Disposition  Home    Were the patients medications reviewed and updated  Yes    Current Symptoms  None      TCM Call (since 5/2/2022)     Post hospital issues  None    Should patient be enrolled in antico monitoring? No    Scheduled for follow up? Yes    Did you obtain your prescribed medications  Yes    Do you need help managing your prescriptions or medications  No    Is transportation to your appointment needed  No    I have advised the patient to call PCP with any new or worsening symptoms  Ewa Combs [place] - [address] or [facility] other    Support System  Spouse; Family; Friends    Are you recieving any outpatient services  No    Are you recieving home care services  Yes    Types of home care services  Nurse visit              Cara Ozuna MD    Some or all of this note was generated with a voice recognition dictation system and therefore my contain grammatical or spelling errors

## 2022-06-07 ENCOUNTER — OFFICE VISIT (OUTPATIENT)
Dept: CARDIOLOGY CLINIC | Facility: CLINIC | Age: 87
End: 2022-06-07
Payer: COMMERCIAL

## 2022-06-07 VITALS
SYSTOLIC BLOOD PRESSURE: 136 MMHG | BODY MASS INDEX: 24.34 KG/M2 | DIASTOLIC BLOOD PRESSURE: 78 MMHG | HEART RATE: 79 BPM | HEIGHT: 68 IN | WEIGHT: 160.6 LBS

## 2022-06-07 DIAGNOSIS — E78.00 PURE HYPERCHOLESTEROLEMIA: ICD-10-CM

## 2022-06-07 DIAGNOSIS — I25.10 CORONARY ARTERY DISEASE INVOLVING NATIVE CORONARY ARTERY OF NATIVE HEART WITHOUT ANGINA PECTORIS: ICD-10-CM

## 2022-06-07 DIAGNOSIS — Z95.5 S/P DRUG ELUTING CORONARY STENT PLACEMENT: ICD-10-CM

## 2022-06-07 DIAGNOSIS — Z09 HOSPITAL DISCHARGE FOLLOW-UP: Primary | ICD-10-CM

## 2022-06-07 DIAGNOSIS — I48.0 PAROXYSMAL ATRIAL FIBRILLATION (HCC): ICD-10-CM

## 2022-06-07 DIAGNOSIS — I10 PRIMARY HYPERTENSION: ICD-10-CM

## 2022-06-07 DIAGNOSIS — Z95.2 S/P AVR: ICD-10-CM

## 2022-06-07 DIAGNOSIS — I49.5 SICK SINUS SYNDROME (HCC): ICD-10-CM

## 2022-06-07 DIAGNOSIS — Z95.0 PRESENCE OF PERMANENT CARDIAC PACEMAKER: ICD-10-CM

## 2022-06-07 DIAGNOSIS — I35.0 NONRHEUMATIC AORTIC VALVE STENOSIS: ICD-10-CM

## 2022-06-07 PROCEDURE — 1111F DSCHRG MED/CURRENT MED MERGE: CPT | Performed by: NURSE PRACTITIONER

## 2022-06-07 PROCEDURE — 99215 OFFICE O/P EST HI 40 MIN: CPT | Performed by: NURSE PRACTITIONER

## 2022-06-07 RX ORDER — CLOPIDOGREL BISULFATE 75 MG/1
75 TABLET ORAL DAILY
Qty: 90 TABLET | Refills: 3 | Status: SHIPPED | OUTPATIENT
Start: 2022-06-07

## 2022-06-07 RX ORDER — NITROGLYCERIN 0.4 MG/1
0.4 TABLET SUBLINGUAL
Qty: 24 TABLET | Refills: 1 | Status: SHIPPED | OUTPATIENT
Start: 2022-06-07

## 2022-06-07 NOTE — PROGRESS NOTES
Cardiology  Hospital Follow Up   Office Visit Note  Lizz Tariq   80 y o    male   MRN: [id number]  [facility] Cecilia Avendañotner  [address] [place] [address] Camilo Avendañotner [place] [address] [phone]    PCP: Jesse Roman MD  Cardiologist: Dr Sadi Cortes            Summary of recommendations  Heart healthy diet  Educational information provided  CBC, BMP have been requested by his PCP  Lifelong SBE prophylaxis  Follow-up with CT surgery 6/15/22  Cardiac rehab, once cleared by CT surgery  Medical adherence to dual antiplatelet therapy reinforced  Follow up will be scheduled with Dr Malik Marquez        Assessment/plan  Severe aortic stenosis status post surgical AVR   25 mm [facility] [place] Ease pericardial tissue valve on 5/16/22  DCd 5/20/22  Intraoperative ROJELIO with normal LVEF and well-seated tissue valve with no paravalvular or transvalvular leak  · Postop anemia, status post 1 unit packed cells intraoperatively  Amicar postoperatively  CAD status post IAIN mid RCA 4/15/22  Otherwise: nonobstructive disease of the LAD- Proximal LAD:  25% stenosis  Mid LAD:  50% stenosis  There was an 80% stenosis of OM1:- it was a small branch, medical therapy advised as it supplies a small territory  · On aspirin 81, Plavix 75, statin, beta-blocker  SSSstatus post Biotroik dual chamber permanent pacemaker 2016 ( MRI conditional  Interrogation 3/2022:AP-100%, -52% (>40% [email or web address])  No significant high rates  Hypertension, essential   BP  136/78 -metoprolol tartrate 50 mg q 12  Hyperlipidemia, on atorvastatin 20 mg daily  5/10/22:  , non   Goal LDL less than 70   BPH on tamsulosin  Cardiac testing   TTE  3/8/22 EF   No RWMA   Cardiac catheterization 4/15/22    Proximal LAD:  25% stenosis  Mid LAD:  50% stenosis  OM1:  80% stenosis  Small OM1 branch, medical therapy, supply small territory  Mid RCA:  90% stenosed  Intervention:Codominant RCA 90% IAIN x 1 placed   Small OM1 branch 80% (1 5-1 75mm diameter, small territory served)-medical therapy              HPI  Chelsea Womack is an 70-year-old gentleman with a history of aortic stenosis, essential hypertension, paroxysmal atrial fibrillation, sick sinus syndrome status post permanent pacemaker 2016  He follows with Dr Sirisha Robertson  The patient per preference, has not been on anticoagulation  An echocardiogram in August 2021 demonstrated severe aortic stenosis  He was asymptomatic  He did develop exertional symptoms, and was subsequently referred to CT surgery for consideration of AVR  Preoperative cardiac catheterization disclosed a 90% mid RCA stenosis for which he underwent PCI with a IAIN   (4/15/22)  Otherwise, there was nonobstructive disease of the LAD- Proximal LAD:  25% stenosis  Mid LAD:  50% stenosis  There was an 80% stenosis of OM1:- it was a small branch, medical therapy advised as it supplies a small territory  Due to location of the left coronary ostium takeoff, TAVR was deemed higher risk and Tissue SAVR was planned  Adm 5/16-5/20/22  Elective admission, 25 mm SAVR, uncomplicated  [facility] Ease pericardial tissue valve  Surgery uncomplicated per OP report  Postprocedure, intraoperative ROJELIO showed preserved Biventricular function, well-seated valve  Complicated by anemia, hemoglobin 8 8 did receive 1 unit packed cells  Discharged on omeprazole    6/7/22  Hospital follow-up, accompanied by his wife  Recent PCI/IAIN mid RCA  SAVR  Prior permanent pacemaker, implanted 2016 ( SSS)  Adherent to his dual antiplatelet therapy   Stopped omeprazole  ROS : He is doing remarkably well  He denies chest discomfort, or incisional pain  He takes aware acetaminophen  He is interested in increasing his activities, however is abiding by the postop restrictions  He denies shortness of breath    /78   Weight 160 lb, stable  We reviewed his coronary angiogram diagram  Exam unremarkable chest and chest tube incisions healing nicely  Discussed the importance of SBE prophylaxis   I reviewed the importance of adhering to dual antiplatelet therapy  A scheduled for follow-up labs next week  Reviewed the benefit of cardiac rehab, once cleared by CT surgery  He tells me he is active on a normally, at home  I have spent 40 minutes with Patient and family today in which greater than 50% of this time was spent in counseling/coordination of care regarding Intructions for management, Patient and family education, Importance of tx compliance and Risk factor reductions  Assessment  Diagnoses and all orders for this visit:    Hospital discharge follow-up    Coronary artery disease involving native coronary artery 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 pectoris    S/P drug eluting coronary stent placement    Nonrheumatic aortic valve stenosis    S/P AVR    Sick sinus syndrome (HCC)    Presence of permanent cardiac pacemaker    Primary hypertension    Pure hypercholesterolemia    Paroxysmal atrial fibrillation (HCC)          Past Medical History:   Diagnosis Date    Angina pectoris (HCC)     Arthritis     Ascending aortic aneurysm (HCC)     trileaflet AV, 41mm    BPH (benign prostatic hyperplasia)     Cancer (HCC)     skin    Coronary artery disease     Former tobacco use     pipe & cigar use socially & infrequently    GERD (gastroesophageal reflux disease)     controlled w/ diet    Headache     h/o    Hepatitis A     History of melanoma     Hypertension     Hypothyroidism     Pacemaker     Severe aortic stenosis     Shortness of breath     with exertion    Vitamin B12 deficiency     Wears dentures        Review of Systems   Constitutional: Negative for chills  Cardiovascular: Negative for chest pain, claudication, cyanosis, dyspnea on exertion, irregular heartbeat, leg swelling, near-syncope, orthopnea, palpitations, paroxysmal nocturnal dyspnea and syncope  Respiratory: Negative for cough and shortness of breath  Gastrointestinal: Negative for heartburn and nausea  Neurological: Negative for dizziness, focal weakness, headaches, light-headedness and weakness  All other systems reviewed and are negative  Allergies   Allergen Reactions    Amoxicillin Other (See Comments)     Severe weakness       Current Outpatient Medications:     Ascorbic Acid (VITAMIN C) 500 MG CAPS, Take 1 tablet by mouth daily, Disp: , Rfl:     aspirin (ECOTRIN LOW STRENGTH) 81 mg EC tablet, Take 81 mg by mouth daily, Disp: , Rfl:     atorvastatin (LIPITOR) 20 mg tablet, Take 1 tablet (20 mg total) by mouth daily with dinner, Disp: 30 tablet, Rfl: 2    clopidogrel (Plavix) 75 mg tablet, Take 1 tablet (75 mg total) by mouth daily, Disp: 90 tablet, Rfl: 1    Cyanocobalamin (VITAMIN B12) 1000 MCG TBCR, Take 1 tablet by mouth daily, Disp: , Rfl:     Flaxseed, Linseed, (FLAXSEED OIL) 1000 MG CAPS, Take 1 capsule by mouth daily, Disp: , Rfl:     Glucosamine-Chondroit-Vit C-Mn (GLUCOSAMINE 1500 COMPLEX) CAPS, Take 1 capsule by mouth daily, Disp: , Rfl:     levothyroxine 100 mcg tablet, TAKE ONE TABLET BY MOUTH EVERY DAY, Disp: 90 tablet, Rfl: 3    metoprolol tartrate (LOPRESSOR) 50 mg tablet, Take 1 tablet (50 mg total) by mouth every 12 (twelve) hours, Disp: , Rfl:     Multiple Vitamin (MULTIVITAMIN) capsule, Take 1 capsule by mouth daily, Disp: , Rfl:     tamsulosin (FLOMAX) 0 4 mg, TAKE ONE CAPSULE BY MOUTH EVERY DAY, Disp: 90 capsule, Rfl: 3        Social History     Socioeconomic History    Marital status: /Civil Union     Spouse name: Jacques Liu Number of children: Not on file    Years of education: Not on file    Highest education level: Not on file   Occupational History    Not on file   Tobacco Use    Smoking status: Never Smoker    Smokeless tobacco: Never Used   Vaping Use    Vaping Use: Former   Substance and Sexual Activity    Alcohol use: Yes     Comment: a beer once in a while      Drug use: No    Sexual activity: Not on file   Other Topics Concern    Not on file   Social History Narrative    Living will on file    Supportive and safe    Lives with wife     Social Determinants of Health     Financial Resource Strain: Not on file   Food Insecurity: No Food Insecurity    Worried About Running Out of Food in the Last Year: Never true    [address] in the Last Year: Never true   Transportation Needs: No Transportation Needs    Lack of Transportation (Medical): No    Lack of Transportation (Non-Medical): No   Physical Activity: Not on file   Stress: Not on file   Social Connections: Not on file   Intimate Partner Violence: Not on file   Housing Stability: Low Risk     Unable to Pay for Housing in the Last Year: No    Number of Places Lived in the Last Year: 1    Unstable Housing in the Last Year: No       Family History   Problem Relation Age of Onset    Cancer Family         gastrointestinal tract    Heart disease Mother     Substance Abuse Neg Hx     Mental illness Neg Hx        Physical Exam  Vitals and nursing note reviewed  Constitutional:       General: He is not in acute distress  HENT:      Head: Normocephalic and atraumatic  Eyes:      Conjunctiva/sclera: Conjunctivae normal    Cardiovascular:      Rate and Rhythm: Normal rate and regular rhythm  Pulses: Intact distal pulses  Heart sounds: Normal heart sounds  Pulmonary:      Effort: Pulmonary effort is normal       Breath sounds: Normal breath sounds  Abdominal:      General: Bowel sounds are normal       Palpations: Abdomen is soft  Musculoskeletal:         General: Normal range of motion  Cervical back: Normal range of motion and neck supple  Skin:     General: Skin is warm and dry  Neurological:      Mental Status: He is alert and oriented to person, place, and time  Vitals: There were no vitals taken for this visit     Wt Readings from Last 3 Encounters:   06/02/22 72 6 kg (160 lb)   05/22/22 72 1 kg (159 lb)   22 74 7 kg (164 lb 10 9 oz)         Labs & Results:  Lab Results   Component Value Date    WBC 11 96 (H) 2022    HGB 8 8 (L) 2022    HCT 25 8 (L) 2022    MCV 93 2022     2022     No results found for: BNP  No components found for: CHEM  Troponin I   Date Value Ref Range Status   2019 <0 02 <=0 04 ng/mL Final     Comment:     3Autovalidation override  Siemens Chemistry analyzer 99% cutoff is > 0 04 ng/mL in network labs     o cTnI 99% cutoff is useful only when applied to patients in the clinical setting of myocardial ischemia   o cTnI 99% cutoff should be interpreted in the context of clinical history, ECG findings and possibly cardiac imaging to establish correct diagnosis  o cTnI 99% cutoff may be suggestive but clearly not indicative of a coronary event without the clinical setting of myocardial ischemia      2019 <0 02 <=0 04 ng/mL Final     Comment:     3Autovalidation override  Siemens Chemistry analyzer 99% cutoff is > 0 04 ng/mL in network labs     o cTnI 99% cutoff is useful only when applied to patients in the clinical setting of myocardial ischemia   o cTnI 99% cutoff should be interpreted in the context of clinical history, ECG findings and possibly cardiac imaging to establish correct diagnosis  o cTnI 99% cutoff may be suggestive but clearly not indicative of a coronary event without the clinical setting of myocardial ischemia         Results for orders placed during the hospital encounter of 21    Echo complete with contrast if indicated    Narrative  [address] [place] [address]    Transthoracic Echocardiogram  2D, M-mode, Doppler, and Color Doppler    Study date:  06-Aug-2021    Patient: Sophia Hamm  MR number: [id number]  Account number: [de-identified]  : 07-Oct-1932  Age: 80 years  Gender: Male  Status: Outpatient  Location: [facility]  Height: 70 in  Weight: 169 lb  BP: 132/ 80 mmHg    Indications: Aortic stenosis  Diagnoses: I35 0 - Nonrheumatic aortic (valve) stenosis    Sonographer:  [facility]  Primary Physician:  Valentine Wharton MD  Referring Physician:  Georgia Rodrigues MD  Group:  [facility]  Interpreting Physician:  Diana Valle DO    SUMMARY    LEFT VENTRICLE:  Systolic function was at the lower limits of normal  Ejection fraction was estimated to be 53 %  There was severe hypokinesis of the distal apex and possibly the basal inferior myocardial wall segment  Wall thickness was at the upper limits of normal   Doppler parameters were consistent with abnormal left ventricular relaxation (grade 1 diastolic dysfunction)  MITRAL VALVE:  There was mild annular calcification  There was moderately restricted mobility of the posterior leaflet  There was mild regurgitation  AORTIC VALVE:  The valve was trileaflet  Leaflets exhibited markedly increased thickness, marked calcification, markedly reduced cuspal separation, reduced mobility, and sclerosis  Transaortic velocity was increased due to valvular stenosis  There was severe stenosis  There was mild regurgitation  The peak valve velocity was 4 cm/s  Valve mean gradient was 49 mmHg  Aortic valve area was 0 7 cmï¾² by the continuity equation  TRICUSPID VALVE:  There was trace regurgitation  HISTORY: PRIOR HISTORY: A-fib, pacemaker  Risk factors: hypertension  PROCEDURE: The study was performed in the [facility]  This was a routine study  The transthoracic approach was used  The study included complete 2D imaging, M-mode, complete spectral Doppler, and color Doppler  Images  were obtained from the parasternal, apical, subcostal, and suprasternal notch acoustic windows  Image quality was adequate  LEFT VENTRICLE: Size was normal  Systolic function was at the lower limits of normal  Ejection fraction was estimated to be 53 %  There was severe hypokinesis of the distal apex and possibly the basal inferior myocardial wall segment  Wall  thickness was at the upper limits of normal  DOPPLER: Doppler parameters were consistent with abnormal left ventricular relaxation (grade 1 diastolic dysfunction)  RIGHT VENTRICLE: The size was normal  Systolic function was normal  Wall thickness was normal     LEFT ATRIUM: Size was normal     RIGHT ATRIUM: Size was normal     MITRAL VALVE: There was mild annular calcification  There was moderately restricted mobility of the posterior leaflet  DOPPLER: There was no evidence for stenosis  There was mild regurgitation  AORTIC VALVE: The valve was trileaflet  Leaflets exhibited markedly increased thickness, marked calcification, markedly reduced cuspal separation, reduced mobility, and sclerosis  DOPPLER: Transaortic velocity was increased due to valvular  stenosis  There was severe stenosis  There was mild regurgitation  TRICUSPID VALVE: The valve structure was normal  There was normal leaflet separation  DOPPLER: The transtricuspid velocity was within the normal range  There was no evidence for stenosis  There was trace regurgitation  PULMONIC VALVE: Not well visualized  PERICARDIUM: There was no pericardial effusion  The pericardium was normal in appearance  AORTA: The root exhibited normal size  SYSTEMIC VEINS: IVC: The inferior vena cava was normal in size and course  Respirophasic changes were normal     PULMONARY ARTERY: DOPPLER: Systolic pressure was within the normal range  Estimated peak pressure was 25 mmHg      MEASUREMENT TABLES    DOPPLER MEASUREMENTS  Aortic valve   (Reference normals)  Peak clarisa   4 cm/s   (--)  Mean gradient   49 mmHg   (--)  Valve area, cont   0 7 cmï¾²   (--)    SYSTEM MEASUREMENT TABLES    2D  %FS: 26 71 %  Ao Diam: 3 17 cm  EDV(Teich): 132 54 ml  EF Biplane: 53 4 %  EF(Teich): 51 8 %  ESV(Teich): 63 89 ml  IVSd: 1 cm  LA Area: 18 38 cm2  LA Diam: 4 15 cm  LVEDV MOD A2C: 133 67 ml  LVEDV MOD A4C: 142 68 ml  LVEDV MOD BP: 138 06 ml  LVEF MOD A2C: 54 01 %  LVEF MOD A4C: 53 28 %  LVESV MOD A2C: 61 48 ml  LVESV MOD A4C: 66 66 ml  LVESV MOD BP: 64 34 ml  LVIDd: 5 25 cm  LVIDs: 3 85 cm  LVLd A2C: 9 39 cm  LVLd A4C: 9 43 cm  LVLs A2C: 8 42 cm  LVLs A4C: 8 25 cm  LVOT Diam: 2 27 cm  LVPWd: 0 89 cm  RA Area: 17 16 cm2  RVIDd: 3 49 cm  SV MOD A2C: 72 2 ml  SV MOD A4C: 76 02 ml  SV(Teich): 68 65 ml    CW  AR Dec [place]: 3 19 m/s2  AR Dec Time: 1395 58 ms  AR PHT: 404 72 ms  AR Vmax: 4 45 m/s  AR maxP 21 mmHg  AV Env  Ti: 329 53 ms  AV VTI: 96 94 cm  AV Vmax: 3 87 m/s  AV Vmean: 2 96 m/s  AV maxP 01 mmHg  AV meanP 93 mmHg  TR Vmax: 2 38 m/s  TR maxP 67 mmHg    MM  TAPSE: 2 41 cm    PW  MIRA (VTI): 0 64 cm2  MIRA Vmax: 0 69 cm2  LVOT Env  Ti: 303 2 ms  LVOT VTI: 15 39 cm  LVOT Vmax: 0 67 m/s  LVOT Vmean: 0 51 m/s  LVOT maxP 78 mmHg  LVOT meanP 11 mmHg  LVSV Dopp: 61 99 ml    [facility] Accredited Echocardiography Laboratory    Prepared and electronically signed by    Luis Woods DO  Signed 06-Aug-2021 13:13:13    No results found for this or any previous visit  This note was completed in part utilizing [employer] fluency direct voice recognition software  Grammatical errors, random word insertion, spelling mistakes, and incomplete sentences may be an occasional consequence of the system secondary to software limitations, ambient noise and hardware issues  At the time of dictation, efforts were made to edit, clarify and /or correct errors  Please read the chart carefully and recognize, using context, where substitutions have occurred    If you have any questions or concerns about the context, text or information contained within the body of this dictation, please contact myself, the provider, for further clarification

## 2022-06-07 NOTE — LETTER
June 7, 2022     MD [facility]  [address] [address]    Patient: aKtie Mckeon   YOB: 1932   Date of Visit: 6/7/2022       Dear Dr Radha Goddard:    Thank you for referring Kallie Lu to me for evaluation  Below are my notes for this consultation  If you have questions, please do not hesitate to call me  I look forward to following your patient along with you  Sincerely,        MELINA Michel        CC: MD Delmer Beard CRNP  6/7/2022 10:08 AM  Sign when Signing Visit  Cardiology  Hospital Follow Up   Office Visit Note  Katie Mckeon   80 y o    male   MRN: [id number]  [facility] [address] [address]  [phone]  [phone]    PCP: Palmira Grant MD  Cardiologist: Dr Raven Evans            Summary of recommendations  Heart healthy diet  Educational information provided  CBC, BMP have been requested by his PCP  Lifelong SBE prophylaxis  Follow-up with CT surgery 6/15/22  Cardiac rehab, once cleared by CT surgery  Medical adherence to dual antiplatelet therapy reinforced  Follow up will be scheduled with Dr Prince Forte        Assessment/plan  Severe aortic stenosis status post surgical AVR   25 mm [facility] Ease pericardial tissue valve on 5/16/22  DCd 5/20/22  Intraoperative ROJELIO with normal LVEF and well-seated tissue valve with no paravalvular or transvalvular leak  · Postop anemia, status post 1 unit packed cells intraoperatively  Amicar postoperatively  CAD status post IAIN mid RCA 4/15/22  Otherwise: nonobstructive disease of the LAD- Proximal LAD:  25% stenosis  Mid LAD:  50% stenosis  There was an 80% stenosis of OM1:- it was a small branch, medical therapy advised as it supplies a small territory    · On aspirin 81, Plavix 75, statin, beta-blocker  SSSstatus post [employer] dual chamber permanent pacemaker 2016 ( MRI conditional  Interrogation 3/2022:AP-100%, -52% (>40% [email or web address])  No significant high rates  Hypertension, essential   BP  136/78 -metoprolol tartrate 50 mg q 12  Hyperlipidemia, on atorvastatin 20 mg daily  5/10/22:  , non   Goal LDL less than 70   BPH on tamsulosin  Cardiac testing   TTE  3/8/22 EF   No RWMA   Cardiac catheterization 4/15/22    Proximal LAD:  25% stenosis  Mid LAD:  50% stenosis  OM1:  80% stenosis  Small OM1 branch, medical therapy, supply small territory  Mid RCA:  90% stenosed  Intervention:Codominant RCA 90% IAIN x 1 placed  Small OM1 branch 80% (1 5-1 75mm diameter, small territory served)-medical therapy              SHANT Benjamin is an 27-year-old gentleman with a history of aortic stenosis, essential hypertension, paroxysmal atrial fibrillation, sick sinus syndrome status post permanent pacemaker 2016  He follows with Dr Rosina Tirado  The patient per preference, has not been on anticoagulation  An echocardiogram in August 2021 demonstrated severe aortic stenosis  He was asymptomatic  He did develop exertional symptoms, and was subsequently referred to CT surgery for consideration of AVR  Preoperative cardiac catheterization disclosed a 90% mid RCA stenosis for which he underwent PCI with a IAIN   (4/15/22)  Otherwise, there was nonobstructive disease of the LAD- Proximal LAD:  25% stenosis  Mid LAD:  50% stenosis  There was an 80% stenosis of OM1:- it was a small branch, medical therapy advised as it supplies a small territory  Due to location of the left coronary ostium takeoff, TAVR was deemed higher risk and Tissue SAVR was planned  Adm 5/16-5/20/22  Elective admission, 25 mm SAVR, uncomplicated  Renaldo Carver Ease pericardial tissue valve  Surgery uncomplicated per OP report  Postprocedure, intraoperative ROJELIO showed preserved Biventricular function, well-seated valve     Complicated by anemia, hemoglobin 8 8 did receive 1 unit packed cells  Discharged on omeprazole    6/7/22  Hospital follow-up, accompanied by his wife  Recent PCI/IAIN mid RCA  SAVR  Prior permanent pacemaker, implanted 2016 ( SSS)  Adherent to his dual antiplatelet therapy   Stopped omeprazole  ROS : He is doing remarkably well  He denies chest discomfort, or incisional pain  He takes aware acetaminophen  He is interested in increasing his activities, however is abiding by the postop restrictions  He denies shortness of breath  /78   Weight 160 lb, stable  We reviewed his coronary angiogram diagram  Exam unremarkable chest and chest tube incisions healing nicely  Discussed the importance of SBE prophylaxis   I reviewed the importance of adhering to dual antiplatelet therapy  A scheduled for follow-up labs next week  Reviewed the benefit of cardiac rehab, once cleared by CT surgery  He tells me he is active on a normally, at home  I have spent 40 minutes with Patient and family today in which greater than 50% of this time was spent in counseling/coordination of care regarding Intructions for management, Patient and family education, Importance of tx compliance and Risk factor reductions    Assessment  Diagnoses and all orders for this visit:    Hospital discharge follow-up    Coronary artery disease involving native coronary artery 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 pectoris    S/P drug eluting coronary stent placement    Nonrheumatic aortic valve stenosis    S/P AVR    Sick sinus syndrome (HCC)    Presence of permanent cardiac pacemaker    Primary hypertension    Pure hypercholesterolemia    Paroxysmal atrial fibrillation (HCC)          Past Medical History:   Diagnosis Date    Angina pectoris (HCC)     Arthritis     Ascending aortic aneurysm (HCC)     trileaflet AV, 41mm    BPH (benign prostatic hyperplasia)     Cancer (HCC)     skin    Coronary artery disease     Former tobacco use     pipe & cigar use socially & infrequently    GERD (gastroesophageal reflux disease)     controlled w/ diet    Headache     h/o    Hepatitis A     History of melanoma     Hypertension     Hypothyroidism     Pacemaker     Severe aortic stenosis     Shortness of breath     with exertion    Vitamin B12 deficiency     Wears dentures        Review of Systems   Constitutional: Negative for chills  Cardiovascular: Negative for chest pain, claudication, cyanosis, dyspnea on exertion, irregular heartbeat, leg swelling, near-syncope, orthopnea, palpitations, paroxysmal nocturnal dyspnea and syncope  Respiratory: Negative for cough and shortness of breath  Gastrointestinal: Negative for heartburn and nausea  Neurological: Negative for dizziness, focal weakness, headaches, light-headedness and weakness  All other systems reviewed and are negative  Allergies   Allergen Reactions    Amoxicillin Other (See Comments)     Severe weakness           Current Outpatient Medications:     Ascorbic Acid (VITAMIN C) 500 MG CAPS, Take 1 tablet by mouth daily, Disp: , Rfl:     aspirin (ECOTRIN LOW STRENGTH) 81 mg EC tablet, Take 81 mg by mouth daily, Disp: , Rfl:     atorvastatin (LIPITOR) 20 mg tablet, Take 1 tablet (20 mg total) by mouth daily with dinner, Disp: 30 tablet, Rfl: 2    clopidogrel (Plavix) 75 mg tablet, Take 1 tablet (75 mg total) by mouth daily, Disp: 90 tablet, Rfl: 1    Cyanocobalamin (VITAMIN B12) 1000 MCG TBCR, Take 1 tablet by mouth daily, Disp: , Rfl:     Flaxseed, Linseed, (FLAXSEED OIL) 1000 MG CAPS, Take 1 capsule by mouth daily, Disp: , Rfl:     Glucosamine-Chondroit-Vit C-Mn (GLUCOSAMINE 1500 COMPLEX) CAPS, Take 1 capsule by mouth daily, Disp: , Rfl:     levothyroxine 100 mcg tablet, TAKE ONE TABLET BY MOUTH EVERY DAY, Disp: 90 tablet, Rfl: 3    metoprolol tartrate (LOPRESSOR) 50 mg tablet, Take 1 tablet (50 mg total) by mouth every 12 (twelve) hours, Disp: , Rfl:     Multiple Vitamin (MULTIVITAMIN) capsule, Take 1 capsule by mouth daily, Disp: , Rfl:     tamsulosin (FLOMAX) 0 4 mg, TAKE ONE CAPSULE BY MOUTH EVERY DAY, Disp: 90 capsule, Rfl: 3        Social History     Socioeconomic History    Marital status: /Civil Union     Spouse name: France Hill Number of children: Not on file    Years of education: Not on file    Highest education level: Not on file   Occupational History    Not on file   Tobacco Use    Smoking status: Never Smoker    Smokeless tobacco: Never Used   Vaping Use    Vaping Use: Former   Substance and Sexual Activity    Alcohol use: Yes     Comment: a beer once in a while   Drug use: No    Sexual activity: Not on file   Other Topics Concern    Not on file   Social History Narrative    Living will on file    Supportive and safe    Lives with wife     Social Determinants of Health     Financial Resource Strain: Not on file   Food Insecurity: No Food Insecurity    Worried About Running Out of Food in the Last Year: Never true    [address] in the Last Year: Never true   Transportation Needs: No Transportation Needs    Lack of Transportation (Medical): No    Lack of Transportation (Non-Medical): No   Physical Activity: Not on file   Stress: Not on file   Social Connections: Not on file   Intimate Partner Violence: Not on file   Housing Stability: Low Risk     Unable to Pay for Housing in the Last Year: No    Number of Places Lived in the Last Year: 1    Unstable Housing in the Last Year: No       Family History   Problem Relation Age of Onset    Cancer Family         gastrointestinal tract    Heart disease Mother     Substance Abuse Neg Hx     Mental illness Neg Hx        Physical Exam  Vitals and nursing note reviewed  Constitutional:       General: He is not in acute distress  HENT:      Head: Normocephalic and atraumatic  Eyes:      Conjunctiva/sclera: Conjunctivae normal    Cardiovascular:      Rate and Rhythm: Normal rate and regular rhythm  Pulses: Intact distal pulses  Heart sounds: Normal heart sounds     Pulmonary:      Effort: Pulmonary effort is normal       Breath sounds: Normal breath sounds  Abdominal:      General: Bowel sounds are normal       Palpations: Abdomen is soft  Musculoskeletal:         General: Normal range of motion  Cervical back: Normal range of motion and neck supple  Skin:     General: Skin is warm and dry  Neurological:      Mental Status: He is alert and oriented to person, place, and time  Vitals: There were no vitals taken for this visit  Wt Readings from Last 3 Encounters:   06/02/22 72 6 kg (160 lb)   05/22/22 72 1 kg (159 lb)   05/20/22 74 7 kg (164 lb 10 9 oz)         Labs & Results:  Lab Results   Component Value Date    WBC 11 96 (H) 05/18/2022    HGB 8 8 (L) 05/18/2022    HCT 25 8 (L) 05/18/2022    MCV 93 05/18/2022     05/18/2022     No results found for: BNP  No components found for: CHEM  Troponin I   Date Value Ref Range Status   03/25/2019 <0 02 <=0 04 ng/mL Final     Comment:     3Autovalidation override  [employer] Chemistry analyzer 99% cutoff is > 0 04 ng/mL in network labs     o cTnI 99% cutoff is useful only when applied to patients in the clinical setting of myocardial ischemia   o cTnI 99% cutoff should be interpreted in the context of clinical history, ECG findings and possibly cardiac imaging to establish correct diagnosis  o cTnI 99% cutoff may be suggestive but clearly not indicative of a coronary event without the clinical setting of myocardial ischemia      03/25/2019 <0 02 <=0 04 ng/mL Final     Comment:     3Autovalidation override  Siemens Chemistry analyzer 99% cutoff is > 0 04 ng/mL in network labs     o cTnI 99% cutoff is useful only when applied to patients in the clinical setting of myocardial ischemia   o cTnI 99% cutoff should be interpreted in the context of clinical history, ECG findings and possibly cardiac imaging to establish correct diagnosis     o cTnI 99% cutoff may be suggestive but clearly not indicative of a coronary event without the clinical setting of myocardial ischemia  Results for orders placed during the hospital encounter of 21    Echo complete with contrast if indicated    Narrative  [address]  [address]    Transthoracic Echocardiogram  2D, M-mode, Doppler, and Color Doppler    Study date:  06-Aug-2021    Patient: Jim Foy  MR number: [id number]  Account number: [de-identified]  : 07-Oct-1932  Age: 80 years  Gender: Male  Status: Outpatient  Location: [facility]  Height: 70 in  Weight: 169 lb  BP: 132/ 80 mmHg    Indications: Aortic stenosis  Diagnoses: I35 0 - Nonrheumatic aortic (valve) stenosis    Sonographer:  CADE Lo RDCS RVT  Primary Physician:  Miguelito Armstrong MD  Referring Physician:  Ian Blanco MD  Group:  [facility]  Interpreting Physician:  Chris Campbell DO    SUMMARY    LEFT VENTRICLE:  Systolic function was at the lower limits of normal  Ejection fraction was estimated to be 53 %  There was severe hypokinesis of the distal apex and possibly the basal inferior myocardial wall segment  Wall thickness was at the upper limits of normal   Doppler parameters were consistent with abnormal left ventricular relaxation (grade 1 diastolic dysfunction)  MITRAL VALVE:  There was mild annular calcification  There was moderately restricted mobility of the posterior leaflet  There was mild regurgitation  AORTIC VALVE:  The valve was trileaflet  Leaflets exhibited markedly increased thickness, marked calcification, markedly reduced cuspal separation, reduced mobility, and sclerosis  Transaortic velocity was increased due to valvular stenosis  There was severe stenosis  There was mild regurgitation  The peak valve velocity was 4 cm/s  Valve mean gradient was 49 mmHg  Aortic valve area was 0 7 cmï¾² by the continuity equation  TRICUSPID VALVE:  There was trace regurgitation      HISTORY: PRIOR HISTORY: A-fib, pacemaker  Risk factors: hypertension  PROCEDURE: The study was performed in the [facility]  This was a routine study  The transthoracic approach was used  The study included complete 2D imaging, M-mode, complete spectral Doppler, and color Doppler  Images  were obtained from the parasternal, apical, subcostal, and suprasternal notch acoustic windows  Image quality was adequate  LEFT VENTRICLE: Size was normal  Systolic function was at the lower limits of normal  Ejection fraction was estimated to be 53 %  There was severe hypokinesis of the distal apex and possibly the basal inferior myocardial wall segment  Wall  thickness was at the upper limits of normal  DOPPLER: Doppler parameters were consistent with abnormal left ventricular relaxation (grade 1 diastolic dysfunction)  RIGHT VENTRICLE: The size was normal  Systolic function was normal  Wall thickness was normal     LEFT ATRIUM: Size was normal     RIGHT ATRIUM: Size was normal     MITRAL VALVE: There was mild annular calcification  There was moderately restricted mobility of the posterior leaflet  DOPPLER: There was no evidence for stenosis  There was mild regurgitation  AORTIC VALVE: The valve was trileaflet  Leaflets exhibited markedly increased thickness, marked calcification, markedly reduced cuspal separation, reduced mobility, and sclerosis  DOPPLER: Transaortic velocity was increased due to valvular  stenosis  There was severe stenosis  There was mild regurgitation  TRICUSPID VALVE: The valve structure was normal  There was normal leaflet separation  DOPPLER: The transtricuspid velocity was within the normal range  There was no evidence for stenosis  There was trace regurgitation  PULMONIC VALVE: Not well visualized  PERICARDIUM: There was no pericardial effusion  The pericardium was normal in appearance  AORTA: The root exhibited normal size      SYSTEMIC VEINS: IVC: The inferior vena cava was normal in size and course  Respirophasic changes were normal     PULMONARY ARTERY: DOPPLER: Systolic pressure was within the normal range  Estimated peak pressure was 25 mmHg  MEASUREMENT TABLES    DOPPLER MEASUREMENTS  Aortic valve   (Reference normals)  Peak clarisa   4 cm/s   (--)  Mean gradient   49 mmHg   (--)  Valve area, cont   0 7 cmï¾²   (--)    SYSTEM MEASUREMENT TABLES    2D  %FS: 26 71 %  Ao Diam: 3 17 cm  EDV(Teich): 132 54 ml  EF Biplane: 53 4 %  EF(Teich): 51 8 %  ESV(Teich): 63 89 ml  IVSd: 1 cm  LA Area: 18 38 cm2  LA Diam: 4 15 cm  LVEDV MOD A2C: 133 67 ml  LVEDV MOD A4C: 142 68 ml  LVEDV MOD BP: 138 06 ml  LVEF MOD A2C: 54 01 %  LVEF MOD A4C: 53 28 %  LVESV MOD A2C: 61 48 ml  LVESV MOD A4C: 66 66 ml  LVESV MOD BP: 64 34 ml  LVIDd: 5 25 cm  LVIDs: 3 85 cm  LVLd A2C: 9 39 cm  LVLd A4C: 9 43 cm  LVLs A2C: 8 42 cm  LVLs A4C: 8 25 cm  LVOT Diam: 2 27 cm  LVPWd: 0 89 cm  RA Area: 17 16 cm2  RVIDd: 3 49 cm  SV MOD A2C: 72 2 ml  SV MOD A4C: 76 02 ml  SV(Teich): 68 65 ml    CW  AR Dec [place]: 3 19 m/s2  AR Dec Time: 1395 58 ms  AR PHT: 404 72 ms  AR Vmax: 4 45 m/s  AR maxP 21 mmHg  AV Env  Ti: 329 53 ms  AV VTI: 96 94 cm  AV Vmax: 3 87 m/s  AV Vmean: 2 96 m/s  AV maxP 01 mmHg  AV meanP 93 mmHg  TR Vmax: 2 38 m/s  TR maxP 67 mmHg    MM  TAPSE: 2 41 cm    PW  MIRA (VTI): 0 64 cm2  MIRA Vmax: 0 69 cm2  LVOT Env  Ti: 303 2 ms  LVOT VTI: 15 39 cm  LVOT Vmax: 0 67 m/s  LVOT Vmean: 0 51 m/s  LVOT maxP 78 mmHg  LVOT meanP 11 mmHg  LVSV Dopp: 61 99 ml    [employer] Accredited Echocardiography Laboratory    Prepared and electronically signed by    Diana Valle DO  Signed 06-Aug-2021 13:13:13    No results found for this or any previous visit  This note was completed in part utilizing m-modal fluency direct voice recognition software     Grammatical errors, random word insertion, spelling mistakes, and incomplete sentences may be an occasional consequence of the system secondary to software limitations, ambient noise and hardware issues  At the time of dictation, efforts were made to edit, clarify and /or correct errors  Please read the chart carefully and recognize, using context, where substitutions have occurred    If you have any questions or concerns about the context, text or information contained within the body of this dictation, please contact myself, the provider, for further clarification

## 2022-06-07 NOTE — PATIENT INSTRUCTIONS
Mediterranean Diet   AMBULATORY CARE:   A Mediterranean diet  is a meal plan that includes foods that are commonly eaten in countries that border the [place]  This meal plan may provide several health benefits  These include losing or maintaining weight, and decreasing blood pressure, blood sugar, and cholesterol levels  It may also help protect against certain health conditions such as heart disease, cancer, type 2 diabetes, and Alzheimer disease  Work with a dietitian to develop a meal plan that is right for you  Foods to include in the [address] [facility] diet:   Include fruits and vegetables in each meal   Eat a variety of fresh fruits and vegetables  Choose whole grains every day  These foods include whole-grain breads, pastas, and cereals  It also includes brown rice, quinoa, and millet  Use unsaturated fats instead of saturated fats  Cook with olive or canola oil  Limit saturated fats, such as butter, margarine, and shortening  Saturated fat is an unhealthy fat that can increase your cholesterol levels  Choose plant foods, poultry, and fish as your main sources of protein  Eat plant-based foods that provide protein,  such as lentils, beans, chickpeas, nuts, and seeds  Choose mostly plant-based foods in place of meat on most days of the week  Eat protein foods high in omega-3 fats  Fish high in omega-3 fats include salmon, trout, and tuna  Include these types of fish 1 or 2 times each week  Limit fish high in mercury, such as shark, swordfish, tilefish, and bubba mackerel  Omega-3 fats are also found in walnuts and flaxseed  Choose poultry (chicken or turkey)  without skin instead of red meat  Red meat is high in saturated fat  Limit eggs and high-fat meats, such as dolan, sausage, and hot dogs  Choose low-fat dairy foods  such as nonfat or 1% milk, or low-fat almond, cashew, or soy milk  Other examples include low-fat cheese, yogurt, and cottage cheese  Limit sweets  Limit your intake of high-sugar foods, such as soda, desserts, and candy  Talk to your healthcare provider about alcohol  Studies have shown that moderate intake of wine may reduce the risk of heart disease  A moderate amount of wine is 1 serving for women and men 65 years and older each day  Two servings is recommended for men 24to 59years of age each day  A serving of wine is 5 ounces  Other things you need to know if you follow the Mediterranean diet:   Include foods high in iron and vitamin C   Plant-based foods that are high in iron include spinach, beans, tofu, and artichoke  Eat a serving of vitamin C with any iron-rich food to help your body absorb more iron  Examples include oranges, strawberries, cantaloupe, broccoli, and yellow peppers  Get regular physical activity  The Mediterranean diet will have the most benefit if you get regular physical activity  Get 30 minutes of physical activity at least 5 days a week  Choose physical activities that increase your heart rate  Examples include walking, hiking, swimming, and riding a bike  Ask your healthcare provider about the best exercise plan for you  © Copyright [employer] 2022 Information is for End User's use only and may not be sold, redistributed or otherwise used for commercial purposes  All illustrations and images included in CareNotes® are the copyrighted property of [employer]  or Ishmael Anderson  The above information is an  only  It is not intended as medical advice for individual conditions or treatments  Talk to your doctor, nurse or pharmacist before following any medical regimen to see if it is safe and effective for you

## 2022-06-09 ENCOUNTER — HOME CARE VISIT (OUTPATIENT)
Dept: HOME HEALTH SERVICES | Facility: HOME HEALTHCARE | Age: 87
End: 2022-06-09
Payer: COMMERCIAL

## 2022-06-09 VITALS
SYSTOLIC BLOOD PRESSURE: 138 MMHG | DIASTOLIC BLOOD PRESSURE: 76 MMHG | OXYGEN SATURATION: 97 % | HEART RATE: 78 BPM | RESPIRATION RATE: 16 BRPM | TEMPERATURE: 97.2 F

## 2022-06-09 PROCEDURE — G0299 HHS/HOSPICE OF RN EA 15 MIN: HCPCS

## 2022-06-14 LAB
BASOPHILS # BLD AUTO: 0 X10E3/UL (ref 0–0.2)
BASOPHILS NFR BLD AUTO: 1 %
BUN SERPL-MCNC: 11 MG/DL (ref 8–27)
BUN/CREAT SERPL: 12 (ref 10–24)
CALCIUM SERPL-MCNC: 9.4 MG/DL (ref 8.6–10.2)
CHLORIDE SERPL-SCNC: 96 MMOL/L (ref 96–106)
CO2 SERPL-SCNC: 23 MMOL/L (ref 20–29)
CREAT SERPL-MCNC: 0.92 MG/DL (ref 0.76–1.27)
EGFR: 80 ML/MIN/1.73
EOSINOPHIL # BLD AUTO: 0.3 X10E3/UL (ref 0–0.4)
EOSINOPHIL NFR BLD AUTO: 5 %
ERYTHROCYTE [DISTWIDTH] IN BLOOD BY AUTOMATED COUNT: 13.2 % (ref 11.6–15.4)
GLUCOSE SERPL-MCNC: 114 MG/DL (ref 65–99)
HCT VFR BLD AUTO: 34.2 % (ref 37.5–51)
HGB BLD-MCNC: 11.7 G/DL (ref 13–17.7)
IMM GRANULOCYTES # BLD: 0 X10E3/UL (ref 0–0.1)
IMM GRANULOCYTES NFR BLD: 0 %
LYMPHOCYTES # BLD AUTO: 1.3 X10E3/UL (ref 0.7–3.1)
LYMPHOCYTES NFR BLD AUTO: 27 %
MCH RBC QN AUTO: 31.2 PG (ref 26.6–33)
MCHC RBC AUTO-ENTMCNC: 34.2 G/DL (ref 31.5–35.7)
MCV RBC AUTO: 91 FL (ref 79–97)
MONOCYTES # BLD AUTO: 0.5 X10E3/UL (ref 0.1–0.9)
MONOCYTES NFR BLD AUTO: 12 %
NEUTROPHILS # BLD AUTO: 2.6 X10E3/UL (ref 1.4–7)
NEUTROPHILS NFR BLD AUTO: 55 %
PLATELET # BLD AUTO: 293 X10E3/UL (ref 150–450)
POTASSIUM SERPL-SCNC: 4.4 MMOL/L (ref 3.5–5.2)
RBC # BLD AUTO: 3.75 X10E6/UL (ref 4.14–5.8)
SODIUM SERPL-SCNC: 135 MMOL/L (ref 134–144)
WBC # BLD AUTO: 4.7 X10E3/UL (ref 3.4–10.8)

## 2022-06-15 ENCOUNTER — OFFICE VISIT (OUTPATIENT)
Dept: CARDIAC SURGERY | Facility: CLINIC | Age: 87
End: 2022-06-15

## 2022-06-15 VITALS
HEART RATE: 75 BPM | SYSTOLIC BLOOD PRESSURE: 137 MMHG | OXYGEN SATURATION: 97 % | DIASTOLIC BLOOD PRESSURE: 76 MMHG | BODY MASS INDEX: 24.1 KG/M2 | WEIGHT: 159 LBS | RESPIRATION RATE: 18 BRPM | HEIGHT: 68 IN

## 2022-06-15 DIAGNOSIS — Z95.2 S/P AVR: ICD-10-CM

## 2022-06-15 DIAGNOSIS — I35.0 NONRHEUMATIC AORTIC VALVE STENOSIS: Primary | ICD-10-CM

## 2022-06-15 PROCEDURE — 99024 POSTOP FOLLOW-UP VISIT: CPT | Performed by: PHYSICIAN ASSISTANT

## 2022-06-15 NOTE — PROGRESS NOTES
POST OP FOLLOW UP VISIT    Procedure: S/P aortic valve replacement, performed on 5/16/22    History: Kalyn Osorio is a 80y o  year old male who presents to our office today for routine follow up care from aortic valve replacement  He was discharged on 5/20 without postop issues, and has been doing well at home  He remains very active, and states he feels better when he's up and doing things  He has followed up with his PCP and his cardiologist who have adjusted some of his medications  Deshawn Grissom has no complaints today  He is here with his wife  Vital Signs:   Vitals:    06/15/22 1544 06/15/22 1549   BP: 145/74 137/76   BP Location: Left arm Right arm   Patient Position: Sitting Sitting   Cuff Size: Standard Standard   Pulse: 75    Resp: 18    SpO2: 97%    Weight: 72 1 kg (159 lb)    Height: 5' 8" (1 727 m)        Home Medications:   Prior to Admission medications    Medication Sig Start Date End Date Taking?  Authorizing Provider   Ascorbic Acid (VITAMIN C) 500 MG CAPS Take 1 tablet by mouth daily   Yes Historical Provider, MD   aspirin (ECOTRIN LOW STRENGTH) 81 mg EC tablet Take 81 mg by mouth daily   Yes Historical Provider, MD   atorvastatin (LIPITOR) 20 mg tablet Take 1 tablet (20 mg total) by mouth daily with dinner 5/20/22  Yes Jojo Emanuel PA-C   clopidogrel (Plavix) 75 mg tablet Take 1 tablet (75 mg total) by mouth daily 6/7/22  Yes MELINA Bocanegra   Cyanocobalamin (VITAMIN B12) 1000 MCG TBCR Take 1 tablet by mouth daily   Yes Historical Provider, MD   Flaxseed, Linseed, (FLAXSEED OIL) 1000 MG CAPS Take 1 capsule by mouth daily   Yes Historical Provider, MD   Glucosamine-Chondroit-Vit C-Mn (GLUCOSAMINE 1500 COMPLEX) CAPS Take 1 capsule by mouth daily   Yes Historical Provider, MD   levothyroxine 100 mcg tablet TAKE ONE TABLET BY MOUTH EVERY DAY 2/28/22  Yes Mellissa Holman MD   metoprolol tartrate (LOPRESSOR) 50 mg tablet Take 1 tablet (50 mg total) by mouth every 12 (twelve) hours 6/2/22 Yes Jerry Collier MD   Multiple Vitamin (MULTIVITAMIN) capsule Take 1 capsule by mouth daily   Yes Historical Provider, MD   tamsulosin (FLOMAX) 0 4 mg TAKE ONE CAPSULE BY MOUTH EVERY DAY 3/12/22  Yes Jerry Collier MD   nitroglycerin (NITROSTAT) 0 4 mg SL tablet Place 1 tablet (0 4 mg total) under the tongue every 5 (five) minutes as needed for chest pain 6/7/22   MELINA Villalta       Physical Exam:  General: alert, pleasant, NAD  HEENT/NECK:  PERRLA  No jugular venous distention  Cardiac:Regular rate and rhythm  Pulmonary:Breath sounds clear bilaterally  Abdomen:  Non-tender, Non-distended  Positive bowel sounds  Upper extremities: 2+ radial pulses; brisk capillary refill  Lower extremities: Extremities warm/dry  PT/DP pules 2+ bilaterally  No edema B/L  Incisions: Sternum is stable  Incision is clean, dry, and intact  Musculoskeletal: WATTS, ROM intact  Neuro: Alert and oriented X 3  Sensation is grossly intact  No focal deficits  Skin: Warm/Dry, without rashes or lesions  Lab Results:     Results from last 7 days   Lab Units 06/14/22  0912   WHITE BLOOD CELL COUNT  x10E3/uL 4 7   HEMOGLOBIN  g/dL 11 7*   HEMATOCRIT  % 34 2*   PLATELETS  [id number]/     Results from last 7 days   Lab Units 06/14/22  0912   POTASSIUM mmol/L 4 4   CHLORIDE mmol/L 96   CO2 mmol/L 23   BUN mg/dL 11   CREATININE mg/dL 0 92         Lab Results   Component Value Date    HGBA1C 5 7 (H) 05/10/2022     Lab Results   Component Value Date    TROPONINI <0 02 03/25/2019       Assessment:   Aortic stenosis, Non-Rheumatic  S/P aortic valve replacement    Whitney Sanchez is 4 weeks post-op, making good progress in their recovery  Incisions are well-healed and the sternum is stable  Weight and VS are stable  Plan:   Medications reviewed with patient, associated questions answered and no changes made     Benefits of participating in cardiac rehab have been discussed and they are cleared to begin the outpatient  program  They may resume driving  They have a lifting restriction of no more than 25 lbs until 8/16/2022 which is 12 weeks from their surgical date  No further follow up in our office is needed; call with questions or concerns  They should maintain routine follow-up with Cardiology and Primary Care for ongoing medical care  Patient verbalizes understanding of recommendations and all questions were answered to their satisfaction      Routine referral to gastroenterology for colonoscopy screening was not indicated, as the patient is over 76years old    Tobi Bauer  [unfilled]

## 2022-06-15 NOTE — LETTER
Sirisha 15, 2022     MD Sridevi Quinn  [address] [address]    Patient: Gonzales Olivera   YOB: 1932   Date of Visit: 6/15/2022       Dear Dr Juan R Perry:    Thank you for referring Keisha Muniz to me for evaluation  Below are my notes for this consultation  If you have questions, please do not hesitate to call me  I look forward to following your patient along with you  Sincerely,        Amanda Miranda DO        CC: No Recipients  Narda Ac PA-C  6/15/2022  4:40 PM  Attested   POST OP FOLLOW UP VISIT    Procedure: S/P aortic valve replacement, performed on 5/16/22    History: Gonzales Olivera is a 80y o  year old male who presents to our office today for routine follow up care from aortic valve replacement  He was discharged on 5/20 without postop issues, and has been doing well at home  He remains very active, and states he feels better when he's up and doing things  He has followed up with his PCP and his cardiologist who have adjusted some of his medications  Jairo Caro has no complaints today  He is here with his wife  Vital Signs:   Vitals:    06/15/22 1544 06/15/22 1549   BP: 145/74 137/76   BP Location: Left arm Right arm   Patient Position: Sitting Sitting   Cuff Size: Standard Standard   Pulse: 75    Resp: 18    SpO2: 97%    Weight: 72 1 kg (159 lb)    Height: 5' 8" (1 727 m)        Home Medications:   Prior to Admission medications    Medication Sig Start Date End Date Taking?  Authorizing Provider   Ascorbic Acid (VITAMIN C) 500 MG CAPS Take 1 tablet by mouth daily   Yes Historical Provider, MD   aspirin (ECOTRIN LOW STRENGTH) 81 mg EC tablet Take 81 mg by mouth daily   Yes Historical Provider, MD   atorvastatin (LIPITOR) 20 mg tablet Take 1 tablet (20 mg total) by mouth daily with dinner 5/20/22  Yes Deborah Courtney PA-C   clopidogrel (Plavix) 75 mg tablet Take 1 tablet (75 mg total) by mouth daily 6/7/22  Yes MELINA Sampson Cyanocobalamin (VITAMIN B12) 1000 MCG TBCR Take 1 tablet by mouth daily   Yes Historical Provider, MD   Flaxseed, Linseed, (FLAXSEED OIL) 1000 MG CAPS Take 1 capsule by mouth daily   Yes Historical Provider, MD   Glucosamine-Chondroit-Vit C-Mn (GLUCOSAMINE 1500 COMPLEX) CAPS Take 1 capsule by mouth daily   Yes Historical Provider, MD   levothyroxine 100 mcg tablet TAKE ONE TABLET BY MOUTH EVERY DAY 2/28/22  Yes Unique Goode MD   metoprolol tartrate (LOPRESSOR) 50 mg tablet Take 1 tablet (50 mg total) by mouth every 12 (twelve) hours 6/2/22  Yes Unique Goode MD   Multiple Vitamin (MULTIVITAMIN) capsule Take 1 capsule by mouth daily   Yes Historical Provider, MD   tamsulosin (FLOMAX) 0 4 mg TAKE ONE CAPSULE BY MOUTH EVERY DAY 3/12/22  Yes Unique Goode MD   nitroglycerin (NITROSTAT) 0 4 mg SL tablet Place 1 tablet (0 4 mg total) under the tongue every 5 (five) minutes as needed for chest pain 6/7/22   MELINA Pickens       Physical Exam:  General: alert, pleasant, NAD  HEENT/NECK:  PERRLA  No jugular venous distention  Cardiac:Regular rate and rhythm  Pulmonary:Breath sounds clear bilaterally  Abdomen:  Non-tender, Non-distended  Positive bowel sounds  Upper extremities: 2+ radial pulses; brisk capillary refill  Lower extremities: Extremities warm/dry  PT/DP pules 2+ bilaterally  No edema B/L  Incisions: Sternum is stable  Incision is clean, dry, and intact  Musculoskeletal: WATTS, ROM intact  Neuro: Alert and oriented X 3  Sensation is grossly intact  No focal deficits  Skin: Warm/Dry, without rashes or lesions      Lab Results:     Results from last 7 days   Lab Units 06/14/22  0912   WHITE BLOOD CELL COUNT  x10E3/uL 4 7   HEMOGLOBIN  g/dL 11 7*   HEMATOCRIT  % 34 2*   PLATELETS  G52C3/     Results from last 7 days   Lab Units 06/14/22  0912   POTASSIUM mmol/L 4 4   CHLORIDE mmol/L 96   CO2 mmol/L 23   BUN mg/dL 11   CREATININE mg/dL 0 92         Lab Results   Component Value Date    HGBA1C 5 7 (H) 05/10/2022     Lab Results   Component Value Date    TROPONINI <0 02 03/25/2019       Assessment:   Aortic stenosis, Non-Rheumatic  S/P aortic valve replacement    Jevon Sanchez is 4 weeks post-op, making good progress in their recovery  Incisions are well-healed and the sternum is stable  Weight and VS are stable  Plan:   Medications reviewed with patient, associated questions answered and no changes made  Benefits of participating in cardiac rehab have been discussed and they are cleared to begin the outpatient  program    They may resume driving  They have a lifting restriction of no more than 25 lbs until 8/16/2022 which is 12 weeks from their surgical date  No further follow up in our office is needed; call with questions or concerns  They should maintain routine follow-up with Cardiology and Primary Care for ongoing medical care  Patient verbalizes understanding of recommendations and all questions were answered to their satisfaction  Routine referral to gastroenterology for colonoscopy screening was not indicated, as the patient is over 76years old    Alvena Halsted, [place]  [unfilled]  Attestation signed by Abhijit Loza DO at 6/15/2022  4:50 PM:  I supervised the Advanced Practitioner  ? I performed, in its entirety, the assessment/plan component of the visit  II agree with the Advanced Practitioner's note with the following additions/exceptions:      Mr Marcus Whitaker is recovering well from his aortic valve replacement  He is ready to enter into outpatient cardiac rehab  We discussed his limitations at this point, and that after he completes his cardiac rehab he has no further restrictions      Abhijit Loza DO 06/15/22

## 2022-06-20 DIAGNOSIS — Z95.2 S/P AVR: ICD-10-CM

## 2022-06-20 RX ORDER — ATORVASTATIN CALCIUM 20 MG/1
20 TABLET, FILM COATED ORAL
Qty: 30 TABLET | Refills: 2 | Status: SHIPPED | OUTPATIENT
Start: 2022-06-20

## 2022-06-24 ENCOUNTER — TELEPHONE (OUTPATIENT)
Dept: CARDIAC REHAB | Facility: HOSPITAL | Age: 87
End: 2022-06-24

## 2022-06-27 ENCOUNTER — CLINICAL SUPPORT (OUTPATIENT)
Dept: CARDIAC REHAB | Facility: HOSPITAL | Age: 87
End: 2022-06-27
Payer: COMMERCIAL

## 2022-06-27 DIAGNOSIS — Z95.2 S/P AVR: Primary | ICD-10-CM

## 2022-06-27 PROCEDURE — 93797 PHYS/QHP OP CAR RHAB WO ECG: CPT

## 2022-06-27 NOTE — PROGRESS NOTES
Cardiac Rehabilitation Plan of Care   Initial Care Plan          Today's date: 2022   # of Exercise Sessions Completed: 1-evaluation  Patient name: Rosalba Galvez      : 10/7/1932  Age: 80 y o  MRN: [id number]  Referring Physician: Candelaria Tate DO  Cardiologist: Dr Darshan Bryson  Provider: Nikhil  Clinician: Dhaval Milton MS, CEP      Dx:   Encounter Diagnosis   Name Primary?  S/P AVR      Date of onset: 2022      SUMMARY OF PROGRESS:  Mr Barney Anderson is here today for his cardiac rehabilitation evaluation after recent AVR  He reports doing well overall with recovery and is anxious to get back to being able to use his chain saw to cut down trees on his property  He has resumed mowing and taking care of 10 acre property since surgery  He has been encouraged to start rehab program to help increase his overall strength and stamina to make his chores and ADLs easier for him post surgery  He is in agreement to try rehab for at least 6 weeks for telemetry monitoring and building strength and stamina  His main goal is to be able to use his chain saw  He reports he has always been very active and does not sit down to rest    He and his wife do not follow a specific diet, but try to make healthy choices  He denies depression (PHQ-9=0) or anxiety (SILVIA-7=0) at this time  He has great support from his wife and family  He completed TM ETT today reaching THR at 9 min at 4 3 METs with stable hemodynamic response to exercise  No cardiac complaints with exercise  Telemetry showed 9 beat run of vtach, and did not show pacing  Dr Maty Stiles was notified and is aware to verify vtach vs  Pacing  Will plan to start exercise at 2 5-3 0 METs and increase as tolerated by patient over first 30 days of rehab  He is in agreement to attend rehab sessions 3x/week for at least 6 weeks, and then will reassess how he is doing   He will also plan to attend weekly cardiac education sessions on cardiac risk factor management  He will start his sessions tomorrow 2022        Medication compliance: Yes   Comments: Pt reports to be compliant with medications  Fall Risk: Low   Comments: Ambulates with a steady gait with no assist device    EKG Interpretation: NSR      EXERCISE ASSESSMENT and PLAN    Current Exercise Program in Rehab:       Frequency: 3 days/week Supplement with home exercise 2+ days/wk as tolerated       Minutes: 30-40         METS: 2 5-3 0            HR: 30 beats above resting   RPE: 4-6         Modalities: Treadmill, UBE, Lifecycle, NuStep and Recumbent bike      Exercise Progression 30 Day Goals :    Frequency: 3 days/week of cardiac rehab     Supplement with home exercise 2+ days/wk as tolerated    Minutes: 40                            >150 mins/wk of moderate intensity exercise   METS: 3 0   HR: 30 beats above resting    RPE: 4-6   Modalities: Treadmill, UBE, Lifecycle, NuStep and Recumbent bike    Strength trainin-3 days / week  12-15 repetitions  1-2 sets per modality   Will be added following at least 8 weeks post surgery and 8-10 monitored sessions  Will be added following 2-3 weeks of monitored exercise sessions   Modalities: Pull Downs, Lateral Raise, Arm Extension, Arm Curl, Sit to [employer], Shoulder Shrugs and leg ext    Home Exercise: no regular aerobic exercise, very active walking and working around his 10 acre property    Goals: 10% improvement in functional capacity - based on max METs achieved in fitness assessment, improved DASI score by 10%, Increase in exercise capacity by 40% - based on peak METs tolerated in cardiac rehab exercise session, Exercise 5 days/wk, >150mins/wk of moderate intensity exercise, Attend Rehab regularly and start a home exercise program    Progression Toward Goals:  Reviewed Pt goals and determined plan of care    Education: benefit of exercise for CAD risk factors, home exercise guidelines, AHA guidelines to achieve >150 mins/wk of moderate exercise and RPE scale   Plan:education on home exercise guidelines, home exercise 30+ mins 2 days opposite CR and Education class: Risk Factors for Heart Disease  Readiness to change: Maintenance: (Maintaining the behavior change)      NUTRITION ASSESSMENT AND PLAN    Weight control:    Starting weight: 158 8 lb   Current weight:       Diabetes: N/A  A1c: 5 7    last measured: 5/10/2022    Lipid management: 5/10/2022: chol 168, Trig 85, HDL 45,     Goals:LDL <100, HDL >40, TRG <150 and CHOL <200    Progression Toward Goals: Reviewed Pt goals and determined plan of care    Education: heart healthy eating  low sodium diet  hydration  Plan: Education class: Reading Food Labels and Education Class: Heart Healthy Eating  Readiness to change: Action:  (Changing behavior)      PSYCHOSOCIAL ASSESSMENT AND PLAN    Emotional:  Depression assessment:  PHQ-9 = 0 =No Depression            Anxiety measure:  SILVIA-7 = 0-4  = Not anxious  Self-reported stress level:  1  Social support: Excellent-wife and family    Goals:  Reduce perceived stress to 1-3/10, improved [facility] QOL < 27, Physical Fitness in [facility] Score < 3 and increased energy    Progression Toward Goals: Reviewed Pt goals and determined plan of care    Education: signs/sxs of depression and benefits of a positive support system  Plan: Class: Stress and Your Health and Class: Relaxation  Readiness to change: Action:  (Changing behavior)      OTHER CORE COMPONENTS     Tobacco:   Social History     Tobacco Use   Smoking Status Former Smoker    Types: Cigars   Smokeless Tobacco Never Used   Tobacco Comment    50 years ago       Tobacco Use Intervention:   N/A:  Patient is a non-smoker     Anginal Symptoms:  None   NTG use: No prescription    Blood pressure:    Restin/78   Exercise: 142/78    Goals: consistent BP < 130/80, reduced dietary sodium <2300mg, moderate intensity exercise >150 mins/wk, medication compliance and reduce number of medications needed for BP control    Progression Toward Goals: Reviewed Pt goals and determined plan of care    Education:  understanding high blood pressure and it's relationship to CAD and low sodium diet and HTN  Plan: Class: Understanding Heart Disease and Class: Common Heart Medications  Readiness to change: Action:  (Changing behavior)

## 2022-06-27 NOTE — PROGRESS NOTES
CARDIAC REHAB ASSESSMENT    Today's date: 2022  Patient name: Sofia Dove     : 10/7/1932       MRN: [id number]  PCP: Izabela Ceballos MD  Referring Physician: Anita Lopez DO  Cardiologist: Dr Chad Marion  Surgeon: Dr Ag Martinez  Dx:   Encounter Diagnosis   Name Primary?  S/P AVR        Date of onset: 2022  Cultural needs: n/a    Weight    Wt Readings from Last 1 Encounters:   06/15/22 72 1 kg (159 lb)      Height:   Ht Readings from Last 1 Encounters:   06/15/22 5' 8" (1 727 m)     Medical History:   Past Medical History:   Diagnosis Date    Angina pectoris ([address] )     Arthritis     Ascending aortic aneurysm (HCC)     trileaflet AV, 41mm    BPH (benign prostatic hyperplasia)     Cancer (HCC)     skin    Coronary artery disease     Former tobacco use     pipe & cigar use socially & infrequently    GERD (gastroesophageal reflux disease)     controlled w/ diet    Headache     h/o    Hepatitis A     History of melanoma     Hypertension     Hypothyroidism     Pacemaker     Severe aortic stenosis     Shortness of breath     with exertion    Vitamin B12 deficiency     Wears dentures          Physical Limitations: none    Fall Risk: Low   Comments: Ambulates with a steady gait with no assist device    Anginal Equivalent: None/denies angina   NTG use: No prescription    Risk Factors   Cholesterol: Yes  Smoking: Never used  HTN: Yes  DM: No  Obesity: No   Inactivity: No  Stress:  perceived  stress: 2/10   Stressors:no stressors   Goals for Stress Management:Practice Relaxation Techniques, Exercise, Enjoy a hobby and Spend time with family    Family History:  Family History   Problem Relation Age of Onset    Cancer Family         gastrointestinal tract    Heart disease Mother     Substance Abuse Neg Hx     Mental illness Neg Hx        Allergies: Amoxicillin  ETOH:   Social History     Substance and Sexual Activity   Alcohol Use Yes    Comment: a beer once in a while  Current Medications:   Current Outpatient Medications   Medication Sig Dispense Refill    atorvastatin (LIPITOR) 20 mg tablet Take 1 tablet (20 mg total) by mouth daily with dinner 30 tablet 2    Ascorbic Acid (VITAMIN C) 500 MG CAPS Take 1 tablet by mouth daily      aspirin (ECOTRIN LOW STRENGTH) 81 mg EC tablet Take 81 mg by mouth daily      clopidogrel (Plavix) 75 mg tablet Take 1 tablet (75 mg total) by mouth daily 90 tablet 3    Cyanocobalamin (VITAMIN B12) 1000 MCG TBCR Take 1 tablet by mouth daily      Flaxseed, Linseed, (FLAXSEED OIL) 1000 MG CAPS Take 1 capsule by mouth daily      Glucosamine-Chondroit-Vit C-Mn (GLUCOSAMINE 1500 COMPLEX) CAPS Take 1 capsule by mouth daily      levothyroxine 100 mcg tablet TAKE ONE TABLET BY MOUTH EVERY DAY 90 tablet 3    metoprolol tartrate (LOPRESSOR) 50 mg tablet Take 1 tablet (50 mg total) by mouth every 12 (twelve) hours      Multiple Vitamin (MULTIVITAMIN) capsule Take 1 capsule by mouth daily      nitroglycerin (NITROSTAT) 0 4 mg SL tablet Place 1 tablet (0 4 mg total) under the tongue every 5 (five) minutes as needed for chest pain 24 tablet 1    tamsulosin (FLOMAX) 0 4 mg TAKE ONE CAPSULE BY MOUTH EVERY DAY 90 capsule 3     No current facility-administered medications for this visit           Functional Status Prior to Diagnosis for Treatment   Occupation: retired  Recreation: working on his [address] acre [facility] [employer], cutting fire wood  ADLs: limited by Dyspnea  Weakness  [place]: No limitations  Exercise: no regular aerobic exercise  Other: always moving does not sit around    Current Functional Status  Occupation: retired  Recreation: working on his [address] [facility], cutting fire wood  ADLs: has resumed all ADLs within sternal precautions  [place]: No limitations  Exercise: no regular aerobic exercise  Other: always moving does not sit around    Patient Specific Goals:  Be able to use chain saw to cut down trees on his property, increased strength and endurance to make ADLs and chores easier    Short Term Program Goals: dietary modifications increased strength improved energy/stamina with ADLs exercise 120-150 mins/wk    Long Term Goals: increased maximal walking duration  increased intial training workload  Improved [facility] Activity Status score  Improved functional capacity    Ability to reach goals/rehabilitation potential:  Excellent    Projected return to function: 8-12 weeks  Objective tests: sub-max TM ETT      Nutritional   Reviewed details of Rate your Plate  Discussed key elements of heart healthy eating  Reviewed patient goals for dietary modifications and their clinical implications  Reviewed most recent lipid profile       Goals for dietary modification: choose lean cuts of meat  poultry without the skin  low fat ground meat and poultry  eliminate processed meats  more meatless meals      Emotional/Social  Patient reports he/she is coping well with good social support and denies depression or anxiety  Reports sufficient emotional support    Marital status:     Domestic Violence Screening: No    Comments: n/a

## 2022-06-28 ENCOUNTER — OFFICE VISIT (OUTPATIENT)
Dept: FAMILY MEDICINE CLINIC | Facility: HOSPITAL | Age: 87
End: 2022-06-28
Payer: COMMERCIAL

## 2022-06-28 ENCOUNTER — CLINICAL SUPPORT (OUTPATIENT)
Dept: CARDIAC REHAB | Facility: HOSPITAL | Age: 87
End: 2022-06-28
Payer: COMMERCIAL

## 2022-06-28 VITALS
OXYGEN SATURATION: 97 % | RESPIRATION RATE: 16 BRPM | BODY MASS INDEX: 23.95 KG/M2 | DIASTOLIC BLOOD PRESSURE: 72 MMHG | HEIGHT: 68 IN | TEMPERATURE: 97.3 F | SYSTOLIC BLOOD PRESSURE: 108 MMHG | WEIGHT: 158 LBS | HEART RATE: 76 BPM

## 2022-06-28 DIAGNOSIS — R35.1 NOCTURIA: Primary | ICD-10-CM

## 2022-06-28 DIAGNOSIS — Z95.2 S/P AVR: Primary | ICD-10-CM

## 2022-06-28 DIAGNOSIS — Z95.2 S/P AVR: ICD-10-CM

## 2022-06-28 DIAGNOSIS — N40.0 BENIGN PROSTATIC HYPERPLASIA, UNSPECIFIED WHETHER LOWER URINARY TRACT SYMPTOMS PRESENT: ICD-10-CM

## 2022-06-28 LAB
SL AMB  POCT GLUCOSE, UA: ABNORMAL
SL AMB LEUKOCYTE ESTERASE,UA: ABNORMAL
SL AMB POCT BILIRUBIN,UA: ABNORMAL
SL AMB POCT BLOOD,UA: ABNORMAL
SL AMB POCT CLARITY,UA: CLEAR
SL AMB POCT COLOR,UA: YELLOW
SL AMB POCT KETONES,UA: ABNORMAL
SL AMB POCT NITRITE,UA: ABNORMAL
SL AMB POCT PH,UA: 6
SL AMB POCT SPECIFIC GRAVITY,UA: 1.02
SL AMB POCT URINE PROTEIN: ABNORMAL
SL AMB POCT UROBILINOGEN: ABNORMAL

## 2022-06-28 PROCEDURE — 93798 PHYS/QHP OP CAR RHAB W/ECG: CPT

## 2022-06-28 PROCEDURE — 1036F TOBACCO NON-USER: CPT | Performed by: INTERNAL MEDICINE

## 2022-06-28 PROCEDURE — 81003 URINALYSIS AUTO W/O SCOPE: CPT | Performed by: INTERNAL MEDICINE

## 2022-06-28 PROCEDURE — 1160F RVW MEDS BY RX/DR IN RCRD: CPT | Performed by: INTERNAL MEDICINE

## 2022-06-28 PROCEDURE — 99214 OFFICE O/P EST MOD 30 MIN: CPT | Performed by: INTERNAL MEDICINE

## 2022-06-28 RX ORDER — TAMSULOSIN HYDROCHLORIDE 0.4 MG/1
0.4 CAPSULE ORAL 2 TIMES DAILY
Qty: 90 CAPSULE | Refills: 3 | Status: SHIPPED | OUTPATIENT
Start: 2022-06-28

## 2022-06-28 NOTE — PROGRESS NOTES
Assessment/Plan:       Diagnoses and all orders for this visit:    Nocturia  -     POCT urine dip auto non-scope  -     Ambulatory Referral to Urology; Future    S/P AVR  -     metoprolol tartrate (LOPRESSOR) 25 mg tablet; Take 1 tablet (25 mg total) by mouth every 12 (twelve) hours    Benign prostatic hyperplasia, unspecified whether lower urinary tract symptoms present  -     tamsulosin (FLOMAX) 0 4 mg; Take 1 capsule (0 4 mg total) by mouth 2 (two) times a day  -     Ambulatory Referral to Urology; Future    Other orders  -     mupirocin (BACTROBAN) 2 % ointment; APPLY INTO EACH NOSTRIL 2 TIMES A DAY  START 5 DAYS PRIOR TO PROCEDURE (Patient not taking: Reported on 6/28/2022)          All of the above diagnoses have been assessed  Additional COMMENTS/PLAN:  Patient had obstructive urinary symptoms  I have made the Flomax b i d  And referred him to Urology soon as possible  Urinalysis unremarkable  Subjective:      Patient ID: Ruby Mendez is a 80 y o  male  HPI     Having worsening nocturia  Takes the flomax at bedtime  Has urgency, hesitancy  Patient denies lightheaded dizziness  The following portions of the patient's history were revised and updated as appropriate: Problem list, allergies, med list, FH, SH, Past medical and surgical histories      Current Outpatient Medications   Medication Sig Dispense Refill    Ascorbic Acid (VITAMIN C) 500 MG CAPS Take 1 tablet by mouth daily      aspirin (ECOTRIN LOW STRENGTH) 81 mg EC tablet Take 81 mg by mouth daily      atorvastatin (LIPITOR) 20 mg tablet Take 1 tablet (20 mg total) by mouth daily with dinner 30 tablet 2    clopidogrel (Plavix) 75 mg tablet Take 1 tablet (75 mg total) by mouth daily 90 tablet 3    Cyanocobalamin (VITAMIN B12) 1000 MCG TBCR Take 1 tablet by mouth daily      Flaxseed, Linseed, (FLAXSEED OIL) 1000 MG CAPS Take 1 capsule by mouth daily      Glucosamine-Chondroit-Vit C-Mn (GLUCOSAMINE 1500 COMPLEX) CAPS Take 1 capsule by mouth daily      levothyroxine 100 mcg tablet TAKE ONE TABLET BY MOUTH EVERY DAY 90 tablet 3    metoprolol tartrate (LOPRESSOR) 25 mg tablet Take 1 tablet (25 mg total) by mouth every 12 (twelve) hours 180 tablet 3    Multiple Vitamin (MULTIVITAMIN) capsule Take 1 capsule by mouth daily      nitroglycerin (NITROSTAT) 0 4 mg SL tablet Place 1 tablet (0 4 mg total) under the tongue every 5 (five) minutes as needed for chest pain 24 tablet 1    tamsulosin (FLOMAX) 0 4 mg Take 1 capsule (0 4 mg total) by mouth 2 (two) times a day 90 capsule 3    mupirocin (BACTROBAN) 2 % ointment APPLY INTO EACH NOSTRIL 2 TIMES A DAY  START 5 DAYS PRIOR TO PROCEDURE (Patient not taking: Reported on 6/28/2022)       No current facility-administered medications for this visit  Review of Systems      Objective:    /72   Pulse 76   Temp (!) 97 3 °F (36 3 °C) (Tympanic)   Resp 16   Ht 5' 8" (1 727 m)   Wt 71 7 kg (158 lb)   SpO2 97%   BMI 24 02 kg/m²     BP Readings from Last 3 Encounters:   06/28/22 108/72   06/15/22 137/76   06/09/22 138/76                  Wt Readings from Last 3 Encounters:   06/28/22 71 7 kg (158 lb)   06/15/22 72 1 kg (159 lb)   06/07/22 72 8 kg (160 lb 9 6 oz)         Physical Exam  Constitutional:       Appearance: Normal appearance  Cardiovascular:      Rate and Rhythm: Normal rate and regular rhythm  Abdominal:      Comments: Bladder not palpable   Neurological:      Mental Status: He is alert             Office Visit on 06/28/2022   Component Date Value Ref Range Status     COLOR,UA 06/28/2022 yellow   Final    CLARITY,UA 06/28/2022 clear   Final    SPECIFIC GRAVITY,UA 06/28/2022 1 025   Final     PH,UA 06/28/2022 6 0   Final    LEUKOCYTE ESTERASE,UA 06/28/2022 neg   Final    Corita Maze 06/28/2022 neg   Final    GLUCOSE, UA 06/28/2022 neg   Final    KETONES,UA 06/28/2022 neg   Final    BILIRUBIN,UA 06/28/2022 neg   Final    BLOOD,UA 06/28/2022 neg   Final    POCT URINE PROTEIN 06/28/2022 +-   Final    15 mg/dl    SL AMB POCT UROBILINOGEN 06/28/2022 neg   Final    0 2 mg/dl         Marely Bland MD    Some or all of this note was generated with a voice recognition dictation system and therefore my contain grammatical or spelling errors

## 2022-06-28 NOTE — PATIENT INSTRUCTIONS
Decreased metoprolol to 25 mg twice a day    Increase the Flomax to twice a day    Let me know if you can get prompt Urology appointment

## 2022-06-30 ENCOUNTER — TELEPHONE (OUTPATIENT)
Dept: UROLOGY | Facility: AMBULATORY SURGERY CENTER | Age: 87
End: 2022-06-30

## 2022-06-30 ENCOUNTER — REMOTE DEVICE CLINIC VISIT (OUTPATIENT)
Dept: CARDIOLOGY CLINIC | Facility: CLINIC | Age: 87
End: 2022-06-30
Payer: COMMERCIAL

## 2022-06-30 ENCOUNTER — TELEPHONE (OUTPATIENT)
Dept: OTHER | Facility: OTHER | Age: 87
End: 2022-06-30

## 2022-06-30 ENCOUNTER — CLINICAL SUPPORT (OUTPATIENT)
Dept: CARDIAC REHAB | Facility: HOSPITAL | Age: 87
End: 2022-06-30
Payer: COMMERCIAL

## 2022-06-30 DIAGNOSIS — Z95.2 S/P AVR: Primary | ICD-10-CM

## 2022-06-30 DIAGNOSIS — Z95.0 PRESENCE OF CARDIAC PACEMAKER: Primary | ICD-10-CM

## 2022-06-30 PROCEDURE — 93296 REM INTERROG EVL PM/IDS: CPT | Performed by: INTERNAL MEDICINE

## 2022-06-30 PROCEDURE — 93294 REM INTERROG EVL PM/LDLS PM: CPT | Performed by: INTERNAL MEDICINE

## 2022-06-30 PROCEDURE — 93798 PHYS/QHP OP CAR RHAB W/ECG: CPT

## 2022-06-30 NOTE — TELEPHONE ENCOUNTER
Complaint/Diagnosis:BPH w/unspecified urinary symptoms    Insurance:[address]    History of cancer:no    Previous urologist:no    Outside Testing/where:ep[facility]    If yes,what kind:epic    Records requested/where:epic    Preferred location:[facility]

## 2022-06-30 NOTE — TELEPHONE ENCOUNTER
LM for Chel Hopper that we have and appointment on Tuesday 7/5 @ 10:00 right after his rehab appointment   We are located on 2nd floor [address]   He is to call back and confirm

## 2022-06-30 NOTE — PROGRESS NOTES
Results for orders placed or performed in visit on 06/30/22   Cardiac EP device report    Narrative    BIOT DUAL CHAMBER PPM - ACTIVE SYSTEM IS MRI CONDITIONAL  BIOTRONIK TRANSMISSION: BATTERY VOLTAGE ADEQUATE (OK~55%)  AP: 67%  : 36%  ALL AVAILABLE LEAD PARAMETERS WITHIN NORMAL LIMITS  NO SIGNIFICANT HIGH RATE EPISODES  PACEMAKER FUNCTIONING APPROPRIATELY    [address] [place] [address]

## 2022-06-30 NOTE — TELEPHONE ENCOUNTER
Npt Referral [facility]- BPH w/unspecified urinary symptoms,please review records as PCP would like seen sooner than later

## 2022-07-01 ENCOUNTER — CLINICAL SUPPORT (OUTPATIENT)
Dept: CARDIAC REHAB | Facility: HOSPITAL | Age: 87
End: 2022-07-01
Payer: COMMERCIAL

## 2022-07-01 DIAGNOSIS — Z95.2 S/P AVR: Primary | ICD-10-CM

## 2022-07-01 PROCEDURE — 93798 PHYS/QHP OP CAR RHAB W/ECG: CPT

## 2022-07-01 NOTE — TELEPHONE ENCOUNTER
Pt wife called to return VM and stated pt will wait to make appt due to meds being changed recently and it seems to be helping   Pt will c/b later to schedule appt

## 2022-07-05 ENCOUNTER — CLINICAL SUPPORT (OUTPATIENT)
Dept: CARDIAC REHAB | Facility: HOSPITAL | Age: 87
End: 2022-07-05
Payer: COMMERCIAL

## 2022-07-05 DIAGNOSIS — Z95.2 S/P AVR: Primary | ICD-10-CM

## 2022-07-05 PROCEDURE — 93798 PHYS/QHP OP CAR RHAB W/ECG: CPT

## 2022-07-06 ENCOUNTER — CLINICAL SUPPORT (OUTPATIENT)
Dept: CARDIAC REHAB | Facility: HOSPITAL | Age: 87
End: 2022-07-06
Payer: COMMERCIAL

## 2022-07-06 DIAGNOSIS — Z95.2 S/P AVR: Primary | ICD-10-CM

## 2022-07-06 PROCEDURE — 93798 PHYS/QHP OP CAR RHAB W/ECG: CPT

## 2022-07-07 ENCOUNTER — CLINICAL SUPPORT (OUTPATIENT)
Dept: CARDIAC REHAB | Facility: HOSPITAL | Age: 87
End: 2022-07-07
Payer: COMMERCIAL

## 2022-07-07 DIAGNOSIS — Z95.2 S/P AVR: Primary | ICD-10-CM

## 2022-07-07 PROCEDURE — 93798 PHYS/QHP OP CAR RHAB W/ECG: CPT

## 2022-07-08 ENCOUNTER — APPOINTMENT (OUTPATIENT)
Dept: CARDIAC REHAB | Facility: HOSPITAL | Age: 87
End: 2022-07-08
Payer: COMMERCIAL

## 2022-07-12 ENCOUNTER — APPOINTMENT (OUTPATIENT)
Dept: CARDIAC REHAB | Facility: HOSPITAL | Age: 87
End: 2022-07-12
Payer: COMMERCIAL

## 2022-07-13 ENCOUNTER — CLINICAL SUPPORT (OUTPATIENT)
Dept: CARDIAC REHAB | Facility: HOSPITAL | Age: 87
End: 2022-07-13
Payer: COMMERCIAL

## 2022-07-13 DIAGNOSIS — Z95.2 S/P AVR: Primary | ICD-10-CM

## 2022-07-13 PROCEDURE — 93798 PHYS/QHP OP CAR RHAB W/ECG: CPT

## 2022-07-14 ENCOUNTER — CLINICAL SUPPORT (OUTPATIENT)
Dept: CARDIAC REHAB | Facility: HOSPITAL | Age: 87
End: 2022-07-14
Payer: COMMERCIAL

## 2022-07-14 DIAGNOSIS — Z95.2 S/P AVR: Primary | ICD-10-CM

## 2022-07-14 PROCEDURE — 93798 PHYS/QHP OP CAR RHAB W/ECG: CPT

## 2022-07-15 ENCOUNTER — CLINICAL SUPPORT (OUTPATIENT)
Dept: CARDIAC REHAB | Facility: HOSPITAL | Age: 87
End: 2022-07-15
Payer: COMMERCIAL

## 2022-07-15 DIAGNOSIS — Z95.2 S/P AVR: Primary | ICD-10-CM

## 2022-07-15 PROCEDURE — 93798 PHYS/QHP OP CAR RHAB W/ECG: CPT

## 2022-07-19 ENCOUNTER — CLINICAL SUPPORT (OUTPATIENT)
Dept: CARDIAC REHAB | Facility: HOSPITAL | Age: 87
End: 2022-07-19
Payer: COMMERCIAL

## 2022-07-19 DIAGNOSIS — Z95.2 S/P AVR: Primary | ICD-10-CM

## 2022-07-19 PROCEDURE — 93798 PHYS/QHP OP CAR RHAB W/ECG: CPT

## 2022-07-21 ENCOUNTER — CLINICAL SUPPORT (OUTPATIENT)
Dept: CARDIAC REHAB | Facility: HOSPITAL | Age: 87
End: 2022-07-21
Payer: COMMERCIAL

## 2022-07-21 DIAGNOSIS — Z95.2 S/P AVR: Primary | ICD-10-CM

## 2022-07-21 PROCEDURE — 93798 PHYS/QHP OP CAR RHAB W/ECG: CPT

## 2022-07-22 ENCOUNTER — CLINICAL SUPPORT (OUTPATIENT)
Dept: CARDIAC REHAB | Facility: HOSPITAL | Age: 87
End: 2022-07-22
Payer: COMMERCIAL

## 2022-07-22 DIAGNOSIS — Z95.2 S/P AVR: Primary | ICD-10-CM

## 2022-07-22 PROCEDURE — 93798 PHYS/QHP OP CAR RHAB W/ECG: CPT

## 2022-07-22 NOTE — PROGRESS NOTES
Exercise session details     Patient has been complaining of chest discomfort/soreness following activities at home he knows he shouldn't be doing (raking, lifting, etc)  Keep reviewing with patient the healing process and not over doing it  He reported that yesterday afternoon through 4:30am he was having a lot of chest soreness and was able to sleep 1 hour without pain  He has been taking Tylenol for the pain  Please get in touch with patient to assess chest soreness and over doing it

## 2022-07-26 ENCOUNTER — CLINICAL SUPPORT (OUTPATIENT)
Dept: CARDIAC REHAB | Facility: HOSPITAL | Age: 87
End: 2022-07-26
Payer: COMMERCIAL

## 2022-07-26 DIAGNOSIS — Z95.2 S/P AVR: Primary | ICD-10-CM

## 2022-07-26 PROCEDURE — 93798 PHYS/QHP OP CAR RHAB W/ECG: CPT

## 2022-07-26 NOTE — PROGRESS NOTES
Cardiac Rehabilitation Plan of Care   30 Day Reassessment          Today's date: 2022   # of Exercise Sessions Completed:14  Patient name: Silvina Chapin      : 10/7/1932  Age: 80 y o  MRN: [id number]  Referring Physician: No ref  provider found  Cardiologist: Dr Lion Cruz  Provider: Nikhil  Clinician: Jesusita Damon, MS, CEP       Dx:   Encounter Diagnosis   Name Primary?  S/P AVR Yes     Date of onset: 2022      SUMMARY OF PROGRESS:  Mr Constanza Michaud has begun cardiac rehabilitation after recent AVR on 22  He reports doing well overall with recovery and is anxious to get back to being able to use his chain saw to cut down trees on his property  He has resumed mowing and taking care of 10 acre property since surgery  He has been encouraged to start rehab program to help increase his overall strength and stamina to make his chores and ADLs easier for him post surgery  In the past 30 days, he has been over-doing it with using his upper body and being very sore in the his chest  He is taking Tylenol daily because of the pain  I have advised him multiple times he needs to slow down and take it easy but he has not been listening (he is aware)  Dr Camden Kendrick is aware of this and he will be calling him to set up an appointment regarding his ongoing pain  His main goal is to be able to use his chain saw  He reports he has always been very active and does not sit down to rest  I continue to advise patient to slow down and let the healing process work  He is tolerating increasing workloads well but have not been able to use UBE or show weights due to ongoing soreness  He and his wife do not follow a specific diet, but try to make healthy choices  He denies depression (PHQ-9=0) or anxiety (SILVIA-7=0) at this time  He has great support from his wife and family    He is completing 45-60 minutes of aerobic exercise reaching 3 7-4 0 METs on different modalities such as the TRM, Lifecycle, Nustep, and MARIO  Will hopefully get to show light weight training in the next 30 days  No changes to telemetry and continues to report chest soreness but no other symptoms with exercise  Resting //96 with appropriate hemodynamic response to exercise 98//72  He is not completing a home exercise plan since he is very active at home around his house  He is attending weekly group education classes on cardiac risk factors  Will continue to educate, monitor, and progress as tolerated in the next 30 days  Will hopefully get opinion from Dr Facundo Blood regarding ongoing chest soreness         Medication compliance: Yes   Comments: Pt reports to be compliant with medications  Fall Risk: Low   Comments: Ambulates with a steady gait with no assist device    EKG Interpretation: NSR      EXERCISE ASSESSMENT and PLAN    Current Exercise Program in Rehab:       Frequency: 3 days/week Supplement with home exercise 2+ days/wk as tolerated       Minutes: 45-50        METS: 3 7-4 0            HR: 30 beats above resting   RPE: 4-6         Modalities: Treadmill, UBE, Lifecycle, NuStep and Recumbent bike      Exercise Progression 30 Day Goals :    Frequency: 3 days/week of cardiac rehab     Supplement with home exercise 2+ days/wk as tolerated    Minutes: 45-60                            >150 mins/wk of moderate intensity exercise   METS:  4 0-4 4   HR: 30 beats above resting    RPE: 4-6   Modalities: Treadmill, UBE, Lifecycle, NuStep and Recumbent bike    Strength trainin-3 days / week  12-15 repetitions  1-2 sets per modality   Will be added following at least 8 weeks post surgery and 8-10 monitored sessions  Will be added following 2-3 weeks of monitored exercise sessions   Modalities: Pull Downs, Lateral Raise, Arm Extension, Arm Curl, Sit to [employer], Shoulder Shrugs and leg ext    Home Exercise: no regular aerobic exercise, very active walking and working around his 10 acre property    Goals: 10% improvement in functional capacity - based on max METs achieved in fitness assessment, improved DASI score by 10%, Increase in exercise capacity by 40% - based on peak METs tolerated in cardiac rehab exercise session, Exercise 5 days/wk, >150mins/wk of moderate intensity exercise, Attend Rehab regularly and start a home exercise program    Progression Toward Goals:  Pt is progressing and showing improvement  toward the following goals:  attending rehab regularly 3x/week, incresing functional METs/stamia, tolerating increasing workloads well, continues to stay very active around the house, and has returned back to ADLs (even some he should not be doing)   , Pt has not made progress toward the following goals: ongoing chest soressness due to over doing it at home with some ADLs - Dr Cindy Díaz aware and advised patient to slow down and avoiding upper body   , Patient will start a light strength trianing program, see Dr Cindy Díaz regarding chest soreness, and let chest heal to be able to use his chainsaw  in the next 30 days, Will continue to educate and progress as tolerated      Education: benefit of exercise for CAD risk factors, home exercise guidelines, AHA guidelines to achieve >150 mins/wk of moderate exercise and RPE scale   Plan:education on home exercise guidelines, home exercise 30+ mins 2 days opposite CR and Education class: Risk Factors for Heart Disease  Readiness to change: Maintenance: (Maintaining the behavior change)      NUTRITION ASSESSMENT AND PLAN    Weight control:    Starting weight: 158 8 lb   Current weight:   164 lbs     Diabetes: N/A  A1c: 5 7    last measured: 5/10/2022    Lipid management: 5/10/2022: chol 168, Trig 85, HDL 45,     Goals:LDL <100, HDL >40, TRG <150 and CHOL <200    Progression Toward Goals: Pt is progressing and showing improvement  toward the following goals:  reports staying hydrated on days he is outside in the heat    , Pt has not made progress toward the following goals: weight gain since starting rehab (no concern with weight) and no changes to diet since intial evaluation   , Patient will continue to choose heart healthy options  in the next 30 days, Will continue to educate and progress as tolerated  Education: heart healthy eating  low sodium diet  hydration  Plan: Education class: Reading Food Labels and Education Class: Heart Healthy Eating  Readiness to change: Action:  (Changing behavior)      PSYCHOSOCIAL ASSESSMENT AND PLAN    Emotional:  Depression assessment:  PHQ-9 = 0 =No Depression            Anxiety measure:  SILVIA-7 = 0-4  = Not anxious  Self-reported stress level:  1  Social support: Excellent-wife and family    Goals:  Reduce perceived stress to 1-3/10, improved [facility] QOL < 27, Physical Fitness in [facility] Score < 3 and increased energy    Progression Toward Goals: Pt is progressing and showing improvement  toward the following goals:  denies depression and anxiety and great support system   , Patient will manage stress with ongoing chest sorensss and start listening to staff (patient getting impatient with recovery process which is causing him some stress)  in the next 30 days, Will continue to educate and progress as tolerated      Education: signs/sxs of depression and benefits of a positive support system  Plan: Class: Stress and Your Health and Class: Relaxation  Readiness to change: Action:  (Changing behavior)      OTHER CORE COMPONENTS     Tobacco:   Social History     Tobacco Use   Smoking Status Former Smoker    Types: Cigars   Smokeless Tobacco Never Used   Tobacco Comment    50 years ago       Tobacco Use Intervention:   N/A:  Patient is a non-smoker     Anginal Symptoms:  None   NTG use: No prescription    Blood pressure:    Restin//96   Exercise: 98//72    Goals: consistent BP < 130/80, reduced dietary sodium <2300mg, moderate intensity exercise >150 mins/wk, medication compliance and reduce number of medications  needed for BP control    Progression Toward Goals: Pt is progressing and showing improvement  toward the following goals:  medication compliance and reaching >150 mins/week of exercise with rehab and active around the house     , Pt has not made progress toward the following goals: BPs mostly within normal limits (have been higher due to stress) and  appropriate hemodynmaic response to exercise   , Patient will watch salt intake and manage stress to reduce hypertension at rest  in the next 30 days, Will continue to educate and progress as tolerated      Education:  understanding high blood pressure and it's relationship to CAD and low sodium diet and HTN  Plan: Class: Understanding Heart Disease and Class: Common Heart Medications  Readiness to change: Action:  (Changing behavior)

## 2022-07-28 ENCOUNTER — CLINICAL SUPPORT (OUTPATIENT)
Dept: CARDIAC REHAB | Facility: HOSPITAL | Age: 87
End: 2022-07-28
Payer: COMMERCIAL

## 2022-07-28 DIAGNOSIS — Z95.2 S/P AVR: Primary | ICD-10-CM

## 2022-07-28 PROCEDURE — 93798 PHYS/QHP OP CAR RHAB W/ECG: CPT

## 2022-07-29 ENCOUNTER — CLINICAL SUPPORT (OUTPATIENT)
Dept: CARDIAC REHAB | Facility: HOSPITAL | Age: 87
End: 2022-07-29
Payer: COMMERCIAL

## 2022-07-29 DIAGNOSIS — Z95.2 S/P AVR: Primary | ICD-10-CM

## 2022-07-29 PROCEDURE — 93798 PHYS/QHP OP CAR RHAB W/ECG: CPT

## 2022-08-02 ENCOUNTER — CLINICAL SUPPORT (OUTPATIENT)
Dept: CARDIAC REHAB | Facility: HOSPITAL | Age: 87
End: 2022-08-02
Payer: COMMERCIAL

## 2022-08-02 DIAGNOSIS — Z95.2 S/P AVR: Primary | ICD-10-CM

## 2022-08-02 PROCEDURE — 93798 PHYS/QHP OP CAR RHAB W/ECG: CPT

## 2022-08-03 ENCOUNTER — OFFICE VISIT (OUTPATIENT)
Dept: CARDIOLOGY CLINIC | Facility: CLINIC | Age: 87
End: 2022-08-03
Payer: COMMERCIAL

## 2022-08-03 VITALS
BODY MASS INDEX: 25.04 KG/M2 | SYSTOLIC BLOOD PRESSURE: 150 MMHG | HEART RATE: 101 BPM | WEIGHT: 165.2 LBS | HEIGHT: 68 IN | DIASTOLIC BLOOD PRESSURE: 82 MMHG

## 2022-08-03 DIAGNOSIS — Z95.0 PRESENCE OF PERMANENT CARDIAC PACEMAKER: ICD-10-CM

## 2022-08-03 DIAGNOSIS — Z95.5 S/P DRUG ELUTING CORONARY STENT PLACEMENT: ICD-10-CM

## 2022-08-03 DIAGNOSIS — I35.0 NONRHEUMATIC AORTIC VALVE STENOSIS: ICD-10-CM

## 2022-08-03 DIAGNOSIS — Z95.2 S/P AVR: ICD-10-CM

## 2022-08-03 DIAGNOSIS — I25.10 CORONARY ARTERY DISEASE INVOLVING NATIVE CORONARY ARTERY OF NATIVE HEART WITHOUT ANGINA PECTORIS: Primary | ICD-10-CM

## 2022-08-03 DIAGNOSIS — I49.5 SICK SINUS SYNDROME (HCC): ICD-10-CM

## 2022-08-03 DIAGNOSIS — I10 PRIMARY HYPERTENSION: ICD-10-CM

## 2022-08-03 PROCEDURE — 99214 OFFICE O/P EST MOD 30 MIN: CPT | Performed by: INTERNAL MEDICINE

## 2022-08-03 PROCEDURE — 93000 ELECTROCARDIOGRAM COMPLETE: CPT | Performed by: INTERNAL MEDICINE

## 2022-08-03 NOTE — PROGRESS NOTES
Cardiology Consultation     Donnie Pro  [id number]  10/7/1932  HEART & VASCULAR  [facility] [place] [address]    1  Coronary artery disease involving native coronary artery 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 pectoris  POCT ECG   2  Nonrheumatic aortic valve stenosis     3  Primary hypertension     4  Sick sinus syndrome ([address] )     5  S/P drug eluting coronary stent placement     6  S/P AVR     7  Presence of permanent cardiac pacemaker         Discussion/Summary:    1  Severe aortic stenosis - Last year his aortic stenosis progressed to severe and then earlier this year he was becoming more symptomatic  He went to CT surgery and went through TAVR workup  He ended up having an open AVR as his left coronary ostium takeoff was low in near the annulus  This went well without complications  After our next visit we will get a baseline echocardiogram   He will complete cardiac rehabilitation  He knows to take antibiotic prophylaxis for any dental procedures  2   History of permanent pacemaker - This was secondary to sick sinus syndrome  He had some sort of mechanical chest pain after his procedure that fortunately have greatly improved  He will continue to follow in our pacemaker clinic  He is 100% a paced, and about 50% V-paced  3   Paroxysmal atrial fibrillation - Found on pacemaker interrogation  He is predominantly atrially paced and has not had any recent episodes of atrial fibrillation with the last episode detected about 2 years ago  Anticoagulation is recommended however he has declined in the past   I suggested at least aspirin therapy  5   Hypertension - His blood pressure is elevated today but runs normal on other occasions  He should periodically check his blood pressure home and this will be followed cardiac rehab  We will see him back in 4 months        HPI:    Mr Nighat Moura comes in for follow-up given his cardiac history  He has a history of sick sinus syndrome and status post permanent pacemaker in March of 2016  He also has a history of moderate aortic stenosis, paroxysmal atrial fibrillation and hypertension  He is predominantly atrially paced  He was offered anticoagulation for stroke prevention, but declined  His prior cardiology care was through [facility], where he received as permanent pacemaker  Dewayne Palma gives me a history of having significant mechanical pain after his permanent pacemaker  He recalls coming out of anesthesia and felt as if somebody was pressing on his chest   It is unclear at this time what exactly had happened  I asked if there was a complication like an arrhythmia that required defibrillation, but he does not ever recall being told that  For close to 3 years he had on and off mechanical pains from his pacemaker  They have improved, and now he seems to describe the fleeting sharp chest pain that only lasts a few seconds  His pacemaker interrogations show that he is 100% A-paced, and V paced about 50% of the time  He has had a few episodes of atrial fibrillation detected by pacer interrogation in the past   He has had none recently  His echocardiograms through the years showed moderate aortic stenosis but we repeated 1 last year that showed progression to severe aortic stenosis  At that time he had minimal symptoms, but these changed in follow-up  When I last saw him earlier this year he was having progressive shortness of breath over the winter  He would go for walks and started noticing that he had to take breaks  He denies chest pain or syncope  At that point I referred him to CT surgery for TAVR evaluation  He went through all the preoperative testing that included cardiac catheterization and CTAs  He was found to have a significant 90% RCA stenosis that received a drug-eluting stent    He had an 80% OM1 stenosis but this was a small vessel supplying a small territory which was treated medically  His CTA did show that his left coronary ostium takeoff was very low and close to the aortic valve annulus  This put him at risk for left main occlusion with TAVR  CT surgery at that point recommended open AVR and after he thought about this and discussed it with his family, he decided to proceed  He had this performed on 05/16/22  This went well without complications  He was discharged about 4 days later  Gino Marvin has been enrolled in cardiac rehabilitation and he is progressing nicely  He has had some on and off mechanical chest wall pain from the incision but this is improving and it is very tolerable  His stamina is improving and he no longer has shortness of breath  He is back to his activities of daily living  He denies symptoms of angina  No symptoms of congestive heart failure  No palpitations, lightheadedness or syncope        Patient Active Problem List   Diagnosis    Nonrheumatic aortic valve stenosis    Chronic nonintractable headache    Enlarged prostate without lower urinary tract symptoms (luts)    GERD without esophagitis    Primary hypertension    Acquired hypothyroidism    Sick sinus syndrome (HCC)    Presence of permanent cardiac pacemaker    Paroxysmal atrial fibrillation (HCC)    Balance disorder    Coronary artery disease involving native coronary artery 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 pectoris    S/P drug eluting coronary stent placement    S/P AVR    Acute postoperative anemia due to expected blood loss    Pure hypercholesterolemia     Past Medical History:   Diagnosis Date    Angina pectoris (HCC)     Arthritis     Ascending aortic aneurysm (HCC)     trileaflet AV, 41mm    BPH (benign prostatic hyperplasia)     Cancer (HCC)     skin    Coronary artery disease     Former tobacco use     pipe & cigar use socially & infrequently    GERD (gastroesophageal reflux disease)     controlled w/ diet    Headache     h/o    Hepatitis A     History of melanoma     Hypertension     Hypothyroidism     Pacemaker     Severe aortic stenosis     Shortness of breath     with exertion    Vitamin B12 deficiency     Wears dentures      Social History     Socioeconomic History    Marital status: /Civil Union     Spouse name: Aguila Huang Number of children: Not on file    Years of education: Not on file    Highest education level: Not on file   Occupational History    Not on file   Tobacco Use    Smoking status: Former Smoker     Types: Cigars    Smokeless tobacco: Never Used    Tobacco comment: 50 years ago   Vaping Use    Vaping Use: Former   Substance and Sexual Activity    Alcohol use: Yes     Comment: a beer once in a while   Drug use: No    Sexual activity: Not on file   Other Topics Concern    Not on file   Social History Narrative    Living will on file    Supportive and safe    Lives with wife     Social Determinants of Health     Financial Resource Strain: Not on file   Food Insecurity: No Food Insecurity    Worried About Running Out of Food in the Last Year: Never true    [address] in the Last Year: Never true   Transportation Needs: No Transportation Needs    Lack of Transportation (Medical): No    Lack of Transportation (Non-Medical):  No   Physical Activity: Not on file   Stress: Not on file   Social Connections: Not on file   Intimate Partner Violence: Not on file   Housing Stability: Low Risk     Unable to Pay for Housing in the Last Year: No    Number of Places Lived in the Last Year: 1    Unstable Housing in the Last Year: No      Family History   Problem Relation Age of Onset    Cancer Family         gastrointestinal tract    Heart disease Mother     Substance Abuse Neg Hx     Mental illness Neg Hx      Past Surgical History:   Procedure Laterality Date    CARDIAC CATHETERIZATION  04/15/2022    Procedure: Cardiac catheterization;  Surgeon: Jacinta Robison DO;  Location:  CARDIAC CATH LAB; Service: Cardiology    CARDIAC CATHETERIZATION N/A 04/15/2022    Procedure: Cardiac Coronary Angiogram;  Surgeon: Juan Tapia DO;  [facility]; Service: Cardiology    CARDIAC CATHETERIZATION N/A 04/15/2022    Procedure: Cardiac pci;  Surgeon: Juan Tapia DO;  [facility];   Service: Cardiology    CARDIAC PACEMAKER PLACEMENT      COLONOSCOPY      INGUINAL HERNIA REPAIR Bilateral     TN RPLCMT AORTIC VALVE OPN W/STENTLESS TISSUE VALVE N/A 5/16/2022    Procedure: REPLACEMENT VALVE AORTIC (TISSUE AVR) WITH 25 MM AORTIC MAGNA EASE VALVE W/ ROJELIO;  Surgeon: Acosta Cheema DO;  Location[facility];  Service: Cardiac Surgery    SKIN CANCER EXCISION      melanoma, multiple       Current Outpatient Medications:     Ascorbic Acid (VITAMIN C) 500 MG CAPS, Take 1 tablet by mouth daily, Disp: , Rfl:     aspirin (ECOTRIN LOW STRENGTH) 81 mg EC tablet, Take 81 mg by mouth daily, Disp: , Rfl:     atorvastatin (LIPITOR) 20 mg tablet, Take 1 tablet (20 mg total) by mouth daily with dinner, Disp: 30 tablet, Rfl: 2    clopidogrel (Plavix) 75 mg tablet, Take 1 tablet (75 mg total) by mouth daily, Disp: 90 tablet, Rfl: 3    Cyanocobalamin (VITAMIN B12) 1000 MCG TBCR, Take 1 tablet by mouth daily, Disp: , Rfl:     Flaxseed, Linseed, (FLAXSEED OIL) 1000 MG CAPS, Take 1 capsule by mouth daily, Disp: , Rfl:     Glucosamine-Chondroit-Vit C-Mn (GLUCOSAMINE 1500 COMPLEX) CAPS, Take 1 capsule by mouth daily, Disp: , Rfl:     levothyroxine 100 mcg tablet, TAKE ONE TABLET BY MOUTH EVERY DAY, Disp: 90 tablet, Rfl: 3    metoprolol tartrate (LOPRESSOR) 25 mg tablet, Take 1 tablet (25 mg total) by mouth every 12 (twelve) hours, Disp: 180 tablet, Rfl: 3    Multiple Vitamin (MULTIVITAMIN) capsule, Take 1 capsule by mouth daily, Disp: , Rfl:     nitroglycerin (NITROSTAT) 0 4 mg SL tablet, Place 1 tablet (0 4 mg total) under the tongue every 5 (five) minutes as needed for chest pain, Disp: 24 tablet, Rfl: 1    tamsulosin (FLOMAX) 0 4 mg, Take 1 capsule (0 4 mg total) by mouth 2 (two) times a day, Disp: 90 capsule, Rfl: 3    mupirocin (BACTROBAN) 2 % ointment, , Disp: , Rfl:   Allergies   Allergen Reactions    Amoxicillin Other (See Comments)     Severe weakness     Vitals:    08/03/22 0906 08/03/22 0915 08/03/22 0917   BP: 162/92 158/90 150/82   BP Location: Left arm Left arm Left arm   Patient Position: Supine Sitting Standing   Cuff Size: Standard Standard Standard   Pulse: 101     Weight: 74 9 kg (165 lb 3 2 oz)     Height: 5' 8" (1 727 m)         Labs:  Lab Results   Component Value Date     06/15/2017    K 4 4 06/14/2022    CL 96 06/14/2022    CO2 23 06/14/2022    BUN 11 06/14/2022    CREATININE 0 92 06/14/2022    CREATININE 0 67 05/20/2022    CREATININE 0 89 06/15/2017    GLUCOSE 220 (H) 05/16/2022    GLUCOSE 110 (H) 06/15/2017    CALCIUM 8 4 05/20/2022    CALCIUM 9 2 06/15/2017     Lab Results   Component Value Date    WBC 4 7 06/14/2022    WBC 11 96 (H) 05/18/2022    WBC 5 5 06/15/2017    HGB 11 7 (L) 06/14/2022    HGB 8 8 (L) 05/18/2022    HGB 13 5 06/15/2017    HCT 34 2 (L) 06/14/2022    HCT 25 8 (L) 05/18/2022    HCT 40 2 06/15/2017    MCV 91 06/14/2022    MCV 93 05/18/2022    MCV 94 06/15/2017     06/14/2022     05/18/2022       Imaging:  ECG today shows AV sequential paced rhythm  ECHO(3/18/19):  LEFT VENTRICLE:  Systolic function was normal  Ejection fraction was estimated to be 55 %  There were no regional wall motion abnormalities  Wall thickness was mildly increased      MITRAL VALVE:  There was moderate annular calcification  There was mild regurgitation      AORTIC VALVE:  The valve was trileaflet  Leaflets exhibited moderately to markedly increased thickness, moderate calcification, mild to moderately reduced cuspal separation, and reduced mobility  There was mild to moderate stenosis  There was trace regurgitation    Valve mean gradient was 17 6 mmHg      TRICUSPID VALVE:  There was mild regurgitation      AORTA:  The root exhibited mild dilatation  4 0 cm      Review of Systems:  Review of Systems   Constitutional: Negative  HENT: Negative  Eyes: Negative  Respiratory: Negative  Cardiovascular: Negative  Gastrointestinal: Negative  Musculoskeletal: Positive for arthralgias  Skin: Negative  Allergic/Immunologic: Negative  Neurological: Negative  Hematological: Negative  Psychiatric/Behavioral: Negative  All other systems reviewed and are negative  Vitals:    08/03/22 0906 08/03/22 0915 08/03/22 0917   BP: 162/92 158/90 150/82   BP Location: Left arm Left arm Left arm   Patient Position: Supine Sitting Standing   Cuff Size: Standard Standard Standard   Pulse: 101     Weight: 74 9 kg (165 lb 3 2 oz)     Height: 5' 8" (1 727 m)         Physical Exam:  Physical Exam  Vitals and nursing note reviewed  Constitutional:       Appearance: He is well-developed  HENT:      Head: Normocephalic and atraumatic  Eyes:      General: No scleral icterus  Right eye: No discharge  Left eye: No discharge  Pupils: Pupils are equal, round, and reactive to light  Neck:      Thyroid: No thyromegaly  Vascular: No JVD  Cardiovascular:      Rate and Rhythm: Normal rate and regular rhythm  No extrasystoles are present  Pulses: Normal pulses  No decreased pulses  Heart sounds: S1 normal and S2 normal  Murmur heard  Systolic murmur is present with a grade of 3/6  No friction rub  No gallop  Pulmonary:      Effort: Pulmonary effort is normal  No respiratory distress  Breath sounds: Normal breath sounds  No wheezing or rales  Abdominal:      General: Bowel sounds are normal  There is no distension  Palpations: Abdomen is soft  Tenderness: There is no abdominal tenderness  Musculoskeletal:         General: No tenderness or deformity  Normal range of motion  Cervical back: Normal range of motion and neck supple  Skin:     General: Skin is warm and dry  Findings: No rash  Neurological:      Mental Status: He is alert and oriented to person, place, and time  Cranial Nerves: No cranial nerve deficit  Psychiatric:         Thought Content: Thought content normal          Judgment: Judgment normal        Counseling / Coordination of Care  Total office time spent today 25 minutes  Greater than 50% of total time was spent with the patient and / or family counseling and / or coordination of care

## 2022-08-04 ENCOUNTER — CLINICAL SUPPORT (OUTPATIENT)
Dept: CARDIAC REHAB | Facility: HOSPITAL | Age: 87
End: 2022-08-04
Payer: COMMERCIAL

## 2022-08-04 DIAGNOSIS — Z95.2 S/P AVR: Primary | ICD-10-CM

## 2022-08-04 PROCEDURE — 93798 PHYS/QHP OP CAR RHAB W/ECG: CPT

## 2022-08-05 ENCOUNTER — CLINICAL SUPPORT (OUTPATIENT)
Dept: CARDIAC REHAB | Facility: HOSPITAL | Age: 87
End: 2022-08-05
Payer: COMMERCIAL

## 2022-08-05 DIAGNOSIS — Z95.2 S/P AVR: Primary | ICD-10-CM

## 2022-08-05 PROCEDURE — 93798 PHYS/QHP OP CAR RHAB W/ECG: CPT

## 2022-08-08 ENCOUNTER — TELEPHONE (OUTPATIENT)
Dept: FAMILY MEDICINE CLINIC | Facility: HOSPITAL | Age: 87
End: 2022-08-08

## 2022-08-08 DIAGNOSIS — Z95.2 S/P AVR: Primary | ICD-10-CM

## 2022-08-08 RX ORDER — AZITHROMYCIN 500 MG/1
500 TABLET, FILM COATED ORAL EVERY 24 HOURS
Qty: 1 TABLET | Refills: 4 | Status: SHIPPED | OUTPATIENT
Start: 2022-08-08 | End: 2022-08-09

## 2022-08-09 ENCOUNTER — CLINICAL SUPPORT (OUTPATIENT)
Dept: CARDIAC REHAB | Facility: HOSPITAL | Age: 87
End: 2022-08-09
Payer: COMMERCIAL

## 2022-08-09 DIAGNOSIS — Z95.2 S/P AVR: Primary | ICD-10-CM

## 2022-08-09 PROCEDURE — 93798 PHYS/QHP OP CAR RHAB W/ECG: CPT

## 2022-08-11 ENCOUNTER — CLINICAL SUPPORT (OUTPATIENT)
Dept: CARDIAC REHAB | Facility: HOSPITAL | Age: 87
End: 2022-08-11
Payer: COMMERCIAL

## 2022-08-11 DIAGNOSIS — Z95.2 S/P AVR: Primary | ICD-10-CM

## 2022-08-11 PROCEDURE — 93798 PHYS/QHP OP CAR RHAB W/ECG: CPT

## 2022-08-12 ENCOUNTER — CLINICAL SUPPORT (OUTPATIENT)
Dept: CARDIAC REHAB | Facility: HOSPITAL | Age: 87
End: 2022-08-12
Payer: COMMERCIAL

## 2022-08-12 DIAGNOSIS — Z95.2 S/P AVR: Primary | ICD-10-CM

## 2022-08-12 PROCEDURE — 93798 PHYS/QHP OP CAR RHAB W/ECG: CPT

## 2022-08-16 ENCOUNTER — CLINICAL SUPPORT (OUTPATIENT)
Dept: CARDIAC REHAB | Facility: HOSPITAL | Age: 87
End: 2022-08-16
Payer: COMMERCIAL

## 2022-08-16 DIAGNOSIS — Z95.2 S/P AVR: Primary | ICD-10-CM

## 2022-08-16 PROCEDURE — 93798 PHYS/QHP OP CAR RHAB W/ECG: CPT

## 2022-08-18 ENCOUNTER — APPOINTMENT (OUTPATIENT)
Dept: CARDIAC REHAB | Facility: HOSPITAL | Age: 87
End: 2022-08-18
Payer: COMMERCIAL

## 2022-08-19 ENCOUNTER — OFFICE VISIT (OUTPATIENT)
Dept: UROLOGY | Facility: HOSPITAL | Age: 87
End: 2022-08-19
Payer: COMMERCIAL

## 2022-08-19 ENCOUNTER — CLINICAL SUPPORT (OUTPATIENT)
Dept: CARDIAC REHAB | Facility: HOSPITAL | Age: 87
End: 2022-08-19
Payer: COMMERCIAL

## 2022-08-19 VITALS
HEIGHT: 68 IN | BODY MASS INDEX: 25.01 KG/M2 | DIASTOLIC BLOOD PRESSURE: 76 MMHG | WEIGHT: 165 LBS | SYSTOLIC BLOOD PRESSURE: 138 MMHG

## 2022-08-19 DIAGNOSIS — Z95.2 S/P AVR: Primary | ICD-10-CM

## 2022-08-19 DIAGNOSIS — N40.1 BPH WITH URINARY OBSTRUCTION: Primary | ICD-10-CM

## 2022-08-19 DIAGNOSIS — N13.8 BPH WITH URINARY OBSTRUCTION: Primary | ICD-10-CM

## 2022-08-19 PROCEDURE — 99204 OFFICE O/P NEW MOD 45 MIN: CPT | Performed by: UROLOGY

## 2022-08-19 PROCEDURE — 93798 PHYS/QHP OP CAR RHAB W/ECG: CPT

## 2022-08-19 RX ORDER — FINASTERIDE 5 MG/1
5 TABLET, FILM COATED ORAL DAILY
Qty: 90 TABLET | Refills: 3 | Status: SHIPPED | OUTPATIENT
Start: 2022-08-19

## 2022-08-19 NOTE — PROGRESS NOTES
HISTORY:    Long-term BPH, but not helped much by Flomax  Difficulty starting, very slow flow, trickles towards the end for a long time  Nocturia times 1-3  No hematuria infections stones  ASSESSMENT / PLAN:    Prostate is enlarged on rectal, CT scan shows poor bladder emptying in April 2022  We will add finasteride to his meds, but I suspect he may need TURP if he does not have a good improvement    Re-evaluate in two months  Cysto at that time if not totally better  The following portions of the patient's history were reviewed and updated as appropriate: allergies, current medications, past family history, past medical history, past social history, past surgical history and problem list     Review of Systems   All other systems reviewed and are negative  Objective:     Physical Exam  Constitutional:       General: He is not in acute distress  Appearance: He is well-developed  He is not diaphoretic  HENT:      Head: Normocephalic and atraumatic  Eyes:      General: No scleral icterus  Pulmonary:      Effort: Pulmonary effort is normal    Genitourinary:     Comments: Penis testes normal    Prostate markedly enlarged no nodules  Skin:     Coloration: Skin is not pale  Neurological:      Mental Status: He is alert and oriented to person, place, and time  Psychiatric:         Behavior: Behavior normal          Thought Content:  Thought content normal          Judgment: Judgment normal            No results found for: PSA]  BUN   Date Value Ref Range Status   06/14/2022 11 8 - 27 mg/dL Final     Creatinine   Date Value Ref Range Status   06/14/2022 0 92 0 76 - 1 27 mg/dL Final   05/20/2022 0 67 0 60 - 1 30 mg/dL Final     Comment:     Standardized to IDMS reference method   06/15/2017 0 89 0 76 - 1 27 mg/dL Final     No components found for: CBC      Patient Active Problem List   Diagnosis    Nonrheumatic aortic valve stenosis    Chronic nonintractable headache    Enlarged prostate without lower urinary tract symptoms (luts)    GERD without esophagitis    Primary hypertension    Acquired hypothyroidism    Sick sinus syndrome (HCC)    Presence of permanent cardiac pacemaker    Paroxysmal atrial fibrillation (HCC)    Balance disorder    Coronary artery disease involving native coronary artery 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 pectoris    S/P drug eluting coronary stent placement    S/P AVR    Acute postoperative anemia due to expected blood loss    Pure hypercholesterolemia        Diagnoses and all orders for this visit:    BPH with urinary obstruction  -     finasteride (PROSCAR) 5 mg tablet; Take 1 tablet (5 mg total) by mouth daily           Patient ID: Mathieu Pino is a 80 y o  male        Current Outpatient Medications:     Ascorbic Acid (VITAMIN C) 500 MG CAPS, Take 1 tablet by mouth daily, Disp: , Rfl:     aspirin (ECOTRIN LOW STRENGTH) 81 mg EC tablet, Take 81 mg by mouth daily, Disp: , Rfl:     atorvastatin (LIPITOR) 20 mg tablet, Take 1 tablet (20 mg total) by mouth daily with dinner, Disp: 30 tablet, Rfl: 2    clopidogrel (Plavix) 75 mg tablet, Take 1 tablet (75 mg total) by mouth daily, Disp: 90 tablet, Rfl: 3    Cyanocobalamin (VITAMIN B12) 1000 MCG TBCR, Take 1 tablet by mouth daily, Disp: , Rfl:     finasteride (PROSCAR) 5 mg tablet, Take 1 tablet (5 mg total) by mouth daily, Disp: 90 tablet, Rfl: 3    Flaxseed, Linseed, (FLAXSEED OIL) 1000 MG CAPS, Take 1 capsule by mouth daily, Disp: , Rfl:     Glucosamine-Chondroit-Vit C-Mn (GLUCOSAMINE 1500 COMPLEX) CAPS, Take 1 capsule by mouth daily, Disp: , Rfl:     levothyroxine 100 mcg tablet, TAKE ONE TABLET BY MOUTH EVERY DAY, Disp: 90 tablet, Rfl: 3    metoprolol tartrate (LOPRESSOR) 25 mg tablet, Take 1 tablet (25 mg total) by mouth every 12 (twelve) hours, Disp: 180 tablet, Rfl: 3    Multiple Vitamin (MULTIVITAMIN) capsule, Take 1 capsule by mouth daily, Disp: , Rfl:     nitroglycerin (NITROSTAT) 0 4 mg SL tablet, Place 1 tablet (0 4 mg total) under the tongue every 5 (five) minutes as needed for chest pain, Disp: 24 tablet, Rfl: 1    tamsulosin (FLOMAX) 0 4 mg, Take 1 capsule (0 4 mg total) by mouth 2 (two) times a day, Disp: 90 capsule, Rfl: 3    mupirocin (BACTROBAN) 2 % ointment, , Disp: , Rfl:     Past Medical History:   Diagnosis Date    Angina pectoris (HCC)     Arthritis     Ascending aortic aneurysm (HCC)     trileaflet AV, 41mm    BPH (benign prostatic hyperplasia)     Cancer (HCC)     skin    Coronary artery disease     Former tobacco use     pipe & cigar use socially & infrequently    GERD (gastroesophageal reflux disease)     controlled w/ diet    Headache     h/o    Hepatitis A     History of melanoma     Hypertension     Hypothyroidism     Pacemaker     Severe aortic stenosis     Shortness of breath     with exertion    Vitamin B12 deficiency     Wears dentures        Past Surgical History:   Procedure Laterality Date    CARDIAC CATHETERIZATION  04/15/2022    Procedure: Cardiac catheterization;  Surgeon: Mónica Oconnell DO;  [facility]; Service: Cardiology    CARDIAC CATHETERIZATION N/A 04/15/2022    Procedure: Cardiac Coronary Angiogram;  Surgeon: Mónica Oconnell DO;  [facility]; Service: Cardiology    CARDIAC CATHETERIZATION N/A 04/15/2022    Procedure: Cardiac pci;  Surgeon: Mónica Oconnell DO;  [facility];   Service: Cardiology    CARDIAC PACEMAKER PLACEMENT      COLONOSCOPY      INGUINAL HERNIA REPAIR Bilateral     CO RPLCMT AORTIC VALVE OPN W/STENTLESS TISSUE VALVE N/A 5/16/2022    Procedure: REPLACEMENT VALVE AORTIC (TISSUE AVR) WITH 25 MM AORTIC MAGNA EASE VALVE W/ ROJELIO;  Surgeon: Tawnya Alicea DO;  [facility];  Service: Cardiac Surgery    SKIN CANCER EXCISION      melanoma, multiple       Social History

## 2022-08-23 ENCOUNTER — CLINICAL SUPPORT (OUTPATIENT)
Dept: CARDIAC REHAB | Facility: HOSPITAL | Age: 87
End: 2022-08-23
Payer: COMMERCIAL

## 2022-08-23 DIAGNOSIS — Z95.2 S/P AVR: Primary | ICD-10-CM

## 2022-08-23 PROCEDURE — 93798 PHYS/QHP OP CAR RHAB W/ECG: CPT

## 2022-08-23 NOTE — PROGRESS NOTES
Cardiac Rehabilitation Plan of Care   60 Day Reassessment          Today's date: 2022   # of Exercise Sessions Completed: 25  Patient name: Amina Jennings      : 10/7/1932  Age: 80 y o  MRN: [id number]  Referring Physician: Dr Kenya Iqbal  Cardiologist: Dr Jacki Clay  Provider: Nikhil  Clinician: Jori Benitez MS, CEP        Dx:   Encounter Diagnosis   Name Primary?  S/P AVR Yes     Date of onset: 2022      SUMMARY OF PROGRESS:  Mr Xenia Paul continues to participate in his cardiac rehabilitation after recent AVR on 22  He reports doing well overall with recovery and is anxious to get back to being able to use his chain saw to cut down trees on his property  He has resumed mowing and taking care of 10 acre property since surgery  He has been experiencing severe pain in his chest after doing heavy upper body work at home working on his tractor, etc  At this time he is reporting less pain and is trying to take it easy with activity at home as much as he can  He likes to work and is used to staying busy all day, so he is having a hard time cutting back on heavier activity  He has started an upper body strength training program at rehab, and has been tolerating it well and not reporting any chest soreness  His main goal is to be able to use his chain saw  He has been advised that he can start using his chain saw after this week as long as he does not have any more chest soreness after weight training if cleared by Dr Ynes Henderson  I continue to advise patient to slow down and let the healing process work  He is tolerating increasing workloads well and have been able to add upper body exercise into his program as chest soreness has decreased  He and his wife do not follow a specific diet, but try to make healthy choices  He denies depression (PHQ-9=0) or anxiety (SILVIA-7=0) at this time  He has great support from his wife and family    He is completing 45-60 minutes of aerobic exercise reaching 3 7-4 4 METs on different modalities such as the TRM, Lifecycle, Nustep, and UBE  No changes to telemetry  Resting BP has been elevated at times with range of 124//110  He has appropriate hemodynamic response to exercise 126//100  He is not completing a home exercise plan since he is very active at home around his house  He is attending weekly group education classes on cardiac risk factors  Will continue to educate, monitor, and progress as tolerated in the next 30 days         Medication compliance: Yes   Comments: Pt reports to be compliant with medications  Fall Risk: Low   Comments: Ambulates with a steady gait with no assist device    EKG Interpretation: NSR      EXERCISE ASSESSMENT and PLAN    Current Exercise Program in Rehab:       Frequency: 3 days/week Supplement with home exercise 2+ days/wk as tolerated       Minutes: 45-60        METS: 3 7-4 4            HR: 30 beats above resting   RPE: 4-6         Modalities: Treadmill, UBE, Lifecycle, NuStep and Recumbent bike      Exercise Progression 30 Day Goals :    Frequency: 3 days/week of cardiac rehab     Supplement with home exercise 2+ days/wk as tolerated    Minutes: 45-60                            >150 mins/wk of moderate intensity exercise   METS:  4 0-4 7   HR: 30 beats above resting    RPE: 4-6   Modalities: Treadmill, UBE, Lifecycle, NuStep and Recumbent bike    Strength trainin-3 days / week  12-15 repetitions  1-2 sets per modality   Will be added following at least 8 weeks post surgery and 8-10 monitored sessions  Will be added following 2-3 weeks of monitored exercise sessions   Modalities: Pull Downs, Lateral Raise, Arm Extension, Arm Curl, Sit to [employer], Shoulder Shrugs and leg ext    Home Exercise: no regular aerobic exercise, very active walking and working around his 10 acre property    Goals: 10% improvement in functional capacity - based on max METs achieved in fitness assessment, improved DASI score by 10%, Increase in exercise capacity by 40% - based on peak METs tolerated in cardiac rehab exercise session, Exercise 5 days/wk, >150mins/wk of moderate intensity exercise, Attend Rehab regularly and start a home exercise program    Progression Toward Goals:  Pt is progressing and showing improvement  toward the following goals:  increasing overall strength and endurance with exercise, increasing MET levels appropiriately, has started upper body strength training and is tolerating well with no chest soreness, is reporting less chest soreness after working at home  Education: benefit of exercise for CAD risk factors, home exercise guidelines, AHA guidelines to achieve >150 mins/wk of moderate exercise and RPE scale   Plan:education on home exercise guidelines, home exercise 30+ mins 2 days opposite CR and Education class: Risk Factors for Heart Disease  Readiness to change: Maintenance: (Maintaining the behavior change)      NUTRITION ASSESSMENT AND PLAN    Weight control:    Starting weight: 158 8 lb   Current weight:   165 lbs     Diabetes: N/A  A1c: 5 7    last measured: 5/10/2022    Lipid management: 5/10/2022: chol 168, Trig 85, HDL 45,     Goals:LDL <100, HDL >40, TRG <150 and CHOL <200    Progression Toward Goals: Pt is progressing and showing improvement  toward the following goals:  reports staying hydrated on days he is outside in the heat    , Pt has not made progress toward the following goals: weight gain since starting rehab (no concern with weight) and no changes to diet since intial evaluation   , Patient will continue to choose heart healthy options  in the next 30 days, Will continue to educate and progress as tolerated      Education: heart healthy eating  low sodium diet  hydration  Plan: Education class: Reading Food Labels and Education Class: Heart Healthy Eating  Readiness to change: Action:  (Changing behavior)      PSYCHOSOCIAL ASSESSMENT AND PLAN    Emotional:  Depression assessment:  PHQ-9 = 0 =No Depression            Anxiety measure:  SILVIA-7 = 0-4  = Not anxious  Self-reported stress level:  1  Social support: Excellent-wife and family    Goals:  Reduce perceived stress to 1-3/10, improved [facility] QOL < 27, Physical Fitness in [facility] Score < 3 and increased energy    Progression Toward Goals: Pt is progressing and showing improvement  toward the following goals:  denies depression and anxiety and great support system   , Patient will manage stress with ongoing chest sorensss and start listening to staff (patient getting impatient with recovery process which is causing him some stress)  in the next 30 days, Will continue to educate and progress as tolerated      Education: signs/sxs of depression and benefits of a positive support system  Plan: Class: Stress and Your Health and Class: Relaxation  Readiness to change: Action:  (Changing behavior)      OTHER CORE COMPONENTS     Tobacco:   Social History     Tobacco Use   Smoking Status Former Smoker    Types: Cigars   Smokeless Tobacco Never Used   Tobacco Comment    50 years ago       Tobacco Use Intervention:   N/A:  Patient is a non-smoker     Anginal Symptoms:  None   NTG use: No prescription    Blood pressure:    Restin//110   Exercise: 126//100    Goals: consistent BP < 130/80, reduced dietary sodium <2300mg, moderate intensity exercise >150 mins/wk, medication compliance and reduce number of medications  needed for BP control    Progression Toward Goals: Pt is progressing and showing improvement  toward the following goals:  medication compliance and reaching >150 mins/week of exercise with rehab and active around the house     , Pt has not made progress toward the following goals: BPs mostly within normal limits (have been higher due to stress) and  appropriate hemodynmaic response to exercise   , Patient will watch salt intake and manage stress to reduce hypertension at rest  in the next 30 days, Will continue to educate and progress as tolerated      Education:  understanding high blood pressure and it's relationship to CAD and low sodium diet and HTN  Plan: Class: Understanding Heart Disease and Class: Common Heart Medications  Readiness to change: Action:  (Changing behavior)

## 2022-08-25 ENCOUNTER — CLINICAL SUPPORT (OUTPATIENT)
Dept: CARDIAC REHAB | Facility: HOSPITAL | Age: 87
End: 2022-08-25
Payer: COMMERCIAL

## 2022-08-25 DIAGNOSIS — Z95.2 S/P AVR: Primary | ICD-10-CM

## 2022-08-25 PROCEDURE — 93798 PHYS/QHP OP CAR RHAB W/ECG: CPT

## 2022-08-26 ENCOUNTER — APPOINTMENT (OUTPATIENT)
Dept: CARDIAC REHAB | Facility: HOSPITAL | Age: 87
End: 2022-08-26
Payer: COMMERCIAL

## 2022-08-26 ENCOUNTER — OFFICE VISIT (OUTPATIENT)
Dept: FAMILY MEDICINE CLINIC | Facility: HOSPITAL | Age: 87
End: 2022-08-26
Payer: COMMERCIAL

## 2022-08-26 DIAGNOSIS — I10 PRIMARY HYPERTENSION: ICD-10-CM

## 2022-08-26 DIAGNOSIS — E78.00 PURE HYPERCHOLESTEROLEMIA: ICD-10-CM

## 2022-08-26 DIAGNOSIS — Z95.5 S/P DRUG ELUTING CORONARY STENT PLACEMENT: Primary | ICD-10-CM

## 2022-08-26 DIAGNOSIS — Z95.2 S/P AVR: ICD-10-CM

## 2022-08-26 PROBLEM — D62 ACUTE POSTOPERATIVE ANEMIA DUE TO EXPECTED BLOOD LOSS: Status: RESOLVED | Noted: 2022-05-17 | Resolved: 2022-08-26

## 2022-08-26 PROCEDURE — 1170F FXNL STATUS ASSESSED: CPT | Performed by: INTERNAL MEDICINE

## 2022-08-26 PROCEDURE — 3725F SCREEN DEPRESSION PERFORMED: CPT | Performed by: INTERNAL MEDICINE

## 2022-08-26 PROCEDURE — 3288F FALL RISK ASSESSMENT DOCD: CPT | Performed by: INTERNAL MEDICINE

## 2022-08-26 PROCEDURE — 1160F RVW MEDS BY RX/DR IN RCRD: CPT | Performed by: INTERNAL MEDICINE

## 2022-08-26 PROCEDURE — 99214 OFFICE O/P EST MOD 30 MIN: CPT | Performed by: INTERNAL MEDICINE

## 2022-08-26 PROCEDURE — 1125F AMNT PAIN NOTED PAIN PRSNT: CPT | Performed by: INTERNAL MEDICINE

## 2022-08-26 PROCEDURE — G0439 PPPS, SUBSEQ VISIT: HCPCS | Performed by: INTERNAL MEDICINE

## 2022-08-26 NOTE — PATIENT INSTRUCTIONS
Medicare Preventive Visit Patient Instructions  Thank you for completing your Welcome to Medicare Visit or Medicare Annual Wellness Visit today  Your next wellness visit will be due in one year (8/27/2023)  The screening/preventive services that you may require over the next 5-10 years are detailed below  Some tests may not apply to you based off risk factors and/or age  Screening tests ordered at today's visit but not completed yet may show as past due  Also, please note that scanned in results may not display below  Preventive Screenings:  Service Recommendations Previous Testing/Comments   Colorectal Cancer Screening  · Colonoscopy    · Fecal Occult Blood Test (FOBT)/Fecal Immunochemical Test (FIT)  · Fecal DNA/Cologuard Test  · Flexible Sigmoidoscopy Age: 39-70 years old   Colonoscopy: every 10 years (May be performed more frequently if at higher risk)  OR  FOBT/FIT: every 1 year  OR  Cologuard: every 3 years  OR  Sigmoidoscopy: every 5 years  Screening may be recommended earlier than age 39 if at higher risk for colorectal cancer  Also, an individualized decision between you and your healthcare provider will decide whether screening between the ages of 74-80 would be appropriate   Colonoscopy: Not on file  FOBT/FIT: Not on file  Cologuard: Not on file  Sigmoidoscopy: Not on file    Screening Not Indicated     Prostate Cancer Screening Individualized decision between patient and health care provider in men between ages of 53-78   Medicare will cover every 12 months beginning on the day after your 50th birthday PSA: No results in last 5 years     Screening Not Indicated     Hepatitis C Screening Once for adults born between 1945 and 1965  More frequently in patients at high risk for Hepatitis C Hep C Antibody: Not on file        Diabetes Screening 1-2 times per year if you're at risk for diabetes or have pre-diabetes Fasting glucose: 101 mg/dL (5/10/2022)  A1C: 5 7 % (5/10/2022)  Screening Current Cholesterol Screening Once every 5 years if you don't have a lipid disorder  May order more often based on risk factors  Lipid panel: 05/10/2022  Screening Not Indicated  History Lipid Disorder      Other Preventive Screenings Covered by Medicare:  1  Abdominal Aortic Aneurysm (AAA) Screening: covered once if your at risk  You're considered to be at risk if you have a family history of AAA or a male between the age of 73-68 who smoking at least 100 cigarettes in your lifetime  2  Lung Cancer Screening: covers low dose CT scan once per year if you meet all of the following conditions: (1) Age 50-69; (2) No signs or symptoms of lung cancer; (3) Current smoker or have quit smoking within the last 15 years; (4) You have a tobacco smoking history of at least 20 pack years (packs per day x number of years you smoked); (5) You get a written order from a healthcare provider  3  Glaucoma Screening: covered annually if you're considered high risk: (1) You have diabetes OR (2) Family history of glaucoma OR (3)  aged 48 and older OR (3)  American aged 72 and older  3  Osteoporosis Screening: covered every 2 years if you meet one of the following conditions: (1) Have a vertebral abnormality; (2) On glucocorticoid therapy for more than 3 months; (3) Have primary hyperparathyroidism; (4) On osteoporosis medications and need to assess response to drug therapy  5  HIV Screening: covered annually if you're between the age of 12-76  Also covered annually if you are younger than 13 and older than 72 with risk factors for HIV infection  For pregnant patients, it is covered up to 3 times per pregnancy      Immunizations:  Immunization Recommendations   Influenza Vaccine Annual influenza vaccination during flu season is recommended for all persons aged >= 6 months who do not have contraindications   Pneumococcal Vaccine   * Pneumococcal conjugate vaccine = PCV13 (Prevnar 13), PCV15 (Vaxneuvance), PCV20 (Prevnar 20)  * Pneumococcal polysaccharide vaccine = PPSV23 (Pneumovax) Adults 2364 years old: 1-3 doses may be recommended based on certain risk factors  Adults 72 years old: 1-2 doses may be recommended based off what pneumonia vaccine you previously received   Hepatitis B Vaccine 3 dose series if at intermediate or high risk (ex: diabetes, end stage renal disease, liver disease)   Tetanus (Td) Vaccine - COST NOT COVERED BY MEDICARE PART B Following completion of primary series, a booster dose should be given every 10 years to maintain immunity against tetanus  Td may also be given as tetanus wound prophylaxis  Tdap Vaccine - COST NOT COVERED BY MEDICARE PART B Recommended at least once for all adults  For pregnant patients, recommended with each pregnancy  Shingles Vaccine (Shingrix) - COST NOT COVERED BY MEDICARE PART B  2 shot series recommended in those aged 48 and above     Health Maintenance Due:  There are no preventive care reminders to display for this patient  Immunizations Due:      Topic Date Due    COVID-19 Vaccine (4 - Booster for Moderna series) 03/02/2022    Influenza Vaccine (1) 09/01/2022     Advance Directives   What are advance directives? Advance directives are legal documents that state your wishes and plans for medical care  These plans are made ahead of time in case you lose your ability to make decisions for yourself  Advance directives can apply to any medical decision, such as the treatments you want, and if you want to donate organs  What are the types of advance directives? There are many types of advance directives, and each state has rules about how to use them  You may choose a combination of any of the following:  · Living will: This is a written record of the treatment you want  You can also choose which treatments you do not want, which to limit, and which to stop at a certain time  This includes surgery, medicine, IV fluid, and tube feedings     · Durable power of  for healthcare [facility]): This is a written record that states who you want to make healthcare choices for you when you are unable to make them for yourself  This person, called a proxy, is usually a family member or a friend  You may choose more than 1 proxy  · Do not resuscitate (DNR) order:  A DNR order is used in case your heart stops beating or you stop breathing  It is a request not to have certain forms of treatment, such as CPR  A DNR order may be included in other types of advance directives  · Medical directive: This covers the care that you want if you are in a coma, near death, or unable to make decisions for yourself  You can list the treatments you want for each condition  Treatment may include pain medicine, surgery, blood transfusions, dialysis, IV or tube feedings, and a ventilator (breathing machine)  · Values history: This document has questions about your views, beliefs, and how you feel and think about life  This information can help others choose the care that you would choose  Why are advance directives important? An advance directive helps you control your care  Although spoken wishes may be used, it is better to have your wishes written down  Spoken wishes can be misunderstood, or not followed  Treatments may be given even if you do not want them  An advance directive may make it easier for your family to make difficult choices about your care  Weight Management   Why it is important to manage your weight:  Being overweight increases your risk of health conditions such as heart disease, high blood pressure, type 2 diabetes, and certain types of cancer  It can also increase your risk for osteoarthritis, sleep apnea, and other respiratory problems  Aim for a slow, steady weight loss  Even a small amount of weight loss can lower your risk of health problems  How to lose weight safely:  A safe and healthy way to lose weight is to eat fewer calories and get regular exercise   You can lose up about 1 pound a week by decreasing the number of calories you eat by 500 calories each day  Healthy meal plan for weight management:  A healthy meal plan includes a variety of foods, contains fewer calories, and helps you stay healthy  A healthy meal plan includes the following:  · Eat whole-grain foods more often  A healthy meal plan should contain fiber  Fiber is the part of grains, fruits, and vegetables that is not broken down by your body  Whole-grain foods are healthy and provide extra fiber in your diet  Some examples of whole-grain foods are whole-wheat breads and pastas, oatmeal, brown rice, and bulgur  · Eat a variety of vegetables every day  Include dark, leafy greens such as spinach, kale, kiki greens, and mustard greens  Eat yellow and orange vegetables such as carrots, sweet potatoes, and winter squash  · Eat a variety of fruits every day  Choose fresh or canned fruit (canned in its own juice or light syrup) instead of juice  Fruit juice has very little or no fiber  · Eat low-fat dairy foods  Drink fat-free (skim) milk or 1% milk  Eat fat-free yogurt and low-fat cottage cheese  Try low-fat cheeses such as mozzarella and other reduced-fat cheeses  · Choose meat and other protein foods that are low in fat  Choose beans or other legumes such as split peas or lentils  Choose fish, skinless poultry (chicken or turkey), or lean cuts of red meat (beef or pork)  Before you cook meat or poultry, cut off any visible fat  · Use less fat and oil  Try baking foods instead of frying them  Add less fat, such as margarine, sour cream, regular salad dressing and mayonnaise to foods  Eat fewer high-fat foods  Some examples of high-fat foods include french fries, doughnuts, ice cream, and cakes  · Eat fewer sweets  Limit foods and drinks that are high in sugar  This includes candy, cookies, regular soda, and sweetened drinks    Exercise:  Exercise at least 30 minutes per day on most days of the week  Some examples of exercise include walking, biking, dancing, and swimming  You can also fit in more physical activity by taking the stairs instead of the elevator or parking farther away from stores  Ask your healthcare provider about the best exercise plan for you  © Copyright [employer] 2018 Information is for End User's use only and may not be sold, redistributed or otherwise used for commercial purposes   All illustrations and images included in CareNotes® are the copyrighted property of A [employer]  or [address] [place] [address]

## 2022-08-26 NOTE — PROGRESS NOTES
Assessment and Plan:     Problem List Items Addressed This Visit    None     Visit Diagnoses     Medicare annual wellness visit, subsequent    -  Primary           Preventive health issues were discussed with patient, and age appropriate screening tests were ordered as noted in patient's After Visit Summary  Personalized health advice and appropriate referrals for health education or preventive services given if needed, as noted in patient's After Visit Summary       History of Present Illness:     Patient presents for a Medicare Wellness Visit    HPI   Patient Care Team:  Manju Anderson MD as PCP - General  Manju Anderson MD     Review of Systems:     Review of Systems     Problem List:     Patient Active Problem List   Diagnosis    Nonrheumatic aortic valve stenosis    Chronic nonintractable headache    Enlarged prostate without lower urinary tract symptoms (luts)    GERD without esophagitis    Primary hypertension    Acquired hypothyroidism    Sick sinus syndrome (HCC)    Presence of permanent cardiac pacemaker    Paroxysmal atrial fibrillation (HCC)    Balance disorder    Coronary artery disease involving native coronary artery 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 pectoris    S/P drug eluting coronary stent placement    S/P AVR    Acute postoperative anemia due to expected blood loss    Pure hypercholesterolemia      Past Medical and Surgical History:     Past Medical History:   Diagnosis Date    [facility]     Arthritis     Ascending aortic aneurysm (HCC)     trileaflet AV, 41mm    BPH (benign prostatic hyperplasia)     Cancer ([facility] )     skin    Coronary artery disease     Former tobacco use     pipe & cigar use socially & infrequently    GERD (gastroesophageal reflux disease)     controlled w/ diet    Headache     h/o    Hepatitis A     History of melanoma     Hypertension     Hypothyroidism     Pacemaker     Severe aortic stenosis     Shortness of breath     with exertion    Vitamin B12 deficiency     Wears dentures      Past Surgical History:   Procedure Laterality Date    CARDIAC CATHETERIZATION  04/15/2022    Procedure: Cardiac catheterization;  Surgeon: Pamella Flores DO;  [facility]; Service: Cardiology    CARDIAC CATHETERIZATION N/A 04/15/2022    Procedure: Cardiac Coronary Angiogram;  Surgeon: Pamella Flores DO;  [facility]; Service: Cardiology    CARDIAC CATHETERIZATION N/A 04/15/2022    Procedure: Cardiac pci;  Surgeon: Pamella Flores DO;  [facility]; Service: Cardiology    CARDIAC PACEMAKER PLACEMENT      COLONOSCOPY      INGUINAL HERNIA REPAIR Bilateral     NV RPLCMT AORTIC VALVE OPN W/STENTLESS TISSUE VALVE N/A 5/16/2022    Procedure: REPLACEMENT VALVE AORTIC (TISSUE AVR) WITH 25 MM AORTIC MAGNA EASE VALVE W/ ROJELIO;  Surgeon: Tasha Elaine DO;  Location: [facility];  Service: Cardiac Surgery    SKIN CANCER EXCISION      melanoma, multiple      Family History:     Family History   Problem Relation Age of Onset    Cancer Family         gastrointestinal tract    Heart disease Mother     Substance Abuse Neg Hx     Mental illness Neg Hx       Social History:     Social History     Socioeconomic History    Marital status: /Civil Union     Spouse name: Smooth Mcwilliams Number of children: None    Years of education: None    Highest education level: None   Occupational History    None   Tobacco Use    Smoking status: Former Smoker     Types: Cigars    Smokeless tobacco: Never Used    Tobacco comment: 50 years ago   Vaping Use    Vaping Use: Former   Substance and Sexual Activity    Alcohol use: Yes     Comment: a beer once in a while      Drug use: No    Sexual activity: None   Other Topics Concern    None   Social History Narrative    Living will on file    Supportive and safe    Lives with wife     Social Determinants of Health     Financial Resource Strain: Low Risk     Difficulty of Paying Living Expenses: Not hard at all   Food Insecurity: No Food Insecurity    Worried About Running Out of Food in the Last Year: Never true    Ran Out of Food in the Last Year: Never true   Transportation Needs: No Transportation Needs    Lack of Transportation (Medical): No    Lack of Transportation (Non-Medical): No   Physical Activity: Sufficiently Active    Days of Exercise per Week: 7 days    Minutes of Exercise per Session: 120 min   Stress: No Stress Concern Present    Feeling of Stress : Not at all   Social Connections: Socially Integrated    Frequency of Communication with Friends and Family: Once a week    Frequency of Social Gatherings with Friends and Family: Three times a week    Attends [employer] Services: 1 to 4 times per year   [facility] or Organizations:  Yes    Attends Club or Organization Meetings: 1 to 4 times per year    Marital Status:    Intimate Partner Violence: Not At Risk    Fear of Current or Ex-Partner: No    Emotionally Abused: No    Physically Abused: No    Sexually Abused: No   Housing Stability: Low Risk     Unable to Pay for Housing in the Last Year: No    Number of Places Lived in the Last Year: 1    Unstable Housing in the Last Year: No      Medications and Allergies:     Current Outpatient Medications   Medication Sig Dispense Refill    Ascorbic Acid (VITAMIN C) 500 MG CAPS Take 1 tablet by mouth daily      aspirin (ECOTRIN LOW STRENGTH) 81 mg EC tablet Take 81 mg by mouth daily      atorvastatin (LIPITOR) 20 mg tablet Take 1 tablet (20 mg total) by mouth daily with dinner 30 tablet 2    clopidogrel (Plavix) 75 mg tablet Take 1 tablet (75 mg total) by mouth daily 90 tablet 3    Cyanocobalamin (VITAMIN B12) 1000 MCG TBCR Take 1 tablet by mouth daily      finasteride (PROSCAR) 5 mg tablet Take 1 tablet (5 mg total) by mouth daily 90 tablet 3    Flaxseed, Linseed, (FLAXSEED OIL) 1000 MG CAPS Take 1 capsule by mouth daily      Glucosamine-Chondroit-Vit C-Mn (GLUCOSAMINE 1500 COMPLEX) CAPS Take 1 capsule by mouth daily      levothyroxine 100 mcg tablet TAKE ONE TABLET BY MOUTH EVERY DAY 90 tablet 3    metoprolol tartrate (LOPRESSOR) 25 mg tablet Take 1 tablet (25 mg total) by mouth every 12 (twelve) hours 180 tablet 3    Multiple Vitamin (MULTIVITAMIN) capsule Take 1 capsule by mouth daily      nitroglycerin (NITROSTAT) 0 4 mg SL tablet Place 1 tablet (0 4 mg total) under the tongue every 5 (five) minutes as needed for chest pain 24 tablet 1    tamsulosin (FLOMAX) 0 4 mg Take 1 capsule (0 4 mg total) by mouth 2 (two) times a day 90 capsule 3    mupirocin (BACTROBAN) 2 % ointment        No current facility-administered medications for this visit  Allergies   Allergen Reactions    Amoxicillin Other (See Comments)     Severe weakness      Immunizations:     Immunization History   Administered Date(s) Administered    COVID-19 MODERNA VACC 0 5 ML IM 01/18/2021, 02/15/2021, 11/02/2021    Influenza Split High Dose Preservative Free IM 10/06/2014, 10/19/2015, 10/23/2017, 10/01/2019    Influenza, high dose seasonal 0 7 mL 10/05/2018, 10/07/2020    Influenza, seasonal, injectable 10/01/2012, 10/15/2014    Pneumococcal Conjugate 13-Valent 10/05/2018    Pneumococcal Polysaccharide PPV23 10/01/2012    Zoster 06/19/2008      Health Maintenance: There are no preventive care reminders to display for this patient  Topic Date Due    COVID-19 Vaccine (4 - Booster for Moderna series) 03/02/2022    Influenza Vaccine (1) 09/01/2022      Medicare Screening Tests and Risk Assessments:     Janelle Nath is here for his Subsequent Wellness visit  Health Risk Assessment:   Patient rates overall health as good  Patient feels that their physical health rating is slightly better  Patient is satisfied with their life  Eyesight was rated as same  Hearing was rated as same   Patient feels that their emotional and mental health rating is same  Patients states they are sometimes angry  Patient states they are sometimes unusually tired/fatigued  Pain experienced in the last 7 days has been none  Patient states that he has experienced no weight loss or gain in last 6 months  Depression Screening:   PHQ-2 Score: 0      Fall Risk Screening: In the past year, patient has experienced: no history of falling in past year      Home Safety:  Patient does not have trouble with stairs inside or outside of their home  Patient has working smoke alarms and has working carbon monoxide detector  Home safety hazards include: none  Nutrition:   Current diet is No Added Salt  Medications:   Patient is currently taking over-the-counter supplements  OTC medications include: see medication list  Patient is not able to manage medications  Activities of Daily Living (ADLs)/Instrumental Activities of Daily Living (IADLs):   Walk and transfer into and out of bed and chair?: Yes  Dress and groom yourself?: Yes    Bathe or shower yourself?: Yes    Feed yourself? Yes  Do your laundry/housekeeping?: Yes  Manage your money, pay your bills and track your expenses?: Yes  Make your own meals?: Yes    Do your own shopping?: Yes    Previous Hospitalizations:   Any hospitalizations or ED visits within the last 12 months?: Yes    How many hospitalizations have you had in the last year?: 1-2    Advance Care Planning:   Living will: Yes    Durable POA for healthcare:  Yes    Advanced directive: Yes      Cognitive Screening:   Provider or family/friend/caregiver concerned regarding cognition?: No    PREVENTIVE SCREENINGS      Cardiovascular Screening:    General: Screening Not Indicated and History Lipid Disorder      Diabetes Screening:     General: Screening Current      Colorectal Cancer Screening:     General: Screening Not Indicated      Prostate Cancer Screening:    General: Screening Not Indicated      Osteoporosis Screening:    General: Screening Not Indicated Abdominal Aortic Aneurysm (AAA) Screening:    Risk factors include: tobacco use        Lung Cancer Screening:     General: Screening Not Indicated      Hepatitis C Screening:    General: Screening Not Indicated      Preventive Screening Comments: Exercise-cardiac rehab    Vaccines-new covid booster, new Shingles shot  Screening, Brief Intervention, and Referral to Treatment (SBIRT)    Screening      AUDIT-C Screenin) How often did you have a drink containing alcohol in the past year? 2 to 4 times a month  2) How many drinks did you have on a typical day when you were drinking in the past year? 1 to 2  3) How often did you have 6 or more drinks on one occasion in the past year? never    AUDIT-C Score: 2  Interpretation: Score 0-3 (male): Negative screen for alcohol misuse    Single Item Drug Screening:  How often have you used an illegal drug (including marijuana) or a prescription medication for non-medical reasons in the past year? never    Single Item Drug Screen Score: 0  Interpretation: Negative screen for possible drug use disorder    No exam data present     Physical Exam:     There were no vitals taken for this visit      Physical Exam     Kostas Joya MD

## 2022-08-26 NOTE — PROGRESS NOTES
Assessment/Plan:       Diagnoses and all orders for this visit:    S/P drug eluting coronary stent placement    S/P AVR    Pure hypercholesterolemia  -     Lipid Panel with Direct LDL reflex; Future    Primary hypertension  -     CBC; Future  -     Comprehensive metabolic panel; Future          All of the above diagnoses have been assessed  Additional COMMENTS/PLAN: Pt will be seen back in 4 months with BW before including lipid  Subjective:      Patient ID: Silvina Chapin is a 80 y o  male  HPI    Gets some dizziness later in day  Feels like he may black out  Coronary artery disease-The patient denies CP, SOB or palpitations  The patient is compliant with antiplatelet and secondary prevention regimens  Hyperlipidemia- The patient is compliant with diet and taking meds  The patient understands the efficacy of the treatment in vascular prevention  HTN-does not check BP at home  The following portions of the patient's history were revised and updated as appropriate: Problem list, allergies, med list, FH, SH, Past medical and surgical histories      Current Outpatient Medications   Medication Sig Dispense Refill    Ascorbic Acid (VITAMIN C) 500 MG CAPS Take 1 tablet by mouth daily      aspirin (ECOTRIN LOW STRENGTH) 81 mg EC tablet Take 81 mg by mouth daily      atorvastatin (LIPITOR) 20 mg tablet Take 1 tablet (20 mg total) by mouth daily with dinner 30 tablet 2    clopidogrel (Plavix) 75 mg tablet Take 1 tablet (75 mg total) by mouth daily 90 tablet 3    Cyanocobalamin (VITAMIN B12) 1000 MCG TBCR Take 1 tablet by mouth daily      finasteride (PROSCAR) 5 mg tablet Take 1 tablet (5 mg total) by mouth daily 90 tablet 3    Flaxseed, Linseed, (FLAXSEED OIL) 1000 MG CAPS Take 1 capsule by mouth daily      Glucosamine-Chondroit-Vit C-Mn (GLUCOSAMINE 1500 COMPLEX) CAPS Take 1 capsule by mouth daily      levothyroxine 100 mcg tablet TAKE ONE TABLET BY MOUTH EVERY DAY 90 tablet 3    metoprolol tartrate (LOPRESSOR) 25 mg tablet Take 1 tablet (25 mg total) by mouth every 12 (twelve) hours 180 tablet 3    Multiple Vitamin (MULTIVITAMIN) capsule Take 1 capsule by mouth daily      nitroglycerin (NITROSTAT) 0 4 mg SL tablet Place 1 tablet (0 4 mg total) under the tongue every 5 (five) minutes as needed for chest pain 24 tablet 1    tamsulosin (FLOMAX) 0 4 mg Take 1 capsule (0 4 mg total) by mouth 2 (two) times a day 90 capsule 3     No current facility-administered medications for this visit  Review of Systems   All other systems reviewed and are negative  Objective: There were no vitals taken for this visit  BP Readings from Last 3 Encounters:   08/19/22 138/76   08/03/22 150/82   06/28/22 108/72                  Wt Readings from Last 3 Encounters:   08/19/22 74 8 kg (165 lb)   08/03/22 74 9 kg (165 lb 3 2 oz)   06/28/22 71 7 kg (158 lb)      No tilt   Physical Exam  Vitals reviewed  Constitutional:       Appearance: Normal appearance  Neck:      Vascular: No carotid bruit  Cardiovascular:      Rate and Rhythm: Normal rate and regular rhythm  Pulmonary:      Effort: Pulmonary effort is normal       Breath sounds: Normal breath sounds  Abdominal:      General: Abdomen is flat  Bowel sounds are normal       Palpations: Abdomen is soft  Musculoskeletal:      Right lower leg: No edema  Left lower leg: No edema  Skin:     General: Skin is warm and dry  Neurological:      General: No focal deficit present  Mental Status: He is alert and oriented to person, place, and time  No visits with results within 2 Week(s) from this visit     Latest known visit with results is:   Office Visit on 06/28/2022   Component Date Value Ref Range Status     COLOR,UA 06/28/2022 yellow   Final    CLARITY,UA 06/28/2022 clear   Final    SPECIFIC GRAVITY, 06/28/2022 1 025   Final     PH, 06/28/2022 6 0   Final    LEUKOCYTE ESTERASE,UA 06/28/2022 neg   Final    Elías Luna 06/28/2022 neg   Final    GLUCOSE, UA 06/28/2022 neg   Final    KETONES,UA 06/28/2022 neg   Final    BILIRUBIN,UA 06/28/2022 neg   Final    BLOOD,UA 06/28/2022 neg   Final    POCT URINE PROTEIN 06/28/2022 +-   Final    15 mg/dl    SL AMB POCT UROBILINOGEN 06/28/2022 neg   Final    0 2 mg/dl         Zachary Castaneda MD    Some or all of this note was generated with a voice recognition dictation system and therefore my contain grammatical or spelling errors

## 2022-08-30 ENCOUNTER — CLINICAL SUPPORT (OUTPATIENT)
Dept: CARDIAC REHAB | Facility: HOSPITAL | Age: 87
End: 2022-08-30
Payer: COMMERCIAL

## 2022-08-30 DIAGNOSIS — Z95.2 S/P AVR: Primary | ICD-10-CM

## 2022-08-30 PROCEDURE — 93798 PHYS/QHP OP CAR RHAB W/ECG: CPT

## 2022-09-01 ENCOUNTER — CLINICAL SUPPORT (OUTPATIENT)
Dept: CARDIAC REHAB | Facility: HOSPITAL | Age: 87
End: 2022-09-01
Payer: COMMERCIAL

## 2022-09-01 DIAGNOSIS — Z95.2 S/P AVR: Primary | ICD-10-CM

## 2022-09-01 PROCEDURE — 93798 PHYS/QHP OP CAR RHAB W/ECG: CPT

## 2022-09-01 NOTE — PROGRESS NOTES
Exercise session details     Post exercise patient hypotensive  Drank 2 8 fl oz of water and continued to drop BP  He reports he feels this at home as well  Reports he feels like he could black out, dizzy, lightheaded, and his inner ear pops  After 20 minutes of rsting, he reported he felt better and BP was 106/70 with lightheadedness subsiding  No changes telemetry  Reviewed with patient the importance of replenishing throughout exercise or activity and not waiting until symptomatic or done with whatever he is doing  Dr Phyllis Mcmillan and aware  Will contact patient as well

## 2022-09-02 ENCOUNTER — CLINICAL SUPPORT (OUTPATIENT)
Dept: CARDIAC REHAB | Facility: HOSPITAL | Age: 87
End: 2022-09-02
Payer: COMMERCIAL

## 2022-09-02 DIAGNOSIS — Z95.2 S/P AVR: Primary | ICD-10-CM

## 2022-09-02 PROCEDURE — 93798 PHYS/QHP OP CAR RHAB W/ECG: CPT

## 2022-09-06 ENCOUNTER — CLINICAL SUPPORT (OUTPATIENT)
Dept: CARDIAC REHAB | Facility: HOSPITAL | Age: 87
End: 2022-09-06
Payer: COMMERCIAL

## 2022-09-06 DIAGNOSIS — Z95.2 S/P AVR: Primary | ICD-10-CM

## 2022-09-06 PROCEDURE — 93798 PHYS/QHP OP CAR RHAB W/ECG: CPT

## 2022-09-08 ENCOUNTER — CLINICAL SUPPORT (OUTPATIENT)
Dept: CARDIAC REHAB | Facility: HOSPITAL | Age: 87
End: 2022-09-08
Payer: COMMERCIAL

## 2022-09-08 DIAGNOSIS — Z95.2 S/P AVR: Primary | ICD-10-CM

## 2022-09-08 PROCEDURE — 93798 PHYS/QHP OP CAR RHAB W/ECG: CPT

## 2022-09-09 ENCOUNTER — APPOINTMENT (OUTPATIENT)
Dept: CARDIAC REHAB | Facility: HOSPITAL | Age: 87
End: 2022-09-09
Payer: COMMERCIAL

## 2022-09-09 ENCOUNTER — CLINICAL SUPPORT (OUTPATIENT)
Dept: CARDIAC REHAB | Facility: HOSPITAL | Age: 87
End: 2022-09-09
Payer: COMMERCIAL

## 2022-09-09 ENCOUNTER — OFFICE VISIT (OUTPATIENT)
Dept: FAMILY MEDICINE CLINIC | Facility: HOSPITAL | Age: 87
End: 2022-09-09
Payer: COMMERCIAL

## 2022-09-09 VITALS
HEIGHT: 68 IN | HEART RATE: 76 BPM | TEMPERATURE: 97.7 F | BODY MASS INDEX: 25.7 KG/M2 | DIASTOLIC BLOOD PRESSURE: 62 MMHG | SYSTOLIC BLOOD PRESSURE: 126 MMHG | WEIGHT: 169.6 LBS

## 2022-09-09 DIAGNOSIS — R26.81 UNSTEADY GAIT: ICD-10-CM

## 2022-09-09 DIAGNOSIS — H69.83 DYSFUNCTION OF BOTH EUSTACHIAN TUBES: Primary | ICD-10-CM

## 2022-09-09 DIAGNOSIS — Z95.2 S/P AVR: Primary | ICD-10-CM

## 2022-09-09 PROCEDURE — 99214 OFFICE O/P EST MOD 30 MIN: CPT | Performed by: INTERNAL MEDICINE

## 2022-09-09 PROCEDURE — 93798 PHYS/QHP OP CAR RHAB W/ECG: CPT

## 2022-09-09 RX ORDER — FLUTICASONE PROPIONATE 50 MCG
2 SPRAY, SUSPENSION (ML) NASAL DAILY
Qty: 16 G | Refills: 1 | Status: SHIPPED | OUTPATIENT
Start: 2022-09-09

## 2022-09-09 NOTE — PROGRESS NOTES
Assessment/Plan:         Diagnoses and all orders for this visit:    Dysfunction of both eustachian tubes  Comments:  Reassured no infection that required tx or cereumen impaction that required irrigation, s/sx c/w ETD, will start OTC antihistamine and Flonase, if not better or if pain/blood drainage/F/C occur then needs re-eval  Orders:  -     fluticasone (FLONASE) 50 mcg/act nasal spray; 2 sprays into each nostril daily    Unsteady gait  Comments:  D/w pt that inner ear dysfunction can also affect balance, advised to treat ETD and if symptoms persisted should eval for gait dyfunction with PT, pt feels may be d/t mult new meds, con't all current meds for now and call if no better          Subjective:      Patient ID: Chanelle Woods is a 80 y o  male  HPI Pt here for an acute visit    Pt with approx 1 month of B/L ear symptoms  He denies pain but states he has crackling which is bothersome  He is not sure if he has drainage as he has been using sweat oil and ear wax softening drops  He has some decrease in hearing but it is not worse with above symptoms  He notes no other cold symptoms  Review of Systems   Constitutional: Negative for chills and fever  HENT: Positive for rhinorrhea  Negative for congestion, ear discharge and ear pain  Eyes: Negative for pain and visual disturbance  Respiratory: Negative for cough and shortness of breath  Cardiovascular: Negative for chest pain and palpitations  Musculoskeletal: Positive for gait problem  Skin: Negative for rash and wound  Neurological: Positive for dizziness  Negative for headaches  Objective:    /62   Pulse 76   Temp 97 7 °F (36 5 °C) (Tympanic)   Ht 5' 8" (1 727 m)   Wt 76 9 kg (169 lb 9 6 oz)   BMI 25 79 kg/m²      Physical Exam  Vitals and nursing note reviewed  Constitutional:       General: He is not in acute distress  Appearance: He is well-developed  He is not ill-appearing     HENT:      Head: Normocephalic and atraumatic  Ears:      Comments: BL scant-mild cerumen (non-obstructing), no swelling/erythema to canal, no bulging or erythema to TM     Mouth/Throat:      Pharynx: No oropharyngeal exudate  Eyes:      General:         Right eye: No discharge  Left eye: No discharge  Conjunctiva/sclera: Conjunctivae normal    Neck:      Trachea: No tracheal deviation  Cardiovascular:      Rate and Rhythm: Normal rate and regular rhythm  Heart sounds: No murmur heard  Pulmonary:      Effort: Pulmonary effort is normal  No respiratory distress  Breath sounds: No wheezing  Comments: Rare bibasilar crackles  Musculoskeletal:         General: No deformity or signs of injury  Cervical back: Neck supple  Lymphadenopathy:      Cervical: Cervical adenopathy present  Skin:     General: Skin is warm and dry  Coloration: Skin is not pale  Findings: No bruising or rash  Neurological:      General: No focal deficit present  Mental Status: He is alert  Motor: No abnormal muscle tone  Gait: Gait normal    Psychiatric:         Mood and Affect: Mood normal          Behavior: Behavior normal          Thought Content:  Thought content normal          Judgment: Judgment normal

## 2022-09-13 ENCOUNTER — CLINICAL SUPPORT (OUTPATIENT)
Dept: CARDIAC REHAB | Facility: HOSPITAL | Age: 87
End: 2022-09-13
Payer: COMMERCIAL

## 2022-09-13 DIAGNOSIS — Z95.2 S/P AVR: Primary | ICD-10-CM

## 2022-09-13 PROCEDURE — 93798 PHYS/QHP OP CAR RHAB W/ECG: CPT

## 2022-09-14 ENCOUNTER — CLINICAL SUPPORT (OUTPATIENT)
Dept: CARDIAC REHAB | Facility: HOSPITAL | Age: 87
End: 2022-09-14
Payer: COMMERCIAL

## 2022-09-14 ENCOUNTER — OFFICE VISIT (OUTPATIENT)
Dept: CARDIOLOGY CLINIC | Facility: CLINIC | Age: 87
End: 2022-09-14
Payer: COMMERCIAL

## 2022-09-14 VITALS
WEIGHT: 169 LBS | DIASTOLIC BLOOD PRESSURE: 82 MMHG | HEIGHT: 70 IN | HEART RATE: 72 BPM | BODY MASS INDEX: 24.2 KG/M2 | SYSTOLIC BLOOD PRESSURE: 158 MMHG | OXYGEN SATURATION: 96 %

## 2022-09-14 DIAGNOSIS — Z95.2 S/P AVR: Primary | ICD-10-CM

## 2022-09-14 DIAGNOSIS — I48.0 PAROXYSMAL ATRIAL FIBRILLATION (HCC): ICD-10-CM

## 2022-09-14 DIAGNOSIS — E78.00 PURE HYPERCHOLESTEROLEMIA: ICD-10-CM

## 2022-09-14 DIAGNOSIS — I10 PRIMARY HYPERTENSION: ICD-10-CM

## 2022-09-14 DIAGNOSIS — Z95.2 S/P AVR: ICD-10-CM

## 2022-09-14 DIAGNOSIS — Z95.5 S/P DRUG ELUTING CORONARY STENT PLACEMENT: ICD-10-CM

## 2022-09-14 DIAGNOSIS — I25.10 CORONARY ARTERY DISEASE INVOLVING NATIVE CORONARY ARTERY OF NATIVE HEART WITHOUT ANGINA PECTORIS: Primary | ICD-10-CM

## 2022-09-14 DIAGNOSIS — Z95.0 PRESENCE OF PERMANENT CARDIAC PACEMAKER: ICD-10-CM

## 2022-09-14 DIAGNOSIS — I49.5 SICK SINUS SYNDROME (HCC): ICD-10-CM

## 2022-09-14 PROCEDURE — 1160F RVW MEDS BY RX/DR IN RCRD: CPT | Performed by: INTERNAL MEDICINE

## 2022-09-14 PROCEDURE — 99214 OFFICE O/P EST MOD 30 MIN: CPT | Performed by: INTERNAL MEDICINE

## 2022-09-14 RX ORDER — AMLODIPINE BESYLATE 5 MG/1
5 TABLET ORAL DAILY
Qty: 90 TABLET | Refills: 3 | Status: SHIPPED | OUTPATIENT
Start: 2022-09-14

## 2022-09-14 RX ORDER — CARVEDILOL 6.25 MG/1
6.25 TABLET ORAL 2 TIMES DAILY WITH MEALS
Qty: 180 TABLET | Refills: 3 | Status: SHIPPED | OUTPATIENT
Start: 2022-09-14

## 2022-09-14 NOTE — PROGRESS NOTES
Patient BP elevated at rest 210/100 initially and then after rest stayed at 200/100  Patient reports feeling dizziness all afternoon and evening yesterday, but feels fine this morning  Advised his wife to monitor at home and contact MD for further instruction, and if it remains high he should be evaluated

## 2022-09-14 NOTE — PROGRESS NOTES
Cardiology Consultation     Donnie Pro  [phone]  10/7/1932  HEART & VASCULAR  [facility] [place] [id number]    1  Coronary artery disease involving native coronary artery 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 pectoris  carvedilol (COREG) 6 25 mg tablet   2  S/P drug eluting coronary stent placement     3  S/P AVR  Echo complete w/ contrast if indicated   4  Primary hypertension  carvedilol (COREG) 6 25 mg tablet    amLODIPine (NORVASC) 5 mg tablet   5  Sick sinus syndrome (HCC)     6  Paroxysmal atrial fibrillation ([address] )     7  Presence of permanent cardiac pacemaker     8  Pure hypercholesterolemia         Discussion/Summary:    1  Severe aortic stenosis - Last year his aortic stenosis progressed to severe and then earlier this year he was becoming more symptomatic  He went to CT surgery and went through TAVR workup  He ended up having an open AVR as his left coronary ostium takeoff was low in near the annulus  This went well without complications  He continues with cardiac rehabilitation  We ordered a baseline echocardiogram today  He should take antibiotic prophylaxis for any dental procedures  2   History of permanent pacemaker - This was secondary to sick sinus syndrome  He had some sort of mechanical chest pain after his procedure that fortunately have greatly improved  He will continue to follow in our pacemaker clinic  He is 100% a paced, and about 50% V-paced  3   Paroxysmal atrial fibrillation - Found on pacemaker interrogation  He is predominantly atrially paced and has not had any recent episodes of atrial fibrillation with the last episode detected about 2 years ago  Anticoagulation is recommended however he has declined in the past   I suggested at least aspirin therapy  5   Hypertension - His blood pressure has now been elevated    It was elevated significantly today a cardiac rehabilitation with symptoms of dizziness  I am going to add back amlodipine 5 mg daily, something he was once on by his internist   I am also going to change his metoprolol over to carvedilol at 6 25 mg twice daily  He should follow his blood pressure closely  Low-sodium diet recommended  He will be back to see us in 3 months  6   CAD - During his workup for his aortic valve replacement, cardiac catheterization did show a 90% RCA in which he received a drug-eluting stent  He will remain on dual anti-platelet therapy  He is on statin beta-blocker as well  HPI:    Mr Garcia Farias comes in for follow-up given his cardiac history  He has a history of sick sinus syndrome and status post permanent pacemaker in March of 2016  He also has a history of moderate aortic stenosis, paroxysmal atrial fibrillation and hypertension  He is predominantly atrially paced  He was offered anticoagulation for stroke prevention, but declined  His prior cardiology care was through [facility], where he received as permanent pacemaker  Bonita Lizama gives me a history of having significant mechanical pain after his permanent pacemaker  He recalls coming out of anesthesia and felt as if somebody was pressing on his chest   It is unclear at this time what exactly had happened  I asked if there was a complication like an arrhythmia that required defibrillation, but he does not ever recall being told that  For close to 3 years he had on and off mechanical pains from his pacemaker  They have improved, and now he seems to describe the fleeting sharp chest pain that only lasts a few seconds  His pacemaker interrogations show that he is 100% A-paced, and V paced about 50% of the time  He has had a few episodes of atrial fibrillation detected by pacer interrogation in the past   He has had none recently      His echocardiograms through the years showed moderate aortic stenosis but we repeated 1 last year that showed progression to severe aortic stenosis  At that time he had minimal symptoms, but these changed in follow-up  When I last saw him earlier this year he was having progressive shortness of breath over the winter  He would go for walks and started noticing that he had to take breaks  He denies chest pain or syncope  At that point I referred him to CT surgery for TAVR evaluation  He went through all the preoperative testing that included cardiac catheterization and CTAs  He was found to have a significant 90% RCA stenosis that received a drug-eluting stent  He had an 80% OM1 stenosis but this was a small vessel supplying a small territory which was treated medically  His CTA did show that his left coronary ostium takeoff was very low and close to the aortic valve annulus  This put him at risk for left main occlusion with TAVR  CT surgery at that point recommended open AVR and after he thought about this and discussed it with his family, he decided to proceed  He had this performed on 05/16/22  This went well without complications  In follow-up he was doing well and he was enrolled in cardiac rehabilitation  I just saw Izzy Ritter about a month ago and he was doing well  He was still recovering from surgery but his stamina was steadily improving with cardiac rehabilitation  He was having some sternal pain, but this was improving as well  His blood pressure was mildly elevated, but had run more normal on other occasions and we did not make any changes  However, today a cardiac rehabilitation his blood pressure was quite elevated with systolic blood pressures of 200  He also has been having an intermittent dizziness and just not feeling quite right  He describes the dizziness is mostly vertiginous  He denies any changes in shortness of breath  His sternal pain has gone away  He denies any other signs or symptoms of CHF  No chest pain or any symptoms of angina  No near-syncope or syncope    He denies palpitations  Patient Active Problem List   Diagnosis    Chronic nonintractable headache    Enlarged prostate without lower urinary tract symptoms (luts)    GERD without esophagitis    Primary hypertension    Acquired hypothyroidism    Sick sinus syndrome (HCC)    Presence of permanent cardiac pacemaker    Paroxysmal atrial fibrillation (HCC)    Balance disorder    Coronary artery disease involving native coronary artery 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 pectoris    S/P drug eluting coronary stent placement    S/P AVR    Pure hypercholesterolemia     Past Medical History:   Diagnosis Date    Angina pectoris (HCC)     Arthritis     Ascending aortic aneurysm (HCC)     trileaflet AV, 41mm    BPH (benign prostatic hyperplasia)     [facility]     skin    Coronary artery disease     Former tobacco use     pipe & cigar use socially & infrequently    GERD (gastroesophageal reflux disease)     controlled w/ diet    Headache     h/o    Hepatitis A     History of melanoma     Hypertension     Hypothyroidism     Pacemaker     Severe aortic stenosis     Shortness of breath     with exertion    Vitamin B12 deficiency     Wears dentures      Social History     Socioeconomic History    Marital status: /Civil Union     Spouse name: Jacob Caldwell Number of children: Not on file    Years of education: Not on file    Highest education level: Not on file   Occupational History    Not on file   Tobacco Use    Smoking status: Former Smoker     Types: Cigars    Smokeless tobacco: Never Used    Tobacco comment: 50 years ago   Vaping Use    Vaping Use: Former   Substance and Sexual Activity    Alcohol use: Yes     Comment: a beer once in a while      Drug use: No    Sexual activity: Not on file   Other Topics Concern    Not on file   Social History Narrative    Living will on file    Supportive and safe    Lives with wife     Social Determinants of Health     Financial Resource Strain: Low Risk     Difficulty of Paying Living Expenses: Not hard at all   Food Insecurity: No Food Insecurity    Worried About Running Out of Food in the Last Year: Never true    Ran Out of Food in the Last Year: Never true   Transportation Needs: No Transportation Needs    Lack of Transportation (Medical): No    Lack of Transportation (Non-Medical): No   Physical Activity: Sufficiently Active    Days of Exercise per Week: 7 days    Minutes of Exercise per Session: 120 min   Stress: No Stress Concern Present    Feeling of Stress : Not at all   Social Connections: Socially Integrated    Frequency of Communication with Friends and Family: Once a week    Frequency of Social Gatherings with Friends and Family: Three times a week    Attends [employer] Services: 1 to 4 times per year   [facility] or Organizations: Yes    Attends Club or Organization Meetings: 1 to 4 times per year    Marital Status:    Intimate Partner Violence: Not At Risk    Fear of Current or Ex-Partner: No    Emotionally Abused: No    Physically Abused: No    Sexually Abused: No   Housing Stability: Low Risk     Unable to Pay for Housing in the Last Year: No    Number of Places Lived in the Last Year: 1    Unstable Housing in the Last Year: No      Family History   Problem Relation Age of Onset    Cancer Family         gastrointestinal tract    Heart disease Mother     Substance Abuse Neg Hx     Mental illness Neg Hx      Past Surgical History:   Procedure Laterality Date    CARDIAC CATHETERIZATION  04/15/2022    Procedure: Cardiac catheterization;  Surgeon: Mckayla Vivas DO;  [facility]: [facility]; Service: Cardiology    CARDIAC CATHETERIZATION N/A 04/15/2022    Procedure: Cardiac Coronary Angiogram;  Surgeon: Mckayla Vivas DO;  [facility];   Service: Cardiology    CARDIAC CATHETERIZATION N/A 04/15/2022    Procedure: Cardiac pci;  Surgeon: Mckayla Vivas DO; [facility];   Service: Cardiology    CARDIAC PACEMAKER PLACEMENT      COLONOSCOPY      INGUINAL HERNIA REPAIR Bilateral     OR RPLCMT AORTIC VALVE OPN W/STENTLESS TISSUE VALVE N/A 5/16/2022    Procedure: REPLACEMENT VALVE AORTIC (TISSUE AVR) WITH 25 MM AORTIC MAGNA EASE VALVE W/ ROJELIO;  Surgeon: Adebayo Pelayo DO;  Location: [facility];  Service: Cardiac Surgery    SKIN CANCER EXCISION      melanoma, multiple       Current Outpatient Medications:     amLODIPine (NORVASC) 5 mg tablet, Take 1 tablet (5 mg total) by mouth daily, Disp: 90 tablet, Rfl: 3    Ascorbic Acid (VITAMIN C) 500 MG CAPS, Take 1 tablet by mouth daily, Disp: , Rfl:     aspirin (ECOTRIN LOW STRENGTH) 81 mg EC tablet, Take 81 mg by mouth daily, Disp: , Rfl:     atorvastatin (LIPITOR) 20 mg tablet, Take 1 tablet (20 mg total) by mouth daily with dinner, Disp: 30 tablet, Rfl: 2    carvedilol (COREG) 6 25 mg tablet, Take 1 tablet (6 25 mg total) by mouth 2 (two) times a day with meals, Disp: 180 tablet, Rfl: 3    clopidogrel (Plavix) 75 mg tablet, Take 1 tablet (75 mg total) by mouth daily, Disp: 90 tablet, Rfl: 3    Cyanocobalamin (VITAMIN B12) 1000 MCG TBCR, Take 1 tablet by mouth daily, Disp: , Rfl:     finasteride (PROSCAR) 5 mg tablet, Take 1 tablet (5 mg total) by mouth daily, Disp: 90 tablet, Rfl: 3    Flaxseed, Linseed, (FLAXSEED OIL) 1000 MG CAPS, Take 1 capsule by mouth daily, Disp: , Rfl:     fluticasone (FLONASE) 50 mcg/act nasal spray, 2 sprays into each nostril daily, Disp: 16 g, Rfl: 1    Glucosamine-Chondroit-Vit C-Mn (GLUCOSAMINE 1500 COMPLEX) CAPS, Take 1 capsule by mouth daily, Disp: , Rfl:     levothyroxine 100 mcg tablet, TAKE ONE TABLET BY MOUTH EVERY DAY, Disp: 90 tablet, Rfl: 3    Multiple Vitamin (MULTIVITAMIN) capsule, Take 1 capsule by mouth daily, Disp: , Rfl:     nitroglycerin (NITROSTAT) 0 4 mg SL tablet, Place 1 tablet (0 4 mg total) under the tongue every 5 (five) minutes as needed for chest pain, Disp: 24 tablet, Rfl: 1    tamsulosin (FLOMAX) 0 4 mg, Take 1 capsule (0 4 mg total) by mouth 2 (two) times a day, Disp: 90 capsule, Rfl: 3  Allergies   Allergen Reactions    Amoxicillin Other (See Comments)     Severe weakness     Vitals:    09/14/22 1142   BP: 158/82   BP Location: Left arm   Patient Position: Sitting   Cuff Size: Standard   Pulse: 72   SpO2: 96%   Weight: 76 7 kg (169 lb)   Height: 5' 10" (1 778 m)       Labs:  Lab Results   Component Value Date     06/15/2017    K 4 4 06/14/2022    CL 96 06/14/2022    CO2 23 06/14/2022    BUN 11 06/14/2022    CREATININE 0 92 06/14/2022    CREATININE 0 67 05/20/2022    CREATININE 0 89 06/15/2017    GLUCOSE 220 (H) 05/16/2022    GLUCOSE 110 (H) 06/15/2017    CALCIUM 8 4 05/20/2022    CALCIUM 9 2 06/15/2017     Lab Results   Component Value Date    WBC 4 7 06/14/2022    WBC 11 96 (H) 05/18/2022    WBC 5 5 06/15/2017    HGB 11 7 (L) 06/14/2022    HGB 8 8 (L) 05/18/2022    HGB 13 5 06/15/2017    HCT 34 2 (L) 06/14/2022    HCT 25 8 (L) 05/18/2022    HCT 40 2 06/15/2017    MCV 91 06/14/2022    MCV 93 05/18/2022    MCV 94 06/15/2017     06/14/2022     05/18/2022       Imaging:    CARDIAC CATH (4/2022):  Diagnostic  Dominance: Co-dominant      Left Main   The vessel exhibits minimal luminal irregularities  Left Anterior Descending   Prox LAD lesion is 25% stenosed  FLAKITO flow is 3  Mid LAD lesion is 50% stenosed  FLAKITO flow is 3  Left Circumflex   First Obtuse Marginal Branch   1st Mrg lesion is 80% stenosed  Small OM1 branch (medical therapy), supplies small territory   Right Coronary Artery   Mid RCA lesion is 90% stenosed  Intervention        Mid RCA lesion   Angioplasty   Angioplasty using a compliant balloon was performed  The balloon used was a BALLOON EUPHORA RX 2 X 15MM  Maximum pressure: 6 rivas  Inflation time: 5 sec  Time obtained: 09:22 EDT  First reinflation; Max pressure: 12 rivas  Inflation time 10 sec   Time obtained: 09:23 EDT  Supplies used: CATH GUIDE LAUNCHER 5FR JR 4 0; BALLOON EUPHORA RX 2 X 15MM; GUIDEWIRE ASAHI MINAMO 190CM J   Stent   Drug-eluting stent was successfully placed  The stent used was a STENT XIENCE SKYPOINT 2 5 X 18MM  Stent was deployed by way of balloon expansion  Maximum pressure: 14 rivas  Inflation time: 10 sec  Supplies used: STENT XIENCE SKYPOINT 2 5 X 18MM; CATH GUIDE LAUNCHER 5FR JR 4 0; GUIDEWIRE ASAHI MINAMO 190CM J   Post-Intervention Lesion Assessment   The intervention was successful  Post-intervention FLAKITO flow is 3  There is a 0% residual stenosis post intervention  ECHO (3/8/22)    Left Ventricle: Left ventricular cavity size is upper normal  Wall thickness is mildly increased  The left ventricular ejection fraction is 50%  Systolic function is low normal  Wall motion is normal     Aortic Valve: The aortic valve is trileaflet  The leaflets are severely calcified  There is severely reduced mobility  There is mild regurgitation  There is severe stenosis  Mean gradient of 41 mmHg, MIRA of 0 75 cm2    Mitral Valve: There is mild annular calcification  There is mild regurgitation    Tricuspid Valve: There is mild regurgitation    Aorta: The aortic root is normal in size  The ascending aorta is mildly dilated            Review of Systems:  Review of Systems   Constitutional: Negative  HENT: Negative  Eyes: Negative  Respiratory: Negative  Cardiovascular: Negative  Gastrointestinal: Negative  Musculoskeletal: Positive for arthralgias  Skin: Negative  Allergic/Immunologic: Negative  Neurological: Negative  Hematological: Negative  Psychiatric/Behavioral: Negative  All other systems reviewed and are negative      Vitals:    09/14/22 1142   BP: 158/82   BP Location: Left arm   Patient Position: Sitting   Cuff Size: Standard   Pulse: 72   SpO2: 96%   Weight: 76 7 kg (169 lb)   Height: 5' 10" (1 778 m)       Physical Exam:  Physical Exam  Vitals and nursing note reviewed  Constitutional:       Appearance: He is well-developed  HENT:      Head: Normocephalic and atraumatic  Eyes:      General: No scleral icterus  Right eye: No discharge  Left eye: No discharge  Pupils: Pupils are equal, round, and reactive to light  Neck:      Thyroid: No thyromegaly  Vascular: No JVD  Cardiovascular:      Rate and Rhythm: Normal rate and regular rhythm  No extrasystoles are present  Pulses: Normal pulses  No decreased pulses  Heart sounds: S1 normal and S2 normal  Murmur heard  Systolic murmur is present with a grade of 2/6  No friction rub  No gallop  Pulmonary:      Effort: Pulmonary effort is normal  No respiratory distress  Breath sounds: Decreased breath sounds present  No wheezing, rhonchi or rales  Abdominal:      General: Bowel sounds are normal  There is no distension  Palpations: Abdomen is soft  Tenderness: There is no abdominal tenderness  Musculoskeletal:         General: No tenderness or deformity  Normal range of motion  Cervical back: Normal range of motion and neck supple  Skin:     General: Skin is warm and dry  Findings: No rash  Neurological:      Mental Status: He is alert and oriented to person, place, and time  Cranial Nerves: No cranial nerve deficit  Psychiatric:         Thought Content: Thought content normal          Judgment: Judgment normal        Counseling / Coordination of Care  Total office time spent today 25 minutes  Greater than 50% of total time was spent with the patient and / or family counseling and / or coordination of care

## 2022-09-14 NOTE — PATIENT INSTRUCTIONS

## 2022-09-15 ENCOUNTER — APPOINTMENT (OUTPATIENT)
Dept: CARDIAC REHAB | Facility: HOSPITAL | Age: 87
End: 2022-09-15
Payer: COMMERCIAL

## 2022-09-16 ENCOUNTER — APPOINTMENT (OUTPATIENT)
Dept: CARDIAC REHAB | Facility: HOSPITAL | Age: 87
End: 2022-09-16
Payer: COMMERCIAL

## 2022-09-20 ENCOUNTER — CLINICAL SUPPORT (OUTPATIENT)
Dept: CARDIAC REHAB | Facility: HOSPITAL | Age: 87
End: 2022-09-20
Payer: COMMERCIAL

## 2022-09-20 DIAGNOSIS — Z95.2 S/P AVR: Primary | ICD-10-CM

## 2022-09-20 PROCEDURE — 93798 PHYS/QHP OP CAR RHAB W/ECG: CPT

## 2022-09-20 NOTE — PROGRESS NOTES
Cardiac Rehabilitation Plan of Care   90 Day Reassessment          Today's date: 2022   # of Exercise Sessions Completed: 35  Patient name: Librado Rucker      : 10/7/1932  Age: 80 y o  MRN: [id number]  Referring Physician: Dr Lenka Ferreira  Cardiologist: Dr Radha Swift  Provider: [address] [place] [address]  Clinician: Ronnie Mcdonough, MS, CEP        Dx:   Encounter Diagnosis   Name Primary?  S/P AVR Yes     Date of onset: 2022      SUMMARY OF PROGRESS:  Mr Dellar Fabry continues to participate in his cardiac rehabilitation after recent AVR on 22, and will be completing his rehab program at the end of this week at 36 sessions  He reports he has done well overall with recovery  He is no longer complaining of upper body and chest pain after he has been working outside at home  He has resumed mowing and taking care of 10 acre property since surgery, and feels he can push himself to do whatever chores he needs to get done  Recently he had been complaining of dizziness at times when working outside, which he believes to be an inner ear problem  Last week BP was very elevated and he had an appointment with Dr Socorro Azevedo  Medications were changed at that appointment and today he states he feels much better since   He is completing 50-60 minutes of aerobic exercise and has increased to 5 6 METs on different modalities such as the TRM, Lifecycle, Nustep, and UBE  No changes to telemetry-paced  He has started an upper body strength training program at rehab, and has been tolerating it well and not reporting any chest soreness  He is not completing a home exercise plan since he is very active at home around his house  We have continued to encourage regular aerobic exercise using a walking program at home with goal of 150-200 min/week  He attended weekly group education classes on cardiac risk factors  He denies depression (PHQ-9=0) or anxiety (SILVIA-7=0) at this time   He has great support from his wife and family        Medication compliance: Yes   Comments: Pt reports to be compliant with medications  Fall Risk: Low   Comments: Ambulates with a steady gait with no assist device    EKG Interpretation: NSR      EXERCISE ASSESSMENT and PLAN    Current Exercise Program in Rehab:       Frequency: 3 days/week Supplement with home exercise 2+ days/wk as tolerated       Minutes: 50-60       METS: 5 6            HR: 30 beats above resting   RPE: 4-6         Modalities: Treadmill, UBE, Lifecycle, NuStep and Recumbent bike      Exercise Progression 30 Day Goals :    Frequency: 3 days/week of cardiac rehab     Supplement with home exercise 2+ days/wk as tolerated    Minutes: 50-60                           >150 mins/wk of moderate intensity exercise   METS:  5 6-6 0   HR: 30 beats above resting    RPE: 4-6   Modalities: Treadmill, UBE, Lifecycle, NuStep and Recumbent bike    Strength trainin-3 days / week  12-15 repetitions  1-2 sets per modality   Will be added following at least 8 weeks post surgery and 8-10 monitored sessions  Will be added following 2-3 weeks of monitored exercise sessions   Modalities: Pull Downs, Lateral Raise, Arm Extension, Arm Curl, Sit to [employer], [employer], Shoulder Shrugs and leg ext    Home Exercise: no regular aerobic exercise, very active walking and working around his 10 acre property    Goals: 10% improvement in functional capacity - based on max METs achieved in fitness assessment, improved DASI score by 10%, Increase in exercise capacity by 40% - based on peak METs tolerated in cardiac rehab exercise session, Exercise 5 days/wk, >150mins/wk of moderate intensity exercise, Attend Rehab regularly and start a home exercise program    Progression Toward Goals:  Pt is progressing and showing improvement  toward the following goals:  increasing overall strength and endurance with exercise, increasing MET levels appropiriately, has started upper body strength training and is tolerating well with no chest soreness, is reporting less chest soreness after working at home  Education: benefit of exercise for CAD risk factors, home exercise guidelines, AHA guidelines to achieve >150 mins/wk of moderate exercise and RPE scale   Plan:education on home exercise guidelines, home exercise 30+ mins 2 days opposite CR and Education class: Risk Factors for Heart Disease  Readiness to change: Maintenance: (Maintaining the behavior change)      NUTRITION ASSESSMENT AND PLAN    Weight control:    Starting weight: 158 8 lb   Current weight:   168 lbs     Diabetes: N/A  A1c: 5 7    last measured: 5/10/2022    Lipid management: 5/10/2022: chol 168, Trig 85, HDL 45,     Goals:LDL <100, HDL >40, TRG <150 and CHOL <200    Progression Toward Goals: Pt is progressing and showing improvement  toward the following goals:  reports staying hydrated on days he is outside in the heat    , Pt has not made progress toward the following goals: weight gain since starting rehab (no concern with weight) and no changes to diet since intial evaluation   , Patient will continue to choose heart healthy options  in the next 30 days, Will continue to educate and progress as tolerated  Has gained 10 lbs since starting rehab      Education: heart healthy eating  low sodium diet  hydration  Plan: Education class: Reading Food Labels and Education Class: Heart Healthy Eating  Readiness to change: Action:  (Changing behavior)      PSYCHOSOCIAL ASSESSMENT AND PLAN    Emotional:  Depression assessment:  PHQ-9 = 0 =No Depression            Anxiety measure:  SILVIA-7 = 0-4  = Not anxious  Self-reported stress level:  1  Social support: Excellent-wife and family    Goals:  Reduce perceived stress to 1-3/10, improved [facility] QOL < 27, Physical Fitness in [facility] Score < 3 and increased energy    Progression Toward Goals: Pt is progressing and showing improvement  toward the following goals:  denies depression and anxiety and great support system   , Patient will manage stress with ongoing chest sorensss and start listening to staff (patient getting impatient with recovery process which is causing him some stress)  in the next 30 days, Will continue to educate and progress as tolerated  Education: signs/sxs of depression and benefits of a positive support system  Plan: Class: Stress and Your Health and Class: Relaxation  Readiness to change: Action:  (Changing behavior)      OTHER CORE COMPONENTS     Tobacco:   Social History     Tobacco Use   Smoking Status Former Smoker    Types: Cigars   Smokeless Tobacco Never Used   Tobacco Comment    50 years ago       Tobacco Use Intervention:   N/A:  Patient is a non-smoker     Anginal Symptoms:  None   NTG use: No prescription    Blood pressure:    Restin//200   Exercise: 126//100    Goals: consistent BP < 130/80, reduced dietary sodium <2300mg, moderate intensity exercise >150 mins/wk, medication compliance and reduce number of medications  needed for BP control    Progression Toward Goals: Pt is progressing and showing improvement  toward the following goals:  BP has been elevated at rest  Medication change last week and patient reports feeling change and not noticing dizziness since  of this week  Patient is monitoring and keeping BP log at home        Education:  understanding high blood pressure and it's relationship to CAD and low sodium diet and HTN  Plan: Class: Understanding Heart Disease and Class: Common Heart Medications  Readiness to change: Action:  (Changing behavior)

## 2022-09-22 ENCOUNTER — APPOINTMENT (OUTPATIENT)
Dept: CARDIAC REHAB | Facility: HOSPITAL | Age: 87
End: 2022-09-22
Payer: COMMERCIAL

## 2022-09-23 ENCOUNTER — CLINICAL SUPPORT (OUTPATIENT)
Dept: CARDIAC REHAB | Facility: HOSPITAL | Age: 87
End: 2022-09-23
Payer: COMMERCIAL

## 2022-09-23 DIAGNOSIS — Z95.2 S/P AVR: Primary | ICD-10-CM

## 2022-09-23 PROCEDURE — 93798 PHYS/QHP OP CAR RHAB W/ECG: CPT

## 2022-09-23 NOTE — PROGRESS NOTES
Cardiac Rehabilitation Plan of Care   120 Day Reassessment          Today's date: 2022   # of Exercise Sessions Completed: 36  Patient name: Donnie Pro      : 10/7/1932  Age: 80 y o  MRN: [id number]  Referring Physician: Dr González Schmitz  Cardiologist: Dr Myriam Muñoz  Provider: Nikhil  Clinician: Mckenzie Farley MS, CEP        Dx:   Encounter Diagnosis   Name Primary?  S/P AVR Yes     Date of onset: 2022      SUMMARY OF PROGRESS:  Mr Nighat Moura has completed his cardiac rehabilitation program after recent AVR on 22 at 39 sessions  He has done a great job in rehab and had progressed himself with exercise nicely  He is no longer complaining of upper body and chest pain after he has been working outside at home  He has resumed mowing and taking care of 10 acre property since surgery, and feels he can push himself to do whatever chores he needs to get done  Recently he had been complaining of dizziness at times when working outside, which he believes to be an inner ear problem  Last week BP was very elevated and he had an appointment with Dr Ladan Moreno  Medications were changed at that appointment and states he feels much better since last   He is completing 50-60 minutes of aerobic exercise and has increased to 5 6 METs on different modalities such as the TRM, Lifecycle, Nustep, and UBE  No changes to telemetry-paced  He has started an upper body strength training program at rehab, and has been tolerating it well and not reporting any chest soreness  He is not completing a home exercise plan since he is very active at home around his house  We have continued to encourage regular aerobic exercise using a walking program at home with goal of 150-200 min/week  He attended weekly group education classes on cardiac risk factors  He completed post TM ETT assessment today and increased to 7 5 METs from 4 3 METs initially   He feels significant improvement in how he feels overall, and is very happy with his progress and grateful for his help in Cardiac rehab program   He denies depression (PHQ-9=0) or anxiety (SILVIA-7=0) at this time  He has great support from his wife and family        Medication compliance: Yes   Comments: Pt reports to be compliant with medications  Fall Risk: Low   Comments: Ambulates with a steady gait with no assist device    EKG Interpretation: NSR      EXERCISE ASSESSMENT and PLAN    Current Exercise Program in Rehab:       Frequency: 3 days/week Supplement with home exercise 2+ days/wk as tolerated       Minutes: 50-60       METS: 5 6            HR: 30 beats above resting   RPE: 4-6         Modalities: Treadmill, UBE, Lifecycle, NuStep and Recumbent bike      Exercise Progression 30 Day Goals :    Frequency: 3 days/week of cardiac rehab     Supplement with home exercise 2+ days/wk as tolerated    Minutes: 50-60                           >150 mins/wk of moderate intensity exercise   METS:  5 6-6 0   HR: 30 beats above resting    RPE: 4-6   Modalities: Treadmill, UBE, Lifecycle, NuStep and Recumbent bike    Strength trainin-3 days / week  12-15 repetitions  1-2 sets per modality   Will be added following at least 8 weeks post surgery and 8-10 monitored sessions  Will be added following 2-3 weeks of monitored exercise sessions   Modalities: Pull Downs, Lateral Raise, Arm Extension, Arm Curl, Sit to [employer], [employer], Shoulder Shrugs and leg ext    Home Exercise: no regular aerobic exercise, very active walking and working around his 10 acre property    Goals: 10% improvement in functional capacity - based on max METs achieved in fitness assessment, improved DASI score by 10%, Increase in exercise capacity by 40% - based on peak METs tolerated in cardiac rehab exercise session, Exercise 5 days/wk, >150mins/wk of moderate intensity exercise, Attend Rehab regularly and start a home exercise program    Progression Toward Goals:  Goals met: increased strength and endurance with exercise, increased functional capacity shown by increased MET levels, increased MET level on TM ETT (74% change), established regular exercise program in cardiac rehab with goal of continueing walking program 150-200 min/week  Education: benefit of exercise for CAD risk factors, home exercise guidelines, AHA guidelines to achieve >150 mins/wk of moderate exercise and RPE scale   Plan:education on home exercise guidelines, home exercise 30+ mins 2 days opposite CR and Education class: Risk Factors for Heart Disease  Readiness to change: Maintenance: (Maintaining the behavior change)      NUTRITION ASSESSMENT AND PLAN    Weight control:    Starting weight: 158 8 lb   Current weight:   168 lbs     Diabetes: N/A  A1c: 5 7    last measured: 5/10/2022    Lipid management: 5/10/2022: chol 168, Trig 85, HDL 45,     Goals:LDL <100, HDL >40, TRG <150 and CHOL <200    Progression Toward Goals: Pt is progressing and showing improvement  toward the following goals:  reports staying hydrated on days he is outside in the heat    , Pt has not made progress toward the following goals: weight gain since starting rehab (no concern with weight) and no changes to diet since intial evaluation   , Patient will continue to choose heart healthy options  in the next 30 days, Will continue to educate and progress as tolerated  Has gained 10 lbs since starting rehab      Education: heart healthy eating  low sodium diet  hydration  Plan: Education class: Reading Food Labels and Education Class: Heart Healthy Eating  Readiness to change: Action:  (Changing behavior)      PSYCHOSOCIAL ASSESSMENT AND PLAN    Emotional:  Depression assessment:  PHQ-9 = 0 =No Depression            Anxiety measure:  SILVIA-7 = 0-4  = Not anxious  Self-reported stress level:  1  Social support: Excellent-wife and family    Goals:  Reduce perceived stress to 1-3/10, improved [facility] QOL < 27, Physical Fitness in [facility] Score < 3 and increased energy    Progression Toward Goals: Goals met: doing well no concerns with depression or anxiety  Great family support  Education: signs/sxs of depression and benefits of a positive support system  Plan: Class: Stress and Your Health and Class: Relaxation  Readiness to change: Action:  (Changing behavior)      OTHER CORE COMPONENTS     Tobacco:   Social History     Tobacco Use   Smoking Status Former Smoker    Types: Cigars   Smokeless Tobacco Never Used   Tobacco Comment    50 years ago       Tobacco Use Intervention:   N/A:  Patient is a non-smoker     Anginal Symptoms:  None   NTG use: No prescription    Blood pressure:    Restin//200   Exercise: 126//100    Goals: consistent BP < 130/80, reduced dietary sodium <2300mg, moderate intensity exercise >150 mins/wk, medication compliance and reduce number of medications  needed for BP control    Progression Toward Goals: Pt is progressing and showing improvement  toward the following goals:  BP has been elevated at rest  Medication change last week and patient reports feeling change and not noticing dizziness since  of this week  Patient is monitoring and keeping BP log at home        Education:  understanding high blood pressure and it's relationship to CAD and low sodium diet and HTN  Plan: Class: Understanding Heart Disease and Class: Common Heart Medications  Readiness to change: Action:  (Changing behavior)

## 2022-09-29 ENCOUNTER — REMOTE DEVICE CLINIC VISIT (OUTPATIENT)
Dept: CARDIOLOGY CLINIC | Facility: CLINIC | Age: 87
End: 2022-09-29
Payer: COMMERCIAL

## 2022-09-29 DIAGNOSIS — Z95.0 CARDIAC PACEMAKER IN SITU: Primary | ICD-10-CM

## 2022-09-29 PROCEDURE — 93296 REM INTERROG EVL PM/IDS: CPT | Performed by: INTERNAL MEDICINE

## 2022-09-29 PROCEDURE — 93294 REM INTERROG EVL PM/LDLS PM: CPT | Performed by: INTERNAL MEDICINE

## 2022-09-29 NOTE — PROGRESS NOTES
Results for orders placed or performed in visit on 09/29/22   Cardiac EP device report    Narrative    BIOT DUAL CHAMBER PPM - ACTIVE SYSTEM IS MRI CONDITIONAL  BIOTRONIK TRANSMISSION: BATTERY STATUS "OK" (55% REMAINING BATTERY LIFE)  AP-87%, -22%  ALL AVAILABLE LEAD PARAMETERS WITHIN NORMAL LIMITS  NO SIGNIFICANT HIGH RATE EPISODES  NORMAL DEVICE FUNCTION   GV

## 2022-10-06 ENCOUNTER — OFFICE VISIT (OUTPATIENT)
Dept: FAMILY MEDICINE CLINIC | Facility: HOSPITAL | Age: 87
End: 2022-10-06
Payer: COMMERCIAL

## 2022-10-06 VITALS
HEART RATE: 73 BPM | BODY MASS INDEX: 24.34 KG/M2 | WEIGHT: 170 LBS | TEMPERATURE: 96.3 F | HEIGHT: 70 IN | DIASTOLIC BLOOD PRESSURE: 80 MMHG | SYSTOLIC BLOOD PRESSURE: 132 MMHG

## 2022-10-06 DIAGNOSIS — L08.9 INFECTED SEBACEOUS CYST: Primary | ICD-10-CM

## 2022-10-06 DIAGNOSIS — Z23 ENCOUNTER FOR IMMUNIZATION: ICD-10-CM

## 2022-10-06 DIAGNOSIS — L72.3 INFECTED SEBACEOUS CYST: Primary | ICD-10-CM

## 2022-10-06 PROCEDURE — 99213 OFFICE O/P EST LOW 20 MIN: CPT | Performed by: FAMILY MEDICINE

## 2022-10-06 PROCEDURE — G0008 ADMIN INFLUENZA VIRUS VAC: HCPCS

## 2022-10-06 PROCEDURE — 90662 IIV NO PRSV INCREASED AG IM: CPT

## 2022-10-06 RX ORDER — DOXYCYCLINE 100 MG/1
100 TABLET ORAL 2 TIMES DAILY
Qty: 14 TABLET | Refills: 0 | Status: SHIPPED | OUTPATIENT
Start: 2022-10-06 | End: 2022-10-13

## 2022-10-06 NOTE — PROGRESS NOTES
Name: Enoc Crook      : 10/7/1932      MRN: [id number]  Encounter Provider: David Dietz MD  Encounter Date: 10/6/2022   Encounter department: [facility]  Infected sebaceous cyst  -     doxycycline (ADOXA) 100 MG tablet; Take 1 tablet (100 mg total) by mouth 2 (two) times a day for 7 days    2  Encounter for immunization  -     influenza vaccine, high-dose, PF 0 7 mL (FLUZONE HIGH-DOSE)  Patient with Infectious of what looks like an underlying sebaceous cyst   Discussed treatment options  At this point will treat empirically with doxycycline as allergic to amoxicillin  Warm compresses advised  If worsening may need incision and drainage  They will call if worsening  Once infection is improved consider definitive treatment by removal of sebaceous cyst which she is inclined not to do  Patient is on Plavix and aspirin thus would be inclined to refer to surgery for definitive treatment rather than in our office  Subjective      Patient has had a mass behind the right shoulder (sees upper right back)  Over the past 1 or 2 years the size has increased  Over the last few days it has become tender, red, swollen  There is no discharge  He has no fever no chills  No purulence  Review of Systems   Constitutional: Negative for activity change, appetite change, fatigue, fever and unexpected weight change         Current Outpatient Medications on File Prior to Visit   Medication Sig    amLODIPine (NORVASC) 5 mg tablet Take 1 tablet (5 mg total) by mouth daily    Ascorbic Acid (VITAMIN C) 500 MG CAPS Take 1 tablet by mouth daily    aspirin (ECOTRIN LOW STRENGTH) 81 mg EC tablet Take 81 mg by mouth daily    atorvastatin (LIPITOR) 20 mg tablet Take 1 tablet (20 mg total) by mouth daily with dinner    carvedilol (COREG) 6 25 mg tablet Take 1 tablet (6 25 mg total) by mouth 2 (two) times a day with meals    clopidogrel (Plavix) 75 mg tablet Take 1 tablet (75 mg total) by mouth daily    Cyanocobalamin (VITAMIN B12) 1000 MCG TBCR Take 1 tablet by mouth daily    finasteride (PROSCAR) 5 mg tablet Take 1 tablet (5 mg total) by mouth daily    Flaxseed, Linseed, (FLAXSEED OIL) 1000 MG CAPS Take 1 capsule by mouth daily    fluticasone (FLONASE) 50 mcg/act nasal spray 2 sprays into each nostril daily    Glucosamine-Chondroit-Vit C-Mn (GLUCOSAMINE 1500 COMPLEX) CAPS Take 1 capsule by mouth daily    levothyroxine 100 mcg tablet TAKE ONE TABLET BY MOUTH EVERY DAY    Multiple Vitamin (MULTIVITAMIN) capsule Take 1 capsule by mouth daily    nitroglycerin (NITROSTAT) 0 4 mg SL tablet Place 1 tablet (0 4 mg total) under the tongue every 5 (five) minutes as needed for chest pain    tamsulosin (FLOMAX) 0 4 mg Take 1 capsule (0 4 mg total) by mouth 2 (two) times a day       Objective     /80   Pulse 73   Temp (!) 96 3 °F (35 7 °C)   Ht 5' 10" (1 778 m)   Wt 77 1 kg (170 lb)   BMI 24 39 kg/m²     Physical Exam  Vitals and nursing note reviewed  Constitutional:       Appearance: Normal appearance  Skin:            Comments: Mass measuring about 2 cm  Minimal fluctuance, erythematous, tender to touch, warm to touch  No discharge  No purulence  Neurological:      Mental Status: He is alert         Kyle Amador MD

## 2022-10-17 ENCOUNTER — TELEPHONE (OUTPATIENT)
Dept: FAMILY MEDICINE CLINIC | Facility: HOSPITAL | Age: 87
End: 2022-10-17

## 2022-10-17 NOTE — TELEPHONE ENCOUNTER
**Can wait until Thursday**  Patient was under the impression you would be able to remove cyst - according to your note, it looks like you wanted to refer him to general surgery? Please advise what you recommend  I did cancel him out of the schedule for Thursday until you are able to review

## 2022-10-17 NOTE — TELEPHONE ENCOUNTER
PATIENT HAS APPT THIS THURSDAY - ASKING IF THERE IS ANY PREPARATION HE NEEDS TO DO BEFORE THIS PROCEDURE? ANY MEDS HE NEEDS TO STOP?     PCB    OK TO LEAVE A DETAILED MESSAGE ON [employer]IC[employer]

## 2022-10-20 DIAGNOSIS — L08.9 INFECTED SEBACEOUS CYST: Primary | ICD-10-CM

## 2022-10-20 DIAGNOSIS — L72.3 INFECTED SEBACEOUS CYST: Primary | ICD-10-CM

## 2022-10-20 NOTE — TELEPHONE ENCOUNTER
Patient and wife called again this morning asking status of this message  Wife reports the cyst is draining and painful  Per your office note, Dr Sean James [facility] contact info given to wife  I will place a referral in his chart for them to refer to

## 2022-10-26 ENCOUNTER — CONSULT (OUTPATIENT)
Dept: SURGERY | Facility: CLINIC | Age: 87
End: 2022-10-26
Payer: COMMERCIAL

## 2022-10-26 VITALS
HEART RATE: 80 BPM | TEMPERATURE: 97.3 F | WEIGHT: 169 LBS | SYSTOLIC BLOOD PRESSURE: 123 MMHG | BODY MASS INDEX: 24.25 KG/M2 | DIASTOLIC BLOOD PRESSURE: 80 MMHG

## 2022-10-26 DIAGNOSIS — L08.9 INFECTED SEBACEOUS CYST OF SKIN: Primary | ICD-10-CM

## 2022-10-26 DIAGNOSIS — L72.3 SEBACEOUS CYST: ICD-10-CM

## 2022-10-26 DIAGNOSIS — L72.3 INFECTED SEBACEOUS CYST OF SKIN: Primary | ICD-10-CM

## 2022-10-26 PROCEDURE — 99205 OFFICE O/P NEW HI 60 MIN: CPT | Performed by: SURGERY

## 2022-10-26 RX ORDER — SULFAMETHOXAZOLE AND TRIMETHOPRIM 800; 160 MG/1; MG/1
1 TABLET ORAL EVERY 12 HOURS SCHEDULED
Qty: 10 TABLET | Refills: 0 | Status: SHIPPED | OUTPATIENT
Start: 2022-10-26 | End: 2022-10-31

## 2022-10-26 NOTE — PROGRESS NOTES
Assessment/Plan:    Sebaceous cyst  79yo M with an infected sebaceous cyst of his upper right back  Plan: We will do a short course of antibiotics to help speed the resolution of the inflamed cyst  I would like him to return to clinic in 1 month for an office excision of the soft tissue mass  I would like him to hold his plavix for 5 days in advance of this office based excision  Diagnoses and all orders for this visit:    Infected sebaceous cyst of skin  -     sulfamethoxazole-trimethoprim (BACTRIM DS) 800-160 mg per tablet; Take 1 tablet by mouth every 12 (twelve) hours for 5 days    Sebaceous cyst          Subjective:      Patient ID: Claude Raya is a 80 y o  male  81 yo M with a soft tissue mass of his right sided back  He has a small cystic lesion of his back approximately the size of a marble, that earlier this month increased in size to approximately the size of a golf ball  He was treated with outpatient antibiotics and it has slowly decreased in size  However, over the past week it has remained painful, and red in size  He denies any drainage, and just has some moderate discomfort  He does take aspirin and plavix  The following portions of the patient's history were reviewed and updated as appropriate:   He  has a past medical history of Angina pectoris ([address] ), Arthritis, Ascending aortic aneurysm, BPH (benign prostatic hyperplasia), Cancer ([address] ), Coronary artery disease, Former tobacco use, GERD (gastroesophageal reflux disease), Headache, Hepatitis A, History of melanoma, Hypertension, Hypothyroidism, Pacemaker, Severe aortic stenosis, Shortness of breath, Vitamin B12 deficiency, and Wears dentures    He   Patient Active Problem List    Diagnosis Date Noted   • Sebaceous cyst 10/26/2022   • Pure hypercholesterolemia 06/07/2022   • S/P AVR 05/16/2022   • Coronary artery disease involving native coronary artery 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 pectoris 04/18/2022   • S/P drug eluting coronary stent placement 04/18/2022   • Balance disorder 06/11/2019   • Sick sinus syndrome ([facility] ) 04/05/2019   • Presence of permanent cardiac pacemaker 04/05/2019   • Paroxysmal atrial fibrillation ([facility] ) 04/05/2019   • Chronic nonintractable headache 05/30/2017   • Acquired hypothyroidism 07/29/2015   • GERD without esophagitis 02/26/2015   • Enlarged prostate without lower urinary tract symptoms (luts) 10/14/2014   • Primary hypertension 10/14/2014     He  has a past surgical history that includes Inguinal hernia repair (Bilateral); Cardiac pacemaker placement; Skin cancer excision; Cardiac catheterization (04/15/2022); Cardiac catheterization (N/A, 04/15/2022); Cardiac catheterization (N/A, 04/15/2022); Colonoscopy; and pr rplcmt aortic valve opn w/stentless tissue valve (N/A, 5/16/2022)  His family history includes Cancer in his family; Heart disease in his mother  He  reports that he has quit smoking  His smoking use included cigars  He has never used smokeless tobacco  He reports current alcohol use  He reports that he does not use drugs    Current Outpatient Medications   Medication Sig Dispense Refill   • sulfamethoxazole-trimethoprim (BACTRIM DS) 800-160 mg per tablet Take 1 tablet by mouth every 12 (twelve) hours for 5 days 10 tablet 0   • amLODIPine (NORVASC) 5 mg tablet Take 1 tablet (5 mg total) by mouth daily 90 tablet 3   • Ascorbic Acid (VITAMIN C) 500 MG CAPS Take 1 tablet by mouth daily     • aspirin (ECOTRIN LOW STRENGTH) 81 mg EC tablet Take 81 mg by mouth daily     • atorvastatin (LIPITOR) 20 mg tablet Take 1 tablet (20 mg total) by mouth daily with dinner 30 tablet 2   • carvedilol (COREG) 6 25 mg tablet Take 1 tablet (6 25 mg total) by mouth 2 (two) times a day with meals 180 tablet 3   • clopidogrel (Plavix) 75 mg tablet Take 1 tablet (75 mg total) by mouth daily 90 tablet 3   • Cyanocobalamin (VITAMIN B12) 1000 MCG TBCR Take 1 tablet by mouth daily     • finasteride (PROSCAR) 5 mg tablet Take 1 tablet (5 mg total) by mouth daily 90 tablet 3   • Flaxseed, Linseed, (FLAXSEED OIL) 1000 MG CAPS Take 1 capsule by mouth daily     • fluticasone (FLONASE) 50 mcg/act nasal spray 2 sprays into each nostril daily 16 g 1   • Glucosamine-Chondroit-Vit C-Mn (GLUCOSAMINE 1500 COMPLEX) CAPS Take 1 capsule by mouth daily     • levothyroxine 100 mcg tablet TAKE ONE TABLET BY MOUTH EVERY DAY 90 tablet 3   • Multiple Vitamin (MULTIVITAMIN) capsule Take 1 capsule by mouth daily     • nitroglycerin (NITROSTAT) 0 4 mg SL tablet Place 1 tablet (0 4 mg total) under the tongue every 5 (five) minutes as needed for chest pain 24 tablet 1   • tamsulosin (FLOMAX) 0 4 mg Take 1 capsule (0 4 mg total) by mouth 2 (two) times a day 90 capsule 3     No current facility-administered medications for this visit       Current Outpatient Medications on File Prior to Visit   Medication Sig   • amLODIPine (NORVASC) 5 mg tablet Take 1 tablet (5 mg total) by mouth daily   • Ascorbic Acid (VITAMIN C) 500 MG CAPS Take 1 tablet by mouth daily   • aspirin (ECOTRIN LOW STRENGTH) 81 mg EC tablet Take 81 mg by mouth daily   • atorvastatin (LIPITOR) 20 mg tablet Take 1 tablet (20 mg total) by mouth daily with dinner   • carvedilol (COREG) 6 25 mg tablet Take 1 tablet (6 25 mg total) by mouth 2 (two) times a day with meals   • clopidogrel (Plavix) 75 mg tablet Take 1 tablet (75 mg total) by mouth daily   • Cyanocobalamin (VITAMIN B12) 1000 MCG TBCR Take 1 tablet by mouth daily   • finasteride (PROSCAR) 5 mg tablet Take 1 tablet (5 mg total) by mouth daily   • Flaxseed, Linseed, (FLAXSEED OIL) 1000 MG CAPS Take 1 capsule by mouth daily   • fluticasone (FLONASE) 50 mcg/act nasal spray 2 sprays into each nostril daily   • Glucosamine-Chondroit-Vit C-Mn (GLUCOSAMINE 1500 COMPLEX) CAPS Take 1 capsule by mouth daily   • levothyroxine 100 mcg tablet TAKE ONE TABLET BY MOUTH EVERY DAY   • Multiple Vitamin (MULTIVITAMIN) capsule Take 1 capsule by mouth daily • nitroglycerin (NITROSTAT) 0 4 mg SL tablet Place 1 tablet (0 4 mg total) under the tongue every 5 (five) minutes as needed for chest pain   • tamsulosin (FLOMAX) 0 4 mg Take 1 capsule (0 4 mg total) by mouth 2 (two) times a day     No current facility-administered medications on file prior to visit  He is allergic to amoxicillin       Review of Systems   Constitutional: Negative for appetite change, chills, diaphoresis and fever  HENT: Negative for nosebleeds and trouble swallowing  Eyes: Negative  Respiratory: Negative for cough, shortness of breath and wheezing  Cardiovascular: Negative for chest pain, palpitations and leg swelling  Gastrointestinal: Negative for abdominal distention, abdominal pain, nausea and vomiting  Genitourinary: Negative for difficulty urinating, flank pain and frequency  Musculoskeletal: Negative for arthralgias, joint swelling and myalgias  Skin: Negative for pallor and rash  Neurological: Negative for dizziness, facial asymmetry and speech difficulty  Hematological: Does not bruise/bleed easily  Psychiatric/Behavioral: Negative for agitation and confusion  All other systems reviewed and are negative  Objective:      /80 (BP Location: Left arm, Patient Position: Sitting, Cuff Size: Large)   Pulse 80   Temp (!) 97 3 °F (36 3 °C) (Skin)   Wt 76 7 kg (169 lb)   BMI 24 25 kg/m²          Physical Exam  Vitals and nursing note reviewed  Constitutional:       General: He is not in acute distress  Appearance: Normal appearance  He is not toxic-appearing  HENT:      Head: Normocephalic and atraumatic  Mouth/Throat:      Mouth: Mucous membranes are moist    Eyes:      Extraocular Movements: Extraocular movements intact  Pupils: Pupils are equal, round, and reactive to light  Cardiovascular:      Rate and Rhythm: Normal rate and regular rhythm  Pulses: Normal pulses     Pulmonary:      Effort: Pulmonary effort is normal  No respiratory distress  Breath sounds: Normal breath sounds  No wheezing  Abdominal:      General: There is no distension  Palpations: Abdomen is soft  There is no mass  Tenderness: There is no abdominal tenderness  There is no guarding or rebound  Hernia: No hernia is present  Musculoskeletal:         General: No swelling or deformity  Normal range of motion  Cervical back: Normal range of motion and neck supple  Right lower leg: No edema  Left lower leg: No edema  Skin:     General: Skin is warm and dry  Coloration: Skin is not jaundiced  Findings: Lesion present  Comments: 2x2cm rubbery erythematous mass, overlying the right scapula   Neurological:      General: No focal deficit present  Mental Status: He is alert and oriented to person, place, and time     Psychiatric:         Mood and Affect: Mood normal          Behavior: Behavior normal

## 2022-10-26 NOTE — ASSESSMENT & PLAN NOTE
79yo M with an infected sebaceous cyst of his upper right back  Plan: We will do a short course of antibiotics to help speed the resolution of the inflamed cyst  I would like him to return to clinic in 1 month for an office excision of the soft tissue mass  I would like him to hold his plavix for 5 days in advance of this office based excision

## 2022-11-09 LAB
ALBUMIN SERPL-MCNC: 4.5 G/DL (ref 3.5–4.6)
ALBUMIN/GLOB SERPL: 1.8 {RATIO} (ref 1.2–2.2)
ALP SERPL-CCNC: 88 IU/L (ref 44–121)
ALT SERPL-CCNC: 21 IU/L (ref 0–44)
AST SERPL-CCNC: 26 IU/L (ref 0–40)
BASOPHILS # BLD AUTO: 0 X10E3/UL (ref 0–0.2)
BASOPHILS NFR BLD AUTO: 1 %
BILIRUB SERPL-MCNC: 0.6 MG/DL (ref 0–1.2)
BUN SERPL-MCNC: 9 MG/DL (ref 10–36)
BUN/CREAT SERPL: 12 (ref 10–24)
CALCIUM SERPL-MCNC: 9.2 MG/DL (ref 8.6–10.2)
CHLORIDE SERPL-SCNC: 99 MMOL/L (ref 96–106)
CHOLEST SERPL-MCNC: 108 MG/DL (ref 100–199)
CO2 SERPL-SCNC: 24 MMOL/L (ref 20–29)
CREAT SERPL-MCNC: 0.77 MG/DL (ref 0.76–1.27)
EGFR: 85 ML/MIN/1.73
EOSINOPHIL # BLD AUTO: 0.2 X10E3/UL (ref 0–0.4)
EOSINOPHIL NFR BLD AUTO: 5 %
ERYTHROCYTE [DISTWIDTH] IN BLOOD BY AUTOMATED COUNT: 13.3 % (ref 11.6–15.4)
GLOBULIN SER-MCNC: 2.5 G/DL (ref 1.5–4.5)
GLUCOSE SERPL-MCNC: 107 MG/DL (ref 70–99)
HCT VFR BLD AUTO: 37.1 % (ref 37.5–51)
HDLC SERPL-MCNC: 44 MG/DL
HGB BLD-MCNC: 12.6 G/DL (ref 13–17.7)
IMM GRANULOCYTES # BLD: 0 X10E3/UL (ref 0–0.1)
IMM GRANULOCYTES NFR BLD: 0 %
LDLC SERPL CALC-MCNC: 46 MG/DL (ref 0–99)
LYMPHOCYTES # BLD AUTO: 1.2 X10E3/UL (ref 0.7–3.1)
LYMPHOCYTES NFR BLD AUTO: 29 %
MCH RBC QN AUTO: 31.3 PG (ref 26.6–33)
MCHC RBC AUTO-ENTMCNC: 34 G/DL (ref 31.5–35.7)
MCV RBC AUTO: 92 FL (ref 79–97)
MONOCYTES # BLD AUTO: 0.5 X10E3/UL (ref 0.1–0.9)
MONOCYTES NFR BLD AUTO: 13 %
NEUTROPHILS # BLD AUTO: 2.1 X10E3/UL (ref 1.4–7)
NEUTROPHILS NFR BLD AUTO: 52 %
PLATELET # BLD AUTO: 258 X10E3/UL (ref 150–450)
POTASSIUM SERPL-SCNC: 4.5 MMOL/L (ref 3.5–5.2)
PROT SERPL-MCNC: 7 G/DL (ref 6–8.5)
RBC # BLD AUTO: 4.02 X10E6/UL (ref 4.14–5.8)
SODIUM SERPL-SCNC: 136 MMOL/L (ref 134–144)
TRIGL SERPL-MCNC: 90 MG/DL (ref 0–149)
WBC # BLD AUTO: 4 X10E3/UL (ref 3.4–10.8)

## 2022-11-18 ENCOUNTER — PROCEDURE VISIT (OUTPATIENT)
Dept: UROLOGY | Facility: HOSPITAL | Age: 87
End: 2022-11-18

## 2022-11-18 VITALS
HEART RATE: 78 BPM | WEIGHT: 170 LBS | DIASTOLIC BLOOD PRESSURE: 90 MMHG | SYSTOLIC BLOOD PRESSURE: 140 MMHG | BODY MASS INDEX: 25.18 KG/M2 | OXYGEN SATURATION: 98 % | HEIGHT: 69 IN

## 2022-11-18 DIAGNOSIS — R33.9 URINARY RETENTION WITH INCOMPLETE BLADDER EMPTYING: ICD-10-CM

## 2022-11-18 DIAGNOSIS — N13.8 BPH WITH OBSTRUCTION/LOWER URINARY TRACT SYMPTOMS: Primary | ICD-10-CM

## 2022-11-18 DIAGNOSIS — N40.1 BPH WITH OBSTRUCTION/LOWER URINARY TRACT SYMPTOMS: Primary | ICD-10-CM

## 2022-11-18 LAB
SL AMB  POCT GLUCOSE, UA: NORMAL
SL AMB LEUKOCYTE ESTERASE,UA: NORMAL
SL AMB POCT BILIRUBIN,UA: NORMAL
SL AMB POCT BLOOD,UA: NORMAL
SL AMB POCT CLARITY,UA: CLEAR
SL AMB POCT COLOR,UA: NORMAL
SL AMB POCT KETONES,UA: NORMAL
SL AMB POCT NITRITE,UA: NORMAL
SL AMB POCT PH,UA: 5
SL AMB POCT SPECIFIC GRAVITY,UA: 1.01
SL AMB POCT URINE PROTEIN: NORMAL
SL AMB POCT UROBILINOGEN: NORMAL

## 2022-11-18 NOTE — PROGRESS NOTES
HISTORY:    Further evaluation for BPH, we started tamsulosin last visit and he thinks he is voiding better  Stronger flow, no hesitancy  However, still has double voiding, urgency where he must run to the bathroom right away  Nocturia times 1-2     Retention still an issue, emptying poorly     Cystoscopy     Date/Time 11/18/2022 9:00 AM     Performed by  Gunnar Cheung MD     Authorized by Gunnar Cheung MD          Procedure Details:  Procedure type: cystoscopy    Patient tolerance: Patient tolerated the procedure well with no immediate complications    Additional Procedure Details:      Patient presents for cystoscopy  I have discussed the reasons for doing the exam, and the potential risks and complications  Patient expressed understanding, and signed informed consent document  The patient was carefully  positioned supine on the examining table  Sterile preparation was performed on the urethra  Xylocaine jelly was instilled and left  Indwelling for the procedure  The 13 Frisian flexible cystoscope was passed with the following findings:      Urethra:  No stricture    Prostate:  lateral lobes and median lobe all severely enlarged, intravesical lobe is large trabeculation diverticula    Bladder:  Trabeculation and diverticula throughout the bladder  Very stretched out, no lesions, tumor, or stones  Residual urine:  250 mL    Patient tolerated the procedure well and was escorted from the examining table  ASSESSMENT / PLAN:    1  Symptom wise he is doing better  2  He does not empty well, and that could be an issue going forward  3  However, he has normal creatinine, no hydro on recent CT scan of April 2022, no infections  4   Continue tamsulosin, reassess with bladder scan at each visit    The following portions of the patient's history were reviewed and updated as appropriate: allergies, current medications, past family history, past medical history, past social history, past surgical history and problem list     Review of Systems   All other systems reviewed and are negative  Objective:     Physical Exam  Constitutional:       Appearance: Normal appearance  Neurological:      Mental Status: He is alert  No results found for: PSA]  BUN   Date Value Ref Range Status   11/08/2022 9 (L) 10 - 36 mg/dL Final     Creatinine   Date Value Ref Range Status   11/08/2022 0 77 0 76 - 1 27 mg/dL Final   05/20/2022 0 67 0 60 - 1 30 mg/dL Final     Comment:     Standardized to IDMS reference method   06/15/2017 0 89 0 76 - 1 27 mg/dL Final     No components found for: CBC      Patient Active Problem List   Diagnosis   • Chronic nonintractable headache   • Enlarged prostate without lower urinary tract symptoms (luts)   • GERD without esophagitis   • Primary hypertension   • Acquired hypothyroidism   • Sick sinus syndrome (HCC)   • Presence of permanent cardiac pacemaker   • Paroxysmal atrial fibrillation (HCC)   • Balance disorder   • Coronary artery disease involving native coronary artery 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 pectoris   • S/P drug eluting coronary stent placement   • S/P AVR   • Pure hypercholesterolemia   • Sebaceous cyst        Diagnoses and all orders for this visit:    BPH with obstruction/lower urinary tract symptoms  -     POCT urine dip    Urinary retention with incomplete bladder emptying    Other orders  -     Cystoscopy           Patient ID: Morgan Mahoney is a 80 y o  male        Current Outpatient Medications:   •  amLODIPine (NORVASC) 5 mg tablet, Take 1 tablet (5 mg total) by mouth daily, Disp: 90 tablet, Rfl: 3  •  Ascorbic Acid (VITAMIN C) 500 MG CAPS, Take 1 tablet by mouth daily, Disp: , Rfl:   •  aspirin (ECOTRIN LOW STRENGTH) 81 mg EC tablet, Take 81 mg by mouth daily, Disp: , Rfl:   •  atorvastatin (LIPITOR) 20 mg tablet, Take 1 tablet (20 mg total) by mouth daily with dinner, Disp: 30 tablet, Rfl: 2  •  carvedilol (COREG) 6 25 mg tablet, Take 1 tablet (6 25 mg total) by mouth 2 (two) times a day with meals, Disp: 180 tablet, Rfl: 3  •  clopidogrel (Plavix) 75 mg tablet, Take 1 tablet (75 mg total) by mouth daily, Disp: 90 tablet, Rfl: 3  •  Cyanocobalamin (VITAMIN B12) 1000 MCG TBCR, Take 1 tablet by mouth daily, Disp: , Rfl:   •  finasteride (PROSCAR) 5 mg tablet, Take 1 tablet (5 mg total) by mouth daily, Disp: 90 tablet, Rfl: 3  •  Flaxseed, Linseed, (FLAXSEED OIL) 1000 MG CAPS, Take 1 capsule by mouth daily, Disp: , Rfl:   •  fluticasone (FLONASE) 50 mcg/act nasal spray, 2 sprays into each nostril daily, Disp: 16 g, Rfl: 1  •  Glucosamine-Chondroit-Vit C-Mn (GLUCOSAMINE 1500 COMPLEX) CAPS, Take 1 capsule by mouth daily, Disp: , Rfl:   •  levothyroxine 100 mcg tablet, TAKE ONE TABLET BY MOUTH EVERY DAY, Disp: 90 tablet, Rfl: 3  •  Multiple Vitamin (MULTIVITAMIN) capsule, Take 1 capsule by mouth daily, Disp: , Rfl:   •  nitroglycerin (NITROSTAT) 0 4 mg SL tablet, Place 1 tablet (0 4 mg total) under the tongue every 5 (five) minutes as needed for chest pain, Disp: 24 tablet, Rfl: 1  •  tamsulosin (FLOMAX) 0 4 mg, Take 1 capsule (0 4 mg total) by mouth 2 (two) times a day, Disp: 90 capsule, Rfl: 3    Past Medical History:   Diagnosis Date   • Angina pectoris (HCC)    • Arthritis    • Ascending aortic aneurysm     trileaflet AV, 41mm   • BPH (benign prostatic hyperplasia)    • Cancer (HCC)     skin   • Coronary artery disease    • Former tobacco use     pipe & cigar use socially & infrequently   • GERD (gastroesophageal reflux disease)     controlled w/ diet   • Headache     h/o   • Hepatitis A    • History of melanoma    • Hypertension    • Hypothyroidism    • Pacemaker    • Severe aortic stenosis    • Shortness of breath     with exertion   • Vitamin B12 deficiency    • Wears dentures        Past Surgical History:   Procedure Laterality Date   • CARDIAC CATHETERIZATION  04/15/2022    Procedure: Cardiac catheterization;  Surgeon: Laurence Chavez, DO; Location: [facility]; Service: Cardiology   • CARDIAC CATHETERIZATION N/A 04/15/2022    Procedure: Cardiac Coronary Angiogram;  Surgeon: Jacinta Robison DO;  Location: [facility]; Service: Cardiology   • CARDIAC CATHETERIZATION N/A 04/15/2022    Procedure: Cardiac pci;  Surgeon: Jacinta Robison DO;  Location: [facility];   Service: Cardiology   • CARDIAC PACEMAKER PLACEMENT     • COLONOSCOPY     • INGUINAL HERNIA REPAIR Bilateral    • WI RPLCMT AORTIC VALVE OPN W/STENTLESS TISSUE VALVE N/A 5/16/2022    Procedure: REPLACEMENT VALVE AORTIC (TISSUE AVR) WITH 25 MM AORTIC MAGNA EASE VALVE W/ ROJELIO;  Surgeon: Paulina Carver DO;  Location: [facility];  Service: Cardiac Surgery   • SKIN CANCER EXCISION      melanoma, multiple       Social History

## 2022-11-23 ENCOUNTER — TELEPHONE (OUTPATIENT)
Dept: FAMILY MEDICINE CLINIC | Facility: HOSPITAL | Age: 87
End: 2022-11-23

## 2022-11-23 DIAGNOSIS — K04.7 DENTAL INFECTION: Primary | ICD-10-CM

## 2022-11-23 RX ORDER — CEPHALEXIN 500 MG/1
500 CAPSULE ORAL EVERY 8 HOURS SCHEDULED
Qty: 21 CAPSULE | Refills: 0 | Status: SHIPPED | OUTPATIENT
Start: 2022-11-23 | End: 2022-11-30

## 2022-11-23 NOTE — TELEPHONE ENCOUNTER
PATIENT BROKE TOOTH    SEEING DENTIST ON MONDAY    NEEDS ABX FROM PCP PRIOR TO DENTAL APPT    PLEASE SEND TO [facility]

## 2022-11-25 ENCOUNTER — HOSPITAL ENCOUNTER (OUTPATIENT)
Dept: NON INVASIVE DIAGNOSTICS | Age: 87
Discharge: HOME | End: 2022-11-25

## 2022-11-25 VITALS
DIASTOLIC BLOOD PRESSURE: 80 MMHG | SYSTOLIC BLOOD PRESSURE: 148 MMHG | WEIGHT: 170 LBS | HEART RATE: 73 BPM | HEIGHT: 69 IN | BODY MASS INDEX: 25.18 KG/M2

## 2022-11-25 DIAGNOSIS — Z95.2 S/P AVR: ICD-10-CM

## 2022-11-25 LAB
AORTIC ROOT: 3.1 CM
AORTIC VALVE MEAN VELOCITY: 12.6 M/S
APICAL FOUR CHAMBER EJECTION FRACTION: 67 %
ATRIAL RATE: 77 BPM
AV AREA BY CONTINUOUS VTI: 1.2 CM2
AV AREA PEAK VELOCITY: 1.3 CM2
AV LVOT MEAN GRADIENT: 1 MMHG
AV LVOT PEAK GRADIENT: 2 MMHG
AV MEAN GRADIENT: 7 MMHG
AV PEAK GRADIENT: 14 MMHG
AV VALVE AREA: 1.22 CM2
AV VELOCITY RATIO: 0.4
DOP CALC AO PEAK VEL: 1.87 M/S
DOP CALC AO VTI: 37.2 CM
DOP CALC LVOT AREA: 3.14 CM2
DOP CALC LVOT DIAMETER: 2 CM
DOP CALC LVOT PEAK VEL VTI: 14.47 CM
DOP CALC LVOT PEAK VEL: 0.75 M/S
DOP CALC LVOT STROKE INDEX: 22.3 ML/M2
DOP CALC LVOT STROKE VOLUME: 45.44 CM3
E WAVE DECELERATION TIME: 141 MS
FRACTIONAL SHORTENING: 29 % (ref 28–44)
INTERVENTRICULAR SEPTUM IN DIASTOLE (PARASTERNAL SHORT AXIS VIEW): 1.3 CM
INTERVENTRICULAR SEPTUM: 1.3 CM (ref 0.6–1.1)
LAAS-AP2: 21.7 CM2
LAAS-AP4: 18.7 CM2
LEFT ATRIUM AREA SYSTOLE SINGLE PLANE A4C: 17.5 CM2
LEFT ATRIUM SIZE: 4.4 CM
LEFT INTERNAL DIMENSION IN SYSTOLE: 2.9 CM (ref 2.1–4)
LEFT VENTRICULAR INTERNAL DIMENSION IN DIASTOLE: 4.1 CM (ref 3.5–6)
LEFT VENTRICULAR POSTERIOR WALL IN END DIASTOLE: 1.3 CM
LEFT VENTRICULAR STROKE VOLUME: 43 ML
LVSV (TEICH): 43 ML
MV E'TISSUE VEL-SEP: 9 CM/S
MV PEAK A VEL: 1.19 M/S
MV PEAK E VEL: 89 CM/S
MV STENOSIS PRESSURE HALF TIME: 41 MS
MV VALVE AREA P 1/2 METHOD: 5.37 CM2
P AXIS: 91 DEGREES
PR INTERVAL: 348 MS
QRS AXIS: -3 DEGREES
QRSD INTERVAL: 96 MS
QT INTERVAL: 334 MS
QTC INTERVAL: 377 MS
RA PRESSURE ESTIMATED: 10 MMHG
RIGHT ATRIUM AREA SYSTOLE A4C: 26.3 CM2
RIGHT VENTRICLE ID DIMENSION: 4.1 CM
RV PSP: 32 MMHG
SL CV LEFT ATRIUM LENGTH A2C: 5.5 CM
SL CV LV EF: 55
SL CV PED ECHO LEFT VENTRICLE DIASTOLIC VOLUME (MOD BIPLANE) 2D: 74 ML
SL CV PED ECHO LEFT VENTRICLE SYSTOLIC VOLUME (MOD BIPLANE) 2D: 31 ML
T WAVE AXIS: -48 DEGREES
TR MAX PG: 22 MMHG
TR PEAK VELOCITY: 2.3 M/S
TRICUSPID ANNULAR PLANE SYSTOLIC EXCURSION: 1.9 CM
TRICUSPID VALVE PEAK REGURGITATION VELOCITY: 2.34 M/S
VENTRICULAR RATE: 77 BPM

## 2022-11-30 ENCOUNTER — OFFICE VISIT (OUTPATIENT)
Dept: SURGERY | Facility: CLINIC | Age: 87
End: 2022-11-30

## 2022-11-30 VITALS
SYSTOLIC BLOOD PRESSURE: 109 MMHG | BODY MASS INDEX: 24.81 KG/M2 | HEART RATE: 72 BPM | WEIGHT: 168 LBS | DIASTOLIC BLOOD PRESSURE: 70 MMHG | TEMPERATURE: 96.4 F

## 2022-11-30 DIAGNOSIS — L72.3 SEBACEOUS CYST: Primary | ICD-10-CM

## 2022-11-30 NOTE — PROGRESS NOTES
Assessment/Plan:    Sebaceous cyst  79 yo M with a sebaceous cyst of his left back  Plan:  Cyst was excised with overlying skin in the office today  Patient tolerated the procedure well  To restart plavix tomorrow  To follow-up in the office in 2 weeks for suture removal         Diagnoses and all orders for this visit:    Sebaceous cyst          Subjective:      Patient ID: Ronni Samuel is a 80 y o  male  51-year-old male well known to me returns to clinic with a left back sebaceous cyst   Since our last visit he completed a course of antibiotics, and the cyst has dramatically decreased in size  His back is flat, and he has no significant complaints or pain in the area  He has had no redness or drainage in several weeks  He denies any fevers, chills, chest pain, or shortness of breath  He has been dealing with some BPH related issues with Dr Taras Swenson of Urology  The following portions of the patient's history were reviewed and updated as appropriate:   He  has a past medical history of Angina pectoris ([facility] [address] ), Arthritis, Ascending aortic aneurysm, BPH (benign prostatic hyperplasia), [facility] ), Coronary artery disease, Former tobacco use, GERD (gastroesophageal reflux disease), Headache, Hepatitis A, History of melanoma, Hypertension, Hypothyroidism, Pacemaker, Severe aortic stenosis, Shortness of breath, Vitamin B12 deficiency, and Wears dentures    He   Patient Active Problem List    Diagnosis Date Noted   • Sebaceous cyst 10/26/2022   • Pure hypercholesterolemia 06/07/2022   • S/P AVR 05/16/2022   • Coronary artery disease involving native coronary artery 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 pectoris 04/18/2022   • S/P drug eluting coronary stent placement 04/18/2022   • Balance disorder 06/11/2019   • Sick sinus syndrome ([facility] [address] ) 04/05/2019   • Presence of permanent cardiac pacemaker 04/05/2019   • Paroxysmal atrial fibrillation ([facility] ) 04/05/2019   • Chronic nonintractable headache 05/30/2017   • Acquired hypothyroidism 07/29/2015   • GERD without esophagitis 02/26/2015   • Enlarged prostate without lower urinary tract symptoms (luts) 10/14/2014   • Primary hypertension 10/14/2014     He  has a past surgical history that includes Inguinal hernia repair (Bilateral); Cardiac pacemaker placement; Skin cancer excision; Cardiac catheterization (04/15/2022); Cardiac catheterization (N/A, 04/15/2022); Cardiac catheterization (N/A, 04/15/2022); Colonoscopy; and pr rplcmt aortic valve opn w/stentless tissue valve (N/A, 5/16/2022)  His family history includes Cancer in his family; Heart disease in his mother  He  reports that he has quit smoking  His smoking use included cigars  He has never used smokeless tobacco  He reports current alcohol use  He reports that he does not use drugs    Current Outpatient Medications   Medication Sig Dispense Refill   • amLODIPine (NORVASC) 5 mg tablet Take 1 tablet (5 mg total) by mouth daily 90 tablet 3   • Ascorbic Acid (VITAMIN C) 500 MG CAPS Take 1 tablet by mouth daily     • aspirin (ECOTRIN LOW STRENGTH) 81 mg EC tablet Take 81 mg by mouth daily     • atorvastatin (LIPITOR) 20 mg tablet Take 1 tablet (20 mg total) by mouth daily with dinner 30 tablet 2   • carvedilol (COREG) 6 25 mg tablet Take 1 tablet (6 25 mg total) by mouth 2 (two) times a day with meals 180 tablet 3   • cephalexin (KEFLEX) 500 mg capsule Take 1 capsule (500 mg total) by mouth every 8 (eight) hours for 7 days 21 capsule 0   • clopidogrel (Plavix) 75 mg tablet Take 1 tablet (75 mg total) by mouth daily 90 tablet 3   • Cyanocobalamin (VITAMIN B12) 1000 MCG TBCR Take 1 tablet by mouth daily     • finasteride (PROSCAR) 5 mg tablet Take 1 tablet (5 mg total) by mouth daily 90 tablet 3   • Flaxseed, Linseed, (FLAXSEED OIL) 1000 MG CAPS Take 1 capsule by mouth daily     • fluticasone (FLONASE) 50 mcg/act nasal spray 2 sprays into each nostril daily 16 g 1   • Glucosamine-Chondroit-Vit C-Mn (GLUCOSAMINE 1500 COMPLEX) CAPS Take 1 capsule by mouth daily     • levothyroxine 100 mcg tablet TAKE ONE TABLET BY MOUTH EVERY DAY 90 tablet 3   • Multiple Vitamin (MULTIVITAMIN) capsule Take 1 capsule by mouth daily     • nitroglycerin (NITROSTAT) 0 4 mg SL tablet Place 1 tablet (0 4 mg total) under the tongue every 5 (five) minutes as needed for chest pain 24 tablet 1   • tamsulosin (FLOMAX) 0 4 mg Take 1 capsule (0 4 mg total) by mouth 2 (two) times a day 90 capsule 3     No current facility-administered medications for this visit       Current Outpatient Medications on File Prior to Visit   Medication Sig   • amLODIPine (NORVASC) 5 mg tablet Take 1 tablet (5 mg total) by mouth daily   • Ascorbic Acid (VITAMIN C) 500 MG CAPS Take 1 tablet by mouth daily   • aspirin (ECOTRIN LOW STRENGTH) 81 mg EC tablet Take 81 mg by mouth daily   • atorvastatin (LIPITOR) 20 mg tablet Take 1 tablet (20 mg total) by mouth daily with dinner   • carvedilol (COREG) 6 25 mg tablet Take 1 tablet (6 25 mg total) by mouth 2 (two) times a day with meals   • cephalexin (KEFLEX) 500 mg capsule Take 1 capsule (500 mg total) by mouth every 8 (eight) hours for 7 days   • clopidogrel (Plavix) 75 mg tablet Take 1 tablet (75 mg total) by mouth daily   • Cyanocobalamin (VITAMIN B12) 1000 MCG TBCR Take 1 tablet by mouth daily   • finasteride (PROSCAR) 5 mg tablet Take 1 tablet (5 mg total) by mouth daily   • Flaxseed, Linseed, (FLAXSEED OIL) 1000 MG CAPS Take 1 capsule by mouth daily   • fluticasone (FLONASE) 50 mcg/act nasal spray 2 sprays into each nostril daily   • Glucosamine-Chondroit-Vit C-Mn (GLUCOSAMINE 1500 COMPLEX) CAPS Take 1 capsule by mouth daily   • levothyroxine 100 mcg tablet TAKE ONE TABLET BY MOUTH EVERY DAY   • Multiple Vitamin (MULTIVITAMIN) capsule Take 1 capsule by mouth daily   • nitroglycerin (NITROSTAT) 0 4 mg SL tablet Place 1 tablet (0 4 mg total) under the tongue every 5 (five) minutes as needed for chest pain   • tamsulosin (FLOMAX) 0 4 mg Take 1 capsule (0 4 mg total) by mouth 2 (two) times a day     No current facility-administered medications on file prior to visit  He is allergic to amoxicillin       Review of Systems   Constitutional: Negative for appetite change, chills, diaphoresis and fever  HENT: Negative for nosebleeds and trouble swallowing  Eyes: Negative  Respiratory: Negative for cough, shortness of breath and wheezing  Cardiovascular: Negative for chest pain, palpitations and leg swelling  Gastrointestinal: Negative for abdominal distention, abdominal pain, nausea and vomiting  Genitourinary: Negative for difficulty urinating, flank pain and frequency  Musculoskeletal: Negative for arthralgias, joint swelling and myalgias  Skin: Positive for wound  Negative for pallor and rash  Neurological: Negative for dizziness, facial asymmetry and speech difficulty  Hematological: Does not bruise/bleed easily  Psychiatric/Behavioral: Negative for agitation and confusion  All other systems reviewed and are negative  Objective:      /70 (BP Location: Left arm, Patient Position: Sitting, Cuff Size: Large)   Pulse 72   Temp (!) 96 4 °F (35 8 °C) (Skin)   Wt 76 2 kg (168 lb)   BMI 24 81 kg/m²            Physical Exam  Vitals and nursing note reviewed  Constitutional:       General: He is not in acute distress  Appearance: Normal appearance  He is not toxic-appearing  HENT:      Head: Normocephalic and atraumatic  Mouth/Throat:      Mouth: Mucous membranes are moist    Eyes:      Extraocular Movements: Extraocular movements intact  Pupils: Pupils are equal, round, and reactive to light  Cardiovascular:      Rate and Rhythm: Normal rate and regular rhythm  Pulses: Normal pulses  Pulmonary:      Effort: Pulmonary effort is normal  No respiratory distress  Breath sounds: Normal breath sounds  No wheezing  Abdominal:      General: There is no distension        Palpations: Abdomen is soft  There is no mass  Tenderness: There is no abdominal tenderness  There is no guarding or rebound  Hernia: No hernia is present  Musculoskeletal:         General: No swelling or deformity  Normal range of motion  Cervical back: Normal range of motion and neck supple  Right lower leg: No edema  Left lower leg: No edema  Skin:     General: Skin is warm and dry  Coloration: Skin is not jaundiced  Findings: Lesion present  Comments: Left mid back, small area of previous induration and erythema, now flattened pink  Tiny punctate area where there was previously drainage  Neurological:      General: No focal deficit present  Mental Status: He is alert and oriented to person, place, and time  Psychiatric:         Mood and Affect: Mood normal          Behavior: Behavior normal        Incision and Drainage    Date/Time: 11/30/2022 11:46 AM  Performed by: Karan Owens MD  Authorized by: Karan Owens MD   Universal Protocol:  Consent: Verbal consent obtained  Written consent obtained  Consent given by: patient  Timeout called at: 11/30/2022 11:46 AM   Patient understanding: patient states understanding of the procedure being performed  Patient consent: the patient's understanding of the procedure matches consent given  Procedure consent: procedure consent matches procedure scheduled  Relevant documents: relevant documents present and verified  Patient identity confirmed: verbally with patient      Patient location:  Clinic  Location:     Type:  Cyst    Location:  Trunk    Trunk location:  Back  Pre-procedure details:     Skin preparation:  Betadine  Anesthesia (see MAR for exact dosages):      Anesthesia method:  Local infiltration    Local anesthetic:  Lidocaine 1% WITH epi  Procedure details:     Complexity:  Complex    Needle aspiration: no      Incision types:  Elliptical    Scalpel blade:  15    Approach:  Open    Incision depth:  Fascia    Irrigation with saline:  10cc  Post-procedure details:     Patient tolerance of procedure: Tolerated well, no immediate complications  Comments: Wide local excision performed of a previous infected sebaceous cyst   Elliptical vertical incision made, with skin, subcutaneous fat, and a small amount of fascia excised on block with the cystic cavity  Wound was irrigated and closed with interrupted 2-0 nylon vertical mattress sutures

## 2022-11-30 NOTE — ASSESSMENT & PLAN NOTE
81 yo M with a sebaceous cyst of his left back  Plan:  Cyst was excised with overlying skin in the office today  Patient tolerated the procedure well  To restart plavix tomorrow   To follow-up in the office in 2 weeks for suture removal

## 2022-12-08 ENCOUNTER — TELEPHONE (OUTPATIENT)
Dept: UROLOGY | Facility: MEDICAL CENTER | Age: 87
End: 2022-12-08

## 2022-12-08 NOTE — TELEPHONE ENCOUNTER
Patient of Dr Beau Ritter in [facility]    Patient's wife called stating patient is having urinary frequency where he gets up every hour  She stated the last he saw Dr Beau Ritter for a cysto   He stated can have a procedure to help him  She would like to know how soon it can be done  He cannot continue like this      Patient can be reached at [phone]

## 2022-12-09 NOTE — TELEPHONE ENCOUNTER
Clinical, there are no notes in last visit that a procedure was discussed, please call patients wife to discuss his symptoms and confer with dr Morales Life or possible treatment

## 2022-12-12 ENCOUNTER — TELEPHONE (OUTPATIENT)
Dept: UROLOGY | Facility: MEDICAL CENTER | Age: 87
End: 2022-12-12

## 2022-12-12 NOTE — TELEPHONE ENCOUNTER
Telephone call with patient and wife regarding his prostate and bladder problems  Cystoscopy a month ago showed severe enlargement, poor bladder emptying  Since that time he has complained more about urgency frequency, nocturia every 1/2 hour or so and urge incontinence  Painful bladder spasms when he has the urge to void  He is already on tamsulosin and finasteride  We cannot use any overactive bladder medicines because he already has moderate retention of 250 mL after he voids  The only procedure I know of that would work would be TURP with temporary SP tube  This would relieve the obstruction, and eventually  his bladder could settle down and not have so much frequency, but that can take months to occur  He could have worsening incontinence during that healing process  He has significant medical issues, the risk of anesthesia is a major concern here  They will think about this discussion and if they want to pursue a procedure, he would need cardiac clearance, would need to stop blood thinners 5 days preop

## 2022-12-13 NOTE — TELEPHONE ENCOUNTER
Per Dr Sindy Lopez last note - please schedule patient for office cystoscopy to discuss TURP due to incomplete emptying and persistent LUTS despite medication   Thank you

## 2022-12-13 NOTE — TELEPHONE ENCOUNTER
Called and spoke with Oletta Moritz and Roro Murguia isn't sure what he wants to do - He will call us if and when he makes a decision

## 2022-12-13 NOTE — TELEPHONE ENCOUNTER
Pt wife called and stated that pt would prefer [place] location for procedure if possible     Pt call [phone]

## 2022-12-13 NOTE — TELEPHONE ENCOUNTER
Patient had had a Cysto 11/18/22    Prostate:  lateral lobes and median lobe all severely enlarged, intravesical lobe is large trabeculation diverticula        ASSESSMENT / PLAN:     1  Symptom wise he is doing better  2  He does not empty well, and that could be an issue going forward  3  However, he has normal creatinine, no hydro on recent CT scan of April 2022, no infections  4   Continue tamsulosin, reassess with bladder scan at each visit    Patient and wife are wondering about a procedure please advise

## 2022-12-14 ENCOUNTER — TELEPHONE (OUTPATIENT)
Dept: SURGERY | Facility: CLINIC | Age: 87
End: 2022-12-14

## 2022-12-14 NOTE — TELEPHONE ENCOUNTER
Spoke with Daria Vega  They are seeing Dr Barbee Frankel - cardiologist 1st week in January- then they will decided on surgery  They want Dr Steve Winn and in [facility]   If we don't hear from them in the middle of January will call them

## 2022-12-14 NOTE — TELEPHONE ENCOUNTER
Called patient regarding his appointment with Dr Teja Lanza on 12/21/22 for his p/o appointment to remove sutures  The patient refused to come to [facility] in the PM on 12/21/22 for his p/o and remove his sutures  I offered 12/28/22 but she has other appointments that day  I suggested she have her PCP remove the sutures and she stated it's too hard to be seen at her PCP and that she would remove the sutures herself   (Spouse is not a nurse or MA) [Negative] : Genitourinary

## 2022-12-19 ENCOUNTER — OFFICE VISIT (OUTPATIENT)
Dept: FAMILY MEDICINE CLINIC | Facility: HOSPITAL | Age: 87
End: 2022-12-19

## 2022-12-19 VITALS
TEMPERATURE: 97.4 F | BODY MASS INDEX: 24.11 KG/M2 | WEIGHT: 162.8 LBS | HEIGHT: 69 IN | HEART RATE: 76 BPM | SYSTOLIC BLOOD PRESSURE: 130 MMHG | OXYGEN SATURATION: 97 % | DIASTOLIC BLOOD PRESSURE: 74 MMHG

## 2022-12-19 DIAGNOSIS — Z48.02 VISIT FOR SUTURE REMOVAL: Primary | ICD-10-CM

## 2022-12-19 NOTE — PROGRESS NOTES
Suture removal    Date/Time: 12/19/2022 2:02 PM  Performed by: MELINA Hernandez  Authorized by: MELINA Hernandez   Universal Protocol:  Consent: Verbal consent obtained  Risks and benefits: risks, benefits and alternatives were discussed  Consent given by: patient  Patient understanding: patient states understanding of the procedure being performed        Patient location:  Clinic  Location:     Laterality:  Right    Location:  Trunk    Trunk location:  Back  Procedure details: Tools used:  Suture removal kit    Wound appearance:  No sign(s) of infection, good wound healing and clean    Number of sutures removed:  4  Post-procedure details:     Post-removal:  Antibiotic ointment applied    Patient tolerance of procedure:   Tolerated well, no immediate complications

## 2022-12-29 ENCOUNTER — REMOTE DEVICE CLINIC VISIT (OUTPATIENT)
Dept: CARDIOLOGY CLINIC | Facility: CLINIC | Age: 87
End: 2022-12-29

## 2022-12-29 DIAGNOSIS — Z95.0 CARDIAC PACEMAKER IN SITU: Primary | ICD-10-CM

## 2022-12-30 NOTE — PROGRESS NOTES
Results for orders placed or performed in visit on 12/29/22   Cardiac EP device report    Narrative    BIOT DUAL CHAMBER PPM - ACTIVE SYSTEM IS MRI CONDITIONAL  BIOTRONIK TRANSMISSION: BATTERY STATUS "OK" (55% REMAINING BATTERY LIFE)  AP-90%, -20%  ALL AVAILABLE LEAD PARAMETERS WITHIN NORMAL LIMITS  NO SIGNIFICANT HIGH RATE EPISODES  NORMAL DEVICE FUNCTION   GV

## 2023-01-03 ENCOUNTER — OFFICE VISIT (OUTPATIENT)
Dept: FAMILY MEDICINE CLINIC | Facility: HOSPITAL | Age: 88
End: 2023-01-03

## 2023-01-03 VITALS
HEART RATE: 68 BPM | HEIGHT: 69 IN | SYSTOLIC BLOOD PRESSURE: 141 MMHG | BODY MASS INDEX: 24.14 KG/M2 | TEMPERATURE: 97.6 F | OXYGEN SATURATION: 98 % | WEIGHT: 163 LBS | DIASTOLIC BLOOD PRESSURE: 80 MMHG

## 2023-01-03 DIAGNOSIS — Z95.2 S/P AVR: ICD-10-CM

## 2023-01-03 DIAGNOSIS — I10 PRIMARY HYPERTENSION: ICD-10-CM

## 2023-01-03 DIAGNOSIS — Z18.9 RETAINED SUTURE, INITIAL ENCOUNTER: ICD-10-CM

## 2023-01-03 DIAGNOSIS — E78.00 PURE HYPERCHOLESTEROLEMIA: ICD-10-CM

## 2023-01-03 DIAGNOSIS — E03.9 ACQUIRED HYPOTHYROIDISM: ICD-10-CM

## 2023-01-03 DIAGNOSIS — R06.09 DYSPNEA ON EFFORT: Primary | ICD-10-CM

## 2023-01-03 DIAGNOSIS — I48.0 PAROXYSMAL ATRIAL FIBRILLATION (HCC): ICD-10-CM

## 2023-01-03 DIAGNOSIS — I25.10 CORONARY ARTERY DISEASE INVOLVING NATIVE CORONARY ARTERY OF NATIVE HEART WITHOUT ANGINA PECTORIS: ICD-10-CM

## 2023-01-03 DIAGNOSIS — T81.89XA RETAINED SUTURE, INITIAL ENCOUNTER: ICD-10-CM

## 2023-01-03 RX ORDER — ATORVASTATIN CALCIUM 20 MG/1
20 TABLET, FILM COATED ORAL
Qty: 90 TABLET | Refills: 2 | Status: SHIPPED | OUTPATIENT
Start: 2023-01-03

## 2023-01-03 NOTE — PROGRESS NOTES
Name: Gregg Dobbins      : 10/7/1932      MRN: [id number]  Encounter Provider: Ranulfo Lozano MD  Encounter Date: 1/3/2023   Encounter department: [facility] [address] [facility]  Dyspnea on effort  Assessment & Plan:  Seems more cardiac than pulmonary though TTE looks strong  Consider mild diuretic in am to alleviate pm frequency  Hesitant to use Albuterol due to a fib diagnosis      2  Acquired hypothyroidism  Assessment & Plan:  Clinically euthyroid      3  Coronary artery disease involving native coronary artery 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 pectoris    4  Primary hypertension  Assessment & Plan:  Good BP control      5  Paroxysmal atrial fibrillation (HCC)    6  Pure hypercholesterolemia  Assessment & Plan:  Excellent profile on Atorvastatin      7  S/P AVR  -     atorvastatin (LIPITOR) 20 mg tablet; Take 1 tablet (20 mg total) by mouth daily with dinner    8  Retained suture, initial encounter  Assessment & Plan:  Removed without issue           Subjective     6 month follow up  Doesn't feel as good as he thinks he should after open heart surgery earlier in   Feels SOB and fatigued with doing less work than he could before  He has to stop after a short amount of work to catch his breath    Walking is not a problem    Has not tried and will not try NTG  Not in favor of more meds in general    Not consistent with urine pattern  Sometimes nocturia 4 times a night  Some benefit with Tamsulosin    Had shaking chills after Thanksgiving  UA through Urology negative    Has pain in incision area and thinks he has a retained suture    Review of Systems   Constitutional: Positive for fatigue  Negative for unexpected weight change  Respiratory: Positive for chest tightness, shortness of breath and wheezing  Negative for cough  Cardiovascular: Negative  Gastrointestinal: Negative  Genitourinary: Positive for frequency and urgency     Musculoskeletal: Negative  All other systems reviewed and are negative  Past Medical History:   Diagnosis Date   • Angina pectoris ([facility] [address] )    • Arthritis    • Ascending aortic aneurysm     trileaflet AV, 41mm   • BPH (benign prostatic hyperplasia)    • Cancer ([facility] [address] )     skin   • Coronary artery disease    • Former tobacco use     pipe & cigar use socially & infrequently   • GERD (gastroesophageal reflux disease)     controlled w/ diet   • Headache     h/o   • Hepatitis A    • History of melanoma    • Hypertension    • Hypothyroidism    • Pacemaker    • Severe aortic stenosis    • Shortness of breath     with exertion   • Vitamin B12 deficiency    • Wears dentures      Past Surgical History:   Procedure Laterality Date   • CARDIAC CATHETERIZATION  04/15/2022    Procedure: Cardiac catheterization;  Surgeon: Juanito Adamson DO;  Location: [facility]; Service: Cardiology   • CARDIAC CATHETERIZATION N/A 04/15/2022    Procedure: Cardiac Coronary Angiogram;  Surgeon: Juanito Adamson DO;  Location: [facility]; Service: Cardiology   • CARDIAC CATHETERIZATION N/A 04/15/2022    Procedure: Cardiac pci;  Surgeon: Juanito Adamson DO;  Location: [facility];   Service: Cardiology   • CARDIAC PACEMAKER PLACEMENT     • COLONOSCOPY     • INGUINAL HERNIA REPAIR Bilateral    • AK RPLCMT AORTIC VALVE OPN W/STENTLESS TISSUE VALVE N/A 5/16/2022    Procedure: REPLACEMENT VALVE AORTIC (TISSUE AVR) WITH 25 MM AORTIC MAGNA EASE VALVE W/ ROJELIO;  Surgeon: Lilibeth Meyer DO;  Location: [facility] MAIN OR;  Service: Cardiac Surgery   • SKIN CANCER EXCISION      melanoma, multiple     Family History   Problem Relation Age of Onset   • Cancer Family         gastrointestinal tract   • Heart disease Mother    • Substance Abuse Neg Hx    • Mental illness Neg Hx      Social History     Socioeconomic History   • Marital status: /Civil Union     Spouse name: Nimesh Cavazos   • Number of children: None   • Years of education: None   • Highest education level: None   Occupational History   • None   Tobacco Use   • Smoking status: Former     Types: Cigars   • Smokeless tobacco: Never   • Tobacco comments:     50 years ago   Vaping Use   • Vaping Use: Former   Substance and Sexual Activity   • Alcohol use: Yes     Comment: a beer once in a while  • Drug use: No   • Sexual activity: None   Other Topics Concern   • None   Social History Narrative    Living will on file    Supportive and safe    Lives with wife     Social Determinants of Health     Financial Resource Strain: Low Risk    • Difficulty of Paying Living Expenses: Not hard at all   Food Insecurity: No Food Insecurity   • Worried About [address] in the Last Year: Never true   • Ran Out of Food in the Last Year: Never true   Transportation Needs: No Transportation Needs   • Lack of Transportation (Medical): No   • Lack of Transportation (Non-Medical): No   Physical Activity: Sufficiently Active   • Days of Exercise per Week: 7 days   • Minutes of Exercise per Session: 120 min   Stress: No Stress Concern Present   • Feeling of Stress : Not at all   Social Connections: Socially Integrated   • Frequency of Communication with Friends and Family: Once a week   • Frequency of Social Gatherings with Friends and Family: Three times a week   • Attends Worship Services: 1 to 4 times per year   • Active Member of Clubs or Organizations:  Yes   • Attends Club or Organization Meetings: 1 to 4 times per year   • Marital Status:    Intimate Partner Violence: Not At Risk   • Fear of Current or Ex-Partner: No   • Emotionally Abused: No   • Physically Abused: No   • Sexually Abused: No   Housing Stability: Low Risk    • Unable to Pay for Housing in the Last Year: No   • Number of [place] in the Last Year: 1   • Unstable Housing in the Last Year: No     Current Outpatient Medications on File Prior to Visit   Medication Sig   • amLODIPine (NORVASC) 5 mg tablet Take 1 tablet (5 mg total) by mouth daily   • Ascorbic Acid (VITAMIN C) 500 MG CAPS Take 1 tablet by mouth daily   • aspirin (ECOTRIN LOW STRENGTH) 81 mg EC tablet Take 81 mg by mouth daily   • carvedilol (COREG) 6 25 mg tablet Take 1 tablet (6 25 mg total) by mouth 2 (two) times a day with meals   • clopidogrel (Plavix) 75 mg tablet Take 1 tablet (75 mg total) by mouth daily   • Cyanocobalamin (VITAMIN B12) 1000 MCG TBCR Take 1 tablet by mouth daily   • finasteride (PROSCAR) 5 mg tablet Take 1 tablet (5 mg total) by mouth daily   • Flaxseed, Linseed, (FLAXSEED OIL) 1000 MG CAPS Take 1 capsule by mouth daily   • Glucosamine-Chondroit-Vit C-Mn (GLUCOSAMINE 1500 COMPLEX) CAPS Take 1 capsule by mouth daily   • levothyroxine 100 mcg tablet TAKE ONE TABLET BY MOUTH EVERY DAY   • Multiple Vitamin (MULTIVITAMIN) capsule Take 1 capsule by mouth daily   • tamsulosin (FLOMAX) 0 4 mg Take 1 capsule (0 4 mg total) by mouth 2 (two) times a day   • [DISCONTINUED] atorvastatin (LIPITOR) 20 mg tablet Take 1 tablet (20 mg total) by mouth daily with dinner   • fluticasone (FLONASE) 50 mcg/act nasal spray 2 sprays into each nostril daily (Patient not taking: Reported on 12/19/2022)   • nitroglycerin (NITROSTAT) 0 4 mg SL tablet Place 1 tablet (0 4 mg total) under the tongue every 5 (five) minutes as needed for chest pain (Patient not taking: Reported on 12/19/2022)     Allergies   Allergen Reactions   • Amoxicillin Other (See Comments)     Severe weakness     Immunization History   Administered Date(s) Administered   • COVID-19 MODERNA VACC 0 5 ML IM 01/18/2021, 02/15/2021, 11/02/2021   • INFLUENZA 10/06/2022   • Influenza Split High Dose Preservative Free IM 10/06/2014, 10/19/2015, 10/23/2017, 10/01/2019   • Influenza, high dose seasonal 0 7 mL 10/05/2018, 10/07/2020, 10/06/2022   • Influenza, seasonal, injectable 10/01/2012, 10/15/2014   • Pneumococcal Conjugate 13-Valent 10/05/2018   • Pneumococcal Polysaccharide PPV23 10/01/2012   • Zoster 06/19/2008       Objective     /80 (BP Location: Left arm, Patient Position: Sitting, Cuff Size: Standard)   Pulse 68   Temp 97 6 °F (36 4 °C) (Tympanic)   Ht 5' 9" (1 753 m)   Wt 73 9 kg (163 lb)   SpO2 98%   BMI 24 07 kg/m²     Physical Exam  Vitals and nursing note reviewed  Constitutional:       Appearance: Normal appearance  Neck:      Vascular: No carotid bruit  Cardiovascular:      Rate and Rhythm: Normal rate and regular rhythm  Pulses: Normal pulses  Heart sounds: Normal heart sounds  Pulmonary:      Effort: Pulmonary effort is normal       Breath sounds: Normal breath sounds  Musculoskeletal:      Right lower leg: No edema  Left lower leg: No edema  Skin:         Neurological:      Mental Status: He is alert         Donnie Paredes MD

## 2023-01-04 NOTE — ASSESSMENT & PLAN NOTE
Seems more cardiac than pulmonary though TTE looks strong   Consider mild diuretic in am to alleviate pm frequency  Hesitant to use Albuterol due to a fib diagnosis

## 2023-01-05 ENCOUNTER — OFFICE VISIT (OUTPATIENT)
Dept: CARDIOLOGY CLINIC | Facility: CLINIC | Age: 88
End: 2023-01-05

## 2023-01-05 VITALS
WEIGHT: 163.2 LBS | HEART RATE: 90 BPM | SYSTOLIC BLOOD PRESSURE: 164 MMHG | DIASTOLIC BLOOD PRESSURE: 90 MMHG | BODY MASS INDEX: 24.17 KG/M2 | HEIGHT: 69 IN

## 2023-01-05 DIAGNOSIS — I49.5 SICK SINUS SYNDROME (HCC): ICD-10-CM

## 2023-01-05 DIAGNOSIS — I48.0 PAROXYSMAL ATRIAL FIBRILLATION (HCC): ICD-10-CM

## 2023-01-05 DIAGNOSIS — I25.10 CORONARY ARTERY DISEASE INVOLVING NATIVE CORONARY ARTERY OF NATIVE HEART WITHOUT ANGINA PECTORIS: Primary | ICD-10-CM

## 2023-01-05 DIAGNOSIS — Z95.0 PRESENCE OF PERMANENT CARDIAC PACEMAKER: ICD-10-CM

## 2023-01-05 DIAGNOSIS — Z95.2 S/P AVR: ICD-10-CM

## 2023-01-05 DIAGNOSIS — E78.00 PURE HYPERCHOLESTEROLEMIA: ICD-10-CM

## 2023-01-05 DIAGNOSIS — I10 PRIMARY HYPERTENSION: ICD-10-CM

## 2023-01-05 DIAGNOSIS — Z95.5 S/P DRUG ELUTING CORONARY STENT PLACEMENT: ICD-10-CM

## 2023-01-05 RX ORDER — CARVEDILOL 12.5 MG/1
12.5 TABLET ORAL 2 TIMES DAILY WITH MEALS
Qty: 180 TABLET | Refills: 3 | Status: SHIPPED | OUTPATIENT
Start: 2023-01-05

## 2023-01-05 RX ORDER — AMLODIPINE BESYLATE 5 MG/1
5 TABLET ORAL 2 TIMES DAILY
Qty: 180 TABLET | Refills: 3 | Status: SHIPPED | OUTPATIENT
Start: 2023-01-05

## 2023-01-05 NOTE — PROGRESS NOTES
Cardiology Consultation     Gregg Dobbins  [id number]  10/7/1932  HEART & VASCULAR  [facility] Mercedes Caldwell  [facility] [address]    1  Coronary artery disease involving native coronary artery 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 pectoris        2  Paroxysmal atrial fibrillation (HCC)        3  S/P drug eluting coronary stent placement        4  S/P AVR        5  Sick sinus syndrome ([address] )        6  Presence of permanent cardiac pacemaker        7  Pure hypercholesterolemia            Discussion/Summary:    1  Severe aortic stenosis - Last year his aortic stenosis progressed to severe and then earlier this year he was becoming more symptomatic  He went to CT surgery and went through TAVR workup  He ended up having an open AVR as his left coronary ostium takeoff was too low and close to the annulus  This went well without complications  He continues with cardiac rehabilitation  His echocardiogram from November shows a normally functioning AVR with normal LV systolic function  He should take antibiotic prophylaxis for any dental procedures  Given his worsening shortness of breath we did order an updated echocardiogram today  2   History of permanent pacemaker - This was secondary to sick sinus syndrome  He had some sort of mechanical chest pain after his procedure that fortunately have greatly improved  He will continue to follow in our pacemaker clinic  He is 100% a paced, and about 50% V-paced  3   Paroxysmal atrial fibrillation - Found on pacemaker interrogation  He is predominantly atrially paced and has not had any recent episodes of atrial fibrillation with the last episode detected about 2 years ago  Anticoagulation is recommended however he has declined in the past   He is on aspirin and Plavix  5   Hypertension - His blood pressure remains elevated    It was elevated significantly today a cardiac rehabilitation with symptoms of dizziness  At our last visit I added back amlodipine 5 mg daily and change Toprol over to carvedilol  We will go up on the carvedilol to 12 5 mg twice daily and the amlodipine to 5 mg twice daily  This may be contributing to his exertional shortness of breath  If this does not help and his echocardiogram looks normal I would suggest a pulmonary work-up  6   CAD - During his workup for his aortic valve replacement, cardiac catheterization did show a 90% RCA in which he received a drug-eluting stent  He will remain on dual anti-platelet therapy at least until April of this year  He is on statin beta-blocker as well  We will see him back in follow-up in 6 months  HPI:    Mr Lily Celestin comes in for follow-up given his cardiac history  He has a history of sick sinus syndrome and status post permanent pacemaker in March of 2016  He also has a history of moderate aortic stenosis, paroxysmal atrial fibrillation and hypertension  He is predominantly atrially paced  He was offered anticoagulation for stroke prevention, but declined  His prior cardiology care was through [facility], where he received as permanent pacemaker  David Brody gives me a history of having significant mechanical pain after his permanent pacemaker  He recalls coming out of anesthesia and felt as if somebody was pressing on his chest   It is unclear at this time what exactly had happened  I asked if there was a complication like an arrhythmia that required defibrillation, but he does not ever recall being told that  For close to 3 years he had on and off mechanical pains from his pacemaker  They have improved, and now he seems to describe the fleeting sharp chest pain that only lasts a few seconds  His pacemaker interrogations show that he is 100% A-paced, and V paced about 50% of the time  He has had a few episodes of atrial fibrillation detected by pacer interrogation in the past   He has had none recently      His echocardiograms through the years showed moderate aortic stenosis but we repeated 1 last year that showed progression to severe aortic stenosis  At that time he had minimal symptoms, but these changed in follow-up  When I last saw him earlier this year he was having progressive shortness of breath over the winter  He would go for walks and started noticing that he had to take breaks  He denies chest pain or syncope  At that point I referred him to CT surgery for TAVR evaluation  He went through all the preoperative testing that included cardiac catheterization and CTAs  He was found to have a significant 90% RCA stenosis that received a drug-eluting stent  He had an 80% OM1 stenosis but this was a small vessel supplying a small territory which was treated medically  His CTA did show that his left coronary ostium takeoff was very low and close to the aortic valve annulus  This put him at risk for left main occlusion with TAVR  CT surgery at that point recommended open AVR and after he thought about this and discussed it with his family, he decided to proceed  He had this performed on 05/16/22  This went well without complications  In follow-up he was doing well and he was enrolled in cardiac rehabilitation  When I last saw German Edwards he was doing well  He was doing well in cardiac rehabilitation  The only issue that we noticed is that his blood pressure was running high  He was having some lightheadedness and dizziness as well  At that visit we changed Toprol over to carvedilol 6 25 mg twice daily and added back his amlodipine 5 mg daily  He has been more active since recovering from his surgery  He had an echocardiogram performed in November that showed a normally functioning aortic valve replacement and normal LV systolic function  Since I last saw German Edwards he has noticed increasing shortness of breath  Part of this is related to the fact that he is more active than he once was    However he has days where he has to stop after 5 minutes of work outside  Other days he will be fine  Blood pressure still is running high  He did have a flulike illness after Thanksgiving which resolved  Denies any other signs or symptoms of CHF  No edema or weight gain  No shortness of breath at rest like orthopnea/PND  He denies chest pain or any symptoms of angina  No return of lightheadedness or dizziness  No palpitations or syncope        Patient Active Problem List   Diagnosis   • Chronic nonintractable headache   • Enlarged prostate without lower urinary tract symptoms (luts)   • GERD without esophagitis   • Primary hypertension   • Acquired hypothyroidism   • Sick sinus syndrome (HCC)   • Presence of permanent cardiac pacemaker   • Paroxysmal atrial fibrillation (HCC)   • Balance disorder   • Coronary artery disease involving native coronary artery 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 pectoris   • S/P drug eluting coronary stent placement   • S/P AVR   • Pure hypercholesterolemia   • Sebaceous cyst   • Retained suture   • Dyspnea on effort     Past Medical History:   Diagnosis Date   • Angina pectoris (HCC)    • Arthritis    • Ascending aortic aneurysm     trileaflet AV, 41mm   • BPH (benign prostatic hyperplasia)    • Cancer (HCC)     skin   • Coronary artery disease    • Former tobacco use     pipe & cigar use socially & infrequently   • GERD (gastroesophageal reflux disease)     controlled w/ diet   • Headache     h/o   • Hepatitis A    • History of melanoma    • Hypertension    • Hypothyroidism    • Pacemaker    • Severe aortic stenosis    • Shortness of breath     with exertion   • Vitamin B12 deficiency    • Wears dentures      Social History     Socioeconomic History   • Marital status: /Civil Union     Spouse name: Miriam Brooks   • Number of children: Not on file   • Years of education: Not on file   • Highest education level: Not on file   Occupational History   • Not on file   Tobacco Use   • Smoking status: Former     Types: Cigars   • Smokeless tobacco: Never   • Tobacco comments:     50 years ago   Vaping Use   • Vaping Use: Former   Substance and Sexual Activity   • Alcohol use: Yes     Comment: a beer once in a while  • Drug use: No   • Sexual activity: Not on file   Other Topics Concern   • Not on file   Social History Narrative    Living will on file    Supportive and safe    Lives with wife     Social Determinants of Health     Financial Resource Strain: Low Risk    • Difficulty of Paying Living Expenses: Not hard at all   Food Insecurity: No Food Insecurity   • Worried About [address] in the Last Year: Never true   • Ran Out of Food in the Last Year: Never true   Transportation Needs: No Transportation Needs   • Lack of Transportation (Medical): No   • Lack of Transportation (Non-Medical): No   Physical Activity: Sufficiently Active   • Days of Exercise per Week: 7 days   • Minutes of Exercise per Session: 120 min   Stress: No Stress Concern Present   • Feeling of Stress : Not at all   Social Connections: Socially Integrated   • Frequency of Communication with Friends and Family: Once a week   • Frequency of Social Gatherings with Friends and Family: Three times a week   • Attends [employer] Services: 1 to 4 times per year   • Active Member of Clubs or Organizations:  Yes   • Attends Club or Organization Meetings: 1 to 4 times per year   • Marital Status:    Intimate Partner Violence: Not At Risk   • Fear of Current or Ex-Partner: No   • Emotionally Abused: No   • Physically Abused: No   • Sexually Abused: No   Housing Stability: Low Risk    • Unable to Pay for Housing in the Last Year: No   • Number of Places Lived in the Last Year: 1   • Unstable Housing in the Last Year: No      Family History   Problem Relation Age of Onset   • Cancer Family         gastrointestinal tract   • Heart disease Mother    • Substance Abuse Neg Hx    • Mental illness Neg Hx      Past Surgical History: Procedure Laterality Date   • CARDIAC CATHETERIZATION  04/15/2022    Procedure: Cardiac catheterization;  Surgeon: Mckayla Vivas DO;  Location: [facility]; Service: Cardiology   • CARDIAC CATHETERIZATION N/A 04/15/2022    Procedure: Cardiac Coronary Angiogram;  Surgeon: Mckayla Vivas DO;  Location: [facility]; Service: Cardiology   • CARDIAC CATHETERIZATION N/A 04/15/2022    Procedure: Cardiac pci;  Surgeon: Mckayla Vivas DO;  Location: [facility];   Service: Cardiology   • CARDIAC PACEMAKER PLACEMENT     • COLONOSCOPY     • INGUINAL HERNIA REPAIR Bilateral    • CT RPLCMT AORTIC VALVE OPN W/STENTLESS TISSUE VALVE N/A 5/16/2022    Procedure: REPLACEMENT VALVE AORTIC (TISSUE AVR) WITH 25 MM AORTIC MAGNA EASE VALVE W/ ROJELIO;  Surgeon: Cyrus Salguero DO;  Location: [facility];  Service: Cardiac Surgery   • SKIN CANCER EXCISION      melanoma, multiple       Current Outpatient Medications:   •  amLODIPine (NORVASC) 5 mg tablet, Take 1 tablet (5 mg total) by mouth daily, Disp: 90 tablet, Rfl: 3  •  Ascorbic Acid (VITAMIN C) 500 MG CAPS, Take 1 tablet by mouth daily, Disp: , Rfl:   •  aspirin (ECOTRIN LOW STRENGTH) 81 mg EC tablet, Take 81 mg by mouth daily, Disp: , Rfl:   •  atorvastatin (LIPITOR) 20 mg tablet, Take 1 tablet (20 mg total) by mouth daily with dinner, Disp: 90 tablet, Rfl: 2  •  carvedilol (COREG) 6 25 mg tablet, Take 1 tablet (6 25 mg total) by mouth 2 (two) times a day with meals, Disp: 180 tablet, Rfl: 3  •  clopidogrel (Plavix) 75 mg tablet, Take 1 tablet (75 mg total) by mouth daily, Disp: 90 tablet, Rfl: 3  •  Cyanocobalamin (VITAMIN B12) 1000 MCG TBCR, Take 1 tablet by mouth daily, Disp: , Rfl:   •  finasteride (PROSCAR) 5 mg tablet, Take 1 tablet (5 mg total) by mouth daily, Disp: 90 tablet, Rfl: 3  •  Flaxseed, Linseed, (FLAXSEED OIL) 1000 MG CAPS, Take 1 capsule by mouth daily, Disp: , Rfl:   •  fluticasone (FLONASE) 50 mcg/act nasal spray, 2 sprays into each nostril daily, Disp: 16 g, Rfl: 1  •  Glucosamine-Chondroit-Vit C-Mn (GLUCOSAMINE 1500 COMPLEX) CAPS, Take 1 capsule by mouth daily, Disp: , Rfl:   •  levothyroxine 100 mcg tablet, TAKE ONE TABLET BY MOUTH EVERY DAY, Disp: 90 tablet, Rfl: 3  •  Multiple Vitamin (MULTIVITAMIN) capsule, Take 1 capsule by mouth daily, Disp: , Rfl:   •  nitroglycerin (NITROSTAT) 0 4 mg SL tablet, Place 1 tablet (0 4 mg total) under the tongue every 5 (five) minutes as needed for chest pain, Disp: 24 tablet, Rfl: 1  •  tamsulosin (FLOMAX) 0 4 mg, Take 1 capsule (0 4 mg total) by mouth 2 (two) times a day, Disp: 90 capsule, Rfl: 3  Allergies   Allergen Reactions   • Amoxicillin Other (See Comments)     Severe weakness     Vitals:    01/05/23 1031   BP: 164/90   BP Location: Right arm   Patient Position: Sitting   Cuff Size: Standard   Pulse: 90   Weight: 74 kg (163 lb 3 2 oz)   Height: 5' 9" (1 753 m)       Labs:  Lab Results   Component Value Date     06/15/2017    K 4 5 11/08/2022    CL 99 11/08/2022    CO2 24 11/08/2022    BUN 9 (L) 11/08/2022    CREATININE 0 77 11/08/2022    CREATININE 0 67 05/20/2022    CREATININE 0 89 06/15/2017    GLUCOSE 220 (H) 05/16/2022    GLUCOSE 110 (H) 06/15/2017    CALCIUM 8 4 05/20/2022    CALCIUM 9 2 06/15/2017     Lab Results   Component Value Date    WBC 4 0 11/08/2022    WBC 11 96 (H) 05/18/2022    WBC 5 5 06/15/2017    HGB 12 6 (L) 11/08/2022    HGB 8 8 (L) 05/18/2022    HGB 13 5 06/15/2017    HCT 37 1 (L) 11/08/2022    HCT 25 8 (L) 05/18/2022    HCT 40 2 06/15/2017    MCV 92 11/08/2022    MCV 93 05/18/2022    MCV 94 06/15/2017     11/08/2022     05/18/2022       Imaging:    CARDIAC CATH (4/2022):  Diagnostic  Dominance: Co-dominant      Left Main   The vessel exhibits minimal luminal irregularities  Left Anterior Descending   Prox LAD lesion is 25% stenosed  FLAKITO flow is 3  Mid LAD lesion is 50% stenosed  FLAKITO flow is 3     Left Circumflex   First Obtuse Marginal Branch   1st Mrg lesion is 80% stenosed  Small OM1 branch (medical therapy), supplies small territory   Right Coronary Artery   Mid RCA lesion is 90% stenosed  Intervention        Mid RCA lesion   Angioplasty   Angioplasty using a compliant balloon was performed  The balloon used was a BALLOON EUPHORA RX 2 X 15MM  Maximum pressure: 6 rivas  Inflation time: 5 sec  Time obtained: 09:22 EDT  First reinflation; Max pressure: 12 rivas  Inflation time 10 sec  Time obtained: 09:23 EDT  Supplies used: CATH GUIDE LAUNCHER 5FR JR 4 0; BALLOON EUPHORA RX 2 X 15MM; GUIDEWIRE ASAHI MINAMO 190CM J   Stent   Drug-eluting stent was successfully placed  The stent used was a STENT XIENCE SKYPOINT 2 5 X 18MM  Stent was deployed by way of balloon expansion  Maximum pressure: 14 rivas  Inflation time: 10 sec  Supplies used: STENT XIENCE SKYPOINT 2 5 X 18MM; CATH GUIDE LAUNCHER 5FR JR 4 0; GUIDEWIRE ASAHI MINAMO 190CM J   Post-Intervention Lesion Assessment   The intervention was successful  Post-intervention FLAKITO flow is 3  There is a 0% residual stenosis post intervention  ECHO (3/8/22)  •  Left Ventricle: Left ventricular cavity size is upper normal  Wall thickness is mildly increased  The left ventricular ejection fraction is 50%  Systolic function is low normal  Wall motion is normal   •  Aortic Valve: The aortic valve is trileaflet  The leaflets are severely calcified  There is severely reduced mobility  There is mild regurgitation  There is severe stenosis  Mean gradient of 41 mmHg, MIRA of 0 75 cm2  •  Mitral Valve: There is mild annular calcification  There is mild regurgitation  •  Tricuspid Valve: There is mild regurgitation  •  Aorta: The aortic root is normal in size  The ascending aorta is mildly dilated            Review of Systems:  Review of Systems   Constitutional: Positive for fatigue  HENT: Negative  Eyes: Negative  Respiratory: Positive for shortness of breath      Cardiovascular: Negative  Gastrointestinal: Negative  Musculoskeletal: Positive for arthralgias  Skin: Negative  Allergic/Immunologic: Negative  Neurological: Negative  Hematological: Negative  Psychiatric/Behavioral: Negative  All other systems reviewed and are negative  Vitals:    01/05/23 1031   BP: 164/90   BP Location: Right arm   Patient Position: Sitting   Cuff Size: Standard   Pulse: 90   Weight: 74 kg (163 lb 3 2 oz)   Height: 5' 9" (1 753 m)       Physical Exam:  Physical Exam  Vitals and nursing note reviewed  Constitutional:       Appearance: He is well-developed  HENT:      Head: Normocephalic and atraumatic  Eyes:      General: No scleral icterus  Right eye: No discharge  Left eye: No discharge  Pupils: Pupils are equal, round, and reactive to light  Neck:      Thyroid: No thyromegaly  Vascular: No JVD  Cardiovascular:      Rate and Rhythm: Normal rate and regular rhythm  No extrasystoles are present  Pulses: Normal pulses  No decreased pulses  Heart sounds: S1 normal and S2 normal  Murmur heard  Systolic murmur is present with a grade of 2/6  No friction rub  No gallop  Pulmonary:      Effort: Pulmonary effort is normal  No respiratory distress  Breath sounds: No decreased breath sounds, wheezing, rhonchi or rales  Abdominal:      General: Bowel sounds are normal  There is no distension  Palpations: Abdomen is soft  Tenderness: There is no abdominal tenderness  Musculoskeletal:         General: No tenderness or deformity  Normal range of motion  Cervical back: Normal range of motion and neck supple  Skin:     General: Skin is warm and dry  Findings: No rash  Neurological:      Mental Status: He is alert and oriented to person, place, and time  Cranial Nerves: No cranial nerve deficit  Psychiatric:         Thought Content:  Thought content normal          Judgment: Judgment normal        Counseling / Coordination of Care  Total office time spent today 25 minutes  Greater than 50% of total time was spent with the patient and / or family counseling and / or coordination of care

## 2023-01-05 NOTE — PATIENT INSTRUCTIONS

## 2023-01-24 ENCOUNTER — TELEPHONE (OUTPATIENT)
Dept: CARDIOLOGY CLINIC | Facility: CLINIC | Age: 88
End: 2023-01-24

## 2023-01-24 ENCOUNTER — HOSPITAL ENCOUNTER (OUTPATIENT)
Dept: NON INVASIVE DIAGNOSTICS | Age: 88
Discharge: HOME | End: 2023-01-24

## 2023-01-24 VITALS
HEIGHT: 69 IN | BODY MASS INDEX: 24.14 KG/M2 | WEIGHT: 163 LBS | SYSTOLIC BLOOD PRESSURE: 140 MMHG | DIASTOLIC BLOOD PRESSURE: 88 MMHG | HEART RATE: 78 BPM

## 2023-01-24 DIAGNOSIS — Z95.2 S/P AVR: ICD-10-CM

## 2023-01-24 NOTE — TELEPHONE ENCOUNTER
Pt has had a cough, and is wondering what cough medication he is able to take along with his other meds      Please advise

## 2023-01-25 LAB
AORTIC ROOT: 2.7 CM
AORTIC VALVE MEAN VELOCITY: 10.7 M/S
APICAL FOUR CHAMBER EJECTION FRACTION: 62 %
ASCENDING AORTA: 4 CM
AV AREA BY CONTINUOUS VTI: 1.3 CM2
AV AREA PEAK VELOCITY: 1.2 CM2
AV LVOT MEAN GRADIENT: 1 MMHG
AV LVOT PEAK GRADIENT: 2 MMHG
AV MEAN GRADIENT: 5 MMHG
AV PEAK GRADIENT: 11 MMHG
AV VALVE AREA: 1.26 CM2
AV VELOCITY RATIO: 0.38
DOP CALC AO PEAK VEL: 1.63 M/S
DOP CALC AO VTI: 31.39 CM
DOP CALC LVOT AREA: 3.14 CM2
DOP CALC LVOT DIAMETER: 2 CM
DOP CALC LVOT PEAK VEL VTI: 12.62 CM
DOP CALC LVOT PEAK VEL: 0.62 M/S
DOP CALC LVOT STROKE INDEX: 20.1 ML/M2
DOP CALC LVOT STROKE VOLUME: 39.63 CM3
E WAVE DECELERATION TIME: 95 MS
FRACTIONAL SHORTENING: 33 % (ref 28–44)
INTERVENTRICULAR SEPTUM IN DIASTOLE (PARASTERNAL SHORT AXIS VIEW): 1.2 CM
INTERVENTRICULAR SEPTUM: 1.2 CM (ref 0.6–1.1)
LEFT ATRIUM AREA SYSTOLE SINGLE PLANE A4C: 15.8 CM2
LEFT ATRIUM SIZE: 4.3 CM
LEFT INTERNAL DIMENSION IN SYSTOLE: 3 CM (ref 2.1–4)
LEFT VENTRICULAR INTERNAL DIMENSION IN DIASTOLE: 4.5 CM (ref 3.5–6)
LEFT VENTRICULAR POSTERIOR WALL IN END DIASTOLE: 1 CM
LEFT VENTRICULAR STROKE VOLUME: 56 ML
LVSV (TEICH): 56 ML
MV E'TISSUE VEL-SEP: 6 CM/S
MV PEAK A VEL: 1.23 M/S
MV PEAK E VEL: 76 CM/S
MV STENOSIS PRESSURE HALF TIME: 28 MS
MV VALVE AREA P 1/2 METHOD: 7.86 CM2
RA PRESSURE ESTIMATED: 3 MMHG
RIGHT ATRIUM AREA SYSTOLE A4C: 22.6 CM2
RIGHT VENTRICLE ID DIMENSION: 4.8 CM
RV PSP: 27 MMHG
SL CV LV EF: 60
SL CV PED ECHO LEFT VENTRICLE DIASTOLIC VOLUME (MOD BIPLANE) 2D: 92 ML
SL CV PED ECHO LEFT VENTRICLE SYSTOLIC VOLUME (MOD BIPLANE) 2D: 35 ML
TR MAX PG: 24 MMHG
TR PEAK VELOCITY: 2.5 M/S
TRICUSPID ANNULAR PLANE SYSTOLIC EXCURSION: 1.6 CM
TRICUSPID VALVE PEAK REGURGITATION VELOCITY: 2.46 M/S

## 2023-02-02 ENCOUNTER — HOSPITAL ENCOUNTER (OUTPATIENT)
Dept: RADIOLOGY | Facility: HOSPITAL | Age: 88
Discharge: HOME | End: 2023-02-02

## 2023-02-02 ENCOUNTER — OFFICE VISIT (OUTPATIENT)
Dept: FAMILY MEDICINE CLINIC | Facility: HOSPITAL | Age: 88
End: 2023-02-02

## 2023-02-02 ENCOUNTER — TELEPHONE (OUTPATIENT)
Dept: FAMILY MEDICINE CLINIC | Facility: HOSPITAL | Age: 88
End: 2023-02-02

## 2023-02-02 VITALS
SYSTOLIC BLOOD PRESSURE: 128 MMHG | DIASTOLIC BLOOD PRESSURE: 78 MMHG | HEIGHT: 69 IN | HEART RATE: 73 BPM | OXYGEN SATURATION: 95 % | TEMPERATURE: 96.4 F | BODY MASS INDEX: 24.32 KG/M2 | WEIGHT: 164.2 LBS

## 2023-02-02 DIAGNOSIS — R06.09 DYSPNEA ON EXERTION: ICD-10-CM

## 2023-02-02 DIAGNOSIS — J20.8 ACUTE BACTERIAL BRONCHITIS: Primary | ICD-10-CM

## 2023-02-02 DIAGNOSIS — R05.1 ACUTE COUGH: Primary | ICD-10-CM

## 2023-02-02 DIAGNOSIS — I48.0 PAROXYSMAL ATRIAL FIBRILLATION (HCC): ICD-10-CM

## 2023-02-02 DIAGNOSIS — N40.0 BENIGN PROSTATIC HYPERPLASIA, UNSPECIFIED WHETHER LOWER URINARY TRACT SYMPTOMS PRESENT: ICD-10-CM

## 2023-02-02 DIAGNOSIS — B96.89 ACUTE BACTERIAL BRONCHITIS: Primary | ICD-10-CM

## 2023-02-02 DIAGNOSIS — R05.1 ACUTE COUGH: ICD-10-CM

## 2023-02-02 PROBLEM — J44.9 CHRONIC OBSTRUCTIVE PULMONARY DISEASE, UNSPECIFIED COPD TYPE (HCC): Status: ACTIVE | Noted: 2023-02-02

## 2023-02-02 RX ORDER — DOXYCYCLINE 100 MG/1
100 TABLET ORAL 2 TIMES DAILY
Qty: 14 TABLET | Refills: 0 | Status: SHIPPED | OUTPATIENT
Start: 2023-02-02 | End: 2023-02-09

## 2023-02-02 RX ORDER — TAMSULOSIN HYDROCHLORIDE 0.4 MG/1
CAPSULE ORAL
Qty: 90 CAPSULE | Refills: 3 | Status: SHIPPED | OUTPATIENT
Start: 2023-02-02

## 2023-02-02 NOTE — PROGRESS NOTES
Name: Kristyn Mitchell      : 10/7/1932      MRN: [id number]  Encounter Provider: George Mares MD  Encounter Date: 2023   Encounter department: [facility]      1  Acute cough  -     XR chest pa & lateral; Future; Expected date: 2023    2  Dyspnea on exertion  -     XR chest pa & lateral; Future; Expected date: 2023    3  Paroxysmal atrial fibrillation (HCC)     concern for pneumonia  Check CXR stat  Plan for empiric abx treatment  Await results  Subjective      2 weeks ongoing cough  Productive, overall not feeling well  Constant cough , no improvement with coricidin  Had some chills but no documented fever  No exposure to covid  No myalgias  Has low left midback pain  No urinary sytmpoms  With increase shortness of breath, dyspnea on exertion  Worse over the pat 2 weeks  Review of Systems   Constitutional: Positive for activity change, appetite change, chills and fatigue  HENT: Negative for congestion, postnasal drip, rhinorrhea, sinus pressure and sinus pain  Respiratory: Positive for cough and shortness of breath  Negative for apnea, chest tightness and wheezing          Current Outpatient Medications on File Prior to Visit   Medication Sig   • amLODIPine (NORVASC) 5 mg tablet Take 1 tablet (5 mg total) by mouth 2 (two) times a day   • Ascorbic Acid (VITAMIN C) 500 MG CAPS Take 1 tablet by mouth daily   • aspirin (ECOTRIN LOW STRENGTH) 81 mg EC tablet Take 81 mg by mouth daily   • atorvastatin (LIPITOR) 20 mg tablet Take 1 tablet (20 mg total) by mouth daily with dinner   • carvedilol (COREG) 12 5 mg tablet Take 1 tablet (12 5 mg total) by mouth 2 (two) times a day with meals   • clopidogrel (Plavix) 75 mg tablet Take 1 tablet (75 mg total) by mouth daily   • Cyanocobalamin (VITAMIN B12) 1000 MCG TBCR Take 1 tablet by mouth daily   • finasteride (PROSCAR) 5 mg tablet Take 1 tablet (5 mg total) by mouth daily   • Flaxseed, Linseed, (FLAXSEED OIL) 1000 MG CAPS Take 1 capsule by mouth daily   • fluticasone (FLONASE) 50 mcg/act nasal spray 2 sprays into each nostril daily   • Glucosamine-Chondroit-Vit C-Mn (GLUCOSAMINE 1500 COMPLEX) CAPS Take 1 capsule by mouth daily   • levothyroxine 100 mcg tablet TAKE ONE TABLET BY MOUTH EVERY DAY   • Multiple Vitamin (MULTIVITAMIN) capsule Take 1 capsule by mouth daily   • nitroglycerin (NITROSTAT) 0 4 mg SL tablet Place 1 tablet (0 4 mg total) under the tongue every 5 (five) minutes as needed for chest pain   • tamsulosin (FLOMAX) 0 4 mg Take 1 capsule (0 4 mg total) by mouth 2 (two) times a day       Objective     /78   Pulse 73   Temp (!) 96 4 °F (35 8 °C)   Ht 5' 9" (1 753 m)   Wt 74 5 kg (164 lb 3 2 oz)   SpO2 95%   BMI 24 25 kg/m²     Physical Exam  Vitals and nursing note reviewed  Constitutional:       Appearance: He is well-developed  He is not ill-appearing or diaphoretic  HENT:      Head: Normocephalic  Mouth/Throat:      Mouth: Mucous membranes are moist    Eyes:      Pupils: Pupils are equal, round, and reactive to light  Cardiovascular:      Rate and Rhythm: Normal rate and regular rhythm  Pulmonary:      Effort: Pulmonary effort is normal       Breath sounds: Examination of the left-lower field reveals rales  Rales present  No wheezing or rhonchi  Musculoskeletal:      Cervical back: Normal range of motion and neck supple  Neurological:      Mental Status: He is alert         Marcie Severs, MD

## 2023-02-02 NOTE — TELEPHONE ENCOUNTER
----- Message from Remy Caldwell MD sent at 2/2/2023  3:40 PM EST -----   Please call  CXR personally reviewed  Official results not back yet  Appears to have no acute process based on the CXR ( no pneumonia)  I will start him on doxycyline 100 mg twice a day for 7 days  Will wait for official results and let him know if there is additional information  I woul dlike him to fu next week to recheck  To call sooner for worsening sytmpoms

## 2023-02-09 ENCOUNTER — OFFICE VISIT (OUTPATIENT)
Dept: FAMILY MEDICINE CLINIC | Facility: HOSPITAL | Age: 88
End: 2023-02-09

## 2023-02-09 VITALS
OXYGEN SATURATION: 96 % | SYSTOLIC BLOOD PRESSURE: 124 MMHG | WEIGHT: 164 LBS | TEMPERATURE: 96.5 F | BODY MASS INDEX: 24.29 KG/M2 | HEART RATE: 72 BPM | DIASTOLIC BLOOD PRESSURE: 70 MMHG | HEIGHT: 69 IN

## 2023-02-09 DIAGNOSIS — I48.0 PAROXYSMAL ATRIAL FIBRILLATION (HCC): ICD-10-CM

## 2023-02-09 DIAGNOSIS — J20.8 ACUTE BACTERIAL BRONCHITIS: Primary | ICD-10-CM

## 2023-02-09 DIAGNOSIS — B96.89 ACUTE BACTERIAL BRONCHITIS: Primary | ICD-10-CM

## 2023-02-09 DIAGNOSIS — R06.02 SOB (SHORTNESS OF BREATH): ICD-10-CM

## 2023-02-09 NOTE — PROGRESS NOTES
Name: Milad Knott      : 10/7/1932      MRN: [id number]  Encounter Provider: Leela Zelaya MD  Encounter Date: 2023   Encounter department: [facility]      1  Acute bacterial bronchitis    2  Paroxysmal atrial fibrillation (HCC)    3  SOB (shortness of breath)     acute bronchitis  CXR negative for pneumonia  Completed doxycycline  Still SOB as he cannot do as much as he normally could  Discussed needing more time for recovery  Continue with spirometer which he is able to take all the way to the top  Activity as tolerated but to take breaks if needed  Will fu in 2 weeks  If ongoing issues will need further evlauation and consideration of ct scan chest      Afib  Stable  HR normal    CAD  Stable  Fu with Cardiology  Echo unremarkable  Subjective      Pt here for fu  Reports improvement of cough  No fever, no chills  However still sob on doing activities he normally does  (cuts firewood, chops trees, using forklift, etc) gets out of breath after about 4 minutes  Review of Systems   Constitutional: Positive for activity change  Negative for appetite change, chills and diaphoresis  HENT: Negative for congestion and dental problem  Respiratory: Negative  Negative for apnea, chest tightness, shortness of breath and wheezing  Cardiovascular: Negative  Negative for chest pain, palpitations and leg swelling  Gastrointestinal: Negative  Negative for abdominal distention, abdominal pain, constipation, diarrhea and nausea  Genitourinary: Negative  Negative for difficulty urinating, dysuria and frequency         Current Outpatient Medications on File Prior to Visit   Medication Sig   • amLODIPine (NORVASC) 5 mg tablet Take 1 tablet (5 mg total) by mouth 2 (two) times a day   • Ascorbic Acid (VITAMIN C) 500 MG CAPS Take 1 tablet by mouth daily   • aspirin (ECOTRIN LOW STRENGTH) 81 mg EC tablet Take 81 mg by mouth daily   • atorvastatin (LIPITOR) 20 mg tablet Take 1 tablet (20 mg total) by mouth daily with dinner   • carvedilol (COREG) 12 5 mg tablet Take 1 tablet (12 5 mg total) by mouth 2 (two) times a day with meals   • clopidogrel (Plavix) 75 mg tablet Take 1 tablet (75 mg total) by mouth daily   • Cyanocobalamin (VITAMIN B12) 1000 MCG TBCR Take 1 tablet by mouth daily   • finasteride (PROSCAR) 5 mg tablet Take 1 tablet (5 mg total) by mouth daily   • Flaxseed, Linseed, (FLAXSEED OIL) 1000 MG CAPS Take 1 capsule by mouth daily   • fluticasone (FLONASE) 50 mcg/act nasal spray 2 sprays into each nostril daily   • Glucosamine-Chondroit-Vit C-Mn (GLUCOSAMINE 1500 COMPLEX) CAPS Take 1 capsule by mouth daily   • levothyroxine 100 mcg tablet TAKE ONE TABLET BY MOUTH EVERY DAY   • Multiple Vitamin (MULTIVITAMIN) capsule Take 1 capsule by mouth daily   • nitroglycerin (NITROSTAT) 0 4 mg SL tablet Place 1 tablet (0 4 mg total) under the tongue every 5 (five) minutes as needed for chest pain   • tamsulosin (FLOMAX) 0 4 mg TAKE ONE CAPSULE BY MOUTH TWICE A DAY   • [DISCONTINUED] doxycycline (ADOXA) 100 MG tablet Take 1 tablet (100 mg total) by mouth 2 (two) times a day for 7 days (Patient not taking: Reported on 2/9/2023)       Objective     /70   Pulse 72   Temp (!) 96 5 °F (35 8 °C)   Ht 5' 9" (1 753 m)   Wt 74 4 kg (164 lb)   SpO2 96%   BMI 24 22 kg/m²     Physical Exam  Vitals and nursing note reviewed  Constitutional:       Appearance: He is well-developed  HENT:      Head: Normocephalic  Eyes:      Extraocular Movements: Extraocular movements intact  Pupils: Pupils are equal, round, and reactive to light  Cardiovascular:      Rate and Rhythm: Normal rate and regular rhythm  Pulmonary:      Effort: Pulmonary effort is normal       Breath sounds: Examination of the left-lower field reveals rales  Rales present  No wheezing or rhonchi     Musculoskeletal:      Cervical back: Normal range of motion and neck supple  Neurological:      Mental Status: He is alert     Psychiatric:         Mood and Affect: Mood normal          Behavior: Behavior normal        Sixto Parker MD

## 2023-02-19 DIAGNOSIS — E03.9 ACQUIRED HYPOTHYROIDISM: ICD-10-CM

## 2023-02-20 RX ORDER — LEVOTHYROXINE SODIUM 0.1 MG/1
TABLET ORAL
Qty: 90 TABLET | Refills: 3 | Status: SHIPPED | OUTPATIENT
Start: 2023-02-20

## 2023-03-01 ENCOUNTER — OFFICE VISIT (OUTPATIENT)
Dept: FAMILY MEDICINE CLINIC | Facility: HOSPITAL | Age: 88
End: 2023-03-01

## 2023-03-01 VITALS
OXYGEN SATURATION: 96 % | BODY MASS INDEX: 24.68 KG/M2 | WEIGHT: 166.6 LBS | DIASTOLIC BLOOD PRESSURE: 78 MMHG | HEART RATE: 88 BPM | HEIGHT: 69 IN | SYSTOLIC BLOOD PRESSURE: 128 MMHG | TEMPERATURE: 97.4 F

## 2023-03-01 DIAGNOSIS — I10 ESSENTIAL HYPERTENSION: Primary | ICD-10-CM

## 2023-03-01 DIAGNOSIS — J20.8 ACUTE BACTERIAL BRONCHITIS: ICD-10-CM

## 2023-03-01 DIAGNOSIS — I25.10 CORONARY ARTERY DISEASE INVOLVING NATIVE CORONARY ARTERY OF NATIVE HEART WITHOUT ANGINA PECTORIS: ICD-10-CM

## 2023-03-01 DIAGNOSIS — K59.00 CONSTIPATION, UNSPECIFIED CONSTIPATION TYPE: ICD-10-CM

## 2023-03-01 DIAGNOSIS — B96.89 ACUTE BACTERIAL BRONCHITIS: ICD-10-CM

## 2023-03-01 DIAGNOSIS — I77.819 ECTATIC AORTA (HCC): ICD-10-CM

## 2023-03-01 DIAGNOSIS — N40.0 BENIGN PROSTATIC HYPERPLASIA, UNSPECIFIED WHETHER LOWER URINARY TRACT SYMPTOMS PRESENT: ICD-10-CM

## 2023-03-01 PROBLEM — L72.3 SEBACEOUS CYST: Status: RESOLVED | Noted: 2022-10-26 | Resolved: 2023-03-01

## 2023-03-01 NOTE — PROGRESS NOTES
Name: Linda Chen      : 10/7/1932      MRN: [id number]  Encounter Provider: Joyce Goldberg MD  Encounter Date: 3/1/2023   Encounter department: [facility]      1  Essential hypertension    2  Ectatic aorta (HCC)  Assessment & Plan:  Stable  cta done 2022        3  Benign prostatic hyperplasia, unspecified whether lower urinary tract symptoms present    4  Acute bacterial bronchitis    5  Constipation, unspecified constipation type    6  Coronary artery disease involving native coronary artery 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 pectoris         Bronchitis and shortness of breath is resolved  Has a bit of a cough left  His goal which he does not stop doing is to continue to be able to cut down trees to make firewood  Normally takes him total of 2 hours from tree cutting to being stacked into firewood  Chronic conditions are stable  Constipation  icnrease fiber and fluids  May try benefiber as well  But more fruit and water  consdier prunes/prune juice  Subjective      Pt here for fu  Reports today he woke up at 430 without waking up in the middle of the night  Feels well today  Has a bit of cough left  No wheezing, no chest pain  BPH  Doing well  On flomax  Has some constipation  Asking about benefiber  CAD  Stable  No chest pain  Review of Systems   Constitutional: Negative  Negative for activity change, appetite change, chills and diaphoresis  HENT: Negative for congestion and dental problem  Respiratory: Negative  Negative for apnea, chest tightness, shortness of breath and wheezing  Cardiovascular: Negative  Negative for chest pain, palpitations and leg swelling  Gastrointestinal: Negative  Negative for abdominal distention, abdominal pain, constipation, diarrhea and nausea  Genitourinary: Negative  Negative for difficulty urinating, dysuria and frequency         Current Outpatient Medications on File Prior to Visit   Medication Sig   • amLODIPine (NORVASC) 5 mg tablet Take 1 tablet (5 mg total) by mouth 2 (two) times a day   • Ascorbic Acid (VITAMIN C) 500 MG CAPS Take 1 tablet by mouth daily   • aspirin (ECOTRIN LOW STRENGTH) 81 mg EC tablet Take 81 mg by mouth daily   • atorvastatin (LIPITOR) 20 mg tablet Take 1 tablet (20 mg total) by mouth daily with dinner   • carvedilol (COREG) 12 5 mg tablet Take 1 tablet (12 5 mg total) by mouth 2 (two) times a day with meals   • clopidogrel (Plavix) 75 mg tablet Take 1 tablet (75 mg total) by mouth daily   • Cyanocobalamin (VITAMIN B12) 1000 MCG TBCR Take 1 tablet by mouth daily   • finasteride (PROSCAR) 5 mg tablet Take 1 tablet (5 mg total) by mouth daily   • Flaxseed, Linseed, (FLAXSEED OIL) 1000 MG CAPS Take 1 capsule by mouth daily   • fluticasone (FLONASE) 50 mcg/act nasal spray 2 sprays into each nostril daily   • Glucosamine-Chondroit-Vit C-Mn (GLUCOSAMINE 1500 COMPLEX) CAPS Take 1 capsule by mouth daily   • levothyroxine 100 mcg tablet TAKE ONE TABLET BY MOUTH EVERY DAY   • Multiple Vitamin (MULTIVITAMIN) capsule Take 1 capsule by mouth daily   • nitroglycerin (NITROSTAT) 0 4 mg SL tablet Place 1 tablet (0 4 mg total) under the tongue every 5 (five) minutes as needed for chest pain   • tamsulosin (FLOMAX) 0 4 mg TAKE ONE CAPSULE BY MOUTH TWICE A DAY       Objective     /78   Pulse 88   Temp (!) 97 4 °F (36 3 °C)   Ht 5' 9" (1 753 m)   Wt 75 6 kg (166 lb 9 6 oz)   SpO2 96%   BMI 24 60 kg/m²     Physical Exam  Vitals and nursing note reviewed  Constitutional:       General: He is not in acute distress  Appearance: Normal appearance  He is well-developed  He is not ill-appearing  HENT:      Head: Normocephalic and atraumatic  Right Ear: External ear normal       Left Ear: External ear normal       Nose: Nose normal  No congestion or rhinorrhea        Mouth/Throat:      Mouth: Mucous membranes are moist  Pharynx: No oropharyngeal exudate or posterior oropharyngeal erythema  Eyes:      Extraocular Movements: Extraocular movements intact  Conjunctiva/sclera: Conjunctivae normal       Pupils: Pupils are equal, round, and reactive to light  Cardiovascular:      Rate and Rhythm: Normal rate and regular rhythm  Heart sounds: Normal heart sounds  No murmur heard  No friction rub  No gallop  Pulmonary:      Effort: Pulmonary effort is normal  No respiratory distress  Breath sounds: Normal breath sounds  No wheezing or rales  Chest:      Chest wall: No tenderness  Abdominal:      General: Bowel sounds are normal  There is no distension  Palpations: Abdomen is soft  There is no mass  Tenderness: There is no abdominal tenderness  There is no guarding or rebound  Musculoskeletal:         General: Normal range of motion  Cervical back: Normal range of motion and neck supple  Skin:     General: Skin is warm  Capillary Refill: Capillary refill takes less than 2 seconds  Neurological:      Mental Status: He is alert and oriented to person, place, and time     Psychiatric:         Mood and Affect: Mood normal          Behavior: Behavior normal        Christy Morales MD

## 2023-03-14 ENCOUNTER — TELEPHONE (OUTPATIENT)
Dept: UROLOGY | Facility: MEDICAL CENTER | Age: 88
End: 2023-03-14

## 2023-03-14 DIAGNOSIS — N13.8 BPH WITH URINARY OBSTRUCTION: ICD-10-CM

## 2023-03-14 DIAGNOSIS — N40.1 BPH WITH URINARY OBSTRUCTION: ICD-10-CM

## 2023-03-14 RX ORDER — FINASTERIDE 5 MG/1
5 TABLET, FILM COATED ORAL DAILY
Qty: 90 TABLET | Refills: 3 | Status: SHIPPED | OUTPATIENT
Start: 2023-03-14 | End: 2023-03-17 | Stop reason: SDUPTHER

## 2023-03-14 NOTE — TELEPHONE ENCOUNTER
Medication Refill Request     Name finasteride (PROSCAR) 5 mg tablet  Dose/Frequency Take 2 tablet (10 mg total) by mouth daily  Quantity 180 tablet  Verified pharmacy   [x]  Verified ordering Provider   [x]  Does patient have enough for the next 3 days?  Yes [x] No []    Patient takes 2 tablets a day

## 2023-03-15 NOTE — TELEPHONE ENCOUNTER
Dosage for finasteride is 5mg once daily  Refill sent yesterday on 3/14/2023  Tamsulosin 0 4mg is taken twice daily, last refilled by PCP on 2/2/23

## 2023-03-15 NOTE — TELEPHONE ENCOUNTER
Pt is under the care of Amanda Melendez and was last seen 11/18/22    Pt's wife called and stated that she called the pharmacy and the prescription that she called about in the previous message has not been rescheduled by the pharmacy yet      Please review and advice     Fatoumata Flores the wife can be reached at [phone]

## 2023-03-16 NOTE — TELEPHONE ENCOUNTER
Patient's wife called stating patient has been taking finasteride 5 mg two pills in the evening since 01/17/23  She stated that's what she thought he was to take and its been helping him not to get up at night anymore  He takes Tamsulosin  Twice a day   One in the morning and one in the evening but now he is running out and the pharmacy will not refill it  She wants to know how to proceed  It he stops taking it again he might go back to getting up many times during the night  She would like to have someone in the office call her to discuss it       Patient can be reached at [phone]

## 2023-03-16 NOTE — TELEPHONE ENCOUNTER
Patient's wife returning missed call  Patient's wife stated the patient is out of the finasteride 5 mg because they thought he was supposed to take that one twice a day as well  She is wanting to know if he can still continue to take it twice a day because it has been helping and if there is anyway to get a new prescription  Patient's wife confirmed they use [facility] in [facility]  Patient's wife requesting a call back at [phone] and will not be available until 4:30 and gives permission to leave a voicemail

## 2023-03-17 RX ORDER — FINASTERIDE 5 MG/1
5 TABLET, FILM COATED ORAL DAILY
Qty: 90 TABLET | Refills: 3 | Status: SHIPPED | OUTPATIENT
Start: 2023-03-17

## 2023-03-17 NOTE — TELEPHONE ENCOUNTER
Finasteride is 1 tablet daily, tamsulosin prescribed as 2 capsules BID  Refills recently sent to pharmacy

## 2023-03-20 NOTE — TELEPHONE ENCOUNTER
Patient 's wife called stating she is returning the call from the nurse      Patient can be reached at [phone]

## 2023-03-20 NOTE — TELEPHONE ENCOUNTER
I spoke with the patients wife and provided her with this information:  Jaret Daly, [address] [address] [address] [facility] 3 days ago     Refill sent   Finasteride should really just be one tablet daily  She did state the pharmacy will not fill this until 4/9/23  I did let her know this is not in our control with this and to ask if they can provide him some until then since he was taking this differently than prescribed  I did let her know this is taken only once a day and the flomax is twice a day

## 2023-03-30 ENCOUNTER — REMOTE DEVICE CLINIC VISIT (OUTPATIENT)
Dept: CARDIOLOGY CLINIC | Facility: CLINIC | Age: 88
End: 2023-03-30

## 2023-03-30 DIAGNOSIS — Z95.0 CARDIAC PACEMAKER IN SITU: Primary | ICD-10-CM

## 2023-03-30 NOTE — PROGRESS NOTES
"Results for orders placed or performed in visit on 03/30/23   Cardiac EP device report    Narrative    BIOT DUAL CHAMBER PPM - ACTIVE SYSTEM IS MRI CONDITIONAL  BIOTRONIK TRANSMISSION: BATTERY STATUS \"OK\" (50% REMAINING BATTERY LIFE)  AP-92%, -22%  ALL AVAILABLE LEAD PARAMETERS WITHIN NORMAL LIMITS  NO SIGNIFICANT HIGH RATE EPISODES  NORMAL DEVICE FUNCTION   GV       "

## 2023-04-04 ENCOUNTER — OFFICE VISIT (OUTPATIENT)
Dept: FAMILY MEDICINE CLINIC | Facility: HOSPITAL | Age: 88
End: 2023-04-04

## 2023-04-04 ENCOUNTER — TELEPHONE (OUTPATIENT)
Dept: FAMILY MEDICINE CLINIC | Facility: HOSPITAL | Age: 88
End: 2023-04-04

## 2023-04-04 VITALS
TEMPERATURE: 97.3 F | OXYGEN SATURATION: 97 % | HEART RATE: 79 BPM | HEIGHT: 69 IN | WEIGHT: 168.8 LBS | SYSTOLIC BLOOD PRESSURE: 128 MMHG | BODY MASS INDEX: 25 KG/M2 | DIASTOLIC BLOOD PRESSURE: 81 MMHG

## 2023-04-04 DIAGNOSIS — R73.9 HYPERGLYCEMIA: ICD-10-CM

## 2023-04-04 DIAGNOSIS — E03.9 ACQUIRED HYPOTHYROIDISM: ICD-10-CM

## 2023-04-04 DIAGNOSIS — I10 PRIMARY HYPERTENSION: ICD-10-CM

## 2023-04-04 DIAGNOSIS — I95.1 ORTHOSTATIC HYPOTENSION: Primary | ICD-10-CM

## 2023-04-04 NOTE — TELEPHONE ENCOUNTER
pls change pcp to michael  Per patient/wife he was intending to see michael after letty retired, but was in for a couple acute visits w/ dr Ethan Hernandez  Going forward patient will see michael for chronic care, and understands that he may need to see other providers for acute issues

## 2023-04-04 NOTE — PROGRESS NOTES
Name: Frank Walker      : 10/7/1932      MRN: [id number]  Encounter Provider: Annamaria Gamez MD  Encounter Date: 2023   Encounter department: [facility] Dr Walsh  Orthostatic hypotension  Comments:  Decrease Carvedilol dose to 1/2 BID    2  Acquired hypothyroidism  -     TSH, 3rd generation with Free T4 reflex; Future  -     TSH, 3rd generation with Free T4 reflex    3  Primary hypertension  -     CBC and differential; Future  -     Comprehensive metabolic panel; Future  -     CBC and differential  -     Comprehensive metabolic panel    4  Hyperglycemia  -     HEMOGLOBIN A1C W/ EAG ESTIMATION; Future  -     HEMOGLOBIN A1C W/ EAG ESTIMATION           Subjective     3 month follow up  Having ongoing exertional SOB  Seen several times for cough/SOB, CXR normal  TTE good EF    Follows with Dr Omer Constantino for cardiac issues S/P aortic replacement    Sitting is fine but activity like with ladder carrying and working on his firewood  Did well with Doxycycline in February     Review of Systems   Constitutional: Negative for unexpected weight change  Respiratory: Positive for shortness of breath  Cardiovascular: Negative for chest pain and leg swelling  Musculoskeletal: Negative  Psychiatric/Behavioral: Negative  All other systems reviewed and are negative        Past Medical History:   Diagnosis Date    Angina pectoris (HCC)     Arthritis     Ascending aortic aneurysm (HCC)     trileaflet AV, 41mm    BPH (benign prostatic hyperplasia)     Cancer (HCC)     skin    Coronary artery disease     Former tobacco use     pipe & cigar use socially & infrequently    GERD (gastroesophageal reflux disease)     controlled w/ diet    Headache     h/o    Hepatitis A     History of melanoma     Hypertension     Hypothyroidism     Pacemaker     Sebaceous cyst 10/26/2022    Severe aortic stenosis     Shortness of breath     with exertion    Vitamin B12 deficiency     Wears dentures      Past Surgical History:   Procedure Laterality Date    CARDIAC CATHETERIZATION  04/15/2022    Procedure: Cardiac catheterization;  Surgeon: Yessi Freeman DO;  Location: [facility]; Service: Cardiology    CARDIAC CATHETERIZATION N/A 04/15/2022    Procedure: Cardiac Coronary Angiogram;  Surgeon: Yessi Freeman DO;  [facility]: [facility]; Service: Cardiology    CARDIAC CATHETERIZATION N/A 04/15/2022    Procedure: Cardiac pci;  Surgeon: Yessi Freeman DO;  [facility]: [facility]; Service: Cardiology    CARDIAC PACEMAKER PLACEMENT      COLONOSCOPY      INGUINAL HERNIA REPAIR Bilateral     OH RPLCMT AORTIC VALVE OPN W/STENTLESS TISSUE VALVE N/A 5/16/2022    Procedure: REPLACEMENT VALVE AORTIC (TISSUE AVR) WITH 25 MM AORTIC MAGNA EASE VALVE W/ ROJELIO;  Surgeon: Georgia Carrington DO;  Location: [facility];  Service: Cardiac Surgery    SKIN CANCER EXCISION      melanoma, multiple     Family History   Problem Relation Age of Onset    Cancer Family         gastrointestinal tract    Heart disease Mother     Substance Abuse Neg Hx     Mental illness Neg Hx      Social History     Socioeconomic History    Marital status: /Civil Union     Spouse name: Wendi Aponte Number of children: None    Years of education: None    Highest education level: None   Occupational History    None   Tobacco Use    Smoking status: Former     Types: Cigars    Smokeless tobacco: Never    Tobacco comments:     50 years ago   Vaping Use    Vaping Use: Former   Substance and Sexual Activity    Alcohol use: Yes     Comment: a beer once in a while      Drug use: No    Sexual activity: None   Other Topics Concern    None   Social History Narrative    Living will on file    Supportive and safe    Lives with wife     Social Determinants of Health     Financial Resource Strain: Low Risk     Difficulty of Paying Living Expenses: Not hard at all   Food Insecurity: No Food Insecurity    Worried About Running Out of Food in the Last Year: Never true    Ran Out of Food in the Last Year: Never true   Transportation Needs: No Transportation Needs    Lack of Transportation (Medical): No    Lack of Transportation (Non-Medical): No   Physical Activity: Sufficiently Active    Days of Exercise per Week: 7 days    Minutes of Exercise per Session: 120 min   Stress: No Stress Concern Present    Feeling of Stress : Not at all   Social Connections: Socially Integrated    Frequency of Communication with Friends and Family: Once a week    Frequency of Social Gatherings with Friends and Family: Three times a week    Attends [employer] Services: 1 to 4 times per year   [facility] Funding or Organizations:  Yes    Attends Club or Organization Meetings: 1 to 4 times per year    Marital Status:    Intimate Partner Violence: Not At Risk    Fear of Current or Ex-Partner: No    Emotionally Abused: No    Physically Abused: No    Sexually Abused: No   Housing Stability: Low Risk     Unable to Pay for Housing in the Last Year: No    Number of Places Lived in the Last Year: 1    Unstable Housing in the Last Year: No     Current Outpatient Medications on File Prior to Visit   Medication Sig    amLODIPine (NORVASC) 5 mg tablet Take 1 tablet (5 mg total) by mouth 2 (two) times a day    Ascorbic Acid (VITAMIN C) 500 MG CAPS Take 1 tablet by mouth daily    aspirin (ECOTRIN LOW STRENGTH) 81 mg EC tablet Take 81 mg by mouth daily    atorvastatin (LIPITOR) 20 mg tablet Take 1 tablet (20 mg total) by mouth daily with dinner    carvedilol (COREG) 12 5 mg tablet Take 1 tablet (12 5 mg total) by mouth 2 (two) times a day with meals (Patient taking differently: Take 12 5 mg by mouth 2 (two) times a day with meals Taking half )    clopidogrel (Plavix) 75 mg tablet Take 1 tablet (75 mg total) by mouth daily    Cyanocobalamin (VITAMIN B12) 1000 MCG TBCR Take 1 "tablet by mouth daily    finasteride (PROSCAR) 5 mg tablet Take 1 tablet (5 mg total) by mouth daily    Flaxseed, Linseed, (FLAXSEED OIL) 1000 MG CAPS Take 1 capsule by mouth daily    fluticasone (FLONASE) 50 mcg/act nasal spray 2 sprays into each nostril daily    Glucosamine-Chondroit-Vit C-Mn (GLUCOSAMINE 1500 COMPLEX) CAPS Take 1 capsule by mouth daily    levothyroxine 100 mcg tablet TAKE ONE TABLET BY MOUTH EVERY DAY    Multiple Vitamin (MULTIVITAMIN) capsule Take 1 capsule by mouth daily    nitroglycerin (NITROSTAT) 0 4 mg SL tablet Place 1 tablet (0 4 mg total) under the tongue every 5 (five) minutes as needed for chest pain    tamsulosin (FLOMAX) 0 4 mg TAKE ONE CAPSULE BY MOUTH TWICE A DAY     Allergies   Allergen Reactions    Amoxicillin Other (See Comments)     Severe weakness     Immunization History   Administered Date(s) Administered    COVID-19 MODERNA VACC 0 5 ML IM 01/18/2021, 02/15/2021, 11/02/2021    INFLUENZA 10/06/2022    Influenza Split High Dose Preservative Free IM 10/06/2014, 10/19/2015, 10/23/2017, 10/01/2019    Influenza, high dose seasonal 0 7 mL 10/05/2018, 10/07/2020, 10/06/2022    Influenza, seasonal, injectable 10/01/2012, 10/15/2014    Pneumococcal Conjugate 13-Valent 10/05/2018    Pneumococcal Polysaccharide PPV23 10/01/2012    Zoster 06/19/2008       Objective     /81 (BP Location: Left arm, Patient Position: Sitting, Cuff Size: Standard)   Pulse 79   Temp (!) 97 3 °F (36 3 °C) (Tympanic)   Ht 5' 9\" (1 753 m)   Wt 76 6 kg (168 lb 12 8 oz)   SpO2 97%   BMI 24 93 kg/m²     Physical Exam  Vitals reviewed  Constitutional:       Appearance: Normal appearance  Cardiovascular:      Rate and Rhythm: Rhythm irregular  Heart sounds: Normal heart sounds  Pulmonary:      Effort: Pulmonary effort is normal       Breath sounds: Normal breath sounds  Musculoskeletal:      Right lower leg: No edema  Left lower leg: No edema     Skin:     Findings: No " rash    Neurological:      General: No focal deficit present  Mental Status: He is alert and oriented to person, place, and time     Psychiatric:         Mood and Affect: Mood normal        Aleida Woodruff MD

## 2023-04-06 LAB
ALBUMIN SERPL-MCNC: 4.3 G/DL (ref 3.5–4.6)
ALBUMIN/GLOB SERPL: 1.7 {RATIO} (ref 1.2–2.2)
ALP SERPL-CCNC: 81 IU/L (ref 44–121)
ALT SERPL-CCNC: 21 IU/L (ref 0–44)
AST SERPL-CCNC: 34 IU/L (ref 0–40)
BASOPHILS # BLD AUTO: 0 X10E3/UL (ref 0–0.2)
BASOPHILS NFR BLD AUTO: 1 %
BILIRUB SERPL-MCNC: 0.5 MG/DL (ref 0–1.2)
BUN SERPL-MCNC: 14 MG/DL (ref 10–36)
BUN/CREAT SERPL: 16 (ref 10–24)
CALCIUM SERPL-MCNC: 9.2 MG/DL (ref 8.6–10.2)
CHLORIDE SERPL-SCNC: 100 MMOL/L (ref 96–106)
CO2 SERPL-SCNC: 22 MMOL/L (ref 20–29)
CREAT SERPL-MCNC: 0.85 MG/DL (ref 0.76–1.27)
EGFR: 83 ML/MIN/1.73
EOSINOPHIL # BLD AUTO: 0.3 X10E3/UL (ref 0–0.4)
EOSINOPHIL NFR BLD AUTO: 7 %
ERYTHROCYTE [DISTWIDTH] IN BLOOD BY AUTOMATED COUNT: 12.6 % (ref 11.6–15.4)
EST. AVERAGE GLUCOSE BLD GHB EST-MCNC: 134 MG/DL
GLOBULIN SER-MCNC: 2.5 G/DL (ref 1.5–4.5)
GLUCOSE SERPL-MCNC: 131 MG/DL (ref 70–99)
HBA1C MFR BLD: 6.3 % (ref 4.8–5.6)
HCT VFR BLD AUTO: 36.4 % (ref 37.5–51)
HGB BLD-MCNC: 12.3 G/DL (ref 13–17.7)
IMM GRANULOCYTES # BLD: 0 X10E3/UL (ref 0–0.1)
IMM GRANULOCYTES NFR BLD: 0 %
LYMPHOCYTES # BLD AUTO: 1.6 X10E3/UL (ref 0.7–3.1)
LYMPHOCYTES NFR BLD AUTO: 36 %
MCH RBC QN AUTO: 31.5 PG (ref 26.6–33)
MCHC RBC AUTO-ENTMCNC: 33.8 G/DL (ref 31.5–35.7)
MCV RBC AUTO: 93 FL (ref 79–97)
MONOCYTES # BLD AUTO: 0.7 X10E3/UL (ref 0.1–0.9)
MONOCYTES NFR BLD AUTO: 15 %
NEUTROPHILS # BLD AUTO: 1.9 X10E3/UL (ref 1.4–7)
NEUTROPHILS NFR BLD AUTO: 41 %
PLATELET # BLD AUTO: 227 X10E3/UL (ref 150–450)
POTASSIUM SERPL-SCNC: 5.3 MMOL/L (ref 3.5–5.2)
PROT SERPL-MCNC: 6.8 G/DL (ref 6–8.5)
RBC # BLD AUTO: 3.91 X10E6/UL (ref 4.14–5.8)
SODIUM SERPL-SCNC: 139 MMOL/L (ref 134–144)
TSH SERPL DL<=0.005 MIU/L-ACNC: 2.18 UIU/ML (ref 0.45–4.5)
WBC # BLD AUTO: 4.5 X10E3/UL (ref 3.4–10.8)

## 2023-04-18 ENCOUNTER — OFFICE VISIT (OUTPATIENT)
Dept: FAMILY MEDICINE CLINIC | Facility: HOSPITAL | Age: 88
End: 2023-04-18

## 2023-04-18 VITALS
SYSTOLIC BLOOD PRESSURE: 127 MMHG | OXYGEN SATURATION: 96 % | WEIGHT: 166.8 LBS | DIASTOLIC BLOOD PRESSURE: 87 MMHG | HEIGHT: 69 IN | BODY MASS INDEX: 24.71 KG/M2 | HEART RATE: 75 BPM | TEMPERATURE: 96.6 F

## 2023-04-18 DIAGNOSIS — I10 PRIMARY HYPERTENSION: ICD-10-CM

## 2023-04-18 DIAGNOSIS — I25.10 CORONARY ARTERY DISEASE INVOLVING NATIVE CORONARY ARTERY OF NATIVE HEART WITHOUT ANGINA PECTORIS: ICD-10-CM

## 2023-04-18 DIAGNOSIS — I48.0 PAROXYSMAL ATRIAL FIBRILLATION (HCC): ICD-10-CM

## 2023-04-18 DIAGNOSIS — I95.1 ORTHOSTATIC HYPOTENSION: Primary | ICD-10-CM

## 2023-04-18 NOTE — PROGRESS NOTES
Name: Sindy Castano      : 10/7/1932      MRN: [id number]  Encounter Provider: Nathalie Wolf MD  Encounter Date: 2023   Encounter department: [facility]      1  Orthostatic hypotension  Assessment & Plan:  Improved with decreased Carvedilol dose      2  Paroxysmal atrial fibrillation (HCC)  Assessment & Plan:  Stable rate on anticoagulation      3  Primary hypertension  Assessment & Plan:  Excellent BP control      4  Coronary artery disease involving native coronary artery 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 pectoris         Subjective      2 week follow up  Felt better after the first day of decreased Carvedilol dose  Able to do about all of what he could do before- very high energy and prolonged activity  Some insomnia    No chest pain    Review of Systems   Constitutional: Negative for fatigue and unexpected weight change  Respiratory: Negative  Cardiovascular: Negative  Gastrointestinal: Negative  Musculoskeletal: Negative  Psychiatric/Behavioral: Positive for sleep disturbance  All other systems reviewed and are negative        Current Outpatient Medications on File Prior to Visit   Medication Sig    amLODIPine (NORVASC) 5 mg tablet Take 1 tablet (5 mg total) by mouth 2 (two) times a day    Ascorbic Acid (VITAMIN C) 500 MG CAPS Take 1 tablet by mouth daily    aspirin (ECOTRIN LOW STRENGTH) 81 mg EC tablet Take 81 mg by mouth daily    atorvastatin (LIPITOR) 20 mg tablet Take 1 tablet (20 mg total) by mouth daily with dinner    carvedilol (COREG) 12 5 mg tablet Take 1 tablet (12 5 mg total) by mouth 2 (two) times a day with meals (Patient taking differently: Take 12 5 mg by mouth 2 (two) times a day with meals Taking half )    clopidogrel (Plavix) 75 mg tablet Take 1 tablet (75 mg total) by mouth daily    Cyanocobalamin (VITAMIN B12) 1000 MCG TBCR Take 1 tablet by mouth daily    finasteride (PROSCAR) 5 mg tablet Take 1 "tablet (5 mg total) by mouth daily    Flaxseed, Linseed, (FLAXSEED OIL) 1000 MG CAPS Take 1 capsule by mouth daily    fluticasone (FLONASE) 50 mcg/act nasal spray 2 sprays into each nostril daily    Glucosamine-Chondroit-Vit C-Mn (GLUCOSAMINE 1500 COMPLEX) CAPS Take 1 capsule by mouth daily    levothyroxine 100 mcg tablet TAKE ONE TABLET BY MOUTH EVERY DAY    Multiple Vitamin (MULTIVITAMIN) capsule Take 1 capsule by mouth daily    nitroglycerin (NITROSTAT) 0 4 mg SL tablet Place 1 tablet (0 4 mg total) under the tongue every 5 (five) minutes as needed for chest pain    tamsulosin (FLOMAX) 0 4 mg TAKE ONE CAPSULE BY MOUTH TWICE A DAY       Objective     /87 (BP Location: Left arm, Patient Position: Sitting, Cuff Size: Standard)   Pulse 75   Temp (!) 96 6 °F (35 9 °C) (Tympanic)   Ht 5' 9\" (1 753 m)   Wt 75 7 kg (166 lb 12 8 oz)   SpO2 96%   BMI 24 63 kg/m²     Physical Exam  Nursing note reviewed  Constitutional:       Appearance: Normal appearance  Neck:      Vascular: No carotid bruit  Cardiovascular:      Rate and Rhythm: Rhythm irregular  Heart sounds: Normal heart sounds  Pulmonary:      Effort: Pulmonary effort is normal       Breath sounds: Normal breath sounds  Musculoskeletal:      Right lower leg: No edema  Left lower leg: No edema  Neurological:      Mental Status: He is alert and oriented to person, place, and time     Psychiatric:         Mood and Affect: Mood normal        Yoli Rosario MD  "

## 2023-04-24 ENCOUNTER — TELEPHONE (OUTPATIENT)
Dept: CARDIOLOGY CLINIC | Facility: CLINIC | Age: 88
End: 2023-04-24

## 2023-04-24 NOTE — TELEPHONE ENCOUNTER
Wife called to find out if should refill Plavix, was told only needs to be on for one yr       Called and left a message to call office

## 2023-04-24 NOTE — TELEPHONE ENCOUNTER
Wife called back wants to know if should refill his Plavix because his yr isn't up  Per wife one yr would be 5/16/2023  Advised to refill, and I will definitely find out if he wants him to stop it in May or wait until he has a follows up in July  Ov note on remain on until at least April      Please advise

## 2023-04-30 PROBLEM — I95.1 ORTHOSTATIC HYPOTENSION: Status: ACTIVE | Noted: 2023-04-30

## 2023-06-09 DIAGNOSIS — I48.0 PAROXYSMAL ATRIAL FIBRILLATION (HCC): Primary | ICD-10-CM

## 2023-06-09 DIAGNOSIS — I35.0 SEVERE AORTIC STENOSIS: ICD-10-CM

## 2023-06-09 DIAGNOSIS — I10 PRIMARY HYPERTENSION: ICD-10-CM

## 2023-06-09 DIAGNOSIS — Z95.0 PRESENCE OF PERMANENT CARDIAC PACEMAKER: ICD-10-CM

## 2023-06-09 DIAGNOSIS — I49.5 SICK SINUS SYNDROME (HCC): ICD-10-CM

## 2023-06-09 RX ORDER — CARVEDILOL 6.25 MG/1
6.25 TABLET ORAL 2 TIMES DAILY WITH MEALS
Qty: 60 TABLET | Refills: 1 | Status: SHIPPED | OUTPATIENT
Start: 2023-06-09

## 2023-06-09 NOTE — TELEPHONE ENCOUNTER
I'm calling for [employer]  His birthdate is [birth year]  Doctor Quinton Sol had prescribed Carvedilol tablets at 12 5 milligrams and he was feeling very, very dizzy on him and Doctor Jesus Ferreira cut them in half on April 4th  That prescription is about to run out  He has only a few pills left and I'd like Doctor Quinton Sol to call in the lower prescription for him to the [facility]  My number is [phone]  Thank you

## 2023-06-09 NOTE — TELEPHONE ENCOUNTER
Per Dr Kellie Yañez, pt has reduced his Carvedilol to 6 25 BID  from 12 5mg BID    A new refill has been sent in for 6 25mg tabs

## 2023-06-09 NOTE — TELEPHONE ENCOUNTER
I'm calling for [facility] birth date [id number]  I'm asking for Doctor Apollo Cano to call in a prescription for Carvedilol for half the prescription it did before 12 5 was what he had before  Doctor Timothy cut it in half and he feels much better on it if he would just call a prescription in to [facility] in [facility]  I'd appreciate that  You can call back at [phone]  Thank you

## 2023-06-29 ENCOUNTER — REMOTE DEVICE CLINIC VISIT (OUTPATIENT)
Dept: CARDIOLOGY CLINIC | Facility: CLINIC | Age: 88
End: 2023-06-29
Payer: COMMERCIAL

## 2023-06-29 DIAGNOSIS — Z95.0 PRESENCE OF PERMANENT CARDIAC PACEMAKER: Primary | ICD-10-CM

## 2023-06-29 PROCEDURE — 93296 REM INTERROG EVL PM/IDS: CPT | Performed by: INTERNAL MEDICINE

## 2023-06-29 PROCEDURE — 93294 REM INTERROG EVL PM/LDLS PM: CPT | Performed by: INTERNAL MEDICINE

## 2023-06-29 NOTE — PROGRESS NOTES
Results for orders placed or performed in visit on 06/29/23   Cardiac EP device report    Narrative    BIOT DUAL CHAMBER PPM - ACTIVE SYSTEM IS MRI CONDITIONAL  BIOTRONIK TRANSMISSION: BATTERY VOLTAGE ADEQUATE (50%)  AP 92%  26% ALL AVAILABLE LEAD PARAMETERS WITHIN NORMAL LIMITS  NO SIGNIFICANT HIGH RATE EPISODES  NORMAL DEVICE FUNCTION   AM/RAMOS

## 2023-07-06 ENCOUNTER — OFFICE VISIT (OUTPATIENT)
Dept: CARDIOLOGY CLINIC | Facility: CLINIC | Age: 88
End: 2023-07-06
Payer: COMMERCIAL

## 2023-07-06 VITALS
SYSTOLIC BLOOD PRESSURE: 122 MMHG | HEART RATE: 69 BPM | BODY MASS INDEX: 23.91 KG/M2 | WEIGHT: 167 LBS | HEIGHT: 70 IN | DIASTOLIC BLOOD PRESSURE: 70 MMHG

## 2023-07-06 DIAGNOSIS — Z95.5 S/P DRUG ELUTING CORONARY STENT PLACEMENT: ICD-10-CM

## 2023-07-06 DIAGNOSIS — I25.10 CORONARY ARTERY DISEASE INVOLVING NATIVE CORONARY ARTERY OF NATIVE HEART WITHOUT ANGINA PECTORIS: Primary | ICD-10-CM

## 2023-07-06 DIAGNOSIS — I48.0 PAROXYSMAL ATRIAL FIBRILLATION (HCC): ICD-10-CM

## 2023-07-06 DIAGNOSIS — Z95.0 PRESENCE OF PERMANENT CARDIAC PACEMAKER: ICD-10-CM

## 2023-07-06 DIAGNOSIS — Z95.2 S/P AVR: ICD-10-CM

## 2023-07-06 DIAGNOSIS — I10 PRIMARY HYPERTENSION: ICD-10-CM

## 2023-07-06 DIAGNOSIS — I49.5 SICK SINUS SYNDROME (HCC): ICD-10-CM

## 2023-07-06 PROCEDURE — 1159F MED LIST DOCD IN RCRD: CPT | Performed by: INTERNAL MEDICINE

## 2023-07-06 PROCEDURE — 99214 OFFICE O/P EST MOD 30 MIN: CPT | Performed by: INTERNAL MEDICINE

## 2023-07-06 PROCEDURE — 1160F RVW MEDS BY RX/DR IN RCRD: CPT | Performed by: INTERNAL MEDICINE

## 2023-07-06 RX ORDER — AMLODIPINE BESYLATE 5 MG/1
5 TABLET ORAL DAILY
Qty: 90 TABLET | Refills: 3 | Status: SHIPPED | OUTPATIENT
Start: 2023-07-06

## 2023-07-06 NOTE — PROGRESS NOTES
Cardiology Consultation     Florence Brasher  [id number]  10/7/1932  HEART & VASCULAR  [facility] [place] [facility] [address]    1. Coronary artery disease involving native coronary artery 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 pectoris        2. Paroxysmal atrial fibrillation (HCC)        3. Sick sinus syndrome ([address] )        4. Primary hypertension        5. S/P drug eluting coronary stent placement        6. S/P AVR        7. Presence of permanent cardiac pacemaker            Discussion/Summary:    1. Severe aortic stenosis - Last year his aortic stenosis progressed to severe and then earlier this year he was becoming more symptomatic. He went to CT surgery and went through TAVR workup. He ended up having an open AVR as his left coronary ostium takeoff was too low and close to the annulus. This went well without complications. He continues with cardiac rehabilitation. His echocardiograms have shown a normally functioning AVR with normal LV function. He should take antibiotic prophylaxis for any dental procedures. 2.  History of permanent pacemaker - This was secondary to sick sinus syndrome. He had some sort of mechanical chest pain after his procedure that fortunately have greatly improved. He will continue to follow in our pacemaker clinic. He is 100% a paced, and about 50% V-paced. 3.  Paroxysmal atrial fibrillation - Found on pacemaker interrogation. He is predominantly atrially paced and has not had any recent episodes of atrial fibrillation with the last episode detected about 2.5 years ago. Anticoagulation is recommended however he has declined in the past.  He is on aspirin. 5.  Hypertension - His blood pressure was elevated last year and we have uptitrated his therapy. We tried to uptitrate carvedilol at our last visit but it appears that he did not tolerate this. He remains on 6.25 mg twice daily.   He is taking amlodipine 5 mg twice daily. He has developed now orthostatic hypotension, particularly in the heat and we will back down on the amlodipine to daily. He should regularly follow his blood pressure at home and also keep hydrated while working outside. 6.  CAD - During his workup for his aortic valve replacement, cardiac catheterization did show a 90% RCA in which he received a drug-eluting stent. At this point he can come off the Plavix and continue aspirin indefinitely. He also remains on beta-blocker and statin. We will see him back in 6 months. HPI:    Mr. jC Singh comes in for follow-up given his cardiac history. He has a history of sick sinus syndrome and status post permanent pacemaker in March of 2016. He also has a history of moderate aortic stenosis, paroxysmal atrial fibrillation and hypertension. He is predominantly atrially paced. He was offered anticoagulation for stroke prevention, but declined. His prior cardiology care was through [facility], where he received as permanent pacemaker. Kelli Ihsan gives me a history of having significant mechanical pain after his permanent pacemaker. He recalls coming out of anesthesia and felt as if somebody was pressing on his chest.  It is unclear at this time what exactly had happened. I asked if there was a complication like an arrhythmia that required defibrillation, but he does not ever recall being told that. For close to 3 years he had on and off mechanical pains from his pacemaker. They have improved, and now he seems to describe the fleeting sharp chest pain that only lasts a few seconds. His pacemaker interrogations show that he is 100% A-paced, and V paced about 50% of the time. He has had a few episodes of atrial fibrillation detected by pacer interrogation in the past.  He has had none recently.     His echocardiograms through the years showed moderate aortic stenosis but we repeated 1 last year that showed progression to severe aortic stenosis. At that time he had minimal symptoms, but these changed in follow-up. When I last saw him earlier this year he was having progressive shortness of breath over the winter. He would go for walks and started noticing that he had to take breaks. He denies chest pain or syncope. At that point I referred him to CT surgery for TAVR evaluation. He went through all the preoperative testing that included cardiac catheterization and CTAs. He was found to have a significant 90% RCA stenosis that received a drug-eluting stent in April 2022. He had an 80% OM1 stenosis but this was a small vessel supplying a small territory which was treated medically. His CTA did show that his left coronary ostium takeoff was very low and close to the aortic valve annulus. This put him at risk for left main occlusion with TAVR. CT surgery at that point recommended open AVR and after he thought about this and discussed it with his family, he decided to proceed. He had this performed on 05/16/22. This went well without complications. In follow-up he was doing well and he was enrolled in cardiac rehabilitation. Last year while doing cardiac rehab we noticed is that his blood pressure was running high. He was having some lightheadedness and dizziness as well. At that visit we changed Toprol over to carvedilol 6.25 mg twice daily and added back his amlodipine 5 mg daily. This then was uptitrated to twice daily. When I last saw Maritza Crowell he was having some increasing shortness of breath and we repeated an echocardiogram.  This showed no change with normal LV systolic function and a normally functioning aortic valve replacement. His pacemaker checks have been normal as well. Fortunately since this time his shortness of breath has significantly improved. Maritza Crowell has been having some intermittent orthostatic lightheadedness. He particularly notices this in the heat when he is outside working on his property.   He is very active for his age and continues to cut his grass and do other work on his property. If he is out there for too long he will notice he is lightheaded. He also however has been getting lightheaded with standing up quickly. No near-syncope or syncope. His shortness of breath is minimal at this point. He does kulkarni with chronic fatigue. No chest pain or any symptoms of angina. No other signs or symptoms of CHF.       Patient Active Problem List   Diagnosis   • Chronic nonintractable headache   • Enlarged prostate without lower urinary tract symptoms (luts)   • GERD without esophagitis   • Primary hypertension   • Acquired hypothyroidism   • Sick sinus syndrome (HCC)   • Presence of permanent cardiac pacemaker   • Paroxysmal atrial fibrillation (HCC)   • Balance disorder   • Coronary artery disease involving native coronary artery 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 pectoris   • S/P drug eluting coronary stent placement   • S/P AVR   • Pure hypercholesterolemia   • Retained suture   • Dyspnea on effort   • Ectatic aorta (HCC)   • Orthostatic hypotension     Past Medical History:   Diagnosis Date   • Angina pectoris (HCC)    • Arthritis    • Ascending aortic aneurysm (HCC)     trileaflet AV, 41mm   • BPH (benign prostatic hyperplasia)    • Cancer (HCC)     skin   • Coronary artery disease    • Former tobacco use     pipe & cigar use socially & infrequently   • GERD (gastroesophageal reflux disease)     controlled w/ diet   • Headache     h/o   • Hepatitis A    • History of melanoma    • Hypertension    • Hypothyroidism    • Pacemaker    • Sebaceous cyst 10/26/2022   • Severe aortic stenosis    • Shortness of breath     with exertion   • Vitamin B12 deficiency    • Wears dentures      Social History     Socioeconomic History   • Marital status: /Civil Union     Spouse name: Modesto Ortez   • Number of children: Not on file   • Years of education: Not on file   • Highest education level: Not on file Occupational History   • Not on file   Tobacco Use   • Smoking status: Former     Types: Cigars   • Smokeless tobacco: Never   • Tobacco comments:     50 years ago   Vaping Use   • Vaping Use: Former   Substance and Sexual Activity   • Alcohol use: Yes     Comment: a beer once in a while. • Drug use: No   • Sexual activity: Not on file   Other Topics Concern   • Not on file   Social History Narrative    Living will on file    Supportive and safe    Lives with wife     Social Determinants of Health     Financial Resource Strain: Low Risk  (8/26/2022)    Overall Financial Resource Strain (CARDIA)    • Difficulty of Paying Living Expenses: Not hard at all   Food Insecurity: No Food Insecurity (8/26/2022)    Hunger Vital Sign    • Worried About Running Out of Food in the Last Year: Never true    • Ran Out of Food in the Last Year: Never true   Transportation Needs: No Transportation Needs (8/26/2022)    PRAPARE - Transportation    • Lack of Transportation (Medical): No    • Lack of Transportation (Non-Medical): No   Physical Activity: Sufficiently Active (8/26/2022)    Exercise Vital Sign    • Days of Exercise per Week: 7 days    • Minutes of Exercise per Session: 120 min   Stress: No Stress Concern Present (8/26/2022)    [facility]    • Feeling of Stress : Not at all   Social Connections: Socially Integrated (8/26/2022)    Social Connection and Isolation Panel [NHANES]    • Frequency of Communication with Friends and Family: Once a week    • Frequency of Social Gatherings with Friends and Family: Three times a week    • Attends [employer] Services: 1 to 4 times per year    • Active Member of Clubs or Organizations:  Yes    • Attends Club or Organization Meetings: 1 to 4 times per year    • Marital Status:    Intimate Partner Violence: Not At Risk (8/26/2022)    Humiliation, Afraid, Rape, and Kick questionnaire    • Fear of Current or Ex-Partner: No    • Emotionally Abused: No    • Physically Abused: No    • Sexually Abused: No   Housing Stability: Low Risk  (8/26/2022)    Housing Stability Vital Sign    • Unable to Pay for Housing in the Last Year: No    • Number of Places Lived in the Last Year: 1    • Unstable Housing in the Last Year: No      Family History   Problem Relation Age of Onset   • Cancer Family         gastrointestinal tract   • Heart disease Mother    • Substance Abuse Neg Hx    • Mental illness Neg Hx      Past Surgical History:   Procedure Laterality Date   • CARDIAC CATHETERIZATION  04/15/2022    Procedure: Cardiac catheterization;  Surgeon: Bryan Duran DO;  Location: [facility]; Service: Cardiology   • CARDIAC CATHETERIZATION N/A 04/15/2022    Procedure: Cardiac Coronary Angiogram;  Surgeon: Bryan Duran DO;  Location: [facility]; Service: Cardiology   • CARDIAC CATHETERIZATION N/A 04/15/2022    Procedure: Cardiac pci;  Surgeon: Bryan Duran DO;  Location: [facility];   Service: Cardiology   • CARDIAC PACEMAKER PLACEMENT     • COLONOSCOPY     • INGUINAL HERNIA REPAIR Bilateral    • NC RPLCMT AORTIC VALVE OPN W/STENTLESS TISSUE VALVE N/A 5/16/2022    Procedure: REPLACEMENT VALVE AORTIC (TISSUE AVR) WITH 25 MM AORTIC MAGNA EASE VALVE W/ ROJELIO;  Surgeon: Mirna Courtney DO;  Location: [facility];  Service: Cardiac Surgery   • SKIN CANCER EXCISION      melanoma, multiple       Current Outpatient Medications:   •  amLODIPine (NORVASC) 5 mg tablet, Take 1 tablet (5 mg total) by mouth 2 (two) times a day, Disp: 180 tablet, Rfl: 3  •  Ascorbic Acid (VITAMIN C) 500 MG CAPS, Take 1 tablet by mouth daily, Disp: , Rfl:   •  aspirin (ECOTRIN LOW STRENGTH) 81 mg EC tablet, Take 81 mg by mouth daily, Disp: , Rfl:   •  atorvastatin (LIPITOR) 20 mg tablet, Take 1 tablet (20 mg total) by mouth daily with dinner, Disp: 90 tablet, Rfl: 2  •  carvedilol (COREG) 6.25 mg tablet, Take 1 tablet (6.25 mg total) by mouth 2 (two) times a day with meals, Disp: 60 tablet, Rfl: 1  •  clopidogrel (Plavix) 75 mg tablet, Take 1 tablet (75 mg total) by mouth daily, Disp: 90 tablet, Rfl: 3  •  Cyanocobalamin (VITAMIN B12) 1000 MCG TBCR, Take 1 tablet by mouth daily, Disp: , Rfl:   •  finasteride (PROSCAR) 5 mg tablet, Take 1 tablet (5 mg total) by mouth daily, Disp: 90 tablet, Rfl: 3  •  Flaxseed, Linseed, (FLAXSEED OIL) 1000 MG CAPS, Take 1 capsule by mouth daily, Disp: , Rfl:   •  fluticasone (FLONASE) 50 mcg/act nasal spray, 2 sprays into each nostril daily, Disp: 16 g, Rfl: 1  •  Glucosamine-Chondroit-Vit C-Mn (GLUCOSAMINE 1500 COMPLEX) CAPS, Take 1 capsule by mouth daily, Disp: , Rfl:   •  levothyroxine 100 mcg tablet, TAKE ONE TABLET BY MOUTH EVERY DAY, Disp: 90 tablet, Rfl: 3  •  Multiple Vitamin (MULTIVITAMIN) capsule, Take 1 capsule by mouth daily, Disp: , Rfl:   •  nitroglycerin (NITROSTAT) 0.4 mg SL tablet, Place 1 tablet (0.4 mg total) under the tongue every 5 (five) minutes as needed for chest pain, Disp: 24 tablet, Rfl: 1  •  tamsulosin (FLOMAX) 0.4 mg, TAKE ONE CAPSULE BY MOUTH TWICE A DAY, Disp: 90 capsule, Rfl: 3  Allergies   Allergen Reactions   • Amoxicillin Other (See Comments)     Severe weakness     Vitals:    07/06/23 0914   BP: 122/70   BP Location: Left arm   Patient Position: Sitting   Cuff Size: Standard   Pulse: 69   Weight: 75.8 kg (167 lb)   Height: 5' 10" (1.778 m)       Labs:  Lab Results   Component Value Date     06/15/2017    K 5.3 (H) 04/05/2023     04/05/2023    CO2 22 04/05/2023    BUN 14 04/05/2023    CREATININE 0.85 04/05/2023    CREATININE 0.67 05/20/2022    CREATININE 0.89 06/15/2017    GLUCOSE 220 (H) 05/16/2022    GLUCOSE 110 (H) 06/15/2017    CALCIUM 8.4 05/20/2022    CALCIUM 9.2 06/15/2017     Lab Results   Component Value Date    WBC 4.5 04/05/2023    WBC 11.96 (H) 05/18/2022    WBC 5.5 06/15/2017    HGB 12.3 (L) 04/05/2023    HGB 8.8 (L) 05/18/2022    HGB 13.5 06/15/2017    HCT 36.4 (L) 04/05/2023    HCT 25.8 (L) 05/18/2022    HCT 40.2 06/15/2017    MCV 93 04/05/2023    MCV 93 05/18/2022    MCV 94 06/15/2017     04/05/2023     05/18/2022       Imaging:    ECHO (1/24/2023): •  Left Ventricle: Left ventricular cavity size is normal. Wall thickness is mildly increased. There is mild asymmetric hypertrophy of the septal wall. The left ventricular ejection fraction is 60-65% by visual estimation. Systolic function is normal. Wall motion is normal. Diastolic function is mildly abnormal, consistent with grade I (abnormal) relaxation. •  Right Ventricle: Right ventricular cavity size is mildly dilated. Systolic function is mildly reduced. •  Left Atrium: The atrium is normal in size. •  Right Atrium: The atrium is mildly dilated. •  Aortic Valve: There is a bioprosthetic valve. The prosthetic valve appears well-seated and appears to be functioning normally. There is no evidence of paravalvular regurgitation. There is no evidence of transvalvular regurgitation. The gradient recorded across the prosthetic aortic valve is within the expected range. The aortic valve peak velocity is 1.63 m/s. The aortic valve mean gradient is 5 mmHg. The dimensionless velocity index is 0.38.  •  Mitral Valve: There is annular calcification. There is mild regurgitation. •  Tricuspid Valve: There is mild regurgitation. •  Pulmonic Valve: There is mild regurgitation. •  Aorta: The aortic root is normal in size. The ascending aorta is mildly dilated (4.0 cm).     Essentially unchanged compared to prior study dated 11/25/22. Normally functioning bioprosthetic aortic valve. CARDIAC CATH (4/2022):  Diagnostic  Dominance: Co-dominant      Left Main   The vessel exhibits minimal luminal irregularities. Left Anterior Descending   Prox LAD lesion is 25% stenosed. FLAKITO flow is 3. Mid LAD lesion is 50% stenosed. FLAKITO flow is 3.    Left Circumflex   First Obtuse Marginal Branch   1st Mrg lesion is 80% stenosed. Small OM1 branch (medical therapy), supplies small territory   Right Coronary Artery   Mid RCA lesion is 90% stenosed. Intervention        Mid RCA lesion   Angioplasty   Angioplasty using a compliant balloon was performed. The balloon used was a BALLOON EUPHORA RX 2 X 15MM. Maximum pressure: 6 rivas. Inflation time: 5 sec. Time obtained: 09:22 EDT. First reinflation; Max pressure: 12 rivas. Inflation time 10 sec. Time obtained: 09:23 EDT. Supplies used: CATH GUIDE LAUNCHER 5FR JR 4.0; BALLOON EUPHORA RX 2 X 15MM; GUIDEWIRE ASAHI MINAMO 190CM J   Stent   Drug-eluting stent was successfully placed. The stent used was a STENT XIENCE SKYPOINT 2.5 X 18MM. Stent was deployed by way of balloon expansion. Maximum pressure: 14 rivas. Inflation time: 10 sec. Supplies used: STENT XIENCE SKYPOINT 2.5 X 18MM; CATH GUIDE LAUNCHER 5FR JR 4.0; GUIDEWIRE ASAHI MINAMO 190CM J   Post-Intervention Lesion Assessment   The intervention was successful. Post-intervention FLAKITO flow is 3. There is a 0% residual stenosis post intervention. Review of Systems:  Review of Systems   Constitutional: Positive for fatigue. HENT: Negative. Eyes: Negative. Respiratory: Positive for shortness of breath. Cardiovascular: Negative. Gastrointestinal: Negative. Musculoskeletal: Positive for arthralgias. Skin: Negative. Allergic/Immunologic: Negative. Neurological: Negative. Hematological: Negative. Psychiatric/Behavioral: Negative. All other systems reviewed and are negative. Vitals:    07/06/23 0914   BP: 122/70   BP Location: Left arm   Patient Position: Sitting   Cuff Size: Standard   Pulse: 69   Weight: 75.8 kg (167 lb)   Height: 5' 10" (1.778 m)       Physical Exam:  Physical Exam  Vitals and nursing note reviewed. Constitutional:       Appearance: He is well-developed. HENT:      Head: Normocephalic and atraumatic. Eyes:      General: No scleral icterus.         Right eye: No discharge. Left eye: No discharge. Pupils: Pupils are equal, round, and reactive to light. Neck:      Thyroid: No thyromegaly. Vascular: No JVD. Cardiovascular:      Rate and Rhythm: Normal rate and regular rhythm. No extrasystoles are present. Pulses: Normal pulses. No decreased pulses. Heart sounds: S1 normal and S2 normal. Murmur heard. Systolic murmur is present with a grade of 2/6. No friction rub. No gallop. Pulmonary:      Effort: Pulmonary effort is normal. No respiratory distress. Breath sounds: No decreased breath sounds, wheezing, rhonchi or rales. Abdominal:      General: Bowel sounds are normal. There is no distension. Palpations: Abdomen is soft. Tenderness: There is no abdominal tenderness. Musculoskeletal:         General: No tenderness or deformity. Normal range of motion. Cervical back: Normal range of motion and neck supple. Skin:     General: Skin is warm and dry. Findings: No rash. Neurological:      Mental Status: He is alert and oriented to person, place, and time. Cranial Nerves: No cranial nerve deficit. Psychiatric:         Thought Content: Thought content normal.         Judgment: Judgment normal.       Counseling / Coordination of Care  Total office time spent today 25 minutes. Greater than 50% of total time was spent with the patient and / or family counseling and / or coordination of care.

## 2023-07-14 ENCOUNTER — TELEPHONE (OUTPATIENT)
Dept: FAMILY MEDICINE CLINIC | Facility: HOSPITAL | Age: 88
End: 2023-07-14

## 2023-07-14 NOTE — TELEPHONE ENCOUNTER
Wife calling saying patient has an appt 8/29 and was wondering if you wanted patient to get blood work before he comes in?    They use [facility].

## 2023-07-17 DIAGNOSIS — R73.9 HYPERGLYCEMIA: Primary | ICD-10-CM

## 2023-07-17 DIAGNOSIS — E78.00 PURE HYPERCHOLESTEROLEMIA: ICD-10-CM

## 2023-07-17 DIAGNOSIS — I10 PRIMARY HYPERTENSION: ICD-10-CM

## 2023-07-17 DIAGNOSIS — E03.9 ACQUIRED HYPOTHYROIDISM: ICD-10-CM

## 2023-07-26 LAB
ALBUMIN SERPL-MCNC: 4.4 G/DL (ref 3.6–4.6)
ALBUMIN/GLOB SERPL: 1.7 {RATIO} (ref 1.2–2.2)
ALP SERPL-CCNC: 95 IU/L (ref 44–121)
ALT SERPL-CCNC: 20 IU/L (ref 0–44)
AST SERPL-CCNC: 28 IU/L (ref 0–40)
BASOPHILS # BLD AUTO: 0 X10E3/UL (ref 0–0.2)
BASOPHILS NFR BLD AUTO: 1 %
BILIRUB SERPL-MCNC: 0.8 MG/DL (ref 0–1.2)
BUN SERPL-MCNC: 11 MG/DL (ref 10–36)
BUN/CREAT SERPL: 13 (ref 10–24)
CALCIUM SERPL-MCNC: 9.3 MG/DL (ref 8.6–10.2)
CHLORIDE SERPL-SCNC: 99 MMOL/L (ref 96–106)
CHOLEST SERPL-MCNC: 119 MG/DL (ref 100–199)
CO2 SERPL-SCNC: 24 MMOL/L (ref 20–29)
CREAT SERPL-MCNC: 0.84 MG/DL (ref 0.76–1.27)
EGFR: 83 ML/MIN/1.73
EOSINOPHIL # BLD AUTO: 0.4 X10E3/UL (ref 0–0.4)
EOSINOPHIL NFR BLD AUTO: 7 %
ERYTHROCYTE [DISTWIDTH] IN BLOOD BY AUTOMATED COUNT: 13.2 % (ref 11.6–15.4)
EST. AVERAGE GLUCOSE BLD GHB EST-MCNC: 126 MG/DL
GLOBULIN SER-MCNC: 2.6 G/DL (ref 1.5–4.5)
GLUCOSE SERPL-MCNC: 110 MG/DL (ref 70–99)
HBA1C MFR BLD: 6 % (ref 4.8–5.6)
HCT VFR BLD AUTO: 36.9 % (ref 37.5–51)
HDLC SERPL-MCNC: 45 MG/DL
HGB BLD-MCNC: 12.2 G/DL (ref 13–17.7)
IMM GRANULOCYTES # BLD: 0 X10E3/UL (ref 0–0.1)
IMM GRANULOCYTES NFR BLD: 0 %
LDLC SERPL CALC-MCNC: 53 MG/DL (ref 0–99)
LDLC/HDLC SERPL: 1.2 RATIO (ref 0–3.6)
LYMPHOCYTES # BLD AUTO: 1.1 X10E3/UL (ref 0.7–3.1)
LYMPHOCYTES NFR BLD AUTO: 19 %
MCH RBC QN AUTO: 31.5 PG (ref 26.6–33)
MCHC RBC AUTO-ENTMCNC: 33.1 G/DL (ref 31.5–35.7)
MCV RBC AUTO: 95 FL (ref 79–97)
MONOCYTES # BLD AUTO: 0.6 X10E3/UL (ref 0.1–0.9)
MONOCYTES NFR BLD AUTO: 10 %
NEUTROPHILS # BLD AUTO: 3.5 X10E3/UL (ref 1.4–7)
NEUTROPHILS NFR BLD AUTO: 63 %
PLATELET # BLD AUTO: 243 X10E3/UL (ref 150–450)
POTASSIUM SERPL-SCNC: 4.5 MMOL/L (ref 3.5–5.2)
PROT SERPL-MCNC: 7 G/DL (ref 6–8.5)
RBC # BLD AUTO: 3.87 X10E6/UL (ref 4.14–5.8)
SL AMB VLDL CHOLESTEROL CALC: 21 MG/DL (ref 5–40)
SODIUM SERPL-SCNC: 136 MMOL/L (ref 134–144)
TRIGL SERPL-MCNC: 114 MG/DL (ref 0–149)
TSH SERPL DL<=0.005 MIU/L-ACNC: 2.06 UIU/ML (ref 0.45–4.5)
WBC # BLD AUTO: 5.6 X10E3/UL (ref 3.4–10.8)

## 2023-08-01 DIAGNOSIS — N40.0 BENIGN PROSTATIC HYPERPLASIA, UNSPECIFIED WHETHER LOWER URINARY TRACT SYMPTOMS PRESENT: ICD-10-CM

## 2023-08-01 RX ORDER — TAMSULOSIN HYDROCHLORIDE 0.4 MG/1
CAPSULE ORAL
Qty: 90 CAPSULE | Refills: 3 | Status: SHIPPED | OUTPATIENT
Start: 2023-08-01

## 2023-08-03 DIAGNOSIS — I48.0 PAROXYSMAL ATRIAL FIBRILLATION (HCC): ICD-10-CM

## 2023-08-03 DIAGNOSIS — I10 PRIMARY HYPERTENSION: ICD-10-CM

## 2023-08-03 DIAGNOSIS — I35.0 SEVERE AORTIC STENOSIS: ICD-10-CM

## 2023-08-03 DIAGNOSIS — I49.5 SICK SINUS SYNDROME (HCC): ICD-10-CM

## 2023-08-04 RX ORDER — CARVEDILOL 6.25 MG/1
6.25 TABLET ORAL 2 TIMES DAILY WITH MEALS
Qty: 60 TABLET | Refills: 1 | Status: SHIPPED | OUTPATIENT
Start: 2023-08-04

## 2023-08-08 ENCOUNTER — OFFICE VISIT (OUTPATIENT)
Dept: FAMILY MEDICINE CLINIC | Facility: HOSPITAL | Age: 88
End: 2023-08-08
Payer: COMMERCIAL

## 2023-08-08 VITALS
DIASTOLIC BLOOD PRESSURE: 76 MMHG | TEMPERATURE: 97.8 F | WEIGHT: 167.4 LBS | HEIGHT: 70 IN | HEART RATE: 72 BPM | SYSTOLIC BLOOD PRESSURE: 124 MMHG | BODY MASS INDEX: 23.96 KG/M2

## 2023-08-08 DIAGNOSIS — T63.441A BEE STING REACTION, ACCIDENTAL OR UNINTENTIONAL, INITIAL ENCOUNTER: Primary | ICD-10-CM

## 2023-08-08 PROCEDURE — 96372 THER/PROPH/DIAG INJ SC/IM: CPT

## 2023-08-08 PROCEDURE — 99213 OFFICE O/P EST LOW 20 MIN: CPT | Performed by: NURSE PRACTITIONER

## 2023-08-08 RX ORDER — METHYLPREDNISOLONE ACETATE 80 MG/ML
80 INJECTION, SUSPENSION INTRA-ARTICULAR; INTRALESIONAL; INTRAMUSCULAR; SOFT TISSUE ONCE
Status: DISCONTINUED | OUTPATIENT
Start: 2023-08-08 | End: 2023-08-08

## 2023-08-08 RX ORDER — BETAMETHASONE DIPROPIONATE 0.5 MG/G
CREAM TOPICAL 2 TIMES DAILY
Qty: 50 G | Refills: 0 | Status: SHIPPED | OUTPATIENT
Start: 2023-08-08

## 2023-08-08 RX ORDER — METHYLPREDNISOLONE ACETATE 80 MG/ML
80 INJECTION, SUSPENSION INTRA-ARTICULAR; INTRALESIONAL; INTRAMUSCULAR; SOFT TISSUE ONCE
Status: COMPLETED | OUTPATIENT
Start: 2023-08-08 | End: 2023-08-08

## 2023-08-08 RX ADMIN — METHYLPREDNISOLONE ACETATE 80 MG: 80 INJECTION, SUSPENSION INTRA-ARTICULAR; INTRALESIONAL; INTRAMUSCULAR; SOFT TISSUE at 09:00

## 2023-08-08 NOTE — PROGRESS NOTES
Name: Brii Bolton      : 10/7/1932      MRN: [id number]  Encounter Provider: MELINA Matias  Encounter Date: 2023   Encounter department: [address]     1. Bee sting reaction, accidental or unintentional, initial encounter  -     betamethasone, augmented, (DIPROLENE-AF) 0.05 % cream; Apply topically 2 (two) times a day  -     methylPREDNISolone acetate (DEPO-MEDROL) injection 80 mg    IM depomedrol given in office  Use topical steroid as rx'd prn  Apply ice/cool compresses to affected areas       Subjective      Multiple bee stings on arms, legs, and neck yesterday when trimming bushes. Felt OK yesterday but swelling and pain started overnight and he hardly slept. Applying cool compresses and OTC anti itch cream.       Review of Systems   Skin: Positive for rash (multiple bee stings).        Current Outpatient Medications on File Prior to Visit   Medication Sig   • amLODIPine (NORVASC) 5 mg tablet Take 1 tablet (5 mg total) by mouth daily   • Ascorbic Acid (VITAMIN C) 500 MG CAPS Take 1 tablet by mouth daily   • aspirin (ECOTRIN LOW STRENGTH) 81 mg EC tablet Take 81 mg by mouth daily   • atorvastatin (LIPITOR) 20 mg tablet Take 1 tablet (20 mg total) by mouth daily with dinner   • carvedilol (COREG) 6.25 mg tablet TAKE 1 TABLET BY MOUTH TWICE DAILY WITH MEALS   • Cyanocobalamin (VITAMIN B12) 1000 MCG TBCR Take 1 tablet by mouth daily   • finasteride (PROSCAR) 5 mg tablet Take 1 tablet (5 mg total) by mouth daily   • Flaxseed, Linseed, (FLAXSEED OIL) 1000 MG CAPS Take 1 capsule by mouth daily   • Glucosamine-Chondroit-Vit C-Mn (GLUCOSAMINE 1500 COMPLEX) CAPS Take 1 capsule by mouth daily   • levothyroxine 100 mcg tablet TAKE ONE TABLET BY MOUTH EVERY DAY   • Multiple Vitamin (MULTIVITAMIN) capsule Take 1 capsule by mouth daily   • nitroglycerin (NITROSTAT) 0.4 mg SL tablet Place 1 tablet (0.4 mg total) under the tongue every 5 (five) minutes as needed for chest pain   • tamsulosin (FLOMAX) 0.4 mg TAKE ONE CAPSULE BY MOUTH TWICE A DAY   • fluticasone (FLONASE) 50 mcg/act nasal spray 2 sprays into each nostril daily (Patient not taking: Reported on 8/8/2023)       Objective     /76   Pulse 72   Temp 97.8 °F (36.6 °C)   Ht 5' 10" (1.778 m)   Wt 75.9 kg (167 lb 6.4 oz)   BMI 24.02 kg/m²       Physical Exam  Vitals reviewed. Constitutional:       General: He is not in acute distress. Eyes:      General: No scleral icterus. Pulmonary:      Effort: Pulmonary effort is normal. No respiratory distress. Skin:     General: Skin is warm and dry. Findings: Erythema (right forearm w/mild swelling) and rash (multiple bee sting sites - right arm, right neck, right lower leg/ankle) present. Neurological:      General: No focal deficit present. Mental Status: He is alert and oriented to person, place, and time.    Psychiatric:         Mood and Affect: Mood normal.         Behavior: Behavior normal.          MELINA Sanches

## 2023-08-18 ENCOUNTER — OFFICE VISIT (OUTPATIENT)
Dept: FAMILY MEDICINE CLINIC | Facility: HOSPITAL | Age: 88
End: 2023-08-18
Payer: COMMERCIAL

## 2023-08-18 VITALS
WEIGHT: 165.8 LBS | SYSTOLIC BLOOD PRESSURE: 150 MMHG | TEMPERATURE: 96.9 F | HEART RATE: 74 BPM | DIASTOLIC BLOOD PRESSURE: 92 MMHG | HEIGHT: 70 IN | BODY MASS INDEX: 23.74 KG/M2

## 2023-08-18 DIAGNOSIS — R22.41 MASS OF SKIN OF RIGHT LOWER LEG: Primary | ICD-10-CM

## 2023-08-18 PROCEDURE — 99213 OFFICE O/P EST LOW 20 MIN: CPT | Performed by: FAMILY MEDICINE

## 2023-08-29 ENCOUNTER — OFFICE VISIT (OUTPATIENT)
Dept: FAMILY MEDICINE CLINIC | Facility: HOSPITAL | Age: 88
End: 2023-08-29
Payer: COMMERCIAL

## 2023-08-29 VITALS
WEIGHT: 165.4 LBS | HEART RATE: 76 BPM | OXYGEN SATURATION: 95 % | HEIGHT: 70 IN | DIASTOLIC BLOOD PRESSURE: 88 MMHG | BODY MASS INDEX: 23.68 KG/M2 | TEMPERATURE: 96.5 F | SYSTOLIC BLOOD PRESSURE: 138 MMHG

## 2023-08-29 DIAGNOSIS — E03.9 ACQUIRED HYPOTHYROIDISM: ICD-10-CM

## 2023-08-29 DIAGNOSIS — E78.00 PURE HYPERCHOLESTEROLEMIA: ICD-10-CM

## 2023-08-29 DIAGNOSIS — I48.0 PAROXYSMAL ATRIAL FIBRILLATION (HCC): ICD-10-CM

## 2023-08-29 DIAGNOSIS — I10 PRIMARY HYPERTENSION: ICD-10-CM

## 2023-08-29 DIAGNOSIS — I25.10 CORONARY ARTERY DISEASE INVOLVING NATIVE CORONARY ARTERY OF NATIVE HEART WITHOUT ANGINA PECTORIS: ICD-10-CM

## 2023-08-29 DIAGNOSIS — Z00.00 MEDICARE ANNUAL WELLNESS VISIT, SUBSEQUENT: Primary | ICD-10-CM

## 2023-08-29 PROCEDURE — G0439 PPPS, SUBSEQ VISIT: HCPCS | Performed by: FAMILY MEDICINE

## 2023-08-29 PROCEDURE — 99214 OFFICE O/P EST MOD 30 MIN: CPT | Performed by: FAMILY MEDICINE

## 2023-08-29 NOTE — PROGRESS NOTES
Assessment and Plan:     Problem List Items Addressed This Visit        Endocrine    Acquired hypothyroidism     TSH euthyroid            Cardiovascular and Mediastinum    Primary hypertension     Good BP control         Paroxysmal atrial fibrillation (HCC)    Coronary artery disease involving native coronary artery 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 pectoris     CAD asymptomatic            Other    Pure hypercholesterolemia    Medicare annual wellness visit, subsequent - Primary       Depression Screening and Follow-up Plan: Patient was screened for depression during today's encounter. They screened negative with a PHQ-2 score of 0. Falls Plan of Care: balance, strength, and gait training instructions were provided. Preventive health issues were discussed with patient, and age appropriate screening tests were ordered as noted in patient's After Visit Summary. Personalized health advice and appropriate referrals for health education or preventive services given if needed, as noted in patient's After Visit Summary. History of Present Illness:     Patient presents for a Medicare Wellness Visit    AWV and F/U medical issues    Had a fall 2 weeks ago and got black eye and R rib contusion  Back to normal at this point  Extremely active outdoors, tree cutting and putting in firewood for winter    R foot tingling off and on, better when he is active and moving    Labs reviewed in detail and copy given  Metabolic measures very stable    Medications reviewed and list revised     Patient Care Team:  Tayo Gan MD as PCP - General (Family Medicine)  Ashley Rojas MD     Review of Systems:     Review of Systems   Constitutional: Negative for fatigue and unexpected weight change. Eyes: Negative for visual disturbance. Respiratory: Negative. Cardiovascular: Negative. Gastrointestinal: Negative. Genitourinary: Negative. Musculoskeletal: Negative. Neurological: Positive for numbness. Hematological: Negative. Psychiatric/Behavioral: Negative. All other systems reviewed and are negative. Problem List:     Patient Active Problem List   Diagnosis   • Chronic nonintractable headache   • Enlarged prostate without lower urinary tract symptoms (luts)   • GERD without esophagitis   • Primary hypertension   • Acquired hypothyroidism   • Sick sinus syndrome (HCC)   • Presence of permanent cardiac pacemaker   • Paroxysmal atrial fibrillation (HCC)   • Balance disorder   • Coronary artery disease involving native coronary artery 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 pectoris   • S/P drug eluting coronary stent placement   • S/P AVR   • Pure hypercholesterolemia   • Retained suture   • Dyspnea on effort   • Ectatic aorta (HCC)   • Orthostatic hypotension   • Medicare annual wellness visit, subsequent      Past Medical and Surgical History:     Past Medical History:   Diagnosis Date   • Angina pectoris ([address])    • Arthritis    • Ascending aortic aneurysm (HCC)     trileaflet AV, 41mm   • BPH (benign prostatic hyperplasia)    • Cancer ([address])     skin   • Coronary artery disease    • Former tobacco use     pipe & cigar use socially & infrequently   • GERD (gastroesophageal reflux disease)     controlled w/ diet   • Headache     h/o   • Hepatitis A    • History of melanoma    • Hypertension    • Hypothyroidism    • Pacemaker    • Sebaceous cyst 10/26/2022   • Severe aortic stenosis    • Shortness of breath     with exertion   • Vitamin B12 deficiency    • Wears dentures      Past Surgical History:   Procedure Laterality Date   • CARDIAC CATHETERIZATION  04/15/2022    Procedure: Cardiac catheterization;  Surgeon: Guillermo Paige DO;  Location: [facility]; Service: Cardiology   • CARDIAC CATHETERIZATION N/A 04/15/2022    Procedure: Cardiac Coronary Angiogram;  Surgeon: Guillermo Paige DO;  Location: [facility];   Service: Cardiology   • CARDIAC CATHETERIZATION N/A 04/15/2022 Procedure: Cardiac pci;  Surgeon: Guillermo Paige DO;  Location: [facility]; Service: Cardiology   • CARDIAC PACEMAKER PLACEMENT     • COLONOSCOPY     • INGUINAL HERNIA REPAIR Bilateral    • ME RPLCMT AORTIC VALVE OPN W/STENTLESS TISSUE VALVE N/A 5/16/2022    Procedure: REPLACEMENT VALVE AORTIC (TISSUE AVR) WITH 25 MM AORTIC MAGNA EASE VALVE W/ ROJELIO;  Surgeon: China Johnson DO;  Location: [facility];  Service: Cardiac Surgery   • SKIN CANCER EXCISION      melanoma, multiple      Family History:     Family History   Problem Relation Age of Onset   • Cancer Family         gastrointestinal tract   • Heart disease Mother    • Substance Abuse Neg Hx    • Mental illness Neg Hx       Social History:     Social History     Socioeconomic History   • Marital status: /Civil Union     Spouse name: Lonny Lima   • Number of children: None   • Years of education: None   • Highest education level: None   Occupational History   • None   Tobacco Use   • Smoking status: Former     Types: Cigars   • Smokeless tobacco: Never   • Tobacco comments:     50 years ago   Vaping Use   • Vaping Use: Former   Substance and Sexual Activity   • Alcohol use: Yes     Comment: a beer once in a while. • Drug use: No   • Sexual activity: None   Other Topics Concern   • None   Social History Narrative    Living will on file    Supportive and safe    Lives with wife     Social Determinants of Health     Financial Resource Strain: Low Risk  (8/29/2023)    Overall Financial Resource Strain (CARDIA)    • Difficulty of Paying Living Expenses: Not hard at all   Food Insecurity: No Food Insecurity (8/26/2022)    Hunger Vital Sign    • Worried About Running Out of Food in the Last Year: Never true    • Ran Out of Food in the Last Year: Never true   Transportation Needs: No Transportation Needs (8/29/2023)    PRAPARE - Transportation    • Lack of Transportation (Medical): No    • Lack of Transportation (Non-Medical):  No   Physical Activity: Sufficiently Active (8/26/2022)    Exercise Vital Sign    • Days of Exercise per Week: 7 days    • Minutes of Exercise per Session: 120 min   Stress: No Stress Concern Present (8/26/2022)    [facility]    • Feeling of Stress : Not at all   Social Connections: Socially Integrated (8/26/2022)    Social Connection and Isolation Panel [NHANES]    • Frequency of Communication with Friends and Family: Once a week    • Frequency of Social Gatherings with Friends and Family: Three times a week    • Attends [employer] Services: 1 to 4 times per year    • Active Member of Clubs or Organizations:  Yes    • Attends Club or Organization Meetings: 1 to 4 times per year    • Marital Status:    Intimate Partner Violence: Not At Risk (8/26/2022)    Humiliation, Afraid, Rape, and Kick questionnaire    • Fear of Current or Ex-Partner: No    • Emotionally Abused: No    • Physically Abused: No    • Sexually Abused: No   Housing Stability: Low Risk  (8/26/2022)    Housing Stability Vital Sign    • Unable to Pay for Housing in the Last Year: No    • Number of [address] in the Last Year: 1    • Unstable Housing in the Last Year: No      Medications and Allergies:     Current Outpatient Medications   Medication Sig Dispense Refill   • amLODIPine (NORVASC) 5 mg tablet Take 1 tablet (5 mg total) by mouth daily 90 tablet 3   • Ascorbic Acid (VITAMIN C) 500 MG CAPS Take 1 tablet by mouth daily     • aspirin (ECOTRIN LOW STRENGTH) 81 mg EC tablet Take 81 mg by mouth daily     • atorvastatin (LIPITOR) 20 mg tablet Take 1 tablet (20 mg total) by mouth daily with dinner 90 tablet 2   • carvedilol (COREG) 6.25 mg tablet TAKE 1 TABLET BY MOUTH TWICE DAILY WITH MEALS 60 tablet 1   • finasteride (PROSCAR) 5 mg tablet Take 1 tablet (5 mg total) by mouth daily 90 tablet 3   • Flaxseed, Linseed, (FLAXSEED OIL) 1000 MG CAPS Take 1 capsule by mouth daily     • Glucosamine-Chondroit-Vit C-Mn (GLUCOSAMINE 1500 COMPLEX) CAPS Take 1 capsule by mouth daily     • levothyroxine 100 mcg tablet TAKE ONE TABLET BY MOUTH EVERY DAY 90 tablet 3   • Multiple Vitamin (MULTIVITAMIN) capsule Take 1 capsule by mouth daily     • nitroglycerin (NITROSTAT) 0.4 mg SL tablet Place 1 tablet (0.4 mg total) under the tongue every 5 (five) minutes as needed for chest pain 24 tablet 1   • tamsulosin (FLOMAX) 0.4 mg TAKE ONE CAPSULE BY MOUTH TWICE A DAY 90 capsule 3     No current facility-administered medications for this visit. Allergies   Allergen Reactions   • Amoxicillin Other (See Comments)     Severe weakness      Immunizations:     Immunization History   Administered Date(s) Administered   • COVID-19 MODERNA VACC 0.5 ML IM 01/18/2021, 02/15/2021, 11/02/2021   • INFLUENZA 10/06/2022   • Influenza Split High Dose Preservative Free IM 10/06/2014, 10/19/2015, 10/23/2017, 10/01/2019   • Influenza, high dose seasonal 0.7 mL 10/05/2018, 10/07/2020, 10/06/2022   • Influenza, seasonal, injectable 10/01/2012, 10/15/2014   • Pneumococcal Conjugate 13-Valent 10/05/2018   • Pneumococcal Polysaccharide PPV23 10/01/2012   • Zoster 06/19/2008      Health Maintenance: There are no preventive care reminders to display for this patient. Topic Date Due   • COVID-19 Vaccine (4 - Moderna series) 12/28/2021   • Influenza Vaccine (1) 09/01/2023      Medicare Screening Tests and Risk Assessments:     Nick Gardner is here for his Subsequent Wellness visit. Last Medicare Wellness visit information reviewed, patient interviewed and updates made to the record today. Health Risk Assessment:   Patient rates overall health as good. Patient feels that their physical health rating is slightly better. Patient is satisfied with their life. Eyesight was rated as same. Hearing was rated as same. Patient feels that their emotional and mental health rating is same. Patients states they are sometimes angry.  Patient states they are never, rarely unusually tired/fatigued. Pain experienced in the last 7 days has been some. Patient's pain rating has been 2/10. Patient states that he has experienced no weight loss or gain in last 6 months. Depression Screening:   PHQ-2 Score: 0      Fall Risk Screening: In the past year, patient has experienced: history of falling in past year    Number of falls: 1  Injured during fall?: Yes    Feels unsteady when standing or walking?: No    Worried about falling?: No      Home Safety:  Patient does not have trouble with stairs inside or outside of their home. Patient has working smoke alarms and has working carbon monoxide detector. Home safety hazards include: none. Nutrition:   Current diet is Regular. Medications:   Patient is not currently taking any over-the-counter supplements. Patient is able to manage medications. Activities of Daily Living (ADLs)/Instrumental Activities of Daily Living (IADLs):   Walk and transfer into and out of bed and chair?: Yes  Dress and groom yourself?: Yes    Bathe or shower yourself?: Yes    Feed yourself? Yes  Do your laundry/housekeeping?: Yes  Manage your money, pay your bills and track your expenses?: Yes  Make your own meals?: Yes    Do your own shopping?: Yes    Previous Hospitalizations:   Any hospitalizations or ED visits within the last 12 months?: No      Advance Care Planning:   Living will: Yes    Durable POA for healthcare:  Yes    Advanced directive: Yes    Advanced directive counseling given: Yes    Five wishes given: Yes    Patient declined ACP directive: No    End of Life Decisions reviewed with patient: Yes    Provider agrees with end of life decisions: Yes      Cognitive Screening:   Provider or family/friend/caregiver concerned regarding cognition?: No    PREVENTIVE SCREENINGS      Cardiovascular Screening:    General: Screening Not Indicated and History Lipid Disorder      Diabetes Screening:     General: Screening Current      Colorectal Cancer Screening:     General: Screening Not Indicated      Prostate Cancer Screening:    General: Screening Not Indicated      Osteoporosis Screening:    General: Screening Not Indicated      Abdominal Aortic Aneurysm (AAA) Screening:    Risk factors include: tobacco use        General: Screening Not Indicated      Lung Cancer Screening:     General: Screening Not Indicated      Hepatitis C Screening:    General: Screening Not Indicated    Screening, Brief Intervention, and Referral to Treatment (SBIRT)    Screening  Typical number of drinks in a day: 0  Typical number of drinks in a week: 1  Interpretation: Low risk drinking behavior. Single Item Drug Screening:  How often have you used an illegal drug (including marijuana) or a prescription medication for non-medical reasons in the past year? never    Single Item Drug Screen Score: 0  Interpretation: Negative screen for possible drug use disorder    Vision Screening    Right eye Left eye Both eyes   Without correction 20/40 20/70 20/40   With correction           Physical Exam:     /88   Pulse 76   Temp (!) 96.5 °F (35.8 °C) (Tympanic)   Ht 5' 10" (1.778 m)   Wt 75 kg (165 lb 6.4 oz)   SpO2 95%   BMI 23.73 kg/m²     Physical Exam  Vitals and nursing note reviewed. Constitutional:       Appearance: Normal appearance. Neck:      Vascular: No carotid bruit. Cardiovascular:      Rate and Rhythm: Normal rate and regular rhythm. Pulses: Normal pulses. Heart sounds: Normal heart sounds. Pulmonary:      Effort: Pulmonary effort is normal.      Breath sounds: Normal breath sounds. Musculoskeletal:      Right lower leg: No edema. Left lower leg: No edema. Lymphadenopathy:      Cervical: No cervical adenopathy. Neurological:      General: No focal deficit present. Mental Status: He is oriented to person, place, and time.    Psychiatric:         Mood and Affect: Mood normal.          Aaron Perdue MD

## 2023-08-29 NOTE — PATIENT INSTRUCTIONS
Medicare Preventive Visit Patient Instructions  Thank you for completing your Welcome to Medicare Visit or Medicare Annual Wellness Visit today. Your next wellness visit will be due in one year (8/29/2024). The screening/preventive services that you may require over the next 5-10 years are detailed below. Some tests may not apply to you based off risk factors and/or age. Screening tests ordered at today's visit but not completed yet may show as past due. Also, please note that scanned in results may not display below. Preventive Screenings:  Service Recommendations Previous Testing/Comments   Colorectal Cancer Screening  · Colonoscopy    · Fecal Occult Blood Test (FOBT)/Fecal Immunochemical Test (FIT)  · Fecal DNA/Cologuard Test  · Flexible Sigmoidoscopy Age: 43-73 years old   Colonoscopy: every 10 years (May be performed more frequently if at higher risk)  OR  FOBT/FIT: every 1 year  OR  Cologuard: every 3 years  OR  Sigmoidoscopy: every 5 years  Screening may be recommended earlier than age 39 if at higher risk for colorectal cancer. Also, an individualized decision between you and your healthcare provider will decide whether screening between the ages of 77-80 would be appropriate.  Colonoscopy: Not on file  FOBT/FIT: Not on file  Cologuard: Not on file  Sigmoidoscopy: Not on file    Screening Not Indicated     Prostate Cancer Screening Individualized decision between patient and health care provider in men between ages of 53-66   Medicare will cover every 12 months beginning on the day after your 50th birthday PSA: No results in last 5 years     Screening Not Indicated     Hepatitis C Screening Once for adults born between 1945 and 1965  More frequently in patients at high risk for Hepatitis C Hep C Antibody: Not on file        Diabetes Screening 1-2 times per year if you're at risk for diabetes or have pre-diabetes Fasting glucose: 101 mg/dL (5/10/2022)  A1C: 6.0 % (7/25/2023)  Screening Current Cholesterol Screening Once every 5 years if you don't have a lipid disorder. May order more often based on risk factors. Lipid panel: 07/25/2023  Screening Not Indicated  History Lipid Disorder      Other Preventive Screenings Covered by Medicare:  1. Abdominal Aortic Aneurysm (AAA) Screening: covered once if your at risk. You're considered to be at risk if you have a family history of AAA or a male between the age of 70-76 who smoking at least 100 cigarettes in your lifetime. 2. Lung Cancer Screening: covers low dose CT scan once per year if you meet all of the following conditions: (1) Age 48-67; (2) No signs or symptoms of lung cancer; (3) Current smoker or have quit smoking within the last 15 years; (4) You have a tobacco smoking history of at least 20 pack years (packs per day x number of years you smoked); (5) You get a written order from a healthcare provider. 3. Glaucoma Screening: covered annually if you're considered high risk: (1) You have diabetes OR (2) Family history of glaucoma OR (3)  aged 48 and older OR (3)  American aged 72 and older  3. Osteoporosis Screening: covered every 2 years if you meet one of the following conditions: (1) Have a vertebral abnormality; (2) On glucocorticoid therapy for more than 3 months; (3) Have primary hyperparathyroidism; (4) On osteoporosis medications and need to assess response to drug therapy. 5. HIV Screening: covered annually if you're between the age of 14-79. Also covered annually if you are younger than 13 and older than 72 with risk factors for HIV infection. For pregnant patients, it is covered up to 3 times per pregnancy.     Immunizations:  Immunization Recommendations   Influenza Vaccine Annual influenza vaccination during flu season is recommended for all persons aged >= 6 months who do not have contraindications   Pneumococcal Vaccine   * Pneumococcal conjugate vaccine = PCV13 (Prevnar 13), PCV15 (Vaxneuvance), PCV20 (Prevnar 20)  * Pneumococcal polysaccharide vaccine = PPSV23 (Pneumovax) Adults 2364 years old: 1-3 doses may be recommended based on certain risk factors  Adults 72 years old: 1-2 doses may be recommended based off what pneumonia vaccine you previously received   Hepatitis B Vaccine 3 dose series if at intermediate or high risk (ex: diabetes, end stage renal disease, liver disease)   Tetanus (Td) Vaccine - COST NOT COVERED BY MEDICARE PART B Following completion of primary series, a booster dose should be given every 10 years to maintain immunity against tetanus. Td may also be given as tetanus wound prophylaxis. Tdap Vaccine - COST NOT COVERED BY MEDICARE PART B Recommended at least once for all adults. For pregnant patients, recommended with each pregnancy. Shingles Vaccine (Shingrix) - COST NOT COVERED BY MEDICARE PART B  2 shot series recommended in those aged 48 and above     Health Maintenance Due:  There are no preventive care reminders to display for this patient. Immunizations Due:      Topic Date Due   • COVID-19 Vaccine (4 - Moderna series) 12/28/2021   • Influenza Vaccine (1) 09/01/2023     Advance Directives   What are advance directives? Advance directives are legal documents that state your wishes and plans for medical care. These plans are made ahead of time in case you lose your ability to make decisions for yourself. Advance directives can apply to any medical decision, such as the treatments you want, and if you want to donate organs. What are the types of advance directives? There are many types of advance directives, and each state has rules about how to use them. You may choose a combination of any of the following:  · Living will: This is a written record of the treatment you want. You can also choose which treatments you do not want, which to limit, and which to stop at a certain time. This includes surgery, medicine, IV fluid, and tube feedings.    · Durable power of  for [facility]): This is a written record that states who you want to make healthcare choices for you when you are unable to make them for yourself. This person, called a proxy, is usually a family member or a friend. You may choose more than 1 proxy. · Do not resuscitate (DNR) order:  A DNR order is used in case your heart stops beating or you stop breathing. It is a request not to have certain forms of treatment, such as CPR. A DNR order may be included in other types of advance directives. · Medical directive: This covers the care that you want if you are in a coma, near death, or unable to make decisions for yourself. You can list the treatments you want for each condition. Treatment may include pain medicine, surgery, blood transfusions, dialysis, IV or tube feedings, and a ventilator (breathing machine). · Values history: This document has questions about your views, beliefs, and how you feel and think about life. This information can help others choose the care that you would choose. Why are advance directives important? An advance directive helps you control your care. Although spoken wishes may be used, it is better to have your wishes written down. Spoken wishes can be misunderstood, or not followed. Treatments may be given even if you do not want them. An advance directive may make it easier for your family to make difficult choices about your care. Fall Prevention    Fall prevention  includes ways to make your home and other areas safer. It also includes ways you can move more carefully to prevent a fall. Health conditions that cause changes in your blood pressure, vision, or muscle strength and coordination may increase your risk for falls. Medicines may also increase your risk for falls if they make you dizzy, weak, or sleepy. Fall prevention tips:   · Stand or sit up slowly. · Use assistive devices as directed. · Wear shoes that fit well and have soles that .     · Wear a personal alarm. · Stay active. · Manage your medical conditions. Home Safety Tips:  · Add items to prevent falls in the bathroom. · Keep paths clear. · Install bright lights in your home. · Keep items you use often on shelves within reach. · Paint or place reflective tape on the edges of your stairs. © Copyright [employer] 2018 Information is for End User's use only and may not be sold, redistributed or otherwise used for commercial purposes.  All illustrations and images included in CareNotes® are the copyrighted property of [employer].D.A[employer]M., Inc. or  [employer]

## 2023-08-31 PROBLEM — Z00.00 MEDICARE ANNUAL WELLNESS VISIT, SUBSEQUENT: Status: ACTIVE | Noted: 2023-08-31

## 2023-09-28 ENCOUNTER — REMOTE DEVICE CLINIC VISIT (OUTPATIENT)
Dept: CARDIOLOGY CLINIC | Facility: CLINIC | Age: 88
End: 2023-09-28
Payer: COMMERCIAL

## 2023-09-28 DIAGNOSIS — Z95.0 CARDIAC PACEMAKER IN SITU: Primary | ICD-10-CM

## 2023-09-28 PROCEDURE — 93296 REM INTERROG EVL PM/IDS: CPT | Performed by: INTERNAL MEDICINE

## 2023-09-28 PROCEDURE — 93294 REM INTERROG EVL PM/LDLS PM: CPT | Performed by: INTERNAL MEDICINE

## 2023-09-28 NOTE — PROGRESS NOTES
Results for orders placed or performed in visit on 09/28/23   Cardiac EP device report    Narrative    BIOT DUAL CHAMBER PPM - ACTIVE SYSTEM IS MRI CONDITIONAL  BIOTRONIK TRANSMISSION: BATTERY STATUS "OK" (50% REMAINING BATTERY LIFE). AP-93%, -26%. ALL AVAILABLE LEAD PARAMETERS WITHIN NORMAL LIMITS. NO SIGNIFICANT HIGH RATE EPISODES. NORMAL DEVICE FUNCTION.  GV

## 2023-09-29 DIAGNOSIS — Z95.2 S/P AVR: ICD-10-CM

## 2023-09-29 RX ORDER — ATORVASTATIN CALCIUM 20 MG/1
20 TABLET, FILM COATED ORAL
Qty: 90 TABLET | Refills: 2 | Status: SHIPPED | OUTPATIENT
Start: 2023-09-29

## 2023-10-11 DIAGNOSIS — I49.5 SICK SINUS SYNDROME (HCC): ICD-10-CM

## 2023-10-11 DIAGNOSIS — I35.0 SEVERE AORTIC STENOSIS: ICD-10-CM

## 2023-10-11 DIAGNOSIS — I10 PRIMARY HYPERTENSION: ICD-10-CM

## 2023-10-11 DIAGNOSIS — I48.0 PAROXYSMAL ATRIAL FIBRILLATION (HCC): ICD-10-CM

## 2023-10-11 RX ORDER — CARVEDILOL 6.25 MG/1
6.25 TABLET ORAL 2 TIMES DAILY WITH MEALS
Qty: 60 TABLET | Refills: 1 | Status: SHIPPED | OUTPATIENT
Start: 2023-10-11

## 2023-10-30 PROBLEM — Z00.00 MEDICARE ANNUAL WELLNESS VISIT, SUBSEQUENT: Status: RESOLVED | Noted: 2023-08-31 | Resolved: 2023-10-30

## 2023-11-02 DIAGNOSIS — Z95.2 S/P AVR: ICD-10-CM

## 2023-11-03 RX ORDER — AZITHROMYCIN 500 MG/1
TABLET, FILM COATED ORAL
Qty: 1 TABLET | Refills: 4 | Status: SHIPPED | OUTPATIENT
Start: 2023-11-03 | End: 2023-11-04

## 2023-11-28 ENCOUNTER — HOSPITAL ENCOUNTER (OUTPATIENT)
Dept: RADIOLOGY | Facility: HOSPITAL | Age: 88
Discharge: HOME | End: 2023-11-28
Payer: COMMERCIAL

## 2023-11-28 ENCOUNTER — OFFICE VISIT (OUTPATIENT)
Dept: FAMILY MEDICINE CLINIC | Facility: HOSPITAL | Age: 88
End: 2023-11-28
Payer: COMMERCIAL

## 2023-11-28 VITALS
SYSTOLIC BLOOD PRESSURE: 152 MMHG | WEIGHT: 172.2 LBS | HEIGHT: 70 IN | DIASTOLIC BLOOD PRESSURE: 88 MMHG | BODY MASS INDEX: 24.65 KG/M2 | TEMPERATURE: 97.8 F | HEART RATE: 80 BPM

## 2023-11-28 DIAGNOSIS — R05.1 ACUTE COUGH: ICD-10-CM

## 2023-11-28 DIAGNOSIS — I25.10 CORONARY ARTERY DISEASE INVOLVING NATIVE CORONARY ARTERY OF NATIVE HEART WITHOUT ANGINA PECTORIS: ICD-10-CM

## 2023-11-28 DIAGNOSIS — R05.1 ACUTE COUGH: Primary | ICD-10-CM

## 2023-11-28 DIAGNOSIS — J06.9 UPPER RESPIRATORY TRACT INFECTION, UNSPECIFIED TYPE: Primary | ICD-10-CM

## 2023-11-28 DIAGNOSIS — I48.0 PAROXYSMAL ATRIAL FIBRILLATION (HCC): ICD-10-CM

## 2023-11-28 PROCEDURE — 99214 OFFICE O/P EST MOD 30 MIN: CPT | Performed by: NURSE PRACTITIONER

## 2023-11-28 PROCEDURE — 71046 X-RAY EXAM CHEST 2 VIEWS: CPT

## 2023-11-28 RX ORDER — DOXYCYCLINE 100 MG/1
100 TABLET ORAL 2 TIMES DAILY
Qty: 20 TABLET | Refills: 0 | Status: SHIPPED | OUTPATIENT
Start: 2023-11-28 | End: 2023-12-08

## 2023-11-28 NOTE — PROGRESS NOTES
Name: Matheus Holland      : 10/7/1932      MRN: [id number]  Encounter Provider: MELINA Sosa  Encounter Date: 2023   Encounter department: [address]     1. Acute cough  -     XR chest pa & lateral; Future; Expected date: 2023    2. Paroxysmal atrial fibrillation (HCC)    3. Coronary artery disease involving native coronary artery 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 pectoris     Evaluate sx's further w/STAT CXR to r/o aspiration pneumonia - will determine further plan pending result. Will likely need antibiotic given age and other co-morbidities       Subjective      Had difficulty swallowing pill last week and it made him cough for 6 hours after. Has been coughing since and getting worse - "takes his breath away". Was wheezing for days - would get better laying on one side. Review of Systems   Constitutional:  Positive for fatigue. Negative for chills and fever. Respiratory:  Positive for cough, shortness of breath and wheezing. Cardiovascular:  Positive for chest pain (left sided with cough).        Current Outpatient Medications on File Prior to Visit   Medication Sig    amLODIPine (NORVASC) 5 mg tablet Take 1 tablet (5 mg total) by mouth daily    Ascorbic Acid (VITAMIN C) 500 MG CAPS Take 1 tablet by mouth daily    aspirin (ECOTRIN LOW STRENGTH) 81 mg EC tablet Take 81 mg by mouth daily    atorvastatin (LIPITOR) 20 mg tablet TAKE 1 TABLET BY MOUTH DAILY WITH DINNER    carvedilol (COREG) 6.25 mg tablet TAKE 1 TABLET BY MOUTH TWICE DAILY WITH MEALS    finasteride (PROSCAR) 5 mg tablet Take 1 tablet (5 mg total) by mouth daily    Glucosamine-Chondroit-Vit C-Mn (GLUCOSAMINE 1500 COMPLEX) CAPS Take 1 capsule by mouth daily    levothyroxine 100 mcg tablet TAKE ONE TABLET BY MOUTH EVERY DAY    Multiple Vitamin (MULTIVITAMIN) capsule Take 1 capsule by mouth daily    nitroglycerin (NITROSTAT) 0.4 mg SL tablet Place 1 tablet (0.4 mg total) under the tongue every 5 (five) minutes as needed for chest pain    tamsulosin (FLOMAX) 0.4 mg TAKE ONE CAPSULE BY MOUTH TWICE A DAY    Flaxseed, Linseed, (FLAXSEED OIL) 1000 MG CAPS Take 1 capsule by mouth daily (Patient not taking: Reported on 11/28/2023)       Objective     /88   Pulse 80   Temp 97.8 °F (36.6 °C)   Ht 5' 10" (1.778 m)   Wt 78.1 kg (172 lb 3.2 oz)   BMI 24.71 kg/m²       Physical Exam  Vitals reviewed. Constitutional:       General: He is not in acute distress. Appearance: He is not ill-appearing. HENT:      Head: Normocephalic. Mouth/Throat:      Mouth: Mucous membranes are moist.      Pharynx: Oropharynx is clear. Cardiovascular:      Rate and Rhythm: Normal rate and regular rhythm. Pulmonary:      Effort: Pulmonary effort is normal. No tachypnea or respiratory distress. Breath sounds: Normal breath sounds. Lymphadenopathy:      Cervical: No cervical adenopathy. Skin:     General: Skin is warm and dry. Neurological:      General: No focal deficit present. Mental Status: He is alert and oriented to person, place, and time.    Psychiatric:         Mood and Affect: Mood normal.         Behavior: Behavior normal.         MELINA More

## 2023-11-30 ENCOUNTER — TELEPHONE (OUTPATIENT)
Dept: FAMILY MEDICINE CLINIC | Facility: HOSPITAL | Age: 88
End: 2023-11-30

## 2023-12-03 DIAGNOSIS — E78.00 PURE HYPERCHOLESTEROLEMIA: ICD-10-CM

## 2023-12-03 DIAGNOSIS — R73.9 HYPERGLYCEMIA: ICD-10-CM

## 2023-12-03 DIAGNOSIS — E03.9 ACQUIRED HYPOTHYROIDISM: Primary | ICD-10-CM

## 2023-12-06 DIAGNOSIS — I10 PRIMARY HYPERTENSION: ICD-10-CM

## 2023-12-06 DIAGNOSIS — I49.5 SICK SINUS SYNDROME (HCC): ICD-10-CM

## 2023-12-06 DIAGNOSIS — I48.0 PAROXYSMAL ATRIAL FIBRILLATION (HCC): ICD-10-CM

## 2023-12-06 DIAGNOSIS — I35.0 SEVERE AORTIC STENOSIS: ICD-10-CM

## 2023-12-07 RX ORDER — CARVEDILOL 6.25 MG/1
6.25 TABLET ORAL 2 TIMES DAILY WITH MEALS
Qty: 60 TABLET | Refills: 5 | Status: SHIPPED | OUTPATIENT
Start: 2023-12-07

## 2023-12-12 ENCOUNTER — OFFICE VISIT (OUTPATIENT)
Dept: FAMILY MEDICINE CLINIC | Facility: HOSPITAL | Age: 88
End: 2023-12-12
Payer: COMMERCIAL

## 2023-12-12 VITALS
HEIGHT: 70 IN | BODY MASS INDEX: 24.48 KG/M2 | OXYGEN SATURATION: 96 % | WEIGHT: 171 LBS | TEMPERATURE: 97.4 F | HEART RATE: 78 BPM | SYSTOLIC BLOOD PRESSURE: 143 MMHG | DIASTOLIC BLOOD PRESSURE: 92 MMHG

## 2023-12-12 DIAGNOSIS — I25.10 CORONARY ARTERY DISEASE INVOLVING NATIVE CORONARY ARTERY OF NATIVE HEART WITHOUT ANGINA PECTORIS: ICD-10-CM

## 2023-12-12 DIAGNOSIS — I48.0 PAROXYSMAL ATRIAL FIBRILLATION (HCC): ICD-10-CM

## 2023-12-12 DIAGNOSIS — I10 PRIMARY HYPERTENSION: ICD-10-CM

## 2023-12-12 DIAGNOSIS — C44.702 CANCER OF SKIN OF RIGHT LOWER LEG: ICD-10-CM

## 2023-12-12 DIAGNOSIS — E03.9 ACQUIRED HYPOTHYROIDISM: Primary | ICD-10-CM

## 2023-12-12 PROCEDURE — 99214 OFFICE O/P EST MOD 30 MIN: CPT | Performed by: FAMILY MEDICINE

## 2023-12-12 NOTE — PROGRESS NOTES
Name: Travon Sanchez      : 10/7/1932      MRN: [id number]  Encounter Provider: Marc Agee MD  Encounter Date: 2023   Encounter department: [facility] [address]     Assessment & Plan     1. Acquired hypothyroidism  Assessment & Plan:  TSH euthyroid      2. Coronary artery disease involving native coronary artery 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 pectoris  Assessment & Plan:  CAD asymptomatic      3. Paroxysmal atrial fibrillation (HCC)    4. Primary hypertension  Assessment & Plan:  Adequate BP control      5. Cancer of skin of right lower leg           Subjective     4 month follow up.    Had MOHs surgery RLE  Discussed wound care for next 10 days    Ongoing exertional SOB although he is continuing at a high level of activity for his age    Discussed need to avoid grapefruit due to intolerance and use of statins      Review of Systems   HENT: Negative.     Respiratory:  Positive for shortness of breath. Negative for chest tightness.    Cardiovascular: Negative.    Skin:  Positive for wound.   Psychiatric/Behavioral: Negative.         Past Medical History:   Diagnosis Date    Angina pectoris (HCC)     Arthritis     Ascending aortic aneurysm (HCC)     trileaflet AV, 41mm    BPH (benign prostatic hyperplasia)     Cancer (HCC)     skin    Coronary artery disease     Former tobacco use     pipe & cigar use socially & infrequently    GERD (gastroesophageal reflux disease)     controlled w/ diet    Headache     h/o    Hepatitis A     History of melanoma     Hypertension     Hypothyroidism     Pacemaker     Sebaceous cyst 10/26/2022    Severe aortic stenosis     Shortness of breath     with exertion    Vitamin B12 deficiency     Wears dentures      Past Surgical History:   Procedure Laterality Date    CARDIAC CATHETERIZATION  04/15/2022    Procedure: Cardiac catheterization;  Surgeon: Lars Fermin DO;  Location: [facility];  Service: Cardiology    CARDIAC  CATHETERIZATION N/A 04/15/2022    Procedure: Cardiac Coronary Angiogram;  Surgeon: Lars Fermin DO;  Location: [facility];  Service: Cardiology    CARDIAC CATHETERIZATION N/A 04/15/2022    Procedure: Cardiac pci;  Surgeon: Lars Fermin DO;  Location: [facility];  Service: Cardiology    CARDIAC PACEMAKER PLACEMENT      COLONOSCOPY      INGUINAL HERNIA REPAIR Bilateral     CT RPLCMT AORTIC VALVE OPN W/STENTLESS TISSUE VALVE N/A 5/16/2022    Procedure: REPLACEMENT VALVE AORTIC (TISSUE AVR) WITH 25 MM AORTIC MAGNA EASE VALVE W/ ROJELIO;  Surgeon: Jimmy Cantrell DO;  Location: [facility];  Service: Cardiac Surgery    SKIN CANCER EXCISION      melanoma, multiple     Family History   Problem Relation Age of Onset    Cancer Family         gastrointestinal tract    Heart disease Mother     Substance Abuse Neg Hx     Mental illness Neg Hx      Social History     Socioeconomic History    Marital status: /Civil Union     Spouse name: Maude    Number of children: None    Years of education: None    Highest education level: None   Occupational History    None   Tobacco Use    Smoking status: Former     Types: Cigars    Smokeless tobacco: Never    Tobacco comments:     50 years ago   Vaping Use    Vaping status: Former   Substance and Sexual Activity    Alcohol use: Yes     Comment: a beer once in a while.    Drug use: No    Sexual activity: None   Other Topics Concern    None   Social History Narrative    Living will on file    Supportive and safe    Lives with wife     Social Determinants of Health     Financial Resource Strain: Low Risk  (8/29/2023)    Overall Financial Resource Strain (CARDIA)     Difficulty of Paying Living Expenses: Not hard at all   Food Insecurity: No Food Insecurity (8/26/2022)    Hunger Vital Sign     Worried About Running Out of Food in the Last Year: Never true     Ran Out of Food in the Last Year: Never true   Transportation Needs: No Transportation Needs  (8/29/2023)    PRAPARE - Transportation     Lack of Transportation (Medical): No     Lack of Transportation (Non-Medical): No   Physical Activity: Sufficiently Active (8/26/2022)    Exercise Vital Sign     Days of Exercise per Week: 7 days     Minutes of Exercise per Session: 120 min   Stress: No Stress Concern Present (8/26/2022)    [employer] - Occupational Stress Questionnaire     Feeling of Stress : Not at all   Social Connections: Socially Integrated (8/26/2022)    Social Connection and Isolation Panel [NHANES]     Frequency of Communication with Friends and Family: Once a week     Frequency of Social Gatherings with Friends and Family: Three times a week     Attends [employer] Services: 1 to 4 times per year     Active Member of Clubs or Organizations: Yes     Attends Club or Organization Meetings: 1 to 4 times per year     Marital Status:    Intimate Partner Violence: Not At Risk (8/26/2022)    Humiliation, Afraid, Rape, and Kick questionnaire     Fear of Current or Ex-Partner: No     Emotionally Abused: No     Physically Abused: No     Sexually Abused: No   Housing Stability: Low Risk  (8/26/2022)    Housing Stability Vital Sign     Unable to Pay for Housing in the Last Year: No     Number of Places Lived in the Last Year: 1     Unstable Housing in the Last Year: No     Current Outpatient Medications on File Prior to Visit   Medication Sig    amLODIPine (NORVASC) 5 mg tablet Take 1 tablet (5 mg total) by mouth daily    Ascorbic Acid (VITAMIN C) 500 MG CAPS Take 1 tablet by mouth daily    aspirin (ECOTRIN LOW STRENGTH) 81 mg EC tablet Take 81 mg by mouth daily    atorvastatin (LIPITOR) 20 mg tablet TAKE 1 TABLET BY MOUTH DAILY WITH DINNER    carvedilol (COREG) 6.25 mg tablet TAKE ONE TABLET BY MOUTH TWICE A DAY WITH MEALS    finasteride (PROSCAR) 5 mg tablet Take 1 tablet (5 mg total) by mouth daily    Glucosamine-Chondroit-Vit C-Mn (GLUCOSAMINE 1500 COMPLEX) CAPS Take 1  "capsule by mouth daily    levothyroxine 100 mcg tablet TAKE ONE TABLET BY MOUTH EVERY DAY    Multiple Vitamin (MULTIVITAMIN) capsule Take 1 capsule by mouth daily    nitroglycerin (NITROSTAT) 0.4 mg SL tablet Place 1 tablet (0.4 mg total) under the tongue every 5 (five) minutes as needed for chest pain    tamsulosin (FLOMAX) 0.4 mg TAKE ONE CAPSULE BY MOUTH TWICE A DAY    Flaxseed, Linseed, (FLAXSEED OIL) 1000 MG CAPS Take 1 capsule by mouth daily (Patient not taking: Reported on 11/28/2023)     Allergies   Allergen Reactions    Amoxicillin Other (See Comments)     Severe weakness     Immunization History   Administered Date(s) Administered    COVID-19 MODERNA VACC 0.5 ML IM 01/18/2021, 02/15/2021, 11/02/2021    INFLUENZA 10/06/2022    Influenza Split High Dose Preservative Free IM 10/06/2014, 10/19/2015, 10/23/2017, 10/01/2019    Influenza, high dose seasonal 0.7 mL 10/05/2018, 10/07/2020, 10/06/2022, 10/26/2023    Influenza, seasonal, injectable 10/01/2012, 10/15/2014    Pneumococcal Conjugate 13-Valent 10/05/2018    Pneumococcal Polysaccharide PPV23 10/01/2012    Zoster 06/19/2008       Objective     /92 (BP Location: Left arm, Patient Position: Sitting, Cuff Size: Standard)   Pulse 78   Temp (!) 97.4 °F (36.3 °C) (Tympanic)   Ht 5' 10\" (1.778 m)   Wt 77.6 kg (171 lb)   SpO2 96%   BMI 24.54 kg/m²     Physical Exam  Vitals and nursing note reviewed.   Cardiovascular:      Rate and Rhythm: Normal rate and regular rhythm.      Heart sounds: Normal heart sounds.   Pulmonary:      Effort: Pulmonary effort is normal.      Breath sounds: Normal breath sounds.   Skin:         Neurological:      Mental Status: He is alert.       Marc Agee MD    "

## 2023-12-13 LAB
ALBUMIN SERPL-MCNC: 4.3 G/DL (ref 3.6–4.6)
ALBUMIN/GLOB SERPL: 1.7 {RATIO} (ref 1.2–2.2)
ALP SERPL-CCNC: 90 IU/L (ref 44–121)
ALT SERPL-CCNC: 23 IU/L (ref 0–44)
AST SERPL-CCNC: 33 IU/L (ref 0–40)
BASOPHILS # BLD AUTO: 0.1 X10E3/UL (ref 0–0.2)
BASOPHILS NFR BLD AUTO: 1 %
BILIRUB SERPL-MCNC: 0.7 MG/DL (ref 0–1.2)
BUN SERPL-MCNC: 16 MG/DL (ref 10–36)
BUN/CREAT SERPL: 17 (ref 10–24)
CALCIUM SERPL-MCNC: 9.2 MG/DL (ref 8.6–10.2)
CHLORIDE SERPL-SCNC: 100 MMOL/L (ref 96–106)
CHOLEST SERPL-MCNC: 115 MG/DL (ref 100–199)
CO2 SERPL-SCNC: 23 MMOL/L (ref 20–29)
CREAT SERPL-MCNC: 0.94 MG/DL (ref 0.76–1.27)
EGFR: 77 ML/MIN/1.73
EOSINOPHIL # BLD AUTO: 0.5 X10E3/UL (ref 0–0.4)
EOSINOPHIL NFR BLD AUTO: 8 %
ERYTHROCYTE [DISTWIDTH] IN BLOOD BY AUTOMATED COUNT: 12.3 % (ref 11.6–15.4)
EST. AVERAGE GLUCOSE BLD GHB EST-MCNC: 128 MG/DL
GLOBULIN SER-MCNC: 2.5 G/DL (ref 1.5–4.5)
GLUCOSE SERPL-MCNC: 113 MG/DL (ref 70–99)
HBA1C MFR BLD: 6.1 % (ref 4.8–5.6)
HCT VFR BLD AUTO: 37.1 % (ref 37.5–51)
HDLC SERPL-MCNC: 47 MG/DL
HGB BLD-MCNC: 12.6 G/DL (ref 13–17.7)
IMM GRANULOCYTES # BLD: 0 X10E3/UL (ref 0–0.1)
IMM GRANULOCYTES NFR BLD: 0 %
LDLC SERPL CALC-MCNC: 48 MG/DL (ref 0–99)
LDLC/HDLC SERPL: 1 RATIO (ref 0–3.6)
LYMPHOCYTES # BLD AUTO: 1.7 X10E3/UL (ref 0.7–3.1)
LYMPHOCYTES NFR BLD AUTO: 28 %
MCH RBC QN AUTO: 31.7 PG (ref 26.6–33)
MCHC RBC AUTO-ENTMCNC: 34 G/DL (ref 31.5–35.7)
MCV RBC AUTO: 94 FL (ref 79–97)
MONOCYTES # BLD AUTO: 0.6 X10E3/UL (ref 0.1–0.9)
MONOCYTES NFR BLD AUTO: 10 %
NEUTROPHILS # BLD AUTO: 3.1 X10E3/UL (ref 1.4–7)
NEUTROPHILS NFR BLD AUTO: 53 %
PLATELET # BLD AUTO: 261 X10E3/UL (ref 150–450)
POTASSIUM SERPL-SCNC: 4.8 MMOL/L (ref 3.5–5.2)
PROT SERPL-MCNC: 6.8 G/DL (ref 6–8.5)
RBC # BLD AUTO: 3.97 X10E6/UL (ref 4.14–5.8)
SL AMB VLDL CHOLESTEROL CALC: 20 MG/DL (ref 5–40)
SODIUM SERPL-SCNC: 137 MMOL/L (ref 134–144)
TRIGL SERPL-MCNC: 110 MG/DL (ref 0–149)
TSH SERPL DL<=0.005 MIU/L-ACNC: 2.78 UIU/ML (ref 0.45–4.5)
WBC # BLD AUTO: 6 X10E3/UL (ref 3.4–10.8)

## 2024-01-29 DIAGNOSIS — E03.9 ACQUIRED HYPOTHYROIDISM: ICD-10-CM

## 2024-01-29 DIAGNOSIS — N40.0 BENIGN PROSTATIC HYPERPLASIA, UNSPECIFIED WHETHER LOWER URINARY TRACT SYMPTOMS PRESENT: ICD-10-CM

## 2024-01-29 RX ORDER — LEVOTHYROXINE SODIUM 0.1 MG/1
TABLET ORAL
Qty: 90 TABLET | Refills: 3 | Status: SHIPPED | OUTPATIENT
Start: 2024-01-29

## 2024-01-29 RX ORDER — TAMSULOSIN HYDROCHLORIDE 0.4 MG/1
CAPSULE ORAL
Qty: 90 CAPSULE | Refills: 3 | Status: SHIPPED | OUTPATIENT
Start: 2024-01-29

## 2024-02-02 ENCOUNTER — OFFICE VISIT (OUTPATIENT)
Dept: CARDIOLOGY CLINIC | Facility: CLINIC | Age: 89
End: 2024-02-02
Payer: COMMERCIAL

## 2024-02-02 VITALS
DIASTOLIC BLOOD PRESSURE: 62 MMHG | SYSTOLIC BLOOD PRESSURE: 128 MMHG | BODY MASS INDEX: 25.28 KG/M2 | WEIGHT: 176.6 LBS | HEART RATE: 80 BPM | HEIGHT: 70 IN

## 2024-02-02 DIAGNOSIS — E78.00 PURE HYPERCHOLESTEROLEMIA: ICD-10-CM

## 2024-02-02 DIAGNOSIS — Z95.0 PRESENCE OF PERMANENT CARDIAC PACEMAKER: ICD-10-CM

## 2024-02-02 DIAGNOSIS — I25.10 CORONARY ARTERY DISEASE INVOLVING NATIVE CORONARY ARTERY OF NATIVE HEART WITHOUT ANGINA PECTORIS: ICD-10-CM

## 2024-02-02 DIAGNOSIS — I48.0 PAROXYSMAL ATRIAL FIBRILLATION (HCC): Primary | ICD-10-CM

## 2024-02-02 DIAGNOSIS — Z95.5 S/P DRUG ELUTING CORONARY STENT PLACEMENT: ICD-10-CM

## 2024-02-02 DIAGNOSIS — I49.5 SICK SINUS SYNDROME (HCC): ICD-10-CM

## 2024-02-02 DIAGNOSIS — R06.02 SHORTNESS OF BREATH: ICD-10-CM

## 2024-02-02 DIAGNOSIS — Z95.2 S/P AVR: ICD-10-CM

## 2024-02-02 DIAGNOSIS — I77.819 ECTATIC AORTA (HCC): ICD-10-CM

## 2024-02-02 DIAGNOSIS — I10 PRIMARY HYPERTENSION: ICD-10-CM

## 2024-02-02 PROCEDURE — 99214 OFFICE O/P EST MOD 30 MIN: CPT | Performed by: INTERNAL MEDICINE

## 2024-02-02 PROCEDURE — 93000 ELECTROCARDIOGRAM COMPLETE: CPT | Performed by: INTERNAL MEDICINE

## 2024-02-02 RX ORDER — LANOLIN ALCOHOL/MO/W.PET/CERES
CREAM (GRAM) TOPICAL DAILY
COMMUNITY

## 2024-02-02 NOTE — PROGRESS NOTES
Cardiology Consultation     Travon Sanchez  [id number]  10/7/1932  HEART & VASCULAR  [facility] [place] [id number] [address]  [facility] [id number]    1. Paroxysmal atrial fibrillation (HCC)  POCT ECG      2. Coronary artery disease involving native coronary artery 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 pectoris  NM myocardial perfusion spect (stress and/or rest)      3. Primary hypertension        4. Sick sinus syndrome (HCC)        5. Presence of permanent cardiac pacemaker        6. S/P AVR  Echo complete w/ contrast if indicated      7. S/P drug eluting coronary stent placement  NM myocardial perfusion spect (stress and/or rest)      8. Pure hypercholesterolemia        9. Ectatic aorta (HCC)  Echo complete w/ contrast if indicated      10. Shortness of breath  Echo complete w/ contrast if indicated    NM myocardial perfusion spect (stress and/or rest)          Discussion/Summary:    1.  Severe aortic stenosis - This progressed to severe in 2021 and then the next year he was becoming more symptomatic.  He went to CT surgery and went through TAVR workup.  He ended up having an open AVR on 5/17/2022, as his left coronary ostium takeoff was too low and close to the annulus.  This went well without complications.  He did complete cardiac rehabilitation.  His echocardiograms have shown a normally functioning AVR with normal LV function.  He should take antibiotic prophylaxis for any dental procedures.    2.  History of permanent pacemaker - This was secondary to sick sinus syndrome.  He had some sort of mechanical chest pain after his procedure that fortunately have greatly improved.  He will continue to follow in our pacemaker clinic.  He is 100% a paced, and about 50% V-paced.    3.  Paroxysmal atrial fibrillation - Found on pacemaker interrogation.  He is predominantly atrially paced and has not had any recent episodes of atrial fibrillation with the last episode  detected about 2.5 years ago.  Anticoagulation is recommended however he has declined in the past.  He is on aspirin.    5.  Hypertension - His blood pressure was elevated a few visits ago.  We tried to uptitrate carvedilol but it appears that he did not tolerate this.  He remains on 6.25 mg twice daily.  He had some orthostatic hypotension at our last visit and we backed off of amlodipine to 5 mg daily.  His blood pressure has been under decent control and his symptoms are no longer present.    6.  CAD - During his workup for his aortic valve replacement, cardiac catheterization did show a 90% RCA in which he received a drug-eluting stent.  At this point he can come off the Plavix and continue aspirin indefinitely.  He also remains on beta-blocker and statin.     7.  Shortness of breath with exertion - Travon told me he did feel better after his aortic valve replacement, but now he is telling me that he does not recall this to be really true.  He has been having shortness of breath with exertion on and off for some time now.  He still goes above his activities daily living, just has to take breaks at times.  We will order updated testing which will include an echocardiogram and stress nuclear study.  We will call him with the results.  If these are unremarkable would suggest a pulmonary workup.      HPI:    Mr. Sanchez comes in for follow-up given his cardiac history.  He has a history of sick sinus syndrome and status post permanent pacemaker in March of 2016.  He also has a history of moderate aortic stenosis, paroxysmal atrial fibrillation and hypertension.  He is predominantly atrially paced.  He was offered anticoagulation for stroke prevention, but declined.    His prior cardiology care was through [facility], where he received as permanent pacemaker.  Travon gives me a history of having significant mechanical pain after his permanent pacemaker.  He recalls coming out of anesthesia and felt as if somebody was  pressing on his chest.  It is unclear at this time what exactly had happened.  I asked if there was a complication like an arrhythmia that required defibrillation, but he does not ever recall being told that.  For close to 3 years he had on and off mechanical pains from his pacemaker.  They have improved, and now he seems to describe the fleeting sharp chest pain that only lasts a few seconds.  His pacemaker interrogations show that he is 100% A-paced, and V paced about 50% of the time.  He has had a few episodes of atrial fibrillation detected by pacer interrogation in the past.  He has had none recently.    His echocardiograms through the years showed moderate aortic stenosis but we repeated 1 last year that showed progression to severe aortic stenosis.  At that time he had minimal symptoms, but these changed in follow-up.  When I last saw him earlier this year he was having progressive shortness of breath over the winter.  He would go for walks and started noticing that he had to take breaks.  He denies chest pain or syncope.    At that point I referred him to CT surgery for TAVR evaluation.  He went through all the preoperative testing that included cardiac catheterization and CTAs.  He was found to have a significant 90% RCA stenosis that received a drug-eluting stent in April 2022.  He had an 80% OM1 stenosis but this was a small vessel supplying a small territory which was treated medically.  His CTA did show that his left coronary ostium takeoff was very low and close to the aortic valve annulus.  This put him at risk for left main occlusion with TAVR.  CT surgery at that point recommended open AVR and after he thought about this and discussed it with his family, he decided to proceed.  He had this performed on 05/16/22.  This went well without complications.  In follow-up he was doing well and he was enrolled in cardiac rehabilitation.    Last year while doing cardiac rehab we noticed is that his blood  pressure was running high.  He was having some lightheadedness and dizziness as well.  At that visit we changed Toprol over to carvedilol 6.25 mg twice daily and added back his amlodipine 5 mg daily.  This then was uptitrated to twice daily.  When I last saw Travon he was having some increasing shortness of breath and we repeated an echocardiogram.  This showed no change with normal LV systolic function and a normally functioning aortic valve replacement.  His pacemaker checks have been normal as well.  At our last visit he was having orthostatic lightheadedness and we cut his amlodipine down to daily.  This helped with the symptoms.    Travon continues to convey to me shortness of breath with exertion.  Today he is endorsing the fact that he does not recall ever feeling any better after getting his valve replaced.  Although in the past he has stated that he was feeling better he does not recall this now.  He still is very active for his age and takes care of his yard and land.  He can go above and beyond his activities of daily living but will notice he has to take breaks due to shortness of breath.  He denies any shortness of breath at rest.  No other signs or symptoms of CHF.  Sometimes with his shortness of breath he will feel some chest tightness.  No other anginal equivalents.  He denies palpitations, lightheadedness or syncope.      Patient Active Problem List   Diagnosis    Chronic nonintractable headache    Enlarged prostate without lower urinary tract symptoms (luts)    GERD without esophagitis    Primary hypertension    Acquired hypothyroidism    Sick sinus syndrome (HCC)    Presence of permanent cardiac pacemaker    Paroxysmal atrial fibrillation (HCC)    Balance disorder    Coronary artery disease involving native coronary artery 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 pectoris    S/P drug eluting coronary stent placement    S/P AVR    Pure hypercholesterolemia    Retained suture    Dyspnea on effort     Ectatic aorta (HCC)    Orthostatic hypotension     Past Medical History:   Diagnosis Date    Angina pectoris (HCC)     Arthritis     Ascending aortic aneurysm (HCC)     trileaflet AV, 41mm    BPH (benign prostatic hyperplasia)     Cancer (HCC)     skin    Coronary artery disease     Former tobacco use     pipe & cigar use socially & infrequently    GERD (gastroesophageal reflux disease)     controlled w/ diet    Headache     h/o    Hepatitis A     History of melanoma     Hypertension     Hypothyroidism     Pacemaker     Sebaceous cyst 10/26/2022    Severe aortic stenosis     Shortness of breath     with exertion    Vitamin B12 deficiency     Wears dentures      Social History     Socioeconomic History    Marital status: /Civil Union     Spouse name: Maude    Number of children: Not on file    Years of education: Not on file    Highest education level: Not on file   Occupational History    Not on file   Tobacco Use    Smoking status: Former     Types: Cigars    Smokeless tobacco: Never    Tobacco comments:     50 years ago   Vaping Use    Vaping status: Former   Substance and Sexual Activity    Alcohol use: Yes     Comment: a beer once in a while.    Drug use: No    Sexual activity: Not on file   Other Topics Concern    Not on file   Social History Narrative    Living will on file    Supportive and safe    Lives with wife     Social Determinants of Health     Financial Resource Strain: Low Risk  (8/29/2023)    Overall Financial Resource Strain (CARDIA)     Difficulty of Paying Living Expenses: Not hard at all   Food Insecurity: No Food Insecurity (8/26/2022)    Hunger Vital Sign     Worried About Running Out of Food in the Last Year: Never true     Ran Out of Food in the Last Year: Never true   Transportation Needs: No Transportation Needs (8/29/2023)    PRAPARE - Transportation     Lack of Transportation (Medical): No     Lack of Transportation (Non-Medical): No   Physical Activity: Sufficiently Active  (8/26/2022)    Exercise Vital Sign     Days of Exercise per Week: 7 days     Minutes of Exercise per Session: 120 min   Stress: No Stress Concern Present (8/26/2022)    [employer] - Occupational Stress Questionnaire     Feeling of Stress : Not at all   Social Connections: Socially Integrated (8/26/2022)    Social Connection and Isolation Panel [NHANES]     Frequency of Communication with Friends and Family: Once a week     Frequency of Social Gatherings with Friends and Family: Three times a week     Attends Yazdanism Services: 1 to 4 times per year     Active Member of Clubs or Organizations: Yes     Attends Club or Organization Meetings: 1 to 4 times per year     Marital Status:    Intimate Partner Violence: Not At Risk (8/26/2022)    Humiliation, Afraid, Rape, and Kick questionnaire     Fear of Current or Ex-Partner: No     Emotionally Abused: No     Physically Abused: No     Sexually Abused: No   Housing Stability: Low Risk  (8/26/2022)    Housing Stability Vital Sign     Unable to Pay for Housing in the Last Year: No     Number of Places Lived in the Last Year: 1     Unstable Housing in the Last Year: No      Family History   Problem Relation Age of Onset    Cancer Family         gastrointestinal tract    Heart disease Mother     Substance Abuse Neg Hx     Mental illness Neg Hx      Past Surgical History:   Procedure Laterality Date    CARDIAC CATHETERIZATION  04/15/2022    Procedure: Cardiac catheterization;  Surgeon: Lars Fermin DO;  Location: [facility];  Service: Cardiology    CARDIAC CATHETERIZATION N/A 04/15/2022    Procedure: Cardiac Coronary Angiogram;  Surgeon: Lars Fermin DO;  Location: [facility];  Service: Cardiology    CARDIAC CATHETERIZATION N/A 04/15/2022    Procedure: Cardiac pci;  Surgeon: Lars Fermin DO;  Location: [facility];  Service: Cardiology    CARDIAC PACEMAKER PLACEMENT      COLONOSCOPY       INGUINAL HERNIA REPAIR Bilateral     IA RPLCMT AORTIC VALVE OPN W/STENTLESS TISSUE VALVE N/A 5/16/2022    Procedure: REPLACEMENT VALVE AORTIC (TISSUE AVR) WITH 25 MM AORTIC MAGNA EASE VALVE W/ ROJELIO;  Surgeon: Jimmy Cantrell DO;  Location: [facility];  Service: Cardiac Surgery    SKIN CANCER EXCISION      melanoma, multiple       Current Outpatient Medications:     amLODIPine (NORVASC) 5 mg tablet, Take 1 tablet (5 mg total) by mouth daily, Disp: 90 tablet, Rfl: 3    Ascorbic Acid (VITAMIN C) 500 MG CAPS, Take 1 tablet by mouth daily, Disp: , Rfl:     aspirin (ECOTRIN LOW STRENGTH) 81 mg EC tablet, Take 81 mg by mouth daily, Disp: , Rfl:     atorvastatin (LIPITOR) 20 mg tablet, TAKE 1 TABLET BY MOUTH DAILY WITH DINNER, Disp: 90 tablet, Rfl: 2    carvedilol (COREG) 6.25 mg tablet, TAKE ONE TABLET BY MOUTH TWICE A DAY WITH MEALS, Disp: 60 tablet, Rfl: 5    finasteride (PROSCAR) 5 mg tablet, Take 1 tablet (5 mg total) by mouth daily, Disp: 90 tablet, Rfl: 3    Glucosamine-Chondroit-Vit C-Mn (GLUCOSAMINE 1500 COMPLEX) CAPS, Take 1 capsule by mouth daily, Disp: , Rfl:     levothyroxine 100 mcg tablet, TAKE ONE TABLET BY MOUTH EVERY DAY, Disp: 90 tablet, Rfl: 3    Multiple Vitamin (MULTIVITAMIN) capsule, Take 1 capsule by mouth daily, Disp: , Rfl:     nitroglycerin (NITROSTAT) 0.4 mg SL tablet, Place 1 tablet (0.4 mg total) under the tongue every 5 (five) minutes as needed for chest pain, Disp: 24 tablet, Rfl: 1    tamsulosin (FLOMAX) 0.4 mg, TAKE ONE CAPSULE BY MOUTH TWICE A DAY, Disp: 90 capsule, Rfl: 3    vitamin B-12 (VITAMIN B-12) 1,000 mcg tablet, Take by mouth daily, Disp: , Rfl:     Flaxseed, Linseed, (FLAXSEED OIL) 1000 MG CAPS, Take 1 capsule by mouth daily (Patient not taking: Reported on 11/28/2023), Disp: , Rfl:   Allergies   Allergen Reactions    Amoxicillin Other (See Comments)     Severe weakness     Vitals:    02/02/24 0824   BP: 128/62   BP Location: Left arm   Patient Position: Sitting   Cuff Size:  "Standard   Pulse: 80   Weight: 80.1 kg (176 lb 9.6 oz)   Height: 5' 10\" (1.778 m)       Labs:  Lab Results   Component Value Date     06/15/2017    K 4.8 12/12/2023     12/12/2023    CO2 23 12/12/2023    BUN 16 12/12/2023    CREATININE 0.94 12/12/2023    CREATININE 0.67 05/20/2022    CREATININE 0.89 06/15/2017    GLUCOSE 220 (H) 05/16/2022    GLUCOSE 110 (H) 06/15/2017    CALCIUM 8.4 05/20/2022    CALCIUM 9.2 06/15/2017     Lab Results   Component Value Date    WBC 6.0 12/12/2023    WBC 11.96 (H) 05/18/2022    WBC 5.5 06/15/2017    HGB 12.6 (L) 12/12/2023    HGB 8.8 (L) 05/18/2022    HGB 13.5 06/15/2017    HCT 37.1 (L) 12/12/2023    HCT 25.8 (L) 05/18/2022    HCT 40.2 06/15/2017    MCV 94 12/12/2023    MCV 93 05/18/2022    MCV 94 06/15/2017     12/12/2023     05/18/2022       Imaging:  ECG today shows an atrial paced rhythm with nonspecific ST changes.    ECHO (1/24/2023):    Left Ventricle: Left ventricular cavity size is normal. Wall thickness is mildly increased. There is mild asymmetric hypertrophy of the septal wall. The left ventricular ejection fraction is 60-65% by visual estimation. Systolic function is normal. Wall motion is normal. Diastolic function is mildly abnormal, consistent with grade I (abnormal) relaxation.    Right Ventricle: Right ventricular cavity size is mildly dilated. Systolic function is mildly reduced.    Left Atrium: The atrium is normal in size.    Right Atrium: The atrium is mildly dilated.    Aortic Valve: There is a bioprosthetic valve. The prosthetic valve appears well-seated and appears to be functioning normally. There is no evidence of paravalvular regurgitation. There is no evidence of transvalvular regurgitation. The gradient recorded across the prosthetic aortic valve is within the expected range. The aortic valve peak velocity is 1.63 m/s. The aortic valve mean gradient is 5 mmHg. The dimensionless velocity index is 0.38.    Mitral Valve: There is " annular calcification. There is mild regurgitation.    Tricuspid Valve: There is mild regurgitation.    Pulmonic Valve: There is mild regurgitation.    Aorta: The aortic root is normal in size. The ascending aorta is mildly dilated (4.0 cm).     Essentially unchanged compared to prior study dated 11/25/22. Normally functioning bioprosthetic aortic valve.       CARDIAC CATH (4/2022):  Diagnostic  Dominance: Co-dominant      Left Main   The vessel exhibits minimal luminal irregularities.   Left Anterior Descending   Prox LAD lesion is 25% stenosed. FLAKITO flow is 3.   Mid LAD lesion is 50% stenosed. FLAKITO flow is 3.   Left Circumflex   First Obtuse Marginal Branch   1st Mrg lesion is 80% stenosed. Small OM1 branch (medical therapy), supplies small territory   Right Coronary Artery   Mid RCA lesion is 90% stenosed.     Intervention        Mid RCA lesion   Angioplasty   Angioplasty using a compliant balloon was performed. The balloon used was a BALLOON EUPHORA RX 2 X 15MM. Maximum pressure: 6 rivas. Inflation time: 5 sec. Time obtained: 09:22 EDT. First reinflation; Max pressure: 12 rivas. Inflation time 10 sec. Time obtained: 09:23 EDT.   Supplies used: CATH GUIDE LAUNCHER 5FR JR 4.0; BALLOON EUPHORA RX 2 X 15MM; GUIDEWIRE ASAHI MINAMO 190CM J   Stent   Drug-eluting stent was successfully placed. The stent used was a STENT XIENCE SKYPOINT 2.5 X 18MM. Stent was deployed by way of balloon expansion. Maximum pressure: 14 rivas. Inflation time: 10 sec.   Supplies used: STENT XIENCE SKYPOINT 2.5 X 18MM; CATH GUIDE LAUNCHER 5FR JR 4.0; GUIDEWIRE ASAHI MINAMO 190CM J   Post-Intervention Lesion Assessment   The intervention was successful. Post-intervention FLAKITO flow is 3.   There is a 0% residual stenosis post intervention.         Review of Systems:  Review of Systems   Constitutional:  Positive for fatigue.   HENT: Negative.     Eyes: Negative.    Respiratory:  Positive for shortness of breath.    Cardiovascular: Negative.   "  Gastrointestinal: Negative.    Musculoskeletal:  Positive for arthralgias.   Skin: Negative.    Allergic/Immunologic: Negative.    Neurological: Negative.    Hematological: Negative.    Psychiatric/Behavioral: Negative.     All other systems reviewed and are negative.    Vitals:    02/02/24 0824   BP: 128/62   BP Location: Left arm   Patient Position: Sitting   Cuff Size: Standard   Pulse: 80   Weight: 80.1 kg (176 lb 9.6 oz)   Height: 5' 10\" (1.778 m)       Physical Exam:  Physical Exam  Vitals and nursing note reviewed.   Constitutional:       Appearance: He is well-developed.   HENT:      Head: Normocephalic and atraumatic.   Eyes:      General: No scleral icterus.        Right eye: No discharge.         Left eye: No discharge.      Pupils: Pupils are equal, round, and reactive to light.   Neck:      Thyroid: No thyromegaly.      Vascular: No JVD.   Cardiovascular:      Rate and Rhythm: Normal rate and regular rhythm. No extrasystoles are present.     Pulses: Normal pulses. No decreased pulses.      Heart sounds: S1 normal and S2 normal. Murmur heard.      Systolic murmur is present with a grade of 2/6.      No friction rub. No gallop.   Pulmonary:      Effort: Pulmonary effort is normal. No respiratory distress.      Breath sounds: No decreased breath sounds, wheezing, rhonchi or rales.   Abdominal:      General: Bowel sounds are normal. There is no distension.      Palpations: Abdomen is soft.      Tenderness: There is no abdominal tenderness.   Musculoskeletal:         General: No tenderness or deformity. Normal range of motion.      Cervical back: Normal range of motion and neck supple.   Skin:     General: Skin is warm and dry.      Findings: No rash.   Neurological:      Mental Status: He is alert and oriented to person, place, and time.      Cranial Nerves: No cranial nerve deficit.   Psychiatric:         Thought Content: Thought content normal.         Judgment: Judgment normal.       Counseling / " Coordination of Care  Total office time spent today 25 minutes.  Greater than 50% of total time was spent with the patient and / or family counseling and / or coordination of care.

## 2024-02-21 ENCOUNTER — IN-CLINIC DEVICE VISIT (OUTPATIENT)
Dept: CARDIOLOGY CLINIC | Facility: CLINIC | Age: 89
End: 2024-02-21
Payer: COMMERCIAL

## 2024-02-21 DIAGNOSIS — Z95.0 CARDIAC PACEMAKER IN SITU: Primary | ICD-10-CM

## 2024-02-21 PROCEDURE — 93280 PM DEVICE PROGR EVAL DUAL: CPT | Performed by: INTERNAL MEDICINE

## 2024-02-21 NOTE — PROGRESS NOTES
Results for orders placed or performed in visit on 02/21/24   Cardiac EP device report    Narrative    BIOT DUAL CHAMBER PPM - ACTIVE SYSTEM IS MRI CONDITIONAL  DEVICE INTERROGATED IN THE [facility] OFFICE: BATTERY VOLTAGE ADEQUATE-4 YRS. AP 96%  38%. ALL AVAILABLE LEAD PARAMETERS & TRENDS WITHIN NORMAL LIMITS. 6 AHRS NOTED; 0% BURDEN; PT ON ASA & COREG; ALL EGRAMS <10 SECS SHOWING PAT. NO PROGRAMMING CHANGES MADE TO DEVICE PARAMETERS. NORMAL DEVICE FUNCTION. NC

## 2024-03-12 ENCOUNTER — OFFICE VISIT (OUTPATIENT)
Dept: FAMILY MEDICINE CLINIC | Facility: HOSPITAL | Age: 89
End: 2024-03-12
Payer: COMMERCIAL

## 2024-03-12 VITALS
WEIGHT: 172.4 LBS | HEART RATE: 71 BPM | OXYGEN SATURATION: 98 % | TEMPERATURE: 97.6 F | BODY MASS INDEX: 24.68 KG/M2 | DIASTOLIC BLOOD PRESSURE: 83 MMHG | SYSTOLIC BLOOD PRESSURE: 128 MMHG | HEIGHT: 70 IN

## 2024-03-12 DIAGNOSIS — E03.9 ACQUIRED HYPOTHYROIDISM: ICD-10-CM

## 2024-03-12 DIAGNOSIS — I49.5 SICK SINUS SYNDROME (HCC): ICD-10-CM

## 2024-03-12 DIAGNOSIS — S16.1XXS CERVICAL STRAIN, ACUTE, SEQUELA: ICD-10-CM

## 2024-03-12 DIAGNOSIS — I48.0 PAROXYSMAL ATRIAL FIBRILLATION (HCC): ICD-10-CM

## 2024-03-12 DIAGNOSIS — I25.10 CORONARY ARTERY DISEASE INVOLVING NATIVE CORONARY ARTERY OF NATIVE HEART WITHOUT ANGINA PECTORIS: ICD-10-CM

## 2024-03-12 DIAGNOSIS — I10 PRIMARY HYPERTENSION: Primary | ICD-10-CM

## 2024-03-12 PROCEDURE — 99214 OFFICE O/P EST MOD 30 MIN: CPT | Performed by: FAMILY MEDICINE

## 2024-03-12 PROCEDURE — G2211 COMPLEX E/M VISIT ADD ON: HCPCS | Performed by: FAMILY MEDICINE

## 2024-03-12 RX ORDER — AZITHROMYCIN 500 MG/1
TABLET, FILM COATED ORAL
COMMUNITY
Start: 2024-03-06

## 2024-03-12 NOTE — PROGRESS NOTES
Name: Travon Sanchez      : 10/7/1932      MRN: [id number]  Encounter Provider: Marc Agee MD  Encounter Date: 3/12/2024   Encounter department: [facility] [address]     Assessment & Plan     1. Primary hypertension  Assessment & Plan:  Very good BP control      2. Paroxysmal atrial fibrillation (HCC)  Assessment & Plan:  Not currently on anticoagulation      3. Sick sinus syndrome (HCC)    4. Acquired hypothyroidism  Assessment & Plan:  Clinically euthyroid      5. Coronary artery disease involving native coronary artery 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 pectoris  Assessment & Plan:  Follow up with Cardiology      6. Cervical strain, acute, sequela  Assessment & Plan:  Samples Celebrex 200mg #7    Orders:  -     XR spine cervical complete 4 or 5 vw non injury; Future; Expected date: 2024        Depression Screening and Follow-up Plan: Patient was screened for depression during today's encounter. They screened negative with a PHQ-2 score of 0.    Falls Plan of Care: balance, strength, and gait training instructions were provided. Medications that increase falls were reviewed. Assessed feet and footwear.         Subjective     3 month follow up.    No recent illness  He did fall onto his buttocks 2-3 weeks ago and since has pain everywhere  Manny everything  Has headaches every night since then    Has ongoing SOB ongoing with his regular activities    Has cardiac testing next week          Review of Systems   Constitutional:  Negative for unexpected weight change.   Respiratory: Negative.     Cardiovascular: Negative.    Musculoskeletal:  Positive for arthralgias and gait problem.   Psychiatric/Behavioral: Negative.     All other systems reviewed and are negative.      Past Medical History:   Diagnosis Date    Angina pectoris (HCC)     Arthritis     Ascending aortic aneurysm (HCC)     trileaflet AV, 41mm    BPH (benign prostatic hyperplasia)     Cancer (HCC)     skin     Coronary artery disease     Former tobacco use     pipe & cigar use socially & infrequently    GERD (gastroesophageal reflux disease)     controlled w/ diet    Headache     h/o    Hepatitis A     History of melanoma     Hypertension     Hypothyroidism     Pacemaker     Sebaceous cyst 10/26/2022    Severe aortic stenosis     Shortness of breath     with exertion    Vitamin B12 deficiency     Wears dentures      Past Surgical History:   Procedure Laterality Date    CARDIAC CATHETERIZATION  04/15/2022    Procedure: Cardiac catheterization;  Surgeon: Lars Fermin DO;  Location: [facility];  Service: Cardiology    CARDIAC CATHETERIZATION N/A 04/15/2022    Procedure: Cardiac Coronary Angiogram;  Surgeon: Lars Fermin DO;  [facility]: [facility];  Service: Cardiology    CARDIAC CATHETERIZATION N/A 04/15/2022    Procedure: Cardiac pci;  Surgeon: Lars Fermin DO;  [facility]: [facility];  Service: Cardiology    CARDIAC PACEMAKER PLACEMENT      COLONOSCOPY      INGUINAL HERNIA REPAIR Bilateral     [place] RPLCMT AORTIC VALVE OPN W/STENTLESS TISSUE VALVE N/A 5/16/2022    Procedure: REPLACEMENT VALVE AORTIC (TISSUE AVR) WITH 25 MM AORTIC MAGNA EASE VALVE W/ ROJELIO;  Surgeon: Jimmy Cantrell DO;  Location: [facility];  Service: Cardiac Surgery    SKIN CANCER EXCISION      melanoma, multiple     Family History   Problem Relation Age of Onset    Cancer Family         gastrointestinal tract    Heart disease Mother     Substance Abuse Neg Hx     Mental illness Neg Hx      Social History     Socioeconomic History    Marital status: /Civil Union     Spouse name: Maude    Number of children: None    Years of education: None    Highest education level: None   Occupational History    None   Tobacco Use    Smoking status: Former     Types: Cigars    Smokeless tobacco: Never    Tobacco comments:     50 years ago   Vaping Use    Vaping status: Former   Substance and Sexual Activity     Alcohol use: Yes     Comment: a beer once in a while.    Drug use: No    Sexual activity: None   Other Topics Concern    None   Social History Narrative    Living will on file    Supportive and safe    Lives with wife     Social Determinants of Health     Financial Resource Strain: Low Risk  (8/29/2023)    Overall Financial Resource Strain (CARDIA)     Difficulty of Paying Living Expenses: Not hard at all   Food Insecurity: No Food Insecurity (8/26/2022)    Hunger Vital Sign     Worried About Running Out of Food in the Last Year: Never true     Ran Out of Food in the Last Year: Never true   Transportation Needs: No Transportation Needs (8/29/2023)    PRAPARE - Transportation     Lack of Transportation (Medical): No     Lack of Transportation (Non-Medical): No   Physical Activity: Sufficiently Active (8/26/2022)    Exercise Vital Sign     Days of Exercise per Week: 7 days     Minutes of Exercise per Session: 120 min   Stress: No Stress Concern Present (8/26/2022)    [employer] [place] [employer] - Occupational Stress Questionnaire     Feeling of Stress : Not at all   Social Connections: Socially Integrated (8/26/2022)    Social Connection and Isolation Panel [NHANES]     Frequency of Communication with Friends and Family: Once a week     Frequency of Social Gatherings with Friends and Family: Three times a week     Attends Buddhist Services: 1 to 4 times per year     Active Member of Clubs or Organizations: Yes     Attends Club or Organization Meetings: 1 to 4 times per year     Marital Status:    Intimate Partner Violence: Not At Risk (8/26/2022)    Humiliation, Afraid, Rape, and Kick questionnaire     Fear of Current or Ex-Partner: No     Emotionally Abused: No     Physically Abused: No     Sexually Abused: No   Housing Stability: Low Risk  (8/26/2022)    Housing Stability Vital Sign     Unable to Pay for Housing in the Last Year: No     Number of Places Lived in the Last Year: 1     Unstable  Housing in the Last Year: No     Current Outpatient Medications on File Prior to Visit   Medication Sig    amLODIPine (NORVASC) 5 mg tablet Take 1 tablet (5 mg total) by mouth daily    Ascorbic Acid (VITAMIN C) 500 MG CAPS Take 1 tablet by mouth daily    aspirin (ECOTRIN LOW STRENGTH) 81 mg EC tablet Take 81 mg by mouth daily    atorvastatin (LIPITOR) 20 mg tablet TAKE 1 TABLET BY MOUTH DAILY WITH DINNER    azithromycin (ZITHROMAX) 500 MG tablet TAKE ONE TABLET BY MOUTH 1 HOUR PRIOR TO DENTAL APPOINTMENT    carvedilol (COREG) 6.25 mg tablet TAKE ONE TABLET BY MOUTH TWICE A DAY WITH MEALS    finasteride (PROSCAR) 5 mg tablet Take 1 tablet (5 mg total) by mouth daily    Glucosamine-Chondroit-Vit C-Mn (GLUCOSAMINE 1500 COMPLEX) CAPS Take 1 capsule by mouth daily    levothyroxine 100 mcg tablet TAKE ONE TABLET BY MOUTH EVERY DAY    Multiple Vitamin (MULTIVITAMIN) capsule Take 1 capsule by mouth daily    nitroglycerin (NITROSTAT) 0.4 mg SL tablet Place 1 tablet (0.4 mg total) under the tongue every 5 (five) minutes as needed for chest pain    tamsulosin (FLOMAX) 0.4 mg TAKE ONE CAPSULE BY MOUTH TWICE A DAY    vitamin B-12 (VITAMIN B-12) 1,000 mcg tablet Take by mouth daily    Flaxseed, Linseed, (FLAXSEED OIL) 1000 MG CAPS Take 1 capsule by mouth daily (Patient not taking: Reported on 11/28/2023)     Allergies   Allergen Reactions    Amoxicillin Other (See Comments)     Severe weakness     Immunization History   Administered Date(s) Administered    COVID-19 MODERNA VACC 0.5 ML IM 01/18/2021, 02/15/2021, 11/02/2021    COVID-19 Moderna Vac BIVALENT 12 Yr+ IM 0.5 ML 10/21/2022    COVID-19 Moderna mRNA Vaccine 12 Yr+ 50 mcg/0.5 mL (Spikevax) 10/31/2023    INFLUENZA 10/06/2022    Influenza Split High Dose Preservative Free IM 10/06/2014, 10/19/2015, 10/23/2017, 10/01/2019    Influenza, high dose seasonal 0.7 mL 10/05/2018, 10/07/2020, 10/06/2022, 10/26/2023    Influenza, seasonal, injectable 10/01/2012, 10/15/2014     "Pneumococcal Conjugate 13-Valent 10/05/2018    Pneumococcal Polysaccharide PPV23 10/01/2012    Zoster 06/19/2008       Objective     /83 (BP Location: Left arm, Patient Position: Sitting, Cuff Size: Standard)   Pulse 71   Temp 97.6 °F (36.4 °C) (Tympanic)   Ht 5' 10\" (1.778 m)   Wt 78.2 kg (172 lb 6.4 oz)   SpO2 98%   BMI 24.74 kg/m²     Physical Exam  Vitals and nursing note reviewed.   Constitutional:       Appearance: Normal appearance.   Neck:      Vascular: No carotid bruit.   Cardiovascular:      Rate and Rhythm: Regular rhythm.      Heart sounds: Normal heart sounds.   Pulmonary:      Breath sounds: Normal breath sounds.   Musculoskeletal:      Right lower leg: No edema.      Left lower leg: No edema.   Neurological:      Mental Status: He is alert and oriented to person, place, and time.   Psychiatric:         Mood and Affect: Mood normal.       Marc Agee MD    "

## 2024-03-19 ENCOUNTER — HOSPITAL ENCOUNTER (OUTPATIENT)
Dept: NUCLEAR MEDICINE | Facility: HOSPITAL | Age: 89
Discharge: HOME | End: 2024-03-19
Attending: INTERNAL MEDICINE
Payer: COMMERCIAL

## 2024-03-19 ENCOUNTER — HOSPITAL ENCOUNTER (OUTPATIENT)
Dept: NON INVASIVE DIAGNOSTICS | Facility: HOSPITAL | Age: 89
Discharge: HOME | End: 2024-03-19
Attending: INTERNAL MEDICINE
Payer: COMMERCIAL

## 2024-03-19 ENCOUNTER — HOSPITAL ENCOUNTER (OUTPATIENT)
Dept: RADIOLOGY | Facility: HOSPITAL | Age: 89
Discharge: HOME | End: 2024-03-19
Payer: COMMERCIAL

## 2024-03-19 VITALS
BODY MASS INDEX: 24.62 KG/M2 | SYSTOLIC BLOOD PRESSURE: 154 MMHG | WEIGHT: 172 LBS | HEART RATE: 81 BPM | HEIGHT: 70 IN | DIASTOLIC BLOOD PRESSURE: 96 MMHG

## 2024-03-19 DIAGNOSIS — I25.10 CORONARY ARTERY DISEASE INVOLVING NATIVE CORONARY ARTERY OF NATIVE HEART WITHOUT ANGINA PECTORIS: ICD-10-CM

## 2024-03-19 DIAGNOSIS — S16.1XXS CERVICAL STRAIN, ACUTE, SEQUELA: ICD-10-CM

## 2024-03-19 DIAGNOSIS — R06.02 SHORTNESS OF BREATH: ICD-10-CM

## 2024-03-19 DIAGNOSIS — I77.819 ECTATIC AORTA (HCC): ICD-10-CM

## 2024-03-19 DIAGNOSIS — Z95.2 S/P AVR: ICD-10-CM

## 2024-03-19 DIAGNOSIS — Z95.5 S/P DRUG ELUTING CORONARY STENT PLACEMENT: ICD-10-CM

## 2024-03-19 LAB
AORTIC ROOT: 3.1 CM
AORTIC VALVE MEAN VELOCITY: 11 M/S
APICAL FOUR CHAMBER EJECTION FRACTION: 56 %
AV AREA BY CONTINUOUS VTI: 1.7 CM2
AV AREA PEAK VELOCITY: 1.6 CM2
AV LVOT MEAN GRADIENT: 1 MMHG
AV LVOT PEAK GRADIENT: 3 MMHG
AV MEAN GRADIENT: 6 MMHG
AV PEAK GRADIENT: 11 MMHG
AV VALVE AREA: 1.66 CM2
AV VELOCITY RATIO: 0.5
DOP CALC AO PEAK VEL: 1.63 M/S
DOP CALC AO VTI: 33.33 CM
DOP CALC LVOT AREA: 3.14 CM2
DOP CALC LVOT CARDIAC INDEX: 2.01 L/MIN/M2
DOP CALC LVOT CARDIAC OUTPUT: 3.95 L/MIN
DOP CALC LVOT DIAMETER: 2 CM
DOP CALC LVOT PEAK VEL VTI: 17.61 CM
DOP CALC LVOT PEAK VEL: 0.82 M/S
DOP CALC LVOT STROKE INDEX: 28.1 ML/M2
DOP CALC LVOT STROKE VOLUME: 55.3
E WAVE DECELERATION TIME: 57 MS
E/A RATIO: 0.54
FRACTIONAL SHORTENING: 30 (ref 28–44)
INTERVENTRICULAR SEPTUM IN DIASTOLE (PARASTERNAL SHORT AXIS VIEW): 1.2 CM
INTERVENTRICULAR SEPTUM: 1.2 CM (ref 0.6–1.1)
LAAS-AP2: 21.1 CM2
LAAS-AP4: 17.2 CM2
LEFT ATRIUM AREA SYSTOLE SINGLE PLANE A4C: 18 CM2
LEFT ATRIUM SIZE: 3.6 CM
LEFT ATRIUM VOLUME (MOD BIPLANE): 57 ML
LEFT ATRIUM VOLUME INDEX (MOD BIPLANE): 29.1 ML/M2
LEFT INTERNAL DIMENSION IN SYSTOLE: 3.1 CM (ref 2.1–4)
LEFT VENTRICULAR INTERNAL DIMENSION IN DIASTOLE: 4.4 CM (ref 3.5–6)
LEFT VENTRICULAR POSTERIOR WALL IN END DIASTOLE: 0.9 CM
LEFT VENTRICULAR STROKE VOLUME: 48 ML
LVSV (TEICH): 48 ML
MAX HR PERCENT: 90 %
MAX HR: 117 BPM
MV E'TISSUE VEL-SEP: 8 CM/S
MV PEAK A VEL: 1.16 M/S
MV PEAK E VEL: 63 CM/S
MV STENOSIS PRESSURE HALF TIME: 17 MS
MV VALVE AREA P 1/2 METHOD: 12.94
NUC STRESS EJECTION FRACTION: 72 %
RATE PRESSURE PRODUCT: NORMAL
RIGHT ATRIUM AREA SYSTOLE A4C: 24.1 CM2
RIGHT VENTRICLE ID DIMENSION: 3.9 CM
SL CV LEFT ATRIUM LENGTH A2C: 5.2 CM
SL CV LV EF: 60
SL CV PED ECHO LEFT VENTRICLE DIASTOLIC VOLUME (MOD BIPLANE) 2D: 86 ML
SL CV PED ECHO LEFT VENTRICLE SYSTOLIC VOLUME (MOD BIPLANE) 2D: 39 ML
SL CV REST NUCLEAR ISOTOPE DOSE: 10.2 MCI
SL CV STRESS NUCLEAR ISOTOPE DOSE: 32.2 MCI
SL CV STRESS STAGE REACHED: 2
SL CV TR MAX PG ANTEGRADE: 24 MMHG
STRESS ANGINA INDEX: 0
STRESS BASELINE HR: 74 BPM
STRESS PEAK HR: 117 BPM
STRESS POST ESTIMATED WORKLOAD: 7 METS
STRESS POST EXERCISE DUR MIN: 6 MIN
STRESS POST EXERCISE DUR SEC: 45 SEC
STRESS POST PEAK BP: 158 MMHG
STRESS/REST PERFUSION RATIO: 0.89
TR MAX PG: 23 MMHG
TR PEAK VELOCITY: 2.4 M/S
TRICUSPID ANNULAR PLANE SYSTOLIC EXCURSION: 1 CM
TRICUSPID VALVE PEAK REGURGITATION VELOCITY: 2.41 M/S
TV MEAN GRADIENT: 16 MMHG
TV MV D: 1.91 M/S
TV VALVE AREA BY CONTINUITY EQUATION: 0.71 CM2
TV VTI: 77.67 CM

## 2024-03-19 PROCEDURE — C8929 TTE W OR WO FOL WCON,DOPPLER: HCPCS

## 2024-03-19 PROCEDURE — 78452 HT MUSCLE IMAGE SPECT MULT: CPT

## 2024-03-19 PROCEDURE — 93017 CV STRESS TEST TRACING ONLY: CPT

## 2024-03-19 PROCEDURE — A9502 TC99M TETROFOSMIN: HCPCS

## 2024-03-19 PROCEDURE — 78452 HT MUSCLE IMAGE SPECT MULT: CPT | Performed by: INTERNAL MEDICINE

## 2024-03-19 PROCEDURE — 93018 CV STRESS TEST I&R ONLY: CPT | Performed by: INTERNAL MEDICINE

## 2024-03-19 PROCEDURE — 93016 CV STRESS TEST SUPVJ ONLY: CPT | Performed by: INTERNAL MEDICINE

## 2024-03-19 PROCEDURE — 72050 X-RAY EXAM NECK SPINE 4/5VWS: CPT

## 2024-03-19 PROCEDURE — 93306 TTE W/DOPPLER COMPLETE: CPT | Performed by: INTERNAL MEDICINE

## 2024-03-19 PROCEDURE — G1004 CDSM NDSC: HCPCS

## 2024-03-19 RX ADMIN — PERFLUTREN 0.2 ML/MIN: 6.52 INJECTION, SUSPENSION INTRAVENOUS at 12:15

## 2024-03-20 LAB
CHEST PAIN STATEMENT: NORMAL
MAX DIASTOLIC BP: 90 MMHG
MAX PREDICTED HEART RATE: 129 BPM
PROTOCOL NAME: NORMAL
REASON FOR TERMINATION: NORMAL
STRESS POST EXERCISE DUR MIN: 6 MIN
STRESS POST EXERCISE DUR SEC: 45 SEC
STRESS POST PEAK HR: 117 BPM
STRESS POST PEAK SYSTOLIC BP: 158 MMHG
TARGET HR FORMULA: NORMAL
TEST INDICATION: NORMAL

## 2024-04-03 ENCOUNTER — TELEPHONE (OUTPATIENT)
Dept: FAMILY MEDICINE CLINIC | Facility: HOSPITAL | Age: 89
End: 2024-04-03

## 2024-04-03 NOTE — TELEPHONE ENCOUNTER
Blas wife left a message asking if he is due for blood work?    She stated they normally go together to get their labs done and she is due now and wondering if he is too?     Please call

## 2024-04-05 DIAGNOSIS — R73.9 HYPERGLYCEMIA: ICD-10-CM

## 2024-04-05 DIAGNOSIS — E03.9 ACQUIRED HYPOTHYROIDISM: Primary | ICD-10-CM

## 2024-04-17 LAB
ALBUMIN SERPL-MCNC: 4.3 G/DL (ref 3.6–4.6)
ALBUMIN/GLOB SERPL: 1.7 {RATIO} (ref 1.2–2.2)
ALP SERPL-CCNC: 91 IU/L (ref 44–121)
ALT SERPL-CCNC: 27 IU/L (ref 0–44)
AST SERPL-CCNC: 31 IU/L (ref 0–40)
BASOPHILS # BLD AUTO: 0 X10E3/UL (ref 0–0.2)
BASOPHILS NFR BLD AUTO: 1 %
BILIRUB SERPL-MCNC: 0.5 MG/DL (ref 0–1.2)
BUN SERPL-MCNC: 14 MG/DL (ref 10–36)
BUN/CREAT SERPL: 16 (ref 10–24)
CALCIUM SERPL-MCNC: 9.3 MG/DL (ref 8.6–10.2)
CHLORIDE SERPL-SCNC: 99 MMOL/L (ref 96–106)
CO2 SERPL-SCNC: 24 MMOL/L (ref 20–29)
CREAT SERPL-MCNC: 0.87 MG/DL (ref 0.76–1.27)
EGFR: 81 ML/MIN/1.73
EOSINOPHIL # BLD AUTO: 0.4 X10E3/UL (ref 0–0.4)
EOSINOPHIL NFR BLD AUTO: 8 %
ERYTHROCYTE [DISTWIDTH] IN BLOOD BY AUTOMATED COUNT: 12.9 % (ref 11.6–15.4)
EST. AVERAGE GLUCOSE BLD GHB EST-MCNC: 134 MG/DL
GLOBULIN SER-MCNC: 2.6 G/DL (ref 1.5–4.5)
GLUCOSE SERPL-MCNC: 110 MG/DL (ref 70–99)
HBA1C MFR BLD: 6.3 % (ref 4.8–5.6)
HCT VFR BLD AUTO: 38.3 % (ref 37.5–51)
HGB BLD-MCNC: 12.7 G/DL (ref 13–17.7)
IMM GRANULOCYTES # BLD: 0 X10E3/UL (ref 0–0.1)
IMM GRANULOCYTES NFR BLD: 0 %
LYMPHOCYTES # BLD AUTO: 1.4 X10E3/UL (ref 0.7–3.1)
LYMPHOCYTES NFR BLD AUTO: 30 %
MCH RBC QN AUTO: 31.2 PG (ref 26.6–33)
MCHC RBC AUTO-ENTMCNC: 33.2 G/DL (ref 31.5–35.7)
MCV RBC AUTO: 94 FL (ref 79–97)
MONOCYTES # BLD AUTO: 0.6 X10E3/UL (ref 0.1–0.9)
MONOCYTES NFR BLD AUTO: 12 %
NEUTROPHILS # BLD AUTO: 2.3 X10E3/UL (ref 1.4–7)
NEUTROPHILS NFR BLD AUTO: 49 %
PLATELET # BLD AUTO: 266 X10E3/UL (ref 150–450)
POTASSIUM SERPL-SCNC: 4.6 MMOL/L (ref 3.5–5.2)
PROT SERPL-MCNC: 6.9 G/DL (ref 6–8.5)
RBC # BLD AUTO: 4.07 X10E6/UL (ref 4.14–5.8)
SODIUM SERPL-SCNC: 136 MMOL/L (ref 134–144)
TSH SERPL DL<=0.005 MIU/L-ACNC: 1.92 UIU/ML (ref 0.45–4.5)
WBC # BLD AUTO: 4.8 X10E3/UL (ref 3.4–10.8)

## 2024-04-23 ENCOUNTER — HOSPITAL ENCOUNTER (OUTPATIENT)
Dept: RADIOLOGY | Facility: HOSPITAL | Age: 89
Discharge: HOME | End: 2024-04-23
Payer: COMMERCIAL

## 2024-04-23 ENCOUNTER — TELEPHONE (OUTPATIENT)
Age: 89
End: 2024-04-23

## 2024-04-23 ENCOUNTER — OFFICE VISIT (OUTPATIENT)
Dept: FAMILY MEDICINE CLINIC | Facility: HOSPITAL | Age: 89
End: 2024-04-23
Payer: COMMERCIAL

## 2024-04-23 VITALS
OXYGEN SATURATION: 97 % | DIASTOLIC BLOOD PRESSURE: 84 MMHG | SYSTOLIC BLOOD PRESSURE: 150 MMHG | BODY MASS INDEX: 24.82 KG/M2 | TEMPERATURE: 97.9 F | HEIGHT: 70 IN | WEIGHT: 173.4 LBS | HEART RATE: 72 BPM

## 2024-04-23 DIAGNOSIS — M54.41 ACUTE BILATERAL LOW BACK PAIN WITH RIGHT-SIDED SCIATICA: Primary | ICD-10-CM

## 2024-04-23 DIAGNOSIS — M54.41 ACUTE BILATERAL LOW BACK PAIN WITH RIGHT-SIDED SCIATICA: ICD-10-CM

## 2024-04-23 PROCEDURE — 72220 X-RAY EXAM SACRUM TAILBONE: CPT

## 2024-04-23 PROCEDURE — 99214 OFFICE O/P EST MOD 30 MIN: CPT | Performed by: NURSE PRACTITIONER

## 2024-04-23 PROCEDURE — 72100 X-RAY EXAM L-S SPINE 2/3 VWS: CPT

## 2024-04-23 PROCEDURE — G2211 COMPLEX E/M VISIT ADD ON: HCPCS | Performed by: NURSE PRACTITIONER

## 2024-04-23 RX ORDER — PREDNISONE 10 MG/1
TABLET ORAL
Qty: 32 TABLET | Refills: 0 | Status: SHIPPED | OUTPATIENT
Start: 2024-04-23

## 2024-04-23 NOTE — TELEPHONE ENCOUNTER
Pts wife called asking how to take the prednisone tablets. Pts wife states the order says take 4 tabs x 3 days, 3 tabs x 2 days, etc. Pts wife is asking if pt should take the 4 tablets all at once or if they should be spaced out?    Please call pts wife back with further advisement.

## 2024-04-23 NOTE — PROGRESS NOTES
Assessment/Plan:     Low back pain s/p fall 1 month ago.   Check LS and sacral/coccyx xrays.   Start prednisone. Avoid any lifting or bending or strenuous activity.   Call if pain is not improving or worsening.      Diagnoses and all orders for this visit:    Acute bilateral low back pain with right-sided sciatica  -     XR spine lumbar 2 or 3 views injury; Future  -     XR sacrum and coccyx; Future  -     predniSONE 10 mg tablet; 4 tabs x 3 days, 3 tabs x 3 days 2 tabs x 3 days 1 tab x 3 days, 1/2 tab x 3 days then stop          Subjective:     Patient ID: Travon Sanchez is a 89 y.o. male.    Cutting a log with a chain saw. Was trying to roll log with foot and slipped and fell back on buttock.States it sounded like dishes crashing. Felt pain in back that radiated up to head. Lasted 20 minutes then went away. No pain for about 2 weeks. For the last 2 weeks having bad pain in sacrum. Has times that it radiates up. Pain is worse if sitting too long and when he gets up. Pain is better when he up and moving. Will have pain that shoots into right leg. Denies any h/o back pain. Denies any leg numbness or weakness. No bowel or bladder incontinence. He has not restricted his activity. He continues to split wood and roll logs.         Review of Systems   Constitutional:  Negative for chills and fever.   Gastrointestinal:  Negative for constipation.   Genitourinary:  Negative for enuresis.   Musculoskeletal:  Positive for back pain.   Neurological:  Negative for weakness and numbness.         The following portions of the patient's history were reviewed and updated as appropriate: allergies, current medications, past family history, past medical history, past social history, past surgical history and problem list.    Objective:  Vitals:    04/23/24 1201   BP: 150/84   Pulse: 72   Temp: 97.9 °F (36.6 °C)   SpO2: 97%      Physical Exam  Vitals reviewed.   Constitutional:       Appearance: Normal appearance.   Cardiovascular:       Rate and Rhythm: Normal rate and regular rhythm.      Heart sounds: Normal heart sounds. No murmur heard.  Pulmonary:      Effort: Pulmonary effort is normal.      Breath sounds: Normal breath sounds.   Musculoskeletal:      Lumbar back: Tenderness (B/L sacrum) present. No swelling, signs of trauma or bony tenderness. Decreased range of motion (with forward flexion and right lateral bending).   Skin:     General: Skin is warm and dry.   Neurological:      Mental Status: He is alert and oriented to person, place, and time.   Psychiatric:         Mood and Affect: Mood normal.         Behavior: Behavior normal.         Thought Content: Thought content normal.         Judgment: Judgment normal.

## 2024-04-24 ENCOUNTER — TELEPHONE (OUTPATIENT)
Dept: FAMILY MEDICINE CLINIC | Facility: HOSPITAL | Age: 89
End: 2024-04-24

## 2024-04-24 NOTE — TELEPHONE ENCOUNTER
----- Message from MELINA Edmonds sent at 4/24/2024  8:49 AM EDT -----  Xrays of both low spine and sacrum were normal. No fracture. Only arthritic changes consistent with his age are noted.   He may want to consider PT. Would he be interested in doing this?

## 2024-04-24 NOTE — TELEPHONE ENCOUNTER
Relayed message . Patient was with his spouse. Not interested in PT . Patient states he's active enough.

## 2024-04-25 ENCOUNTER — TELEPHONE (OUTPATIENT)
Age: 89
End: 2024-04-25

## 2024-04-25 NOTE — TELEPHONE ENCOUNTER
Patients wife called in regarding prednisone. Wants to know if It is safe to take with all his heart medications. States that he is feeling much better on the medication. Pain has subsided. Please advise.

## 2024-05-28 ENCOUNTER — REMOTE DEVICE CLINIC VISIT (OUTPATIENT)
Dept: CARDIOLOGY CLINIC | Facility: CLINIC | Age: 89
End: 2024-05-28
Payer: COMMERCIAL

## 2024-05-28 DIAGNOSIS — Z95.0 PRESENCE OF CARDIAC PACEMAKER: Primary | ICD-10-CM

## 2024-05-28 PROCEDURE — 93294 REM INTERROG EVL PM/LDLS PM: CPT | Performed by: INTERNAL MEDICINE

## 2024-05-28 PROCEDURE — 93296 REM INTERROG EVL PM/IDS: CPT | Performed by: INTERNAL MEDICINE

## 2024-05-28 NOTE — PROGRESS NOTES
Results for orders placed or performed in visit on 05/28/24   Cardiac EP device report    Narrative    BIOT DUAL CHAMBER PPM - ACTIVE SYSTEM IS MRI CONDITIONAL  BIOTRONIK TRANSMISSION: BATTERY VOLTAGE ADEQUATE (OK~45%). AP: 95%. : 25%. ALL AVAILABLE LEAD PARAMETERS WITHIN NORMAL LIMITS. NO SIGNIFICANT HIGH RATE EPISODES. NORMAL DEVICE FUNCTION. CH

## 2024-06-10 DIAGNOSIS — I35.0 SEVERE AORTIC STENOSIS: ICD-10-CM

## 2024-06-10 DIAGNOSIS — I49.5 SICK SINUS SYNDROME (HCC): ICD-10-CM

## 2024-06-10 DIAGNOSIS — I10 PRIMARY HYPERTENSION: ICD-10-CM

## 2024-06-10 DIAGNOSIS — I48.0 PAROXYSMAL ATRIAL FIBRILLATION (HCC): ICD-10-CM

## 2024-06-10 RX ORDER — CARVEDILOL 6.25 MG/1
6.25 TABLET ORAL 2 TIMES DAILY WITH MEALS
Qty: 60 TABLET | Refills: 5 | Status: SHIPPED | OUTPATIENT
Start: 2024-06-10

## 2024-06-24 DIAGNOSIS — Z95.2 S/P AVR: ICD-10-CM

## 2024-06-24 RX ORDER — ATORVASTATIN CALCIUM 20 MG/1
20 TABLET, FILM COATED ORAL
Qty: 90 TABLET | Refills: 1 | Status: SHIPPED | OUTPATIENT
Start: 2024-06-24

## 2024-07-21 DIAGNOSIS — N40.0 BENIGN PROSTATIC HYPERPLASIA, UNSPECIFIED WHETHER LOWER URINARY TRACT SYMPTOMS PRESENT: ICD-10-CM

## 2024-07-21 RX ORDER — TAMSULOSIN HYDROCHLORIDE 0.4 MG/1
CAPSULE ORAL
Qty: 90 CAPSULE | Refills: 3 | Status: SHIPPED | OUTPATIENT
Start: 2024-07-21

## 2024-07-30 ENCOUNTER — OFFICE VISIT (OUTPATIENT)
Dept: FAMILY MEDICINE CLINIC | Facility: HOSPITAL | Age: 89
End: 2024-07-30
Payer: COMMERCIAL

## 2024-07-30 VITALS
BODY MASS INDEX: 24.11 KG/M2 | HEART RATE: 78 BPM | DIASTOLIC BLOOD PRESSURE: 80 MMHG | WEIGHT: 168.4 LBS | OXYGEN SATURATION: 98 % | TEMPERATURE: 98.2 F | SYSTOLIC BLOOD PRESSURE: 122 MMHG | HEIGHT: 70 IN

## 2024-07-30 DIAGNOSIS — R06.09 DYSPNEA ON EFFORT: ICD-10-CM

## 2024-07-30 DIAGNOSIS — I25.10 CORONARY ARTERY DISEASE INVOLVING NATIVE CORONARY ARTERY OF NATIVE HEART WITHOUT ANGINA PECTORIS: ICD-10-CM

## 2024-07-30 DIAGNOSIS — Z95.0 PRESENCE OF PERMANENT CARDIAC PACEMAKER: ICD-10-CM

## 2024-07-30 DIAGNOSIS — H61.23 BILATERAL IMPACTED CERUMEN: ICD-10-CM

## 2024-07-30 DIAGNOSIS — I10 PRIMARY HYPERTENSION: Primary | ICD-10-CM

## 2024-07-30 DIAGNOSIS — E03.9 ACQUIRED HYPOTHYROIDISM: ICD-10-CM

## 2024-07-30 PROCEDURE — 69209 REMOVE IMPACTED EAR WAX UNI: CPT | Performed by: FAMILY MEDICINE

## 2024-07-30 PROCEDURE — 99214 OFFICE O/P EST MOD 30 MIN: CPT | Performed by: FAMILY MEDICINE

## 2024-07-30 NOTE — PROGRESS NOTES
Ear cerumen removal    Date/Time: 7/30/2024 1:40 PM    Performed by: Marc Agee MD  Authorized by: Marc Agee MD  Universal Protocol:  Consent: Verbal consent obtained. Written consent not obtained.  Consent given by: patient  Timeout called at: 7/30/2024 2:02 PM.  Patient understanding: patient states understanding of the procedure being performed    Patient location:  Clinic  Procedure details:     Location:  R ear and L ear    Procedure type: irrigation only      Approach:  Natural orifice  Post-procedure details:     Complication:  None    Hearing quality:  Improved    Patient tolerance of procedure:  Tolerated well, no immediate complications

## 2024-07-30 NOTE — PROGRESS NOTES
Ambulatory Visit  Name: Travon Sanchez      : 10/7/1932      MRN: [id number]  Encounter Provider: Marc Agee MD  Encounter Date: 2024   Encounter department: [facility] [address]     Assessment & Plan   1. Primary hypertension  Assessment & Plan:  Excellent control  2. Acquired hypothyroidism  Assessment & Plan:  Clinically euthyroid  3. Coronary artery disease involving native coronary artery 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 pectoris  Assessment & Plan:  Not overtly symptomatic  4. Presence of permanent cardiac pacemaker  5. Dyspnea on effort  6. Bilateral impacted cerumen  -     Ear cerumen removal         History of Present Illness     4 month follow up.    Had recent pitchfork injury, has drainage from wound    Gets very dizzy and eyes get screwed up       Review of Systems   Constitutional:  Positive for fatigue. Negative for unexpected weight change.   Respiratory:  Positive for shortness of breath. Negative for cough.    Cardiovascular: Negative.    Gastrointestinal: Negative.    Musculoskeletal:  Positive for arthralgias and back pain.   Psychiatric/Behavioral: Negative.     All other systems reviewed and are negative.    Past Medical History:   Diagnosis Date    Angina pectoris (HCC)     Arthritis     Ascending aortic aneurysm (HCC)     trileaflet AV, 41mm    BPH (benign prostatic hyperplasia)     Cancer (HCC)     skin    Coronary artery disease     Former tobacco use     pipe & cigar use socially & infrequently    GERD (gastroesophageal reflux disease)     controlled w/ diet    Headache     h/o    Hepatitis A     History of melanoma     Hypertension     Hypothyroidism     Pacemaker     Sebaceous cyst 10/26/2022    Severe aortic stenosis     Shortness of breath     with exertion    Vitamin B12 deficiency     Wears dentures      Past Surgical History:   Procedure Laterality Date    CARDIAC CATHETERIZATION  04/15/2022    Procedure: Cardiac catheterization;  Surgeon:  Lars Fermin DO;  [facility]: [facility];  Service: Cardiology    CARDIAC CATHETERIZATION N/A 04/15/2022    Procedure: Cardiac Coronary Angiogram;  Surgeon: Lars Fermin DO;  [facility]: [facility];  Service: Cardiology    CARDIAC CATHETERIZATION N/A 04/15/2022    Procedure: Cardiac pci;  Surgeon: Lars Fermin DO;  [facility];  Service: Cardiology    CARDIAC PACEMAKER PLACEMENT      COLONOSCOPY      INGUINAL HERNIA REPAIR Bilateral     SD RPLCMT AORTIC VALVE OPN W/STENTLESS TISSUE VALVE N/A 5/16/2022    Procedure: REPLACEMENT VALVE AORTIC (TISSUE AVR) WITH 25 MM AORTIC MAGNA EASE VALVE W/ ROJELIO;  Surgeon: Jimmy Cantrell DO;  Location: [facility];  Service: Cardiac Surgery    SKIN CANCER EXCISION      melanoma, multiple     Family History   Problem Relation Age of Onset    Cancer Family         gastrointestinal tract    Heart disease Mother     Substance Abuse Neg Hx     Mental illness Neg Hx      Social History     Tobacco Use    Smoking status: Former     Types: Cigars    Smokeless tobacco: Never    Tobacco comments:     50 years ago   Vaping Use    Vaping status: Former   Substance and Sexual Activity    Alcohol use: Yes     Comment: a beer once in a while.    Drug use: No    Sexual activity: Not on file     Current Outpatient Medications on File Prior to Visit   Medication Sig    amLODIPine (NORVASC) 5 mg tablet Take 1 tablet (5 mg total) by mouth daily    Ascorbic Acid (VITAMIN C) 500 MG CAPS Take 1 tablet by mouth daily    aspirin (ECOTRIN LOW STRENGTH) 81 mg EC tablet Take 81 mg by mouth daily    atorvastatin (LIPITOR) 20 mg tablet TAKE 1 TABLET BY MOUTH DAILY WITH DINNER    carvedilol (COREG) 6.25 mg tablet TAKE ONE TABLET BY MOUTH TWICE A DAY WITH MEALS    finasteride (PROSCAR) 5 mg tablet TAKE ONE TABLET BY MOUTH EVERY DAY    Glucosamine-Chondroit-Vit C-Mn (GLUCOSAMINE 1500 COMPLEX) CAPS Take 1 capsule by mouth daily    levothyroxine 100 mcg  "tablet TAKE ONE TABLET BY MOUTH EVERY DAY    Multiple Vitamin (MULTIVITAMIN) capsule Take 1 capsule by mouth daily    nitroglycerin (NITROSTAT) 0.4 mg SL tablet Place 1 tablet (0.4 mg total) under the tongue every 5 (five) minutes as needed for chest pain    predniSONE 10 mg tablet 4 tabs x 3 days, 3 tabs x 3 days 2 tabs x 3 days 1 tab x 3 days, 1/2 tab x 3 days then stop    tamsulosin (FLOMAX) 0.4 mg TAKE ONE CAPSULE BY MOUTH TWICE A DAY    vitamin B-12 (VITAMIN B-12) 1,000 mcg tablet Take by mouth daily     Allergies   Allergen Reactions    Amoxicillin Other (See Comments)     Severe weakness     Immunization History   Administered Date(s) Administered    COVID-19 MODERNA VACC 0.5 ML IM 01/18/2021, 02/15/2021, 11/02/2021    COVID-19 Moderna Vac BIVALENT 12 Yr+ IM 0.5 ML 10/21/2022    COVID-19 Moderna mRNA Vaccine 12 Yr+ 50 mcg/0.5 mL (Spikevax) 10/31/2023    INFLUENZA 10/06/2022    Influenza Split High Dose Preservative Free IM 10/06/2014, 10/19/2015, 10/23/2017, 10/01/2019    Influenza, high dose seasonal 0.7 mL 10/05/2018, 10/07/2020, 10/06/2022, 10/26/2023    Influenza, seasonal, injectable 10/01/2012, 10/15/2014    Pneumococcal Conjugate 13-Valent 10/05/2018    Pneumococcal Polysaccharide PPV23 10/01/2012    Zoster 06/19/2008     Objective     /80 (BP Location: Left arm, Patient Position: Sitting, Cuff Size: Standard)   Pulse 78   Temp 98.2 °F (36.8 °C) (Tympanic)   Ht 5' 10\" (1.778 m)   Wt 76.4 kg (168 lb 6.4 oz)   SpO2 98%   BMI 24.16 kg/m²     Physical Exam  Vitals reviewed.   Constitutional:       Appearance: Normal appearance.   HENT:      Right Ear: There is impacted cerumen.      Left Ear: There is impacted cerumen.   Cardiovascular:      Rate and Rhythm: Regular rhythm.      Heart sounds: Normal heart sounds.   Pulmonary:      Breath sounds: Normal breath sounds.   Musculoskeletal:      Right lower leg: No edema.      Left lower leg: No edema.   Neurological:      Mental Status: He is " oriented to person, place, and time.

## 2024-08-16 DIAGNOSIS — I10 PRIMARY HYPERTENSION: ICD-10-CM

## 2024-08-16 RX ORDER — AMLODIPINE BESYLATE 5 MG/1
5 TABLET ORAL DAILY
Qty: 90 TABLET | Refills: 1 | Status: SHIPPED | OUTPATIENT
Start: 2024-08-16

## 2024-08-27 ENCOUNTER — REMOTE DEVICE CLINIC VISIT (OUTPATIENT)
Dept: CARDIOLOGY CLINIC | Facility: CLINIC | Age: 89
End: 2024-08-27
Payer: COMMERCIAL

## 2024-08-27 DIAGNOSIS — Z95.0 PRESENCE OF PERMANENT CARDIAC PACEMAKER: Primary | ICD-10-CM

## 2024-08-27 PROCEDURE — 93294 REM INTERROG EVL PM/LDLS PM: CPT | Performed by: INTERNAL MEDICINE

## 2024-08-27 PROCEDURE — 93296 REM INTERROG EVL PM/IDS: CPT | Performed by: INTERNAL MEDICINE

## 2024-08-27 NOTE — PROGRESS NOTES
Results for orders placed or performed in visit on 08/27/24   Cardiac EP device report    Narrative    BIOT DUAL CHAMBER PPM - ACTIVE SYSTEM IS MRI CONDITIONAL  BIOTRONIK TRANSMISSION: BATTERY VOLTAGE ADEQUATE (45%/OK). AP 95%  26% ALL AVAILABLE LEAD PARAMETERS WITHIN NORMAL LIMITS. NO SIGNIFICANT HIGH RATE EPISODES. NORMAL DEVICE FUNCTION. AM

## 2024-09-04 ENCOUNTER — TELEPHONE (OUTPATIENT)
Age: 89
End: 2024-09-04

## 2024-09-04 ENCOUNTER — OFFICE VISIT (OUTPATIENT)
Dept: FAMILY MEDICINE CLINIC | Facility: HOSPITAL | Age: 89
End: 2024-09-04
Payer: COMMERCIAL

## 2024-09-04 VITALS
OXYGEN SATURATION: 95 % | BODY MASS INDEX: 25.64 KG/M2 | HEIGHT: 68 IN | HEART RATE: 76 BPM | WEIGHT: 169.2 LBS | DIASTOLIC BLOOD PRESSURE: 70 MMHG | TEMPERATURE: 98.7 F | SYSTOLIC BLOOD PRESSURE: 128 MMHG

## 2024-09-04 DIAGNOSIS — I25.10 CORONARY ARTERY DISEASE INVOLVING NATIVE CORONARY ARTERY OF NATIVE HEART WITHOUT ANGINA PECTORIS: ICD-10-CM

## 2024-09-04 DIAGNOSIS — N40.1 BPH WITH URINARY OBSTRUCTION: ICD-10-CM
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
## 2024-09-04 DIAGNOSIS — Z95.0 PRESENCE OF PERMANENT CARDIAC PACEMAKER: ICD-10-CM

## 2024-09-04 DIAGNOSIS — I10 PRIMARY HYPERTENSION: ICD-10-CM

## 2024-09-04 DIAGNOSIS — I48.0 PAROXYSMAL ATRIAL FIBRILLATION (HCC): ICD-10-CM

## 2024-09-04 DIAGNOSIS — N13.8 BPH WITH URINARY OBSTRUCTION: ICD-10-CM

## 2024-09-04 DIAGNOSIS — U07.1 COVID-19: Primary | ICD-10-CM

## 2024-09-04 DIAGNOSIS — N40.0 ENLARGED PROSTATE WITHOUT LOWER URINARY TRACT SYMPTOMS (LUTS): ICD-10-CM

## 2024-09-04 PROBLEM — I95.1 ORTHOSTATIC HYPOTENSION: Status: RESOLVED | Noted: 2023-04-30 | Resolved: 2024-09-04
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
## 2024-09-04 LAB
SARS-COV-2 AG UPPER RESP QL IA: POSITIVE
VALID CONTROL: ABNORMAL

## 2024-09-04 PROCEDURE — 1100F PTFALLS ASSESS-DOCD GE2>/YR: CPT | Performed by: FAMILY MEDICINE

## 2024-09-04 PROCEDURE — G0439 PPPS, SUBSEQ VISIT: HCPCS | Performed by: FAMILY MEDICINE

## 2024-09-04 PROCEDURE — 99214 OFFICE O/P EST MOD 30 MIN: CPT | Performed by: FAMILY MEDICINE

## 2024-09-04 PROCEDURE — 87811 SARS-COV-2 COVID19 W/OPTIC: CPT | Performed by: FAMILY MEDICINE

## 2024-09-04 PROCEDURE — 3288F FALL RISK ASSESSMENT DOCD: CPT | Performed by: FAMILY MEDICINE

## 2024-09-04 PROCEDURE — 3725F SCREEN DEPRESSION PERFORMED: CPT | Performed by: FAMILY MEDICINE

## 2024-09-04 PROCEDURE — 1160F RVW MEDS BY RX/DR IN RCRD: CPT | Performed by: FAMILY MEDICINE

## 2024-09-04 PROCEDURE — 1170F FXNL STATUS ASSESSED: CPT | Performed by: FAMILY MEDICINE

## 2024-09-04 PROCEDURE — 1159F MED LIST DOCD IN RCRD: CPT | Performed by: FAMILY MEDICINE

## 2024-09-04 RX ORDER — FINASTERIDE 5 MG/1
5 TABLET, FILM COATED ORAL DAILY
Qty: 90 TABLET | Refills: 3 | Status: SHIPPED | OUTPATIENT
Start: 2024-09-04

## 2024-09-04 RX ORDER — FINASTERIDE 5 MG/1
5 TABLET, FILM COATED ORAL DAILY
Qty: 90 TABLET | Refills: 3 | Status: CANCELLED | OUTPATIENT
Start: 2024-09-04

## 2024-09-04 NOTE — ASSESSMENT & PLAN NOTE
He is currently stable.  He is on a regimen of Flomax and finasteride.  Okay without seeing urology at this time.  Continue the same medications.  Refill for finasteride given.

## 2024-09-04 NOTE — PATIENT INSTRUCTIONS
Medicare Preventive Visit Patient Instructions  Thank you for completing your Welcome to Medicare Visit or Medicare Annual Wellness Visit today. Your next wellness visit will be due in one year (9/5/2025).  The screening/preventive services that you may require over the next 5-10 years are detailed below. Some tests may not apply to you based off risk factors and/or age. Screening tests ordered at today's visit but not completed yet may show as past due. Also, please note that scanned in results may not display below.  Preventive Screenings:  Service Recommendations Previous Testing/Comments   Colorectal Cancer Screening  Colonoscopy    Fecal Occult Blood Test (FOBT)/Fecal Immunochemical Test (FIT)  Fecal DNA/Cologuard Test  Flexible Sigmoidoscopy Age: 45-75 years old   Colonoscopy: every 10 years (May be performed more frequently if at higher risk)  OR  FOBT/FIT: every 1 year  OR  Cologuard: every 3 years  OR  Sigmoidoscopy: every 5 years  Screening may be recommended earlier than age 45 if at higher risk for colorectal cancer. Also, an individualized decision between you and your healthcare provider will decide whether screening between the ages of 76-85 would be appropriate. Colonoscopy: Not on file  FOBT/FIT: Not on file  Cologuard: Not on file  Sigmoidoscopy: Not on file    Screening Not Indicated     Prostate Cancer Screening Individualized decision between patient and health care provider in men between ages of 55-69   Medicare will cover every 12 months beginning on the day after your 50th birthday PSA: No results in last 5 years     Screening Not Indicated     Hepatitis C Screening Once for adults born between 1945 and 1965  More frequently in patients at high risk for Hepatitis C Hep C Antibody: Not on file        Diabetes Screening 1-2 times per year if you're at risk for diabetes or have pre-diabetes Fasting glucose: 101 mg/dL (5/10/2022)  A1C: 6.3 % (4/16/2024)  Screening Current   Cholesterol Screening  Once every 5 years if you don't have a lipid disorder. May order more often based on risk factors. Lipid panel: 12/12/2023  Screening Not Indicated  History Lipid Disorder      Other Preventive Screenings Covered by Medicare:  Abdominal Aortic Aneurysm (AAA) Screening: covered once if your at risk. You're considered to be at risk if you have a family history of AAA or a male between the age of 65-75 who smoking at least 100 cigarettes in your lifetime.  Lung Cancer Screening: covers low dose CT scan once per year if you meet all of the following conditions: (1) Age 55-77; (2) No signs or symptoms of lung cancer; (3) Current smoker or have quit smoking within the last 15 years; (4) You have a tobacco smoking history of at least 20 pack years (packs per day x number of years you smoked); (5) You get a written order from a healthcare provider.  Glaucoma Screening: covered annually if you're considered high risk: (1) You have diabetes OR (2) Family history of glaucoma OR (3)  aged 50 and older OR (4)  American aged 65 and older  Osteoporosis Screening: covered every 2 years if you meet one of the following conditions: (1) Have a vertebral abnormality; (2) On glucocorticoid therapy for more than 3 months; (3) Have primary hyperparathyroidism; (4) On osteoporosis medications and need to assess response to drug therapy.  HIV Screening: covered annually if you're between the age of 15-65. Also covered annually if you are younger than 15 and older than 65 with risk factors for HIV infection. For pregnant patients, it is covered up to 3 times per pregnancy.    Immunizations:  Immunization Recommendations   Influenza Vaccine Annual influenza vaccination during flu season is recommended for all persons aged >= 6 months who do not have contraindications   Pneumococcal Vaccine   * Pneumococcal conjugate vaccine = PCV13 (Prevnar 13), PCV15 (Vaxneuvance), PCV20 (Prevnar 20)  * Pneumococcal polysaccharide  vaccine = PPSV23 (Pneumovax) Adults 19-65 yo with certain risk factors or if 65+ yo  If never received any pneumonia vaccine: recommend Prevnar 20 (PCV20)  Give PCV20 if previously received 1 dose of PCV13 or PPSV23   Hepatitis B Vaccine 3 dose series if at intermediate or high risk (ex: diabetes, end stage renal disease, liver disease)   Respiratory syncytial virus (RSV) Vaccine - COVERED BY MEDICARE PART D  * RSVPreF3 (Arexvy) CDC recommends that adults 60 years of age and older may receive a single dose of RSV vaccine using shared clinical decision-making (SCDM)   Tetanus (Td) Vaccine - COST NOT COVERED BY MEDICARE PART B Following completion of primary series, a booster dose should be given every 10 years to maintain immunity against tetanus. Td may also be given as tetanus wound prophylaxis.   Tdap Vaccine - COST NOT COVERED BY MEDICARE PART B Recommended at least once for all adults. For pregnant patients, recommended with each pregnancy.   Shingles Vaccine (Shingrix) - COST NOT COVERED BY MEDICARE PART B  2 shot series recommended in those 19 years and older who have or will have weakened immune systems or those 50 years and older     Health Maintenance Due:  There are no preventive care reminders to display for this patient.  Immunizations Due:      Topic Date Due   • Influenza Vaccine (1) 09/01/2024     Advance Directives   What are advance directives?  Advance directives are legal documents that state your wishes and plans for medical care. These plans are made ahead of time in case you lose your ability to make decisions for yourself. Advance directives can apply to any medical decision, such as the treatments you want, and if you want to donate organs.   What are the types of advance directives?  There are many types of advance directives, and each state has rules about how to use them. You may choose a combination of any of the following:  Living will:  This is a written record of the treatment you  want. You can also choose which treatments you do not want, which to limit, and which to stop at a certain time. This includes surgery, medicine, IV fluid, and tube feedings.   Durable power of  for healthcare (DPAHC):  This is a written record that states who you want to make healthcare choices for you when you are unable to make them for yourself. This person, called a proxy, is usually a family member or a friend. You may choose more than 1 proxy.  Do not resuscitate (DNR) order:  A DNR order is used in case your heart stops beating or you stop breathing. It is a request not to have certain forms of treatment, such as CPR. A DNR order may be included in other types of advance directives.  Medical directive:  This covers the care that you want if you are in a coma, near death, or unable to make decisions for yourself. You can list the treatments you want for each condition. Treatment may include pain medicine, surgery, blood transfusions, dialysis, IV or tube feedings, and a ventilator (breathing machine).  Values history:  This document has questions about your views, beliefs, and how you feel and think about life. This information can help others choose the care that you would choose.  Why are advance directives important?  An advance directive helps you control your care. Although spoken wishes may be used, it is better to have your wishes written down. Spoken wishes can be misunderstood, or not followed. Treatments may be given even if you do not want them. An advance directive may make it easier for your family to make difficult choices about your care.   Fall Prevention    Fall prevention  includes ways to make your home and other areas safer. It also includes ways you can move more carefully to prevent a fall. Health conditions that cause changes in your blood pressure, vision, or muscle strength and coordination may increase your risk for falls. Medicines may also increase your risk for falls if  they make you dizzy, weak, or sleepy.   Fall prevention tips:   Stand or sit up slowly.    Use assistive devices as directed.    Wear shoes that fit well and have soles that .    Wear a personal alarm.    Stay active.    Manage your medical conditions.    Home Safety Tips:  Add items to prevent falls in the bathroom.    Keep paths clear.    Install bright lights in your home.    Keep items you use often on shelves within reach.    Paint or place reflective tape on the edges of your stairs.    Weight Management   Why it is important to manage your weight:  Being overweight increases your risk of health conditions such as heart disease, high blood pressure, type 2 diabetes, and certain types of cancer. It can also increase your risk for osteoarthritis, sleep apnea, and other respiratory problems. Aim for a slow, steady weight loss. Even a small amount of weight loss can lower your risk of health problems.  How to lose weight safely:  A safe and healthy way to lose weight is to eat fewer calories and get regular exercise. You can lose up about 1 pound a week by decreasing the number of calories you eat by 500 calories each day.   Healthy meal plan for weight management:  A healthy meal plan includes a variety of foods, contains fewer calories, and helps you stay healthy. A healthy meal plan includes the following:  Eat whole-grain foods more often.  A healthy meal plan should contain fiber. Fiber is the part of grains, fruits, and vegetables that is not broken down by your body. Whole-grain foods are healthy and provide extra fiber in your diet. Some examples of whole-grain foods are whole-wheat breads and pastas, oatmeal, brown rice, and bulgur.  Eat a variety of vegetables every day.  Include dark, leafy greens such as spinach, kale, kiki greens, and mustard greens. Eat yellow and orange vegetables such as carrots, sweet potatoes, and winter squash.   Eat a variety of fruits every day.  Choose fresh or canned  fruit (canned in its own juice or light syrup) instead of juice. Fruit juice has very little or no fiber.  Eat low-fat dairy foods.  Drink fat-free (skim) milk or 1% milk. Eat fat-free yogurt and low-fat cottage cheese. Try low-fat cheeses such as mozzarella and other reduced-fat cheeses.  Choose meat and other protein foods that are low in fat.  Choose beans or other legumes such as split peas or lentils. Choose fish, skinless poultry (chicken or turkey), or lean cuts of red meat (beef or pork). Before you cook meat or poultry, cut off any visible fat.   Use less fat and oil.  Try baking foods instead of frying them. Add less fat, such as margarine, sour cream, regular salad dressing and mayonnaise to foods. Eat fewer high-fat foods. Some examples of high-fat foods include french fries, doughnuts, ice cream, and cakes.  Eat fewer sweets.  Limit foods and drinks that are high in sugar. This includes candy, cookies, regular soda, and sweetened drinks.  Exercise:  Exercise at least 30 minutes per day on most days of the week. Some examples of exercise include walking, biking, dancing, and swimming. You can also fit in more physical activity by taking the stairs instead of the elevator or parking farther away from stores. Ask your healthcare provider about the best exercise plan for you.      © Copyright [employer] 2018 Information is for End User's use only and may not be sold, redistributed or otherwise used for commercial purposes. All illustrations and images included in CareNotes® are the copyrighted property of [employer] or [employer]

## 2024-09-04 NOTE — ASSESSMENT & PLAN NOTE
This is not related to lightheadedness, orthostasis, difficulty of ambulation.  Usually it is due to activity that he could have prevented.  Such as he fell because he kicked a log multiple times.  The other reason he fell was he went into the woods and somewhat got tangled in lines.    Continue to monitor.  Safety precautions discussed.  No indication for physical therapy at this time.

## 2024-09-04 NOTE — PROGRESS NOTES
Ambulatory Visit  Name: Travon Sanchez      : 10/7/1932      MRN: [id number]  Encounter Provider: Tomas Navarrete MD  Encounter Date: 2024   Encounter department: [facility] [address]     Assessment & Plan   1. COVID-19  -     POCT Rapid Covid Ag  2. BPH with urinary obstruction  3. Paroxysmal atrial fibrillation (HCC)  Assessment & Plan:  Management per cardiology.  Continues on aspirin 81 mg daily.  He is on rate control.  He is on Coreg twice a day.  4. Presence of permanent cardiac pacemaker  5. Enlarged prostate without lower urinary tract symptoms (luts)  Assessment & Plan:  He is currently stable.  He is on a regimen of Flomax and finasteride.  Okay without seeing urology at this time.  Continue the same medications.  Refill for finasteride given.  6. Coronary artery disease involving native coronary artery 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 pectoris  Assessment & Plan:  Continue follow-up with cardiology.  Overall he is asymptomatic.      7. Primary hypertension  Assessment & Plan:  This has been stable.  Continue with the same.  8. Fall, subsequent encounter  Assessment & Plan:  This is not related to lightheadedness, orthostasis, difficulty of ambulation.  Usually it is due to activity that he could have prevented.  Such as he fell because he kicked a log multiple times.  The other reason he fell was he went into the woods and somewhat got tangled in lines.    Continue to monitor.  Safety precautions discussed.  No indication for physical therapy at this time.  I discussed with him that it is quite reasonable for him to be short of breath and not being able to do the activities that he has done all his life.  He is able to mow the lawn with a lawn more and work outside for 4 to 5 hours.  His limitation is when he is cutting wood utilizing a chainsaw.  He reports being tired after 15 minutes and needing a break.  I think at this point in his life this is quite reasonable  and honestly quite impressive that he could still do this.    Continue to monitor.  Will follow-up in 3 months.    Patient with URI symptoms and tested positive for COVID-19 today.  Discussed diagnosis and treatment.  Discussed options for treatment.  Shared decision making regarding the use of Paxlovid discussed.  He prefers not to use the Paxlovid at this time.  He will continue to monitor.     Preventive health issues were discussed with patient, and age appropriate screening tests were ordered as noted in patient's After Visit Summary. Personalized health advice and appropriate referrals for health education or preventive services given if needed, as noted in patient's After Visit Summary.    History of Present Illness     Travon is here for follow-up of chronic conditions and annual wellness visit.    Overall he reports he is feeling well.  However he gets frustrated that he tires easily.  He says he can only cut wood with a chainsaw for about 15 minutes at a time until he has to take a break.  However he can work around his yard and house for 4 to 5 hours.  He has had falls but is he relates it is due to things that he should not have done that created the fall.  No lightheadedness no dizziness.  He has no chest pain.     Over the past 2 days however he has an illness that he thinks he got from his wife.  He has nasal congestion, postnasal drip, mild cough.  No shortness of breath.  No wheezing.  No fever no chills.  Appetite has been good.  No loss of smell or taste.  No known sick contacts.       Patient Care Team:  Tomas Navarrete MD as PCP - General (Family Medicine)  Ty Bach MD    Review of Systems   Constitutional: Negative.  Negative for activity change, appetite change, chills and diaphoresis.   HENT:  Positive for congestion, postnasal drip and rhinorrhea. Negative for dental problem.    Respiratory:  Positive for cough. Negative for apnea, chest tightness, shortness of breath and wheezing.     Cardiovascular: Negative.  Negative for chest pain, palpitations and leg swelling.   Gastrointestinal: Negative.  Negative for abdominal distention, abdominal pain, constipation, diarrhea and nausea.   Genitourinary: Negative.  Negative for difficulty urinating, dysuria and frequency.     Medical History Reviewed by provider this encounter:       Annual Wellness Visit Questionnaire   Travon is here for his Subsequent Wellness visit.     Health Risk Assessment:   Patient rates overall health as good. Patient feels that their physical health rating is same. Patient is satisfied with their life. Eyesight was rated as same. Hearing was rated as same. Patient feels that their emotional and mental health rating is same. Patients states they are sometimes angry. Patient states they are sometimes unusually tired/fatigued. Patient states that he has experienced no weight loss or gain in last 6 months.     Depression Screening:   PHQ-2 Score: 0      Fall Risk Screening:   In the past year, patient has experienced: history of falling in past year    Number of falls: 2 or more  Injured during fall?: Yes    Feels unsteady when standing or walking?: Yes    Worried about falling?: No      Home Safety:  Patient does not have trouble with stairs inside or outside of their home. Patient has working smoke alarms and has no working carbon monoxide detector. Home safety hazards include: none.     Nutrition:   Current diet is Regular.     Medications:   Patient is currently taking over-the-counter supplements. OTC medications include: see medication list. Patient is able to manage medications.     Activities of Daily Living (ADLs)/Instrumental Activities of Daily Living (IADLs):   Walk and transfer into and out of bed and chair?: Yes  Dress and groom yourself?: Yes    Bathe or shower yourself?: Yes    Feed yourself? Yes  Do your laundry/housekeeping?: No  Manage your money, pay your bills and track your expenses?: No  Make your own  meals?: No    Do your own shopping?: Yes    Previous Hospitalizations:   Any hospitalizations or ED visits within the last 12 months?: No      Advance Care Planning:   Living will: Yes    Durable POA for healthcare: Yes    Advanced directive: Yes      Cognitive Screening:   Provider or family/friend/caregiver concerned regarding cognition?: No    PREVENTIVE SCREENINGS      Cardiovascular Screening:    General: Screening Not Indicated and History Lipid Disorder      Diabetes Screening:     General: Screening Current      Colorectal Cancer Screening:     General: Screening Not Indicated      Prostate Cancer Screening:    General: Screening Not Indicated      Osteoporosis Screening:    General: Screening Not Indicated      Abdominal Aortic Aneurysm (AAA) Screening:    Risk factors include: tobacco use        General: Screening Not Indicated      Lung Cancer Screening:     General: Screening Not Indicated      Hepatitis C Screening:    General: Screening Not Indicated    Screening, Brief Intervention, and Referral to Treatment (SBIRT)    Screening      Single Item Drug Screening:  How often have you used an illegal drug (including marijuana) or a prescription medication for non-medical reasons in the past year? never    Single Item Drug Screen Score: 0  Interpretation: Negative screen for possible drug use disorder    Social Determinants of Health     Financial Resource Strain: Low Risk  (8/29/2023)    Overall Financial Resource Strain (CARDIA)     Difficulty of Paying Living Expenses: Not hard at all   Food Insecurity: No Food Insecurity (9/4/2024)    Hunger Vital Sign     Worried About Running Out of Food in the Last Year: Never true     Ran Out of Food in the Last Year: Never true   Transportation Needs: No Transportation Needs (9/4/2024)    PRAPARE - Transportation     Lack of Transportation (Medical): No     Lack of Transportation (Non-Medical): No   Housing Stability: Low Risk  (9/4/2024)    Housing Stability  "Vital Sign     Unable to Pay for Housing in the Last Year: No     Number of Times Moved in the Last Year: 0     Homeless in the Last Year: No   Utilities: Not At Risk (9/4/2024)    [facility] Utilities     Threatened with loss of utilities: No     Vision Screening    Right eye Left eye Both eyes   Without correction      With correction 20/40 20/30 20/30       Objective     /70   Pulse 76   Temp 98.7 °F (37.1 °C)   Ht 5' 8\" (1.727 m)   Wt 76.7 kg (169 lb 3.2 oz)   SpO2 95%   BMI 25.73 kg/m²     Physical Exam  Vitals and nursing note reviewed.   Constitutional:       General: He is not in acute distress.     Appearance: He is well-developed. He is not ill-appearing.   HENT:      Head: Normocephalic and atraumatic.      Right Ear: Tympanic membrane, ear canal and external ear normal.      Left Ear: Tympanic membrane, ear canal and external ear normal.      Mouth/Throat:      Mouth: Mucous membranes are moist.      Pharynx: Oropharynx is clear.   Eyes:      Extraocular Movements: Extraocular movements intact.      Conjunctiva/sclera: Conjunctivae normal.      Pupils: Pupils are equal, round, and reactive to light.   Cardiovascular:      Rate and Rhythm: Normal rate and regular rhythm.      Heart sounds: Normal heart sounds. No murmur heard.  Pulmonary:      Effort: Pulmonary effort is normal.      Breath sounds: Normal breath sounds.   Abdominal:      General: Bowel sounds are normal. There is no distension.      Palpations: Abdomen is soft. There is no mass.      Tenderness: There is no abdominal tenderness. There is no guarding.      Hernia: No hernia is present.   Genitourinary:     Penis: Normal.    Musculoskeletal:         General: Normal range of motion.      Cervical back: Normal range of motion and neck supple.   Skin:     General: Skin is warm and dry.      Capillary Refill: Capillary refill takes less than 2 seconds.   Neurological:      General: No focal deficit present.      Mental Status: He is alert " and oriented to person, place, and time.   Psychiatric:         Mood and Affect: Mood normal.         Behavior: Behavior normal.

## 2024-09-04 NOTE — ASSESSMENT & PLAN NOTE
Management per cardiology.  Continues on aspirin 81 mg daily.  He is on rate control.  He is on Coreg twice a day.

## 2024-09-27 ENCOUNTER — OFFICE VISIT (OUTPATIENT)
Dept: CARDIOLOGY CLINIC | Facility: CLINIC | Age: 89
End: 2024-09-27
Payer: COMMERCIAL

## 2024-09-27 VITALS
HEIGHT: 70 IN | BODY MASS INDEX: 24.2 KG/M2 | DIASTOLIC BLOOD PRESSURE: 89 MMHG | WEIGHT: 169 LBS | HEART RATE: 76 BPM | SYSTOLIC BLOOD PRESSURE: 129 MMHG

## 2024-09-27 DIAGNOSIS — I48.0 PAROXYSMAL ATRIAL FIBRILLATION (HCC): Primary | ICD-10-CM

## 2024-09-27 PROCEDURE — 93000 ELECTROCARDIOGRAM COMPLETE: CPT | Performed by: PHYSICIAN ASSISTANT

## 2024-09-27 PROCEDURE — 99214 OFFICE O/P EST MOD 30 MIN: CPT | Performed by: PHYSICIAN ASSISTANT

## 2024-09-27 RX ORDER — AZITHROMYCIN 500 MG/1
500 TABLET, FILM COATED ORAL AS NEEDED
COMMUNITY
Start: 2024-09-24

## 2024-09-27 NOTE — PROGRESS NOTES
Cardiology Office Follow Up  Travon Sanchez  10/7/1932  [id number]      ASSESSMENT:  Paroxysmal atrial fibrillation  SSS s/p Biotronik DC PPM  AS s/p open TAVR are 5/17/2022  CAD s/p RCA PCI (pre-TAVR)  Hypertension  Hyperlipidemia    PLAN:  --TTE and Pharm MPI reviewed with patient in detail.  -- No further cardiac workup is anticipated at this time  -- BP reasonable but on the higher side therefore recommend continuing Norvasc 5 mg daily, Coreg 6.25mg twice daily  -- Continue ASA and statin.  He was recommended for anticoagulation in regards to his atrial fibrillation but declines due to bleeding risk.  -- Has SL NTG for as needed use  -Advised to follow-up with his PCP in regards to his chronic fatigue for additional workup-if desired.  -- RTO for routine 6-month follow-up or sooner if needed.    Interval History/ HPI:   Travon Sanchez is a 89-year-old male with past medical history as noted above who presents for results review visit.  Patient is known to cardiologist Dr. Charlton.    TTE 3/19/2024: EF 60%, G1DD, mildly reduced RV systolic function, mild RA dilation, bio AVR present well-seated and functioning normally without evidence of paravalvular regurgitation there is trace transvalvular regurgitation, mild MAC with mild MR and MS, PASP normal.    Pharm MPI 3/19/2024: Achieved 7 METS of activity with 90% MPHR.  Notable for 2 runs of NSVT during stress of 6 and 5 beats respectively, stress EKG negative for ischemia after maximal exercise. EKG and SPECT imaging of the stress study are concordant with no evidence of stress-induced myocardial ischemia.    EKG 9/27/2024: A paced rhythm  EP device interrogation (Biotronik DC PPM): AP 95%,  26%.     Ros denies any overt CV symptoms at today's visit.  He does have chronic residual fatigue and is a bit frustrated regarding this.  He denies any chest pain or discomfort including with exertion.  He is still very active outdoors and chops wood and does a fair amount  of outdoor work.  Prior to a few months ago had been able to do more but has been slowing down.  He denies any weight gain, orthopnea, PND or lower extremity edema today.  He was sent for routine testing including TTE and pharmacologic MPI as noted above.    He is interested in reducing the amount of medication he is on, although remains on 3 drug regimen for history of CAD with Coreg, Lipitor and ASA. Additionally he is on Norvasc 5 mg daily. His blood pressure is within normal limits but trends on the higher side ~130s systolic and therefore recommend continuing his regimen.  He has not needed SL NTG since his last visit with us.      Past Medical History:   Diagnosis Date    Angina pectoris (HCC)     Arthritis     Ascending aortic aneurysm (HCC)     trileaflet AV, 41mm    BPH (benign prostatic hyperplasia)     Cancer (HCC)     skin    Coronary artery disease     Former tobacco use     pipe & cigar use socially & infrequently    GERD (gastroesophageal reflux disease)     controlled w/ diet    Headache     h/o    Hepatitis A     History of melanoma     Hypertension     Hypothyroidism     Orthostatic hypotension 04/30/2023    Pacemaker     Sebaceous cyst 10/26/2022    Severe aortic stenosis     Shortness of breath     with exertion    Vitamin B12 deficiency     Wears dentures      Social History     Socioeconomic History    Marital status: /Civil Union     Spouse name: Maude    Number of children: Not on file    Years of education: Not on file    Highest education level: Not on file   Occupational History    Not on file   Tobacco Use    Smoking status: Former     Types: Cigars    Smokeless tobacco: Never    Tobacco comments:     50 years ago   Vaping Use    Vaping status: Former   Substance and Sexual Activity    Alcohol use: Yes     Comment: a beer once in a while.    Drug use: No    Sexual activity: Not on file   Other Topics Concern    Not on file   Social History Narrative    Living will on file     Supportive and safe    Lives with wife     Social Determinants of Health     Financial Resource Strain: Low Risk  (8/29/2023)    Overall Financial Resource Strain (CARDIA)     Difficulty of Paying Living Expenses: Not hard at all   Food Insecurity: No Food Insecurity (9/4/2024)    Hunger Vital Sign     Worried About Running Out of Food in the Last Year: Never true     Ran Out of Food in the Last Year: Never true   Transportation Needs: No Transportation Needs (9/4/2024)    PRAPARE - Transportation     Lack of Transportation (Medical): No     Lack of Transportation (Non-Medical): No   Physical Activity: Sufficiently Active (8/26/2022)    Exercise Vital Sign     Days of Exercise per Week: 7 days     Minutes of Exercise per Session: 120 min   Stress: No Stress Concern Present (8/26/2022)    [employer] - Occupational Stress Questionnaire     Feeling of Stress : Not at all   Social Connections: Socially Integrated (8/26/2022)    Social Connection and Isolation Panel [NHANES]     Frequency of Communication with Friends and Family: Once a week     Frequency of Social Gatherings with Friends and Family: Three times a week     Attends [employer] Services: 1 to 4 times per year     Active Member of Clubs or Organizations: Yes     Attends Club or Organization Meetings: 1 to 4 times per year     Marital Status:    Intimate Partner Violence: Not At Risk (8/26/2022)    Humiliation, Afraid, Rape, and Kick questionnaire     Fear of Current or Ex-Partner: No     Emotionally Abused: No     Physically Abused: No     Sexually Abused: No   Housing Stability: Low Risk  (9/4/2024)    Housing Stability Vital Sign     Unable to Pay for Housing in the Last Year: No     Number of Times Moved in the Last Year: 0     Homeless in the Last Year: No      Family History   Problem Relation Age of Onset    Cancer Family         gastrointestinal tract    Heart disease Mother     Substance Abuse Neg Hx     Mental  illness Neg Hx      Past Surgical History:   Procedure Laterality Date    CARDIAC CATHETERIZATION  04/15/2022    Procedure: Cardiac catheterization;  Surgeon: Lars Fermin DO;  Location: [facility];  Service: Cardiology    CARDIAC CATHETERIZATION N/A 04/15/2022    Procedure: Cardiac Coronary Angiogram;  Surgeon: Lars Fermin DO;  Location: [facility];  Service: Cardiology    CARDIAC CATHETERIZATION N/A 04/15/2022    Procedure: Cardiac pci;  Surgeon: Lars Fermin DO;  Location: [facility];  Service: Cardiology    CARDIAC PACEMAKER PLACEMENT      COLONOSCOPY      INGUINAL HERNIA REPAIR Bilateral     VA RPLCMT AORTIC VALVE OPN W/STENTLESS TISSUE VALVE N/A 5/16/2022    Procedure: REPLACEMENT VALVE AORTIC (TISSUE AVR) WITH 25 MM AORTIC MAGNA EASE VALVE W/ ROJELIO;  Surgeon: Jimmy Cantrell DO;  Location: [facility];  Service: Cardiac Surgery    SKIN CANCER EXCISION      melanoma, multiple       Current Outpatient Medications:     amLODIPine (NORVASC) 5 mg tablet, TAKE ONE TABLET BY MOUTH EVERY DAY, Disp: 90 tablet, Rfl: 1    Ascorbic Acid (VITAMIN C) 500 MG CAPS, Take 1 tablet by mouth daily, Disp: , Rfl:     aspirin (ECOTRIN LOW STRENGTH) 81 mg EC tablet, Take 81 mg by mouth daily, Disp: , Rfl:     atorvastatin (LIPITOR) 20 mg tablet, TAKE 1 TABLET BY MOUTH DAILY WITH DINNER, Disp: 90 tablet, Rfl: 1    azithromycin (ZITHROMAX) 500 MG tablet, Take 500 mg by mouth if needed (1 hour prior to dental procedures - valve), Disp: , Rfl:     carvedilol (COREG) 6.25 mg tablet, TAKE ONE TABLET BY MOUTH TWICE A DAY WITH MEALS, Disp: 60 tablet, Rfl: 5    finasteride (PROSCAR) 5 mg tablet, Take 1 tablet (5 mg total) by mouth daily, Disp: 90 tablet, Rfl: 3    Glucosamine-Chondroit-Vit C-Mn (GLUCOSAMINE 1500 COMPLEX) CAPS, Take 1 capsule by mouth daily, Disp: , Rfl:     levothyroxine 100 mcg tablet, TAKE ONE TABLET BY MOUTH EVERY DAY, Disp: 90 tablet, Rfl: 3    Multiple Vitamin  (MULTIVITAMIN) capsule, Take 1 capsule by mouth daily, Disp: , Rfl:     nitroglycerin (NITROSTAT) 0.4 mg SL tablet, Place 1 tablet (0.4 mg total) under the tongue every 5 (five) minutes as needed for chest pain, Disp: 24 tablet, Rfl: 1    tamsulosin (FLOMAX) 0.4 mg, TAKE ONE CAPSULE BY MOUTH TWICE A DAY, Disp: 90 capsule, Rfl: 3    vitamin B-12 (VITAMIN B-12) 1,000 mcg tablet, Take by mouth daily, Disp: , Rfl:       Review of Systems:  Review of Systems   Constitutional:  Negative for appetite change, chills, diaphoresis, fatigue and fever.   Respiratory:  Negative for cough, chest tightness and shortness of breath.    Cardiovascular:  Negative for chest pain, palpitations and leg swelling.   Gastrointestinal:  Negative for diarrhea, nausea and vomiting.   Endocrine: Negative for cold intolerance and heat intolerance.   Genitourinary:  Negative for difficulty urinating, dysuria and enuresis.   Musculoskeletal:  Negative for arthralgias, back pain and gait problem.   Allergic/Immunologic: Negative for environmental allergies and food allergies.   Neurological:  Negative for dizziness, facial asymmetry and headaches.   Hematological:  Negative for adenopathy. Does not bruise/bleed easily.   Psychiatric/Behavioral:  Negative for agitation, behavioral problems and confusion.          Physical Exam:  Physical Exam  Constitutional:       Appearance: He is well-developed.   HENT:      Right Ear: External ear normal.      Left Ear: External ear normal.   Eyes:      Extraocular Movements: EOM normal.      Pupils: Pupils are equal, round, and reactive to light.   Cardiovascular:      Rate and Rhythm: Normal rate and regular rhythm.      Heart sounds: Normal heart sounds. No murmur heard.     No friction rub. No gallop.   Pulmonary:      Effort: Pulmonary effort is normal.      Breath sounds: Normal breath sounds.   Abdominal:      Palpations: Abdomen is soft.   Musculoskeletal:         General: Normal range of motion.       Cervical back: Normal range of motion.   Skin:     General: Skin is warm and dry.   Neurological:      Mental Status: He is alert and oriented to person, place, and time.      Deep Tendon Reflexes: Reflexes are normal and symmetric.   Psychiatric:         Mood and Affect: Mood and affect normal.         Behavior: Behavior normal.         Thought Content: Thought content normal.         Judgment: Judgment normal.         This note was completed in part utilizing M-Modal Fluency Direct Software.  Grammatical errors, random word insertions, spelling mistakes, and incomplete sentences can be an occasional consequence of this system secondary to software limitations, ambient noise, and hardware issues.  If you have any questions or concerns about the content, text, or information contained within the body of this dictation, please contact the provider for clarification.

## 2024-10-24 ENCOUNTER — NURSE TRIAGE (OUTPATIENT)
Age: 89
End: 2024-10-24

## 2024-10-24 NOTE — TELEPHONE ENCOUNTER
"Spouse called stating for the past few months pt has been experiencing blurred vision, especially in the am. Symptoms seem to be progressing and during these episodes pt feels very off balance, and unsteady on his feet.    Spouse stated after stress testing 3/19/24 Dr Charlton came into the room and said pt may reduce or change some of his medications, and feels this is attributing to the blurred vision.     Pt had a recent appt with Jose 9/27/24 and again requested reducing medications, but on exam it was recommended to continue taking as directed.   BP in office 129/89, HR 76  Pt does not have any home BP readings, but will start checking when pt has another episode.     Pt is taking:  Amlodipine 5 mg  Carvedilol 6.25 mg BID  Atorvastatin 20 mg    I recommended following up with ophthalmology ASAP and pt has an eye exam next week.    In the meantime, spouse asked me to please discuss with Dr Charlton...forwarding message as requested.  Appt with Dr Charlton in April 2025    Answer Assessment - Initial Assessment Questions  1. DESCRIPTION: \"How has your vision changed?\" (e.g., complete vision loss, blurred vision, double vision, floaters, etc.)      Ongoing blurred vision in the am  2. LOCATION: \"One or both eyes?\" If one, ask: \"Which eye?\"      Both   3. SEVERITY: \"Can you see anything?\" If Yes, ask: \"What can you see?\" (e.g., fine print)      Currently no symptoms  4. ONSET: \"When did this begin?\" \"Did it start suddenly or has this been gradual?\"      Past few months  5. PATTERN: \"Does this come and go, or has it been constant since it started?\"      Comes and goes  6. PAIN: \"Is there any pain in your eye(s)?\"  (Scale 1-10; or mild, moderate, severe)      denies  7. CONTACTS-GLASSES: \"Do you wear contacts or glasses?\"      unknown  8. CAUSE: \"What do you think is causing this visual problem?\"      unknown  9. OTHER SYMPTOMS: \"Do you have any other symptoms?\" (e.g., confusion, headache, arm or leg weakness, speech " problems)      Unsteady gait    Protocols used: Vision Loss or Change-Adult-[place]

## 2024-11-26 ENCOUNTER — REMOTE DEVICE CLINIC VISIT (OUTPATIENT)
Dept: CARDIOLOGY CLINIC | Facility: CLINIC | Age: 89
End: 2024-11-26
Payer: COMMERCIAL

## 2024-11-26 ENCOUNTER — RESULTS FOLLOW-UP (OUTPATIENT)
Dept: CARDIOLOGY CLINIC | Facility: CLINIC | Age: 89
End: 2024-11-26

## 2024-11-26 DIAGNOSIS — Z95.0 CARDIAC PACEMAKER IN SITU: Primary | ICD-10-CM

## 2024-11-26 PROCEDURE — 93296 REM INTERROG EVL PM/IDS: CPT | Performed by: INTERNAL MEDICINE

## 2024-11-26 PROCEDURE — 93294 REM INTERROG EVL PM/LDLS PM: CPT | Performed by: INTERNAL MEDICINE

## 2024-11-26 NOTE — PROGRESS NOTES
BIOT DUAL CHAMBER PPM - ACTIVE SYSTEM IS MRI CONDITIONAL   BIOTRONIK TRANSMISSION: BATTERY VOLTAGE ADEQUATE (~ 45% REMAINING LONGEVITY). AP 94%  27% (DDD-CLS 70 PPM, -280 MS). ALL AVAILABLE LEAD PARAMETERS WITHIN NORMAL LIMITS. NO SIGNIFICANT HIGH RATE EPISODES. NORMAL DEVICE FUNCTION.  ES

## 2024-12-01 DIAGNOSIS — I35.0 SEVERE AORTIC STENOSIS: ICD-10-CM

## 2024-12-01 DIAGNOSIS — I49.5 SICK SINUS SYNDROME (HCC): ICD-10-CM

## 2024-12-01 DIAGNOSIS — I48.0 PAROXYSMAL ATRIAL FIBRILLATION (HCC): ICD-10-CM

## 2024-12-01 DIAGNOSIS — I10 PRIMARY HYPERTENSION: ICD-10-CM

## 2024-12-02 ENCOUNTER — TELEPHONE (OUTPATIENT)
Dept: CARDIOLOGY CLINIC | Facility: CLINIC | Age: 89
End: 2024-12-02

## 2024-12-02 ENCOUNTER — NURSE TRIAGE (OUTPATIENT)
Age: 89
End: 2024-12-02

## 2024-12-02 RX ORDER — CARVEDILOL 6.25 MG/1
6.25 TABLET ORAL 2 TIMES DAILY WITH MEALS
Qty: 180 TABLET | Refills: 1 | Status: SHIPPED | OUTPATIENT
Start: 2024-12-02

## 2024-12-02 NOTE — TELEPHONE ENCOUNTER
Caller: Maude Sanchez     Doctor: Dr. Adria Charlton     Reason for call: Wife called because she mentioned that the last time they had spoke to Dr. Charlton there was talks about taking Travon off of some meds or minimizing his med intake, but to there knowledge they have not stopped any meds and they would like clarity on that.     She also wanted to mention that he has been feeling dizzy for about a month now but last night he took his Amlodipine and fell over. I am also sending a Call HUB message to [facility] clinical but wanted to document on this T.E as well.     Call back#: [phone]

## 2024-12-02 NOTE — TELEPHONE ENCOUNTER
"Reason for Conversation: spoke with pt's wife.  Pt has been having intermittent dizziness for a month.  She states it occurs when pt stands up from sitting.  Last night she states pt was bending over getting wood for the wood stove and he felt dizzy, could not get fully up but he managed to fall back on the sofa.  Denies LOC.  Denies chest pain/SOB.  She states pt stays hydrated.  She says this dizziness occurs soon after he takes his amlodipine and coreg that he takes in the evening.  I offered an appointment this week with an AP, but she declined- pt prefers to only see Dr. Charlton.  Scheduled first available 3/28 and added to wait list.    VS/Weight: (Note: Please include date/time vitals/weight were measured)  unsure, pt doesn't check BP at home    Pain: No    Risk Factors: Hypertension, Devices, and Arrhythmia- paroxysmal atrial fibrillation, TAVR 2022    Recent relevant testing and date of testing: Other device report 11/25      Medication: amlodipine 5 mg qd, atorvastatin 20 mg qd, coreg 6.25 mg bid, asa 81 mg qd    Upcoming Office Visit: Yes    Last Office Visit: 9/27        Reason for Disposition   Taking a medicine that could cause dizziness (e.g., blood pressure medications, diuretics)    Answer Assessment - Initial Assessment Questions  1. DESCRIPTION: \"Describe your dizziness.\"      Dizziness ongoing for a month  2. LIGHTHEADED: \"Do you feel lightheaded?\" (e.g., somewhat faint, woozy, weak upon standing)      Yes   3. VERTIGO: \"Do you feel like either you or the room is spinning or tilting?\" (i.e., vertigo)      Yes, last night head was spinning  4. SEVERITY: \"How bad is it?\"  \"Do you feel like you are going to faint?\" \"Can you stand and walk?\"      Dizzy upon standing, able to walk  5. ONSET:  \"When did the dizziness begin?\"      Month ago  6. AGGRAVATING FACTORS: \"Does anything make it worse?\" (e.g., standing, change in head position)      Going from sitting to standing   7. HEART RATE: \"Can you tell " "me your heart rate?\" \"How many beats in 15 seconds?\"  (Note: Not all patients can do this.)        Unsure   8. CAUSE: \"What do you think is causing the dizziness?\" (e.g., decreased fluids or food, diarrhea, emotional distress, heat exposure, new medicine, sudden standing, vomiting; unknown)      Occurs after taking amlodipine and coreg at night  9. RECURRENT SYMPTOM: \"Have you had dizziness before?\" If Yes, ask: \"When was the last time?\" \"What happened that time?\"      Last night, felt very dizzy. Was bending over to get wood, and fell on the sofa   10. OTHER SYMPTOMS: \"Do you have any other symptoms?\" (e.g., fever, chest pain, vomiting, diarrhea, bleeding)        Denies    Protocols used: Dizziness-Adult-[place]    "

## 2024-12-02 NOTE — TELEPHONE ENCOUNTER
Regarding: DIZZINESS for about a month NOW  ----- Message from Chantel LUCAS sent at 12/2/2024  3:03 PM EST -----  Wife called because Travon has been experiencing some Dizziness for about a month now she said, but last night was the worst its got. She explained that he took his amlodipine per usual and moments later he stood up out of bed when feeling dizzy and fell over

## 2024-12-03 NOTE — TELEPHONE ENCOUNTER
Received call from patient's wife Maude, regarding VM message. Relayed earlier message from Dr. Charlton to hold Amlodipine, continue Carvedilol and monitor pts BP.  She states  is not home now, but they will check his BP when he returns and call back with reading.   Pt's wife verbalized understanding about Amlodipine changes. Pt takes Amlodipine at night, will hold at night and monitor BP daily.

## 2024-12-03 NOTE — TELEPHONE ENCOUNTER
Pt's wife called back with BP reading from today. 140/76.     Advised to continue to monitor BP daily. Offered an appointment with an AP but again declined.    ANNIE

## 2024-12-04 NOTE — TELEPHONE ENCOUNTER
Spouse called back today with this morning's BP readings.  Patient held the Amlodipine last night.  Continuing carvedilol    BP at 7:45 AM was 103/62.  At 10:15 AM it was 118/68.  No information on cuff regarding a heart rate.    Spouse asked if patient should continue to hold the Amlodipine.

## 2024-12-05 NOTE — TELEPHONE ENCOUNTER
Pt's wife returned the call.  Pt's BP this morning was 116/69 HR 78.  She states that since stopping the amlodipine the pt is feeling much better and is no longer dizzy and has much more energy.  The pt would like to know if he should stop this medication indefinitely.  Please review and advise

## 2024-12-16 ENCOUNTER — OFFICE VISIT (OUTPATIENT)
Dept: FAMILY MEDICINE CLINIC | Facility: HOSPITAL | Age: 89
End: 2024-12-16
Payer: COMMERCIAL

## 2024-12-16 VITALS
SYSTOLIC BLOOD PRESSURE: 120 MMHG | OXYGEN SATURATION: 96 % | DIASTOLIC BLOOD PRESSURE: 90 MMHG | WEIGHT: 170.6 LBS | HEIGHT: 70 IN | BODY MASS INDEX: 24.42 KG/M2 | HEART RATE: 73 BPM | TEMPERATURE: 97.1 F

## 2024-12-16 DIAGNOSIS — I10 PRIMARY HYPERTENSION: ICD-10-CM

## 2024-12-16 DIAGNOSIS — I48.0 PAROXYSMAL ATRIAL FIBRILLATION (HCC): ICD-10-CM

## 2024-12-16 DIAGNOSIS — B35.1 ONYCHOMYCOSIS: ICD-10-CM

## 2024-12-16 DIAGNOSIS — R73.03 PREDIABETES: ICD-10-CM

## 2024-12-16 DIAGNOSIS — I25.10 CORONARY ARTERY DISEASE INVOLVING NATIVE CORONARY ARTERY OF NATIVE HEART WITHOUT ANGINA PECTORIS: Primary | ICD-10-CM

## 2024-12-16 PROBLEM — R51.9 CHRONIC NONINTRACTABLE HEADACHE: Status: RESOLVED | Noted: 2017-05-30 | Resolved: 2024-12-16

## 2024-12-16 PROBLEM — G89.29 CHRONIC NONINTRACTABLE HEADACHE: Status: RESOLVED | Noted: 2017-05-30 | Resolved: 2024-12-16

## 2024-12-16 PROBLEM — R06.09 DYSPNEA ON EFFORT: Status: RESOLVED | Noted: 2023-01-03 | Resolved: 2024-12-16

## 2024-12-16 PROBLEM — R26.89 BALANCE DISORDER: Status: RESOLVED | Noted: 2019-06-11 | Resolved: 2024-12-16

## 2024-12-16 PROBLEM — S16.1XXS CERVICAL STRAIN, ACUTE, SEQUELA: Status: RESOLVED | Noted: 2024-03-12 | Resolved: 2024-12-16

## 2024-12-16 PROCEDURE — G2211 COMPLEX E/M VISIT ADD ON: HCPCS | Performed by: FAMILY MEDICINE

## 2024-12-16 PROCEDURE — 99214 OFFICE O/P EST MOD 30 MIN: CPT | Performed by: FAMILY MEDICINE

## 2024-12-16 NOTE — ASSESSMENT & PLAN NOTE
Stable.  On rate control.  Continues on aspirin.    Very high risk for bleeding.  Stay off of anticoagulation.  Orders:    TSH, 3rd generation with Free T4 reflex; Future

## 2024-12-16 NOTE — ASSESSMENT & PLAN NOTE
Has been stable.  Blood pressure readings at home have been in the 1/21/1930 range.    Off of amlodipine.  Continue with Coreg at 6.25 mg twice a day.  Orders:    TSH, 3rd generation with Free T4 reflex; Future

## 2024-12-16 NOTE — PROGRESS NOTES
Name: Travon Sanchez      : 10/7/1932      MRN: [id number]  Encounter Provider: Tomas Navarrete MD  Encounter Date: 2024   Encounter department: [facility] [address]   :  Assessment & Plan  Coronary artery disease involving native coronary artery 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 pectoris  Stable.  No symptoms.  Orders:    CBC; Future    Comprehensive metabolic panel; Future    Lipid Panel with Direct LDL reflex; Future    Hemoglobin A1C; Future    TSH, 3rd generation with Free T4 reflex; Future    Onychomycosis  Discussed thickened discolored nail on the right.  Continue to file and cut as needed.  Consider podiatry.  Advised no antifungals at this time.       Primary hypertension  Has been stable.  Blood pressure readings at home have been in the 1930 range.    Off of amlodipine.  Continue with Coreg at 6.25 mg twice a day.  Orders:    TSH, 3rd generation with Free T4 reflex; Future    Paroxysmal atrial fibrillation (HCC)  Stable.  On rate control.  Continues on aspirin.    Very high risk for bleeding.  Stay off of anticoagulation.  Orders:    TSH, 3rd generation with Free T4 reflex; Future    Prediabetes  Recheck A1c.  Continue good dietary control.  Orders:    Hemoglobin A1C; Future    TSH, 3rd generation with Free T4 reflex; Future           History of Present Illness     Patient is here for follow-up of chronic conditions.  Overall has been feeling well.  Since the last visit he was feeling some lightheadedness and fatigue.  Blood pressures were noted in the low 100s.  He was checking this at home.  Amlodipine has since stopped.  He is feeling significantly better off of the medication.          Review of Systems   Constitutional: Negative.  Negative for activity change, appetite change, chills and diaphoresis.   HENT:  Negative for congestion and dental problem.    Respiratory: Negative.  Negative for apnea, chest tightness, shortness of breath and wheezing.   "  Cardiovascular: Negative.  Negative for chest pain, palpitations and leg swelling.   Gastrointestinal: Negative.  Negative for abdominal distention, abdominal pain, constipation, diarrhea and nausea.   Genitourinary: Negative.  Negative for difficulty urinating, dysuria and frequency.       Objective   /90   Pulse 73   Temp (!) 97.1 °F (36.2 °C) (Tympanic)   Ht 5' 10\" (1.778 m)   Wt 77.4 kg (170 lb 9.6 oz)   SpO2 96%   BMI 24.48 kg/m²      Physical Exam  Vitals reviewed.   Constitutional:       General: He is not in acute distress.     Appearance: He is well-developed. He is not ill-appearing.   HENT:      Head: Normocephalic and atraumatic.      Right Ear: External ear normal.      Left Ear: External ear normal.      Mouth/Throat:      Mouth: Mucous membranes are moist.      Pharynx: Oropharynx is clear.   Eyes:      Extraocular Movements: Extraocular movements intact.      Conjunctiva/sclera: Conjunctivae normal.      Pupils: Pupils are equal, round, and reactive to light.   Cardiovascular:      Rate and Rhythm: Normal rate and regular rhythm.      Heart sounds: Normal heart sounds. No murmur heard.  Pulmonary:      Effort: Pulmonary effort is normal.      Breath sounds: Normal breath sounds.   Abdominal:      General: Bowel sounds are normal. There is no distension.      Palpations: Abdomen is soft. There is no mass.      Tenderness: There is no abdominal tenderness. There is no guarding.      Hernia: No hernia is present.   Musculoskeletal:         General: Normal range of motion.      Cervical back: Normal range of motion and neck supple.   Skin:     General: Skin is warm and dry.      Capillary Refill: Capillary refill takes less than 2 seconds.   Neurological:      General: No focal deficit present.      Mental Status: He is alert and oriented to person, place, and time.   Psychiatric:         Mood and Affect: Mood normal.         Behavior: Behavior normal.         "

## 2024-12-16 NOTE — ASSESSMENT & PLAN NOTE
Stable.  No symptoms.  Orders:    CBC; Future    Comprehensive metabolic panel; Future    Lipid Panel with Direct LDL reflex; Future    Hemoglobin A1C; Future    TSH, 3rd generation with Free T4 reflex; Future

## 2025-01-07 DIAGNOSIS — Z95.2 S/P AVR: ICD-10-CM

## 2025-01-07 RX ORDER — ATORVASTATIN CALCIUM 20 MG/1
20 TABLET, FILM COATED ORAL
Qty: 90 TABLET | Refills: 1 | Status: SHIPPED | OUTPATIENT
Start: 2025-01-07

## 2025-01-07 NOTE — TELEPHONE ENCOUNTER
Reason for call:   [x] Refill   [] Prior Auth  [x] Other: Patient has one tablet remaining    Office:   [x] PCP/Provider - [facility] CHRISTEL Agee MD   [] Specialty/Provider -     Medication:  atorvastatin (LIPITOR) 20 mg tablet    Dose/Frequency: TAKE 1 TABLET BY MOUTH DAILY WITH DINNER     Quantity: 90 tablet     Pharmacy: [address] [place] [address] [phone]    Does the patient have enough for 3 days?   [] Yes   [x] No - Send as HP to POD

## 2025-01-16 DIAGNOSIS — I10 PRIMARY HYPERTENSION: ICD-10-CM

## 2025-01-16 DIAGNOSIS — E03.9 ACQUIRED HYPOTHYROIDISM: ICD-10-CM

## 2025-01-16 RX ORDER — LEVOTHYROXINE SODIUM 100 UG/1
100 TABLET ORAL DAILY
Qty: 90 TABLET | Refills: 1 | Status: SHIPPED | OUTPATIENT
Start: 2025-01-16

## 2025-01-17 RX ORDER — AMLODIPINE BESYLATE 5 MG/1
5 TABLET ORAL DAILY
Qty: 90 TABLET | Refills: 1 | OUTPATIENT
Start: 2025-01-17

## 2025-01-20 DIAGNOSIS — N40.0 BENIGN PROSTATIC HYPERPLASIA, UNSPECIFIED WHETHER LOWER URINARY TRACT SYMPTOMS PRESENT: ICD-10-CM

## 2025-01-21 RX ORDER — TAMSULOSIN HYDROCHLORIDE 0.4 MG/1
0.4 CAPSULE ORAL 2 TIMES DAILY
Qty: 90 CAPSULE | Refills: 1 | Status: SHIPPED | OUTPATIENT
Start: 2025-01-21

## 2025-02-01 DIAGNOSIS — I10 PRIMARY HYPERTENSION: ICD-10-CM

## 2025-02-03 RX ORDER — AMLODIPINE BESYLATE 5 MG/1
5 TABLET ORAL DAILY
Qty: 90 TABLET | Refills: 1 | Status: SHIPPED | OUTPATIENT
Start: 2025-02-03

## 2025-03-04 ENCOUNTER — REMOTE DEVICE CLINIC VISIT (OUTPATIENT)
Dept: CARDIOLOGY CLINIC | Facility: CLINIC | Age: OVER 89
End: 2025-03-04
Payer: COMMERCIAL

## 2025-03-04 DIAGNOSIS — Z95.0 PRESENCE OF PERMANENT CARDIAC PACEMAKER: Primary | ICD-10-CM

## 2025-03-04 PROCEDURE — 93296 REM INTERROG EVL PM/IDS: CPT | Performed by: INTERNAL MEDICINE

## 2025-03-04 PROCEDURE — 93294 REM INTERROG EVL PM/LDLS PM: CPT | Performed by: INTERNAL MEDICINE

## 2025-03-04 NOTE — PROGRESS NOTES
Results for orders placed or performed in visit on 03/04/25   Cardiac EP device report    Narrative    BIOT DUAL CHAMBER PPM - ACTIVE SYSTEM IS MRI CONDITIONAL  BIOTRONIK TRANSMISSION: BATTERY VOLTAGE ADEQUATE. ( 40%) AP 95%  27%. ALL AVAILABLE LEAD PARAMETERS WITHIN NORMAL LIMITS. NO SIGNIFICANT HIGH RATE EPISODES. NORMAL DEVICE FUNCTION.---RAMOS

## 2025-03-09 ENCOUNTER — RESULTS FOLLOW-UP (OUTPATIENT)
Dept: NON INVASIVE DIAGNOSTICS | Facility: HOSPITAL | Age: OVER 89
End: 2025-03-09

## 2025-03-17 ENCOUNTER — OFFICE VISIT (OUTPATIENT)
Dept: FAMILY MEDICINE CLINIC | Facility: HOSPITAL | Age: OVER 89
End: 2025-03-17
Payer: COMMERCIAL

## 2025-03-17 VITALS
SYSTOLIC BLOOD PRESSURE: 146 MMHG | DIASTOLIC BLOOD PRESSURE: 82 MMHG | BODY MASS INDEX: 24.51 KG/M2 | HEIGHT: 70 IN | OXYGEN SATURATION: 96 % | HEART RATE: 76 BPM | WEIGHT: 171.2 LBS

## 2025-03-17 DIAGNOSIS — B35.1 ONYCHOMYCOSIS: Primary | ICD-10-CM

## 2025-03-17 DIAGNOSIS — I10 PRIMARY HYPERTENSION: ICD-10-CM

## 2025-03-17 DIAGNOSIS — I25.10 CORONARY ARTERY DISEASE INVOLVING NATIVE CORONARY ARTERY OF NATIVE HEART WITHOUT ANGINA PECTORIS: ICD-10-CM

## 2025-03-17 DIAGNOSIS — Z29.89 INDICATION PRESENT FOR ENDOCARDITIS PROPHYLAXIS: Primary | ICD-10-CM

## 2025-03-17 DIAGNOSIS — I48.0 PAROXYSMAL ATRIAL FIBRILLATION (HCC): ICD-10-CM

## 2025-03-17 PROCEDURE — 99214 OFFICE O/P EST MOD 30 MIN: CPT | Performed by: FAMILY MEDICINE

## 2025-03-17 RX ORDER — AZITHROMYCIN 500 MG/1
500 TABLET, FILM COATED ORAL AS NEEDED
Qty: 3 TABLET | Refills: 0 | Status: SHIPPED | OUTPATIENT
Start: 2025-03-17 | End: 2025-03-18

## 2025-03-17 NOTE — TELEPHONE ENCOUNTER
Pt has a dental appt next Monday.  Lacie was supposed to call us to refill this.     Medication: azithromycin (ZITHROMAX) 500 MG tablet     Dose/Frequency:     Take 500 mg by mouth if needed (1 hour prior to dental procedures - valve)       Quantity: Dose, Route, Frequency: 500 mg,     Pharmacy: [facility]    Office:   [x] PCP/Provider - Dr Navarrete  [] Speciality/Provider -     Does the patient have enough for 3 days?   [] Yes   [x] No - Send as HP to POD

## 2025-03-17 NOTE — PROGRESS NOTES
Name: Travon Sanchez      : 10/7/1932      MRN: [id number]  Encounter Provider: Tomas Navarrete MD  Encounter Date: 3/17/2025   Encounter department: [facility] [address]ARE [address]   :  Assessment & Plan  Onychomycosis  Needs better nail care.   Would not recommend oral antifungals but would benefit from mechanial debridement.     Referral to podaitry.     Orders:    Ambulatory Referral to Podiatry; Future    Paroxysmal atrial fibrillation (HCC)  Stable. Fu with cardiology.   Prefers to maintain on AC and rate control.        Primary hypertension  Stable. Keep BP < 150.   Does not tolerate lower Bp        Coronary artery disease involving native coronary artery 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 pectoris  Stable.     Has intermittent chest pain at rest, short lasting.   Not associated with sob or diaphoresis.     He does not have chest pain on exertion.   He feels best when he is active doing his outside chores.               History of Present Illness   Travon is here for fu of chronic conditions.     Reports occasional chest pain.only at rest. Mainly when he is restless.   Thinks about all the things he needs to do around his yard.     He feels best when he works outside 3-4 hours. Although noting tired and achy the day after.         Dizziness  Pertinent negatives include no abdominal pain, chest pain, chills, congestion, diaphoresis or nausea.     Review of Systems   Constitutional: Negative.  Negative for activity change, appetite change, chills and diaphoresis.   HENT:  Negative for congestion and dental problem.    Respiratory: Negative.  Negative for apnea, chest tightness, shortness of breath and wheezing.    Cardiovascular: Negative.  Negative for chest pain, palpitations and leg swelling.   Gastrointestinal: Negative.  Negative for abdominal distention, abdominal pain, constipation, diarrhea and nausea.   Genitourinary: Negative.  Negative for difficulty urinating, dysuria and  "frequency.   Neurological:  Positive for dizziness.       Objective   /82   Pulse 76   Ht 5' 10\" (1.778 m)   Wt 77.7 kg (171 lb 3.2 oz)   SpO2 96%   BMI 24.56 kg/m²      Physical Exam  Vitals reviewed.   Constitutional:       General: He is not in acute distress.     Appearance: He is well-developed. He is not ill-appearing.   HENT:      Head: Normocephalic and atraumatic.      Right Ear: External ear normal.      Left Ear: External ear normal.      Mouth/Throat:      Mouth: Mucous membranes are moist.      Pharynx: Oropharynx is clear.   Eyes:      Extraocular Movements: Extraocular movements intact.      Conjunctiva/sclera: Conjunctivae normal.      Pupils: Pupils are equal, round, and reactive to light.   Cardiovascular:      Rate and Rhythm: Normal rate and regular rhythm.      Heart sounds: Normal heart sounds. No murmur heard.  Pulmonary:      Effort: Pulmonary effort is normal.      Breath sounds: Normal breath sounds.   Abdominal:      General: Bowel sounds are normal. There is no distension.      Palpations: Abdomen is soft. There is no mass.      Tenderness: There is no abdominal tenderness. There is no guarding.      Hernia: No hernia is present.   Musculoskeletal:         General: Normal range of motion.      Cervical back: Normal range of motion and neck supple.   Skin:     General: Skin is warm and dry.      Capillary Refill: Capillary refill takes less than 2 seconds.   Neurological:      General: No focal deficit present.      Mental Status: He is alert and oriented to person, place, and time.   Psychiatric:         Mood and Affect: Mood normal.         Behavior: Behavior normal.         "

## 2025-03-17 NOTE — ASSESSMENT & PLAN NOTE
Stable.     Has intermittent chest pain at rest, short lasting.   Not associated with sob or diaphoresis.     He does not have chest pain on exertion.   He feels best when he is active doing his outside chores.

## 2025-04-13 DIAGNOSIS — N40.0 BENIGN PROSTATIC HYPERPLASIA, UNSPECIFIED WHETHER LOWER URINARY TRACT SYMPTOMS PRESENT: ICD-10-CM

## 2025-04-14 RX ORDER — TAMSULOSIN HYDROCHLORIDE 0.4 MG/1
0.4 CAPSULE ORAL 2 TIMES DAILY
Qty: 90 CAPSULE | Refills: 1 | Status: SHIPPED | OUTPATIENT
Start: 2025-04-14

## 2025-04-22 ENCOUNTER — OFFICE VISIT (OUTPATIENT)
Dept: CARDIOLOGY CLINIC | Facility: CLINIC | Age: OVER 89
End: 2025-04-22
Payer: COMMERCIAL

## 2025-04-22 VITALS
SYSTOLIC BLOOD PRESSURE: 118 MMHG | DIASTOLIC BLOOD PRESSURE: 82 MMHG | HEIGHT: 70 IN | WEIGHT: 171 LBS | BODY MASS INDEX: 24.48 KG/M2 | HEART RATE: 82 BPM

## 2025-04-22 DIAGNOSIS — I25.10 CORONARY ARTERY DISEASE INVOLVING NATIVE CORONARY ARTERY OF NATIVE HEART WITHOUT ANGINA PECTORIS: ICD-10-CM

## 2025-04-22 DIAGNOSIS — I49.5 SICK SINUS SYNDROME (HCC): ICD-10-CM

## 2025-04-22 DIAGNOSIS — Z95.2 S/P AVR: ICD-10-CM

## 2025-04-22 DIAGNOSIS — I35.0 SEVERE AORTIC STENOSIS: ICD-10-CM

## 2025-04-22 DIAGNOSIS — Z95.5 S/P DRUG ELUTING CORONARY STENT PLACEMENT: ICD-10-CM

## 2025-04-22 DIAGNOSIS — Z95.0 PRESENCE OF PERMANENT CARDIAC PACEMAKER: ICD-10-CM

## 2025-04-22 DIAGNOSIS — I10 PRIMARY HYPERTENSION: ICD-10-CM

## 2025-04-22 DIAGNOSIS — I48.0 PAROXYSMAL ATRIAL FIBRILLATION (HCC): Primary | ICD-10-CM

## 2025-04-22 PROCEDURE — 99214 OFFICE O/P EST MOD 30 MIN: CPT | Performed by: INTERNAL MEDICINE

## 2025-04-22 PROCEDURE — 93000 ELECTROCARDIOGRAM COMPLETE: CPT | Performed by: INTERNAL MEDICINE

## 2025-04-22 RX ORDER — CARVEDILOL 3.12 MG/1
3.12 TABLET ORAL 2 TIMES DAILY WITH MEALS
Qty: 180 TABLET | Refills: 3 | Status: SHIPPED | OUTPATIENT
Start: 2025-04-22

## 2025-04-22 NOTE — PROGRESS NOTES
Cardiology Consultation     Travon Sanchez  [id number]  10/7/1932  HEART & VASCULAR  [facility] [place]  [place]  [facility] [id number]    1. Paroxysmal atrial fibrillation (HCC)  POCT ECG      2. Coronary artery disease involving native coronary artery 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 pectoris        3. Sick sinus syndrome (HCC)        4. Primary hypertension        5. S/P drug eluting coronary stent placement        6. S/P AVR        7. Presence of permanent cardiac pacemaker            Discussion/Summary:    1.  Severe aortic stenosis - This progressed to severe in 2021 and then the next year he was becoming more symptomatic.  He went to CT surgery and went through TAVR workup.  He ended up having an open AVR on 5/17/2022, as his left coronary ostium takeoff was too low and close to the annulus.  This went well without complications.  He did complete cardiac rehabilitation.  His echocardiograms have shown a normally functioning AVR with normal LV function.  He should take antibiotic prophylaxis for any dental procedures.  We will see him back in 1 year.    2.  History of permanent pacemaker - This was secondary to sick sinus syndrome.  He had some sort of mechanical chest pain after his procedure that fortunately have greatly improved.  He will continue to follow in our pacemaker clinic.  He is 100% a paced, and about 50% V-paced.    3.  Paroxysmal atrial fibrillation - Found on pacemaker interrogation.  He is predominantly atrially paced and has not had any recent episodes of atrial fibrillation with the last episode detected about 3-4 years ago.  Anticoagulation is recommended however he has declined in the past.  He is on aspirin.    5.  Hypertension - His blood pressure was elevated a few visits ago.  We tried to uptitrate carvedilol but it appears that he did not tolerate this.  He remains on 6.25 mg twice daily.  He had some orthostatic  hypotension at our last visit and we backed off of amlodipine to 5 mg daily.  His blood pressure has been under decent control and his symptoms are no longer present.  As stated below we are going to decrease his carvedilol.    6.  CAD - During his workup for his aortic valve replacement, cardiac catheterization did show a 90% RCA in which he received a drug-eluting stent.  At this point he can come off the Plavix and continue aspirin indefinitely.  He also remains on beta-blocker and statin.     7.  Shortness of breath with exertion - Travon told me he did feel better after his aortic valve replacement, but now he is telling me that he does not recall this to be really true.  He has chronic shortness of breath and fatigue when he tries to do things around his yard, but he continues to remain active.  His echocardiograms have looked good with a normally functioning AVR and his stress nuclear study last year was normal.  I question whether this is inability to increase his heart rate given his paced rhythm and side effects of medications.  I am going to try decreasing the carvedilol to 3.125 mg twice daily to see if this helps.      HPI:    Mr. Sanchez comes in for follow-up given his cardiac history.  He has a history of sick sinus syndrome and status post permanent pacemaker in March of 2016.  He also has a history of moderate aortic stenosis, paroxysmal atrial fibrillation and hypertension.  He is predominantly atrially paced.  He was offered anticoagulation for stroke prevention, but declined.    His prior cardiology care was through [facility], where he received as permanent pacemaker.  Travon gives me a history of having significant mechanical pain after his permanent pacemaker.  He recalls coming out of anesthesia and felt as if somebody was pressing on his chest.  It is unclear at this time what exactly had happened.  I asked if there was a complication like an arrhythmia that required defibrillation, but he  does not ever recall being told that.  For close to 3 years he had on and off mechanical pains from his pacemaker.  They have improved, and now he seems to describe the fleeting sharp chest pain that only lasts a few seconds.  His pacemaker interrogations show that he is 100% A-paced, and V paced about 50% of the time.  He has had a few episodes of atrial fibrillation detected by pacer interrogation in the past.  He has had none recently.    His echocardiograms through the years showed moderate aortic stenosis but we repeated 1 last year that showed progression to severe aortic stenosis.  At that time he had minimal symptoms, but these changed in follow-up.  When I last saw him earlier this year he was having progressive shortness of breath over the winter.  He would go for walks and started noticing that he had to take breaks.  He denies chest pain or syncope.    At that point I referred him to CT surgery for TAVR evaluation.  He went through all the preoperative testing that included cardiac catheterization and CTAs.  He was found to have a significant 90% RCA stenosis that received a drug-eluting stent in April 2022.  He had an 80% OM1 stenosis but this was a small vessel supplying a small territory which was treated medically.  His CTA did show that his left coronary ostium takeoff was very low and close to the aortic valve annulus.  This put him at risk for left main occlusion with TAVR.  CT surgery at that point recommended open AVR and after he thought about this and discussed it with his family, he decided to proceed.  He had this performed on 05/16/22.  This went well without complications.  In follow-up he was doing well and he was enrolled in cardiac rehabilitation.    Last year while doing cardiac rehab we noticed is that his blood pressure was running high.  He was having some lightheadedness and dizziness as well.  At that visit we changed Toprol over to carvedilol 6.25 mg twice daily and added back  his amlodipine 5 mg daily.  This then was uptitrated to twice daily.    A couple years ago he was having orthostatic lightheadedness and we cut his amlodipine down to daily.  This helped with the symptoms.  His pacemaker checks have been normal.    Travon has told me over the last several appointments that he has had shortness of breath with exertion and fatigue with trying to do his usual yard work and other things around his property.  He states that this has been present since his valve replacement, and does not recall ever feeling that much better.  He has had a couple echocardiograms that have showed no change with a normally functioning aortic valve replacement.  Last year we had him go through a stress nuclear study which was normal.  He still is very active for his age and takes care of his yard and land.  He can go above and beyond his activities of daily living but will notice he has to take breaks due to shortness of breath.  He denies any shortness of breath at rest.  No other signs or symptoms of CHF.  He denies palpitations, lightheadedness or syncope.      Patient Active Problem List   Diagnosis    Enlarged prostate without lower urinary tract symptoms (luts)    GERD without esophagitis    Primary hypertension    Acquired hypothyroidism    Sick sinus syndrome (HCC)    Presence of permanent cardiac pacemaker    Paroxysmal atrial fibrillation (HCC)    Coronary artery disease involving native coronary artery 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 pectoris    S/P drug eluting coronary stent placement    S/P AVR    Pure hypercholesterolemia    Retained suture    Ectatic aorta ([facility])    Fall     Past Medical History:   Diagnosis Date    Angina pectoris ([facility])     Arthritis     Ascending aortic aneurysm ([facility])     trileaflet AV, 41mm    Balance disorder 06/11/2019    BPH (benign prostatic hyperplasia)     Cancer (Formerly [facility])     skin    Cervical strain, acute, sequela 03/12/2024    Coronary artery disease     Dyspnea on  effort 01/03/2023    Former tobacco use     pipe & cigar use socially & infrequently    GERD (gastroesophageal reflux disease)     controlled w/ diet    Headache     h/o    Hepatitis A     History of melanoma     Hypertension     Hypothyroidism     Orthostatic hypotension 04/30/2023    Pacemaker     Sebaceous cyst 10/26/2022    Severe aortic stenosis     Shortness of breath     with exertion    Vitamin B12 deficiency     Wears dentures      Social History     Socioeconomic History    Marital status: /Civil Union     Spouse name: Maude    Number of children: Not on file    Years of education: Not on file    Highest education level: Not on file   Occupational History    Not on file   Tobacco Use    Smoking status: Former     Types: Cigars    Smokeless tobacco: Never    Tobacco comments:     50 years ago   Vaping Use    Vaping status: Former   Substance and Sexual Activity    Alcohol use: Yes     Comment: a beer once in a while.    Drug use: No    Sexual activity: Not on file   Other Topics Concern    Not on file   Social History Narrative    Living will on file    Supportive and safe    Lives with wife     Social Drivers of Health     Financial Resource Strain: Low Risk  (8/29/2023)    Overall Financial Resource Strain (CARDIA)     Difficulty of Paying Living Expenses: Not hard at all   Food Insecurity: No Food Insecurity (9/4/2024)    Nursing - Inadequate Food Risk Classification     Worried About Running Out of Food in the Last Year: Never true     Ran Out of Food in the Last Year: Never true     Ran Out of Food in the Last Year: Not on file   Transportation Needs: No Transportation Needs (9/4/2024)    PRAPARE - Transportation     Lack of Transportation (Medical): No     Lack of Transportation (Non-Medical): No   Physical Activity: Sufficiently Active (8/26/2022)    Exercise Vital Sign     Days of Exercise per Week: 7 days     Minutes of Exercise per Session: 120 min   Stress: No Stress Concern Present  (8/26/2022)    [employer] [place] - Occupational Stress Questionnaire     Feeling of Stress : Not at all   Social Connections: Socially Integrated (8/26/2022)    Social Connection and Isolation Panel [NHANES]     Frequency of Communication with Friends and Family: Once a week     Frequency of Social Gatherings with Friends and Family: Three times a week     Attends Anabaptism Services: 1 to 4 times per year     Active Member of Clubs or Organizations: Yes     Attends Club or Organization Meetings: 1 to 4 times per year     Marital Status:    Intimate Partner Violence: Not At Risk (8/26/2022)    Humiliation, Afraid, Rape, and Kick questionnaire     Fear of Current or Ex-Partner: No     Emotionally Abused: No     Physically Abused: No     Sexually Abused: No   Housing Stability: Low Risk  (9/4/2024)    Housing Stability Vital Sign     Unable to Pay for Housing in the Last Year: No     Number of Times Moved in the Last Year: 0     Homeless in the Last Year: No      Family History   Problem Relation Age of Onset    Cancer Family         gastrointestinal tract    Heart disease Mother     Substance Abuse Neg Hx     Mental illness Neg Hx      Past Surgical History:   Procedure Laterality Date    CARDIAC CATHETERIZATION  04/15/2022    Procedure: Cardiac catheterization;  Surgeon: Lars Fermin DO;  Location: [facility];  Service: Cardiology    CARDIAC CATHETERIZATION N/A 04/15/2022    Procedure: Cardiac Coronary Angiogram;  Surgeon: Lars Fermin DO;  Location: [facility];  Service: Cardiology    CARDIAC CATHETERIZATION N/A 04/15/2022    Procedure: Cardiac pci;  Surgeon: Lars Fermin DO;  Location: [facility];  Service: Cardiology    CARDIAC PACEMAKER PLACEMENT      COLONOSCOPY      INGUINAL HERNIA REPAIR Bilateral     ME RPLCMT AORTIC VALVE OPN W/STENTLESS TISSUE VALVE N/A 5/16/2022    Procedure: REPLACEMENT VALVE AORTIC (TISSUE AVR) WITH 25 MM  "AORTIC MAGNA EASE VALVE W/ ROJELIO;  Surgeon: Jimmy Cantrell DO;  Location: [facility];  Service: Cardiac Surgery    SKIN CANCER EXCISION      melanoma, multiple       Current Outpatient Medications:     amLODIPine (NORVASC) 5 mg tablet, TAKE ONE TABLET BY MOUTH EVERY DAY, Disp: 90 tablet, Rfl: 1    aspirin (ECOTRIN LOW STRENGTH) 81 mg EC tablet, Take 81 mg by mouth daily, Disp: , Rfl:     atorvastatin (LIPITOR) 20 mg tablet, Take 1 tablet (20 mg total) by mouth daily with dinner, Disp: 90 tablet, Rfl: 1    carvedilol (COREG) 6.25 mg tablet, TAKE ONE TABLET BY MOUTH TWICE A DAY WITH MEALS, Disp: 180 tablet, Rfl: 1    finasteride (PROSCAR) 5 mg tablet, Take 1 tablet (5 mg total) by mouth daily, Disp: 90 tablet, Rfl: 3    Glucosamine-Chondroit-Vit C-Mn (GLUCOSAMINE 1500 COMPLEX) CAPS, Take 1 capsule by mouth daily, Disp: , Rfl:     levothyroxine 100 mcg tablet, TAKE ONE TABLET BY MOUTH EVERY DAY, Disp: 90 tablet, Rfl: 1    tamsulosin (FLOMAX) 0.4 mg, TAKE ONE CAPSULE BY MOUTH TWICE A DAY, Disp: 90 capsule, Rfl: 1    vitamin B-12 (VITAMIN B-12) 1,000 mcg tablet, Take by mouth daily, Disp: , Rfl:     Ascorbic Acid (VITAMIN C) 500 MG CAPS, Take 1 tablet by mouth daily, Disp: , Rfl:     Multiple Vitamin (MULTIVITAMIN) capsule, Take 1 capsule by mouth daily, Disp: , Rfl:     nitroglycerin (NITROSTAT) 0.4 mg SL tablet, Place 1 tablet (0.4 mg total) under the tongue every 5 (five) minutes as needed for chest pain (Patient not taking: Reported on 4/22/2025), Disp: 24 tablet, Rfl: 1  Allergies   Allergen Reactions    Amoxicillin Other (See Comments)     Severe weakness     Vitals:    04/22/25 0935   BP: 118/82   BP Location: Left arm   Patient Position: Sitting   Cuff Size: Standard   Pulse: 82   Weight: 77.6 kg (171 lb)   Height: 5' 10\" (1.778 m)       Labs:  Lab Results   Component Value Date     06/15/2017    K 4.6 04/16/2024    CL 99 04/16/2024    CO2 24 04/16/2024    BUN 14 04/16/2024    CREATININE 0.87 04/16/2024 "    CREATININE 0.67 05/20/2022    CREATININE 0.89 06/15/2017    GLUCOSE 220 (H) 05/16/2022    GLUCOSE 110 (H) 06/15/2017    CALCIUM 8.4 05/20/2022    CALCIUM 9.2 06/15/2017     Lab Results   Component Value Date    WBC 4.8 04/16/2024    WBC 11.96 (H) 05/18/2022    WBC 5.5 06/15/2017    HGB 12.7 (L) 04/16/2024    HGB 8.8 (L) 05/18/2022    HGB 13.5 06/15/2017    HCT 38.3 04/16/2024    HCT 25.8 (L) 05/18/2022    HCT 40.2 06/15/2017    MCV 94 04/16/2024    MCV 93 05/18/2022    MCV 94 06/15/2017     04/16/2024     05/18/2022       Imaging:  ECG today shows AV sequential paced rhythm.    ECHO (1/24/2023):    Left Ventricle: Left ventricular cavity size is normal. Wall thickness is mildly increased. There is mild asymmetric hypertrophy of the septal wall. The left ventricular ejection fraction is 60-65% by visual estimation. Systolic function is normal. Wall motion is normal. Diastolic function is mildly abnormal, consistent with grade I (abnormal) relaxation.    Right Ventricle: Right ventricular cavity size is mildly dilated. Systolic function is mildly reduced.    Left Atrium: The atrium is normal in size.    Right Atrium: The atrium is mildly dilated.    Aortic Valve: There is a bioprosthetic valve. The prosthetic valve appears well-seated and appears to be functioning normally. There is no evidence of paravalvular regurgitation. There is no evidence of transvalvular regurgitation. The gradient recorded across the prosthetic aortic valve is within the expected range. The aortic valve peak velocity is 1.63 m/s. The aortic valve mean gradient is 5 mmHg. The dimensionless velocity index is 0.38.    Mitral Valve: There is annular calcification. There is mild regurgitation.    Tricuspid Valve: There is mild regurgitation.    Pulmonic Valve: There is mild regurgitation.    Aorta: The aortic root is normal in size. The ascending aorta is mildly dilated (4.0 cm).     Essentially unchanged compared to prior study  dated 11/25/22. Normally functioning bioprosthetic aortic valve.       CARDIAC CATH (4/2022):  Diagnostic  Dominance: Co-dominant      Left Main   The vessel exhibits minimal luminal irregularities.   Left Anterior Descending   Prox LAD lesion is 25% stenosed. FLAKITO flow is 3.   Mid LAD lesion is 50% stenosed. FLAKITO flow is 3.   Left Circumflex   First Obtuse Marginal Branch   1st Mrg lesion is 80% stenosed. Small OM1 branch (medical therapy), supplies small territory   Right Coronary Artery   Mid RCA lesion is 90% stenosed.     Intervention        Mid RCA lesion   Angioplasty   Angioplasty using a compliant balloon was performed. The balloon used was a BALLOON EUPHORA RX 2 X 15MM. Maximum pressure: 6 rivas. Inflation time: 5 sec. Time obtained: 09:22 EDT. First reinflation; Max pressure: 12 rivas. Inflation time 10 sec. Time obtained: 09:23 EDT.   Supplies used: CATH GUIDE LAUNCHER 5FR JR 4.0; BALLOON EUPHORA RX 2 X 15MM; GUIDEWIRE ASAHI MINAMO 190CM J   Stent   Drug-eluting stent was successfully placed. The stent used was a STENT XIENCE SKYPOINT 2.5 X 18MM. Stent was deployed by way of balloon expansion. Maximum pressure: 14 rivas. Inflation time: 10 sec.   Supplies used: STENT XIENCE SKYPOINT 2.5 X 18MM; CATH GUIDE LAUNCHER 5FR JR 4.0; GUIDEWIRE ASAHI MINAMO 190CM J   Post-Intervention Lesion Assessment   The intervention was successful. Post-intervention FLAKITO flow is 3.   There is a 0% residual stenosis post intervention.         Review of Systems:  Review of Systems   Constitutional:  Positive for fatigue.   HENT: Negative.     Eyes: Negative.    Respiratory:  Positive for shortness of breath.    Cardiovascular: Negative.    Gastrointestinal: Negative.    Musculoskeletal:  Positive for arthralgias.   Skin: Negative.    Allergic/Immunologic: Negative.    Neurological: Negative.    Hematological: Negative.    Psychiatric/Behavioral: Negative.     All other systems reviewed and are negative.    Vitals:    04/22/25 0935  "  BP: 118/82   BP Location: Left arm   Patient Position: Sitting   Cuff Size: Standard   Pulse: 82   Weight: 77.6 kg (171 lb)   Height: 5' 10\" (1.778 m)       Physical Exam:  Physical Exam  Vitals and nursing note reviewed.   Constitutional:       Appearance: He is well-developed.   HENT:      Head: Normocephalic and atraumatic.   Eyes:      General: No scleral icterus.        Right eye: No discharge.         Left eye: No discharge.      Pupils: Pupils are equal, round, and reactive to light.   Neck:      Thyroid: No thyromegaly.      Vascular: No JVD.   Cardiovascular:      Rate and Rhythm: Normal rate and regular rhythm. No extrasystoles are present.     Pulses: Normal pulses. No decreased pulses.      Heart sounds: S1 normal and S2 normal. Murmur heard.      Systolic murmur is present with a grade of 2/6.      No friction rub. No gallop.   Pulmonary:      Effort: Pulmonary effort is normal. No respiratory distress.      Breath sounds: No decreased breath sounds, wheezing, rhonchi or rales.   Abdominal:      General: Bowel sounds are normal. There is no distension.      Palpations: Abdomen is soft.      Tenderness: There is no abdominal tenderness.   Musculoskeletal:         General: No tenderness or deformity. Normal range of motion.      Cervical back: Normal range of motion and neck supple.   Skin:     General: Skin is warm and dry.      Findings: No rash.   Neurological:      Mental Status: He is alert and oriented to person, place, and time.      Cranial Nerves: No cranial nerve deficit.   Psychiatric:         Thought Content: Thought content normal.         Judgment: Judgment normal.       Counseling / Coordination of Care  Total office time spent today 25 minutes.  Greater than 50% of total time was spent with the patient and / or family counseling and / or coordination of care.   "

## 2025-04-30 ENCOUNTER — OFFICE VISIT (OUTPATIENT)
Dept: PODIATRY | Facility: CLINIC | Age: OVER 89
End: 2025-04-30
Payer: COMMERCIAL

## 2025-04-30 VITALS — HEIGHT: 70 IN | WEIGHT: 168 LBS | BODY MASS INDEX: 24.05 KG/M2

## 2025-04-30 DIAGNOSIS — I73.9 PERIPHERAL VASCULAR DISEASE (HCC): Primary | ICD-10-CM

## 2025-04-30 DIAGNOSIS — B35.1 ONYCHOMYCOSIS: ICD-10-CM

## 2025-04-30 PROCEDURE — 99202 OFFICE O/P NEW SF 15 MIN: CPT | Performed by: PODIATRIST

## 2025-04-30 PROCEDURE — 11721 DEBRIDE NAIL 6 OR MORE: CPT | Performed by: PODIATRIST

## 2025-04-30 NOTE — LETTER
April 30, 2025     Tomas Navarrete MD  [address]  [address]  [facility] [address]    Patient: Travon Sanchez   YOB: 1932   Date of Visit: 4/30/2025       Dear Dr. Tomas Navarrete MD:    Thank you for referring Travon Sanchez to me for evaluation. Below are my notes for this consultation.    If you have questions, please do not hesitate to call me. I look forward to following your patient along with you.         Sincerely,        Luisito Hernandez DPM        CC: No Recipients    Luisito Hernandez DPM  4/30/2025 12:46 PM  Sign when Signing Visit    PATIENT:  Travon Sanchez  10/7/1932    ASSESSMENT/PLAN:  1. Peripheral vascular disease (HCC)        2. Onychomycosis  Ambulatory Referral to Podiatry              1. Reviewed medical records.  Patient was counseled and educated on the condition and the diagnosis.    2. The diagnosis, treatment options and prognosis were discussed with the patient.    3. Recommended palliative treatment.  Discussed daily foot assessment and proper foot care.  The patient will follow up in 3 months.      PROCEDURE:  All mycotic toenails were reduced and debrided in length, width, and girth using a nail nipper and dremel.  Patient tolerated procedure(s) well without complications.    HPI:  Travon Sanchez is a 89 y.o.year old male, referred to my office for onychomycosis.  He has thick nails for years.  No history of diabetes.  The patient denied any acute pedal disorder, redness, acute swelling, or recent injury.          PAST MEDICAL HISTORY:  Past Medical History:   Diagnosis Date   • Angina pectoris (HCC)    • Arthritis    • Ascending aortic aneurysm (HCC)     trileaflet AV, 41mm   • Balance disorder 06/11/2019   • BPH (benign prostatic hyperplasia)    • Cancer (HCC)     skin   • Cervical strain, acute, sequela 03/12/2024   • Coronary artery disease    • Dyspnea on effort 01/03/2023   • Former tobacco use     pipe & cigar use socially & infrequently   • GERD  (gastroesophageal reflux disease)     controlled w/ diet   • Headache     h/o   • Hepatitis A    • History of melanoma    • Hypertension    • Hypothyroidism    • Orthostatic hypotension 04/30/2023   • Pacemaker    • Sebaceous cyst 10/26/2022   • Severe aortic stenosis    • Shortness of breath     with exertion   • Vitamin B12 deficiency    • Wears dentures        PAST SURGICAL HISTORY:  Past Surgical History:   Procedure Laterality Date   • CARDIAC CATHETERIZATION  04/15/2022    Procedure: Cardiac catheterization;  Surgeon: Lars Fermin DO;  [facility];  Service: Cardiology   • CARDIAC CATHETERIZATION N/A 04/15/2022    Procedure: Cardiac Coronary Angiogram;  Surgeon: Lars Fermin DO;  [facility];  Service: Cardiology   • CARDIAC CATHETERIZATION N/A 04/15/2022    Procedure: Cardiac pci;  Surgeon: Lars Fermin DO;  [facility];  Service: Cardiology   • CARDIAC PACEMAKER PLACEMENT     • COLONOSCOPY     • INGUINAL HERNIA REPAIR Bilateral    • [place] RPLCMT AORTIC VALVE OPN W/STENTLESS TISSUE VALVE N/A 5/16/2022    Procedure: REPLACEMENT VALVE AORTIC (TISSUE AVR) WITH 25 MM AORTIC MAGNA EASE VALVE W/ ROJELIO;  Surgeon: Jimmy Cantrell DO;  Location: [facility];  Service: Cardiac Surgery   • SKIN CANCER EXCISION      melanoma, multiple        ALLERGIES:  Amoxicillin    MEDICATIONS:  Current Outpatient Medications   Medication Sig Dispense Refill   • amLODIPine (NORVASC) 5 mg tablet TAKE ONE TABLET BY MOUTH EVERY DAY 90 tablet 1   • Ascorbic Acid (VITAMIN C) 500 MG CAPS Take 1 tablet by mouth daily     • aspirin (ECOTRIN LOW STRENGTH) 81 mg EC tablet Take 81 mg by mouth daily     • atorvastatin (LIPITOR) 20 mg tablet Take 1 tablet (20 mg total) by mouth daily with dinner 90 tablet 1   • carvedilol (COREG) 3.125 mg tablet Take 1 tablet (3.125 mg total) by mouth 2 (two) times a day with meals 180 tablet 3   • finasteride (PROSCAR) 5 mg tablet Take  1 tablet (5 mg total) by mouth daily 90 tablet 3   • Glucosamine-Chondroit-Vit C-Mn (GLUCOSAMINE 1500 COMPLEX) CAPS Take 1 capsule by mouth daily     • levothyroxine 100 mcg tablet TAKE ONE TABLET BY MOUTH EVERY DAY 90 tablet 1   • Multiple Vitamin (MULTIVITAMIN) capsule Take 1 capsule by mouth daily     • tamsulosin (FLOMAX) 0.4 mg TAKE ONE CAPSULE BY MOUTH TWICE A DAY 90 capsule 1   • vitamin B-12 (VITAMIN B-12) 1,000 mcg tablet Take by mouth daily     • nitroglycerin (NITROSTAT) 0.4 mg SL tablet Place 1 tablet (0.4 mg total) under the tongue every 5 (five) minutes as needed for chest pain (Patient not taking: Reported on 4/22/2025) 24 tablet 1     No current facility-administered medications for this visit.       SOCIAL HISTORY:  Social History     Socioeconomic History   • Marital status: /Civil Union     Spouse name: Maude   • Number of children: None   • Years of education: None   • Highest education level: None   Occupational History   • None   Tobacco Use   • Smoking status: Former     Types: Cigars   • Smokeless tobacco: Never   • Tobacco comments:     50 years ago   Vaping Use   • Vaping status: Former   Substance and Sexual Activity   • Alcohol use: Yes     Comment: a beer once in a while.   • Drug use: No   • Sexual activity: None   Other Topics Concern   • None   Social History Narrative    Living will on file    Supportive and safe    Lives with wife     Social Drivers of Health     Financial Resource Strain: Low Risk  (8/29/2023)    Overall Financial Resource Strain (CARDIA)    • Difficulty of Paying Living Expenses: Not hard at all   Food Insecurity: No Food Insecurity (9/4/2024)    Nursing - Inadequate Food Risk Classification    • Worried About Running Out of Food in the Last Year: Never true    • Ran Out of Food in the Last Year: Never true    • Ran Out of Food in the Last Year: Not on file   Transportation Needs: No Transportation Needs (9/4/2024)    PRAPARE - Transportation    • Lack of  "Transportation (Medical): No    • Lack of Transportation (Non-Medical): No   Physical Activity: Sufficiently Active (8/26/2022)    Exercise Vital Sign    • Days of Exercise per Week: 7 days    • Minutes of Exercise per Session: 120 min   Stress: No Stress Concern Present (8/26/2022)    [employer] [place] [employer] - Occupational Stress Questionnaire    • Feeling of Stress : Not at all   Social Connections: Socially Integrated (8/26/2022)    Social Connection and Isolation Panel [NHANES]    • Frequency of Communication with Friends and Family: Once a week    • Frequency of Social Gatherings with Friends and Family: Three times a week    • Attends [employer] Services: 1 to 4 times per year    • Active Member of Clubs or Organizations: Yes    • Attends Club or Organization Meetings: 1 to 4 times per year    • Marital Status:    Intimate Partner Violence: Not At Risk (8/26/2022)    Humiliation, Afraid, Rape, and Kick questionnaire    • Fear of Current or Ex-Partner: No    • Emotionally Abused: No    • Physically Abused: No    • Sexually Abused: No   Housing Stability: Low Risk  (9/4/2024)    Housing Stability Vital Sign    • Unable to Pay for Housing in the Last Year: No    • Number of Times Moved in the Last Year: 0    • Homeless in the Last Year: No        REVIEW OF SYSTEMS:  GENERAL: No fever or chills  HEART: No chest pain, or palpitation  RESPIRATORY:  No acute SOB or cough  GI: No Nausea, vomit or diarrhea  NEUROLOGIC: No syncope or acute weakness    PHYSICAL EXAM:    Ht 5' 10\" (1.778 m)   Wt 76.2 kg (168 lb)   BMI 24.11 kg/m²     VASCULAR EXAM  Dorsalis pedis  absent, Posterior tibial artery  +1.  The patient has class findings with skin atrophy, lack of digital hair, and nail dystrophy.  There is +1 lower extremity edema bilaterally.  Venous stasis skin changes noted BLE.    NEUROLOGIC EXAM  Sensation is intact to light touch.  Sensation is intact to 10gm monofilament.  No focal neurologic " deficit.          DERMATOLOGIC EXAM:   No ulcer or cellulitis noted.  The patient has hypertrophic toenails X 7 with discoloration, onycholysis, and subungal debris.  No notable skin lesion.      MUSCULOSKELETAL EXAM:   No acute joint pain, edema, or redness.  No acute musculoskeletal problem.

## 2025-04-30 NOTE — PROGRESS NOTES
PATIENT:  Travon Sanchez  10/7/1932    ASSESSMENT/PLAN:  1. Peripheral vascular disease (HCC)        2. Onychomycosis  Ambulatory Referral to Podiatry              1. Reviewed medical records.  Patient was counseled and educated on the condition and the diagnosis.    2. The diagnosis, treatment options and prognosis were discussed with the patient.    3. Recommended palliative treatment.  Discussed daily foot assessment and proper foot care.  The patient will follow up in 3 months.      PROCEDURE:  All mycotic toenails were reduced and debrided in length, width, and girth using a nail nipper and dremel.  Patient tolerated procedure(s) well without complications.    HPI:  Travon Sanchez is a 89 y.o.year old male, referred to my office for onychomycosis.  He has thick nails for years.  No history of diabetes.  The patient denied any acute pedal disorder, redness, acute swelling, or recent injury.          PAST MEDICAL HISTORY:  Past Medical History:   Diagnosis Date    Angina pectoris (HCC)     Arthritis     Ascending aortic aneurysm (HCC)     trileaflet AV, 41mm    Balance disorder 06/11/2019    BPH (benign prostatic hyperplasia)     Cancer (HCC)     skin    Cervical strain, acute, sequela 03/12/2024    Coronary artery disease     Dyspnea on effort 01/03/2023    Former tobacco use     pipe & cigar use socially & infrequently    GERD (gastroesophageal reflux disease)     controlled w/ diet    Headache     h/o    Hepatitis A     History of melanoma     Hypertension     Hypothyroidism     Orthostatic hypotension 04/30/2023    Pacemaker     Sebaceous cyst 10/26/2022    Severe aortic stenosis     Shortness of breath     with exertion    Vitamin B12 deficiency     Wears dentures        PAST SURGICAL HISTORY:  Past Surgical History:   Procedure Laterality Date    CARDIAC CATHETERIZATION  04/15/2022    Procedure: Cardiac catheterization;  Surgeon: Lars Fermin DO;  [facility]: [facility];  Service:  Cardiology    CARDIAC CATHETERIZATION N/A 04/15/2022    Procedure: Cardiac Coronary Angiogram;  Surgeon: Lars Fermin DO;  [facility];  Service: Cardiology    CARDIAC CATHETERIZATION N/A 04/15/2022    Procedure: Cardiac pci;  Surgeon: Lars Fermin DO;  [facility];  Service: Cardiology    CARDIAC PACEMAKER PLACEMENT      COLONOSCOPY      INGUINAL HERNIA REPAIR Bilateral     [place] RPLCMT AORTIC VALVE OPN W/STENTLESS TISSUE VALVE N/A 5/16/2022    Procedure: REPLACEMENT VALVE AORTIC (TISSUE AVR) WITH 25 MM AORTIC MAGNA EASE VALVE W/ ROJELIO;  Surgeon: Jimmy Cantrell DO;  Location: [facility];  Service: Cardiac Surgery    SKIN CANCER EXCISION      melanoma, multiple        ALLERGIES:  Amoxicillin    MEDICATIONS:  Current Outpatient Medications   Medication Sig Dispense Refill    amLODIPine (NORVASC) 5 mg tablet TAKE ONE TABLET BY MOUTH EVERY DAY 90 tablet 1    Ascorbic Acid (VITAMIN C) 500 MG CAPS Take 1 tablet by mouth daily      aspirin (ECOTRIN LOW STRENGTH) 81 mg EC tablet Take 81 mg by mouth daily      atorvastatin (LIPITOR) 20 mg tablet Take 1 tablet (20 mg total) by mouth daily with dinner 90 tablet 1    carvedilol (COREG) 3.125 mg tablet Take 1 tablet (3.125 mg total) by mouth 2 (two) times a day with meals 180 tablet 3    finasteride (PROSCAR) 5 mg tablet Take 1 tablet (5 mg total) by mouth daily 90 tablet 3    Glucosamine-Chondroit-Vit C-Mn (GLUCOSAMINE 1500 COMPLEX) CAPS Take 1 capsule by mouth daily      levothyroxine 100 mcg tablet TAKE ONE TABLET BY MOUTH EVERY DAY 90 tablet 1    Multiple Vitamin (MULTIVITAMIN) capsule Take 1 capsule by mouth daily      tamsulosin (FLOMAX) 0.4 mg TAKE ONE CAPSULE BY MOUTH TWICE A DAY 90 capsule 1    vitamin B-12 (VITAMIN B-12) 1,000 mcg tablet Take by mouth daily      nitroglycerin (NITROSTAT) 0.4 mg SL tablet Place 1 tablet (0.4 mg total) under the tongue every 5 (five) minutes as needed for chest pain (Patient not  taking: Reported on 4/22/2025) 24 tablet 1     No current facility-administered medications for this visit.       SOCIAL HISTORY:  Social History     Socioeconomic History    Marital status: /Civil Union     Spouse name: Maude    Number of children: None    Years of education: None    Highest education level: None   Occupational History    None   Tobacco Use    Smoking status: Former     Types: Cigars    Smokeless tobacco: Never    Tobacco comments:     50 years ago   Vaping Use    Vaping status: Former   Substance and Sexual Activity    Alcohol use: Yes     Comment: a beer once in a while.    Drug use: No    Sexual activity: None   Other Topics Concern    None   Social History Narrative    Living will on file    Supportive and safe    Lives with wife     Social Drivers of Health     Financial Resource Strain: Low Risk  (8/29/2023)    Overall Financial Resource Strain (CARDIA)     Difficulty of Paying Living Expenses: Not hard at all   Food Insecurity: No Food Insecurity (9/4/2024)    Nursing - Inadequate Food Risk Classification     Worried About Running Out of Food in the Last Year: Never true     Ran Out of Food in the Last Year: Never true     Ran Out of Food in the Last Year: Not on file   Transportation Needs: No Transportation Needs (9/4/2024)    PRAPARE - Transportation     Lack of Transportation (Medical): No     Lack of Transportation (Non-Medical): No   Physical Activity: Sufficiently Active (8/26/2022)    Exercise Vital Sign     Days of Exercise per Week: 7 days     Minutes of Exercise per Session: 120 min   Stress: No Stress Concern Present (8/26/2022)    [employer] [place] [employer]ational [employer] - Occupational Stress Questionnaire     Feeling of Stress : Not at all   Social Connections: Socially Integrated (8/26/2022)    Social Connection and Isolation Panel [NHANES]     Frequency of Communication with Friends and Family: Once a week     Frequency of Social Gatherings with Friends and Family:  "Three times a week     Attends [employer] Services: 1 to 4 times per year     Active Member of Clubs or Organizations: Yes     Attends Club or Organization Meetings: 1 to 4 times per year     Marital Status:    Intimate Partner Violence: Not At Risk (8/26/2022)    Humiliation, Afraid, Rape, and Kick questionnaire     Fear of Current or Ex-Partner: No     Emotionally Abused: No     Physically Abused: No     Sexually Abused: No   Housing Stability: Low Risk  (9/4/2024)    Housing Stability Vital Sign     Unable to Pay for Housing in the Last Year: No     Number of Times Moved in the Last Year: 0     Homeless in the Last Year: No        REVIEW OF SYSTEMS:  GENERAL: No fever or chills  HEART: No chest pain, or palpitation  RESPIRATORY:  No acute SOB or cough  GI: No Nausea, vomit or diarrhea  NEUROLOGIC: No syncope or acute weakness    PHYSICAL EXAM:    Ht 5' 10\" (1.778 m)   Wt 76.2 kg (168 lb)   BMI 24.11 kg/m²     VASCULAR EXAM  Dorsalis pedis  absent, Posterior tibial artery  +1.  The patient has class findings with skin atrophy, lack of digital hair, and nail dystrophy.  There is +1 lower extremity edema bilaterally.  Venous stasis skin changes noted BLE.    NEUROLOGIC EXAM  Sensation is intact to light touch.  Sensation is intact to 10gm monofilament.  No focal neurologic deficit.          DERMATOLOGIC EXAM:   No ulcer or cellulitis noted.  The patient has hypertrophic toenails X 7 with discoloration, onycholysis, and subungal debris.  No notable skin lesion.      MUSCULOSKELETAL EXAM:   No acute joint pain, edema, or redness.  No acute musculoskeletal problem.         "

## 2025-05-28 ENCOUNTER — TELEPHONE (OUTPATIENT)
Age: OVER 89
End: 2025-05-28

## 2025-05-28 NOTE — TELEPHONE ENCOUNTER
Lynn from [facility] requesting 2 most recent OV notes, any cardiology reports and chest x-ray be faxed to them at [phone]. Patient will be a new patient for them.

## 2025-06-03 ENCOUNTER — CONSULT (OUTPATIENT)
Dept: FAMILY MEDICINE CLINIC | Facility: HOSPITAL | Age: OVER 89
End: 2025-06-03
Payer: COMMERCIAL

## 2025-06-03 VITALS
DIASTOLIC BLOOD PRESSURE: 80 MMHG | SYSTOLIC BLOOD PRESSURE: 132 MMHG | TEMPERATURE: 97.6 F | BODY MASS INDEX: 24.14 KG/M2 | HEIGHT: 70 IN | WEIGHT: 168.6 LBS | HEART RATE: 84 BPM | OXYGEN SATURATION: 97 %

## 2025-06-03 DIAGNOSIS — Z01.818 PREOPERATIVE GENERAL PHYSICAL EXAMINATION: Primary | ICD-10-CM

## 2025-06-03 PROCEDURE — 99214 OFFICE O/P EST MOD 30 MIN: CPT | Performed by: NURSE PRACTITIONER

## 2025-06-03 NOTE — PROGRESS NOTES
NAME: Travon Sanchez  AGE: 89 y.o. SEX: male  : 10/7/1932     DATE: 6/3/2025    [facility] Pre-Operative Evaluation      Chief Complaint: Pre-operative Evaluation     Surgery: left cataract surgery  Anticipated Date of Surgery: 25  Referring Provider: Dr. Erazo       History of Present Illness:     Travon Sanchez is a 89 y.o. male who presents to the office today for a preoperative consultation at the request of surgeon, Dr. Erazo, who plans on performing left cataract surgery on 25. Planned anesthesia is IV sedation. Patient has a bleeding risk of: no recent abnormal bleeding.  Current anti-platelet/anti-coagulation medications that the patient is prescribed includes: Aspirin.      Assessment of Chronic Conditions:   - Coronary Artery Disease: controlled  - Hypertension: controlled     Assessment of Cardiac Risk:  Denies unstable or severe angina or MI in the last 6 weeks or history of stent placement in the last year   Denies decompensated heart failure (e.g. New onset heart failure, NYHA functional class IV heart failure, or worsening existing heart failure)  Denies significant arrhythmias such as high grade AV block, symptomatic ventricular arrhythmia, newly recognized ventricular tachycardia, supraventricular tachycardia with resting heart rate >100, or symptomatic bradycardia  Denies severe heart valve disease including aortic stenosis or symptomatic mitral stenosis     Exercise Capacity:  Able to walk 4 blocks without symptoms?: Yes  Able to walk 2 flights without symptoms?: Yes    Prior Anesthesia Reactions: No     Personal history of venous thromboembolic disease? No    History of steroid use for >2 weeks within last year? No         Review of Systems:     Review of Systems   Constitutional: Negative.    HENT: Negative.     Eyes: Negative.    Respiratory:  Positive for shortness of breath (chronic). Negative for apnea, cough, choking, chest tightness, wheezing and stridor.     Cardiovascular: Negative.    Gastrointestinal: Negative.    Endocrine: Negative.    Genitourinary: Negative.    Musculoskeletal: Negative.    Skin: Negative.    Neurological: Negative.    Hematological: Negative.    Psychiatric/Behavioral: Negative.         Current Problem List:     Problem List[1]    Allergies:     Allergies[2]    Current Medications:     Current Medications[3]    Past Medical History:       Past Medical History[4]     Past Surgical History[5]     Family History[6]     Social History     Socioeconomic History    Marital status: /Civil Union     Spouse name: Maude    Number of children: Not on file    Years of education: Not on file    Highest education level: Not on file   Occupational History    Not on file   Tobacco Use    Smoking status: Former     Types: Cigars    Smokeless tobacco: Never    Tobacco comments:     50 years ago   Vaping Use    Vaping status: Former   Substance and Sexual Activity    Alcohol use: Yes     Comment: a beer once in a while.    Drug use: No    Sexual activity: Not on file   Other Topics Concern    Not on file   Social History Narrative    Living will on file    Supportive and safe    Lives with wife     Social Drivers of Health     Financial Resource Strain: Low Risk  (8/29/2023)    Overall Financial Resource Strain (CARDIA)     Difficulty of Paying Living Expenses: Not hard at all   Food Insecurity: No Food Insecurity (9/4/2024)    Nursing - Inadequate Food Risk Classification     Worried About Running Out of Food in the Last Year: Never true     Ran Out of Food in the Last Year: Never true     Ran Out of Food in the Last Year: Not on file   Transportation Needs: No Transportation Needs (9/4/2024)    PRAPARE - Transportation     Lack of Transportation (Medical): No     Lack of Transportation (Non-Medical): No   Physical Activity: Sufficiently Active (8/26/2022)    Exercise Vital Sign     Days of Exercise per Week: 7 days     Minutes of Exercise per Session:  "120 min   Stress: No Stress Concern Present (8/26/2022)    [employer] [employer] [place] [employer] - Occupational Stress Questionnaire     Feeling of Stress : Not at all   Social Connections: Socially Integrated (8/26/2022)    Social Connection and Isolation Panel     Frequency of Communication with Friends and Family: Once a week     Frequency of Social Gatherings with Friends and Family: Three times a week     Attends Restorationism Services: 1 to 4 times per year     Active Member of Clubs or Organizations: Yes     Attends Club or Organization Meetings: 1 to 4 times per year     Marital Status:    Intimate Partner Violence: Not At Risk (8/26/2022)    Humiliation, Afraid, Rape, and Kick questionnaire     Fear of Current or Ex-Partner: No     Emotionally Abused: No     Physically Abused: No     Sexually Abused: No   Housing Stability: Low Risk  (9/4/2024)    Housing Stability Vital Sign     Unable to Pay for Housing in the Last Year: No     Number of Times Moved in the Last Year: 0     Homeless in the Last Year: No        Physical Exam:     /80 (Patient Position: Sitting, Cuff Size: Standard)   Pulse 84   Temp 97.6 °F (36.4 °C) (Tympanic)   Ht 5' 10\" (1.778 m)   Wt 76.5 kg (168 lb 9.6 oz)   SpO2 97%   BMI 24.19 kg/m²     Physical Exam  Vitals reviewed.   Constitutional:       Appearance: Normal appearance. He is well-developed and normal weight.   HENT:      Head: Normocephalic and atraumatic.      Right Ear: Tympanic membrane, ear canal and external ear normal.      Left Ear: There is impacted cerumen.      Nose: Nose normal.      Mouth/Throat:      Mouth: Mucous membranes are moist.      Pharynx: Oropharynx is clear. No oropharyngeal exudate.     Eyes:      Conjunctiva/sclera: Conjunctivae normal.      Pupils: Pupils are equal, round, and reactive to light.     Neck:      Thyroid: No thyromegaly.     Cardiovascular:      Rate and Rhythm: Normal rate and regular rhythm.      Heart sounds: Normal " heart sounds. No murmur heard.  Pulmonary:      Effort: Pulmonary effort is normal.      Breath sounds: Normal breath sounds.   Abdominal:      General: Abdomen is flat. Bowel sounds are normal.      Palpations: Abdomen is soft. There is no hepatomegaly or splenomegaly.      Tenderness: There is no abdominal tenderness.     Musculoskeletal:         General: Normal range of motion.      Cervical back: Normal range of motion and neck supple.   Lymphadenopathy:      Cervical: No cervical adenopathy.     Skin:     General: Skin is warm and dry.      Capillary Refill: Capillary refill takes less than 2 seconds.     Neurological:      Mental Status: He is alert and oriented to person, place, and time.     Psychiatric:         Mood and Affect: Mood normal.         Behavior: Behavior normal.         Thought Content: Thought content normal.         Judgment: Judgment normal.          Data:     Pre-operative work-up    Laboratory Results: Not indicated     EKG: Not indicated    Chest x-ray: Not indicated      Previous cardiopulmonary studies within the past year:  Echocardiogram: N/A  Cardiac Catheterization: N/A  Stress Test: N/A  Pulmonary Function Testing: N/A      Assessment & Recommendations:     No diagnosis found.    Pre-Op Evaluation Assessment  89 y.o. male with planned surgery: left cataract surgery.    Known risk factors for perioperative complications: Coronary disease.        Current medications which may produce withdrawal symptoms if withheld perioperatively: none.    Pre-Op Evaluation Plan  1. Further preoperative workup as follows:   - None; no further preoperative work-up is required    2. Medication Management/Recommendations:   - None, continue medication regimen including morning of surgery, with sip of water    3. Prophylaxis for cardiac events with perioperative beta-blockers: not indicated.    4. Patient requires further consultation with: None    Clearance  Patient is CLEARED for surgery without any  additional cardiac testing.     MELINA Edmonds  [facility] [address]   [address] [place] [id number]  Phone#  [phone]  Fax#  [phone]          [1]   Patient Active Problem List  Diagnosis    Enlarged prostate without lower urinary tract symptoms (luts)    GERD without esophagitis    Primary hypertension    Acquired hypothyroidism    Sick sinus syndrome (HCC)    Presence of permanent cardiac pacemaker    Paroxysmal atrial fibrillation (HCC)    Coronary artery disease involving native coronary artery 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 pectoris    S/P drug eluting coronary stent placement    S/P AVR    Pure hypercholesterolemia    Retained suture    Ectatic aorta (HCC)    Fall   [2]   Allergies  Allergen Reactions    Amoxicillin Other (See Comments)     Severe weakness   [3]   Current Outpatient Medications:     amLODIPine (NORVASC) 5 mg tablet, TAKE ONE TABLET BY MOUTH EVERY DAY, Disp: 90 tablet, Rfl: 1    Ascorbic Acid (VITAMIN C) 500 MG CAPS, Take 1 tablet by mouth in the morning., Disp: , Rfl:     aspirin (ECOTRIN LOW STRENGTH) 81 mg EC tablet, Take 81 mg by mouth in the morning., Disp: , Rfl:     atorvastatin (LIPITOR) 20 mg tablet, Take 1 tablet (20 mg total) by mouth daily with dinner, Disp: 90 tablet, Rfl: 1    carvedilol (COREG) 3.125 mg tablet, Take 1 tablet (3.125 mg total) by mouth 2 (two) times a day with meals, Disp: 180 tablet, Rfl: 3    finasteride (PROSCAR) 5 mg tablet, Take 1 tablet (5 mg total) by mouth daily, Disp: 90 tablet, Rfl: 3    Glucosamine-Chondroit-Vit C-Mn (GLUCOSAMINE 1500 COMPLEX) CAPS, Take 1 capsule by mouth in the morning., Disp: , Rfl:     levothyroxine 100 mcg tablet, TAKE ONE TABLET BY MOUTH EVERY DAY, Disp: 90 tablet, Rfl: 1    Multiple Vitamin (MULTIVITAMIN) capsule, Take 1 capsule by mouth in the morning., Disp: , Rfl:     nitroglycerin (NITROSTAT) 0.4 mg SL tablet, Place 1 tablet (0.4 mg total) under the tongue every 5 (five)  minutes as needed for chest pain, Disp: 24 tablet, Rfl: 1    tamsulosin (FLOMAX) 0.4 mg, TAKE ONE CAPSULE BY MOUTH TWICE A DAY, Disp: 90 capsule, Rfl: 1    vitamin B-12 (VITAMIN B-12) 1,000 mcg tablet, Take by mouth in the morning., Disp: , Rfl:   [4]   Past Medical History:  Diagnosis Date    Angina pectoris (HCC)     Arthritis     Ascending aortic aneurysm (HCC)     trileaflet AV, 41mm    Balance disorder 06/11/2019    BPH (benign prostatic hyperplasia)     Cancer (HCC)     skin    Cervical strain, acute, sequela 03/12/2024    Coronary artery disease     Dyspnea on effort 01/03/2023    Former tobacco use     pipe & cigar use socially & infrequently    GERD (gastroesophageal reflux disease)     controlled w/ diet    Headache     h/o    Hepatitis A     History of melanoma     Hypertension     Hypothyroidism     Orthostatic hypotension 04/30/2023    Pacemaker     Sebaceous cyst 10/26/2022    Severe aortic stenosis     Shortness of breath     with exertion    Vitamin B12 deficiency     Wears dentures    [5]   Past Surgical History:  Procedure Laterality Date    CARDIAC CATHETERIZATION  04/15/2022    Procedure: Cardiac catheterization;  Surgeon: Lars Fermin DO;  Location: [facility];  Service: Cardiology    CARDIAC CATHETERIZATION N/A 04/15/2022    Procedure: Cardiac Coronary Angiogram;  Surgeon: Lars Fermin DO;  Location: [facility];  Service: Cardiology    CARDIAC CATHETERIZATION N/A 04/15/2022    Procedure: Cardiac pci;  Surgeon: Lars Fermin DO;  Location: [facility];  Service: Cardiology    CARDIAC PACEMAKER PLACEMENT      COLONOSCOPY      INGUINAL HERNIA REPAIR Bilateral     OH RPLCMT AORTIC VALVE OPN W/STENTLESS TISSUE VALVE N/A 5/16/2022    Procedure: REPLACEMENT VALVE AORTIC (TISSUE AVR) WITH 25 MM AORTIC MAGNA EASE VALVE W/ ROJELIO;  Surgeon: Jimmy Cantrell DO;  Location: [facility];  Service: Cardiac Surgery    SKIN CANCER EXCISION      melanoma,  multiple   [6]   Family History  Problem Relation Name Age of Onset    Cancer Family          gastrointestinal tract    Heart disease Mother      Substance Abuse Neg Hx      Mental illness Neg Hx

## 2025-06-04 ENCOUNTER — REMOTE DEVICE CLINIC VISIT (OUTPATIENT)
Dept: CARDIOLOGY CLINIC | Facility: CLINIC | Age: OVER 89
End: 2025-06-04
Payer: COMMERCIAL

## 2025-06-04 DIAGNOSIS — I49.5 SSS (SICK SINUS SYNDROME) (HCC): ICD-10-CM

## 2025-06-04 DIAGNOSIS — I48.91 ATRIAL FIBRILLATION, UNSPECIFIED TYPE (HCC): Primary | ICD-10-CM

## 2025-06-04 PROCEDURE — 93294 REM INTERROG EVL PM/LDLS PM: CPT | Performed by: INTERNAL MEDICINE

## 2025-06-04 PROCEDURE — 93296 REM INTERROG EVL PM/IDS: CPT | Performed by: INTERNAL MEDICINE

## 2025-06-04 NOTE — PROGRESS NOTES
Results for orders placed or performed in visit on 06/04/25   Cardiac EP device report    Narrative    BIOT DUAL CHAMBER PPM - ACTIVE SYSTEM IS MRI CONDITIONAL  BIOTRONIK TRANSMISSION: BATTERY VOLTAGE ADEQUATE (40%). AP: 95%. : 28%. ALL AVAILABLE LEAD PARAMETERS WITHIN NORMAL LIMITS. NO SIGNIFICANT HIGH RATE EPISODES. NORMAL DEVICE FUNCTION. CH

## 2025-06-04 NOTE — PROGRESS NOTES
Exercise session details Medication failed protocol.    Medication: clonazepam  Last office visit date: 6/27/24  Next appointment scheduled?: Yes   Medication Refill Protocol Failed.  Failed criteria: controlled substances . Sent to clinician to review.

## 2025-06-07 ENCOUNTER — RESULTS FOLLOW-UP (OUTPATIENT)
Dept: NON INVASIVE DIAGNOSTICS | Facility: HOSPITAL | Age: OVER 89
End: 2025-06-07

## 2025-07-22 ENCOUNTER — OFFICE VISIT (OUTPATIENT)
Dept: FAMILY MEDICINE CLINIC | Facility: HOSPITAL | Age: OVER 89
End: 2025-07-22
Payer: COMMERCIAL

## 2025-07-22 VITALS
DIASTOLIC BLOOD PRESSURE: 78 MMHG | OXYGEN SATURATION: 98 % | WEIGHT: 169.8 LBS | HEART RATE: 92 BPM | HEIGHT: 70 IN | BODY MASS INDEX: 24.31 KG/M2 | SYSTOLIC BLOOD PRESSURE: 148 MMHG

## 2025-07-22 DIAGNOSIS — N40.0 BENIGN PROSTATIC HYPERPLASIA, UNSPECIFIED WHETHER LOWER URINARY TRACT SYMPTOMS PRESENT: ICD-10-CM

## 2025-07-22 DIAGNOSIS — H61.21 IMPACTED CERUMEN OF RIGHT EAR: ICD-10-CM

## 2025-07-22 DIAGNOSIS — I10 PRIMARY HYPERTENSION: Primary | ICD-10-CM

## 2025-07-22 DIAGNOSIS — I48.0 PAROXYSMAL ATRIAL FIBRILLATION (HCC): ICD-10-CM

## 2025-07-22 DIAGNOSIS — E03.9 ACQUIRED HYPOTHYROIDISM: ICD-10-CM

## 2025-07-22 DIAGNOSIS — I49.5 SICK SINUS SYNDROME (HCC): ICD-10-CM

## 2025-07-22 DIAGNOSIS — I25.10 CORONARY ARTERY DISEASE INVOLVING NATIVE CORONARY ARTERY OF NATIVE HEART WITHOUT ANGINA PECTORIS: ICD-10-CM

## 2025-07-22 PROCEDURE — 99214 OFFICE O/P EST MOD 30 MIN: CPT | Performed by: FAMILY MEDICINE

## 2025-07-22 NOTE — PROGRESS NOTES
"Name: Travon Sanchez      : 10/7/1932      MRN: [id number]  Encounter Provider: Tomas Navarrete MD  Encounter Date: 2025   Encounter department: [facility] [address]   :  Assessment & Plan  Sick sinus syndrome (HCC)  Stable,   Monitor.          Primary hypertension  Stable.   Aceeptable bp       Coronary artery disease involving native coronary artery 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 pectoris  No current sytmpoms.        Paroxysmal atrial fibrillation (HCC)  Stable.          Impacted cerumen of right ear  May be causing mild balance issues.   Deborx for 4-5 days prior to next apt.   Apt next week to irrigate right ear.               History of Present Illness   Here for fu of chronic conditions.   Overall feeling well.   Feeling tired a lot.   Feels his ear is clogged and affecting his balance.   Has been putting deborx in but has not helped.       Review of Systems   Constitutional: Negative.  Negative for activity change, appetite change, chills and diaphoresis.   HENT:  Negative for congestion and dental problem.    Respiratory: Negative.  Negative for apnea, chest tightness, shortness of breath and wheezing.    Cardiovascular: Negative.  Negative for chest pain, palpitations and leg swelling.   Gastrointestinal: Negative.  Negative for abdominal distention, abdominal pain, constipation, diarrhea and nausea.   Genitourinary: Negative.  Negative for difficulty urinating, dysuria and frequency.       Objective   /78 (BP Location: Left arm, Patient Position: Sitting)   Pulse 92   Ht 5' 10\" (1.778 m)   Wt 77 kg (169 lb 12.8 oz)   SpO2 98%   BMI 24.36 kg/m²      Physical Exam  Vitals reviewed.   Constitutional:       General: He is not in acute distress.     Appearance: He is well-developed. He is not ill-appearing.   HENT:      Head: Normocephalic and atraumatic.      Right Ear: External ear normal. There is impacted cerumen.      Left Ear: Tympanic membrane, ear canal " and external ear normal.      Mouth/Throat:      Mouth: Mucous membranes are moist.      Pharynx: Oropharynx is clear.     Eyes:      Extraocular Movements: Extraocular movements intact.      Conjunctiva/sclera: Conjunctivae normal.      Pupils: Pupils are equal, round, and reactive to light.       Cardiovascular:      Rate and Rhythm: Normal rate and regular rhythm.      Heart sounds: Normal heart sounds. No murmur heard.  Pulmonary:      Effort: Pulmonary effort is normal.      Breath sounds: Normal breath sounds.   Abdominal:      General: Bowel sounds are normal. There is no distension.      Palpations: Abdomen is soft. There is no mass.      Tenderness: There is no abdominal tenderness. There is no guarding.      Hernia: No hernia is present.     Musculoskeletal:         General: Normal range of motion.      Cervical back: Normal range of motion and neck supple.     Skin:     General: Skin is warm and dry.      Capillary Refill: Capillary refill takes less than 2 seconds.     Neurological:      General: No focal deficit present.      Mental Status: He is alert and oriented to person, place, and time.     Psychiatric:         Mood and Affect: Mood normal.         Behavior: Behavior normal.

## 2025-07-24 RX ORDER — LEVOTHYROXINE SODIUM 100 UG/1
100 TABLET ORAL DAILY
Qty: 30 TABLET | Refills: 0 | Status: SHIPPED | OUTPATIENT
Start: 2025-07-24

## 2025-07-24 RX ORDER — TAMSULOSIN HYDROCHLORIDE 0.4 MG/1
0.4 CAPSULE ORAL 2 TIMES DAILY
Qty: 90 CAPSULE | Refills: 1 | Status: SHIPPED | OUTPATIENT
Start: 2025-07-24

## 2025-07-25 ENCOUNTER — TELEPHONE (OUTPATIENT)
Dept: CARDIOLOGY CLINIC | Facility: CLINIC | Age: OVER 89
End: 2025-07-25

## 2025-07-29 ENCOUNTER — OFFICE VISIT (OUTPATIENT)
Dept: FAMILY MEDICINE CLINIC | Facility: HOSPITAL | Age: OVER 89
End: 2025-07-29
Payer: COMMERCIAL

## 2025-07-29 VITALS
WEIGHT: 169 LBS | DIASTOLIC BLOOD PRESSURE: 78 MMHG | OXYGEN SATURATION: 97 % | HEIGHT: 70 IN | HEART RATE: 72 BPM | BODY MASS INDEX: 24.2 KG/M2 | SYSTOLIC BLOOD PRESSURE: 142 MMHG

## 2025-07-29 DIAGNOSIS — H61.23 IMPACTED CERUMEN OF BOTH EARS: Primary | ICD-10-CM

## 2025-07-29 PROCEDURE — 99213 OFFICE O/P EST LOW 20 MIN: CPT | Performed by: FAMILY MEDICINE

## 2025-07-29 PROCEDURE — 69210 REMOVE IMPACTED EAR WAX UNI: CPT | Performed by: FAMILY MEDICINE

## (undated) DEVICE — FILTER SMOKE EVAC VIROSAFE

## (undated) DEVICE — OASIS DRAIN, SINGLE, INLINE & ATS COMPATIBLE: Brand: OASIS

## (undated) DEVICE — PERCUTANEOUS ENTRY THINWALL NEEDLE  ONE-PART: Brand: COOK

## (undated) DEVICE — SUT PDS PLUS 1 CTB 36 IN PDPB359T

## (undated) DEVICE — BONE WAX WHITE: Brand: BONE WAX WHITE

## (undated) DEVICE — TRAY FOLEY 16FR SURESTEP TEMP SENS URIMETER STAT LOK

## (undated) DEVICE — INTENDED FOR TISSUE SEPARATION, AND OTHER PROCEDURES THAT REQUIRE A SHARP SURGICAL BLADE TO PUNCTURE OR CUT.: Brand: BARD-PARKER ® CARBON RIB-BACK BLADES

## (undated) DEVICE — RED RUBBER URETHRAL CATHETER: Brand: DOVER

## (undated) DEVICE — GAUZE SPONGES,USP TYPE VII GAUZE, 12 PLY: Brand: CURITY

## (undated) DEVICE — TR BAND RADIAL ARTERY COMPRESSION DEVICE: Brand: TR BAND

## (undated) DEVICE — SUT SILK 2 60 IN SA8H

## (undated) DEVICE — RADIFOCUS OPTITORQUE ANGIOGRAPHIC CATHETER: Brand: OPTITORQUE

## (undated) DEVICE — PLEDGET CARDIO PTFE 9.5 X 4.8 SOFT LF (6EA/PK)

## (undated) DEVICE — PINNACLE INTRODUCER SHEATH: Brand: PINNACLE

## (undated) DEVICE — RUNTHROUGH NS EXTRA FLOPPY PTCA GUIDEWIRE: Brand: RUNTHROUGH

## (undated) DEVICE — GLIDESHEATH SLENDER STAINLESS STEEL KIT: Brand: GLIDESHEATH SLENDER

## (undated) DEVICE — 3000CC GUARDIAN II: Brand: GUARDIAN

## (undated) DEVICE — SUT SILK 0 CT-1 30 IN 424H

## (undated) DEVICE — GUIDEWIRE WHOLEY HI TORQUE INTERM MOD J .035 145CM

## (undated) DEVICE — LIGHT HANDLE COVER SLEEVE DISP BLUE STELLAR

## (undated) DEVICE — STERNAL WIRE

## (undated) DEVICE — SUT ETHIBOND 2-0 SH-1/SH-1 30 IN X763H

## (undated) DEVICE — ELECTRODE BLADE E-Z CLEAN 4IN -0014A

## (undated) DEVICE — ANTIBACTERIAL UNDYED BRAIDED (POLYGLACTIN 910), SYNTHETIC ABSORBABLE SUTURE: Brand: COATED VICRYL

## (undated) DEVICE — UMBILICAL TAPE: Brand: DEROYAL

## (undated) DEVICE — SUTURE GUIDE

## (undated) DEVICE — RECIP.STERNUM SAW BLADE 34/7.5/0.7MM: Brand: AESCULAP

## (undated) DEVICE — CATH GUIDE LAUNCHER 5FR JR 4.0

## (undated) DEVICE — GLOVE SRG BIOGEL ECLIPSE 7

## (undated) DEVICE — 32 FR STRAIGHT – SOFT PVC CATHETER: Brand: PVC THORACIC CATHETERS

## (undated) DEVICE — Device: Brand: MINAMO

## (undated) DEVICE — 32 FR RIGHT ANGLE – SOFT PVC CATHETER: Brand: PVC THORACIC CATHETERS

## (undated) DEVICE — PLUMEPEN PRO 10FT

## (undated) DEVICE — DGW .035 FC J3MM 260CM TEF: Brand: EMERALD

## (undated) DEVICE — SUT SILK 3-0 SH CR/8 18 IN C013D

## (undated) DEVICE — PACK VALVE PBDS

## (undated) DEVICE — DRESSING ALLEVYN LIFE HEEL 25 X 25.2CM

## (undated) DEVICE — BALLOON EUPHORA RX 2 X 15MM

## (undated) DEVICE — AIRLIFE™ TRI-FLO™ SUCTION CATHETER: Brand: AIRLIFE™

## (undated) DEVICE — BLANKET HYPOTHERMIA ADULT GAYMAR

## (undated) DEVICE — SUT ETHIBOND 2-0 V-5/V-5 30 IN PXX52

## (undated) DEVICE — GLOVE INDICATOR PI UNDERGLOVE SZ 7 BLUE

## (undated) DEVICE — SUT PROLENE 4-0 SH 36 IN 8521H

## (undated) DEVICE — SUT VICRYL 3-0 SH 27 IN J416H

## (undated) DEVICE — THERMOFLECT BLANKET, L, 25EA                               TS THERMOFLECT BLANKET, 48" X 84", SILVER, 5/BG, 5 BG/CS NW: Brand: THERMOFLECT

## (undated) DEVICE — 40601 PROLONGED POSITIONING SYSTEM: Brand: 40601 PROLONGED POSITIONING SYSTEM

## (undated) DEVICE — SILVER-COATED ANTIBACTERIAL BARRIER DRESSING: Brand: ACTICOAT SURGIC 10X25CM 5PK US

## (undated) DEVICE — EVERGRIP INSERT SET 61MM: Brand: FOGARTY EVERGRIP

## (undated) DEVICE — SUT PROLENE 4-0 BB 36 IN 8581H